# Patient Record
Sex: MALE | Race: WHITE | Employment: OTHER | URBAN - NONMETROPOLITAN AREA
[De-identification: names, ages, dates, MRNs, and addresses within clinical notes are randomized per-mention and may not be internally consistent; named-entity substitution may affect disease eponyms.]

---

## 2017-01-08 DIAGNOSIS — M25.512 LEFT SHOULDER PAIN: ICD-10-CM

## 2017-01-10 ENCOUNTER — TELEPHONE (OUTPATIENT)
Dept: OTHER | Age: 47
End: 2017-01-10

## 2017-01-24 ENCOUNTER — OFFICE VISIT (OUTPATIENT)
Dept: PSYCHIATRY | Age: 47
End: 2017-01-24

## 2017-01-24 DIAGNOSIS — F31.32 BIPOLAR AFFECTIVE DISORDER, CURRENTLY DEPRESSED, MODERATE (HCC): Primary | ICD-10-CM

## 2017-01-24 DIAGNOSIS — F90.8 ADHD, ADULT RESIDUAL TYPE: ICD-10-CM

## 2017-01-24 PROCEDURE — 99213 OFFICE O/P EST LOW 20 MIN: CPT | Performed by: PSYCHIATRY & NEUROLOGY

## 2017-01-24 RX ORDER — METHYLPHENIDATE HYDROCHLORIDE 54 MG/1
54 TABLET ORAL DAILY
Qty: 30 TABLET | Refills: 0 | Status: SHIPPED | OUTPATIENT
Start: 2017-01-24 | End: 2017-03-06 | Stop reason: SDUPTHER

## 2017-01-24 RX ORDER — LAMOTRIGINE 200 MG/1
200 TABLET ORAL 2 TIMES DAILY
Qty: 60 TABLET | Refills: 3 | Status: SHIPPED | OUTPATIENT
Start: 2017-01-24 | End: 2017-05-17 | Stop reason: SDUPTHER

## 2017-01-25 ENCOUNTER — OFFICE VISIT (OUTPATIENT)
Dept: FAMILY MEDICINE CLINIC | Age: 47
End: 2017-01-25

## 2017-01-25 VITALS
BODY MASS INDEX: 42.69 KG/M2 | SYSTOLIC BLOOD PRESSURE: 122 MMHG | HEIGHT: 67 IN | RESPIRATION RATE: 24 BRPM | HEART RATE: 76 BPM | DIASTOLIC BLOOD PRESSURE: 70 MMHG | WEIGHT: 272 LBS | TEMPERATURE: 98.6 F

## 2017-01-25 DIAGNOSIS — R74.8 ELEVATED ALKALINE PHOSPHATASE LEVEL: ICD-10-CM

## 2017-01-25 DIAGNOSIS — F17.210 CIGARETTE NICOTINE DEPENDENCE WITHOUT COMPLICATION: Chronic | ICD-10-CM

## 2017-01-25 DIAGNOSIS — E78.2 MIXED HYPERLIPIDEMIA: ICD-10-CM

## 2017-01-25 DIAGNOSIS — E11.29 TYPE 2 DIABETES MELLITUS WITH MICROALBUMINURIA, WITHOUT LONG-TERM CURRENT USE OF INSULIN (HCC): Primary | Chronic | ICD-10-CM

## 2017-01-25 DIAGNOSIS — R80.9 TYPE 2 DIABETES MELLITUS WITH MICROALBUMINURIA, WITHOUT LONG-TERM CURRENT USE OF INSULIN (HCC): Primary | Chronic | ICD-10-CM

## 2017-01-25 LAB
CHOLESTEROL, TOTAL: 189 MG/DL
CHOLESTEROL/HDL RATIO: ABNORMAL
HDLC SERPL-MCNC: 33 MG/DL (ref 35–70)
LDL CHOLESTEROL CALCULATED: 124 MG/DL (ref 0–160)
TRIGL SERPL-MCNC: 212 MG/DL
VLDLC SERPL CALC-MCNC: 42 MG/DL

## 2017-01-25 PROCEDURE — 99214 OFFICE O/P EST MOD 30 MIN: CPT | Performed by: FAMILY MEDICINE

## 2017-01-26 ENCOUNTER — TELEPHONE (OUTPATIENT)
Dept: FAMILY MEDICINE CLINIC | Age: 47
End: 2017-01-26

## 2017-01-26 LAB
ALBUMIN SERPL-MCNC: 4.2 G/DL
ALP BLD-CCNC: 86 U/L
ALT SERPL-CCNC: 46 U/L
AST SERPL-CCNC: 39 U/L
BILIRUB SERPL-MCNC: 0.2 MG/DL (ref 0.1–1.4)
BUN BLDV-MCNC: 12 MG/DL
CALCIUM SERPL-MCNC: 9.4 MG/DL
CHLORIDE BLD-SCNC: 104 MMOL/L
CO2: 21 MMOL/L
CREAT SERPL-MCNC: 0.97 MG/DL
GFR CALCULATED: 92
GLUCOSE BLD-MCNC: 52 MG/DL
POTASSIUM SERPL-SCNC: 4.6 MMOL/L
SODIUM BLD-SCNC: 142 MMOL/L
TOTAL PROTEIN: 6.5

## 2017-01-30 ENCOUNTER — OFFICE VISIT (OUTPATIENT)
Dept: CARDIOLOGY | Age: 47
End: 2017-01-30

## 2017-01-30 VITALS
HEIGHT: 67 IN | SYSTOLIC BLOOD PRESSURE: 124 MMHG | BODY MASS INDEX: 43.22 KG/M2 | DIASTOLIC BLOOD PRESSURE: 70 MMHG | WEIGHT: 275.4 LBS | HEART RATE: 72 BPM

## 2017-01-30 DIAGNOSIS — I10 ESSENTIAL HYPERTENSION: Primary | Chronic | ICD-10-CM

## 2017-01-30 DIAGNOSIS — R06.02 SOB (SHORTNESS OF BREATH) ON EXERTION: ICD-10-CM

## 2017-01-30 DIAGNOSIS — E78.2 MIXED HYPERLIPIDEMIA: Chronic | ICD-10-CM

## 2017-01-30 DIAGNOSIS — Z98.890 S/P CARDIAC CATHETERIZATION: Chronic | ICD-10-CM

## 2017-01-30 PROCEDURE — 99213 OFFICE O/P EST LOW 20 MIN: CPT | Performed by: INTERNAL MEDICINE

## 2017-02-01 ENCOUNTER — TELEPHONE (OUTPATIENT)
Dept: FAMILY MEDICINE CLINIC | Age: 47
End: 2017-02-01

## 2017-02-01 ENCOUNTER — OFFICE VISIT (OUTPATIENT)
Dept: FAMILY MEDICINE CLINIC | Age: 47
End: 2017-02-01

## 2017-02-01 VITALS
HEART RATE: 92 BPM | HEIGHT: 67 IN | TEMPERATURE: 98.3 F | BODY MASS INDEX: 43.38 KG/M2 | SYSTOLIC BLOOD PRESSURE: 136 MMHG | WEIGHT: 276.4 LBS | DIASTOLIC BLOOD PRESSURE: 72 MMHG | RESPIRATION RATE: 20 BRPM

## 2017-02-01 DIAGNOSIS — K64.0 FIRST DEGREE HEMORRHOIDS: ICD-10-CM

## 2017-02-01 DIAGNOSIS — L02.31 ABSCESS OF BUTTOCK: Primary | ICD-10-CM

## 2017-02-01 DIAGNOSIS — F17.210 CIGARETTE NICOTINE DEPENDENCE WITHOUT COMPLICATION: Chronic | ICD-10-CM

## 2017-02-01 PROCEDURE — 99214 OFFICE O/P EST MOD 30 MIN: CPT | Performed by: FAMILY MEDICINE

## 2017-02-01 RX ORDER — BUTALBITAL, ACETAMINOPHEN AND CAFFEINE 50; 325; 40 MG/1; MG/1; MG/1
1 TABLET ORAL PRN
Qty: 60 TABLET | Refills: 0 | Status: SHIPPED | OUTPATIENT
Start: 2017-02-01 | End: 2017-02-01 | Stop reason: SDUPTHER

## 2017-02-01 RX ORDER — ROPINIROLE 2 MG/1
2 TABLET, FILM COATED ORAL EVERY EVENING
Qty: 90 TABLET | Refills: 0 | Status: SHIPPED | OUTPATIENT
Start: 2017-02-01 | End: 2018-04-18

## 2017-02-01 RX ORDER — MUPIROCIN CALCIUM 20 MG/G
CREAM TOPICAL
Qty: 1 TUBE | Refills: 0 | Status: SHIPPED | OUTPATIENT
Start: 2017-02-01 | End: 2017-03-03

## 2017-02-01 RX ORDER — BUTALBITAL, ACETAMINOPHEN AND CAFFEINE 50; 325; 40 MG/1; MG/1; MG/1
1 TABLET ORAL EVERY 6 HOURS PRN
Qty: 60 TABLET | Refills: 0 | Status: SHIPPED | OUTPATIENT
Start: 2017-02-01 | End: 2021-05-17

## 2017-02-01 RX ORDER — SULFAMETHOXAZOLE AND TRIMETHOPRIM 800; 160 MG/1; MG/1
1 TABLET ORAL 2 TIMES DAILY
Qty: 20 TABLET | Refills: 0 | Status: SHIPPED | OUTPATIENT
Start: 2017-02-01 | End: 2017-02-11

## 2017-03-06 ENCOUNTER — OFFICE VISIT (OUTPATIENT)
Dept: PSYCHIATRY | Age: 47
End: 2017-03-06

## 2017-03-06 DIAGNOSIS — F31.32 BIPOLAR AFFECTIVE DISORDER, CURRENTLY DEPRESSED, MODERATE (HCC): Primary | ICD-10-CM

## 2017-03-06 DIAGNOSIS — F90.8 ADHD, ADULT RESIDUAL TYPE: ICD-10-CM

## 2017-03-06 PROCEDURE — 99213 OFFICE O/P EST LOW 20 MIN: CPT | Performed by: PSYCHIATRY & NEUROLOGY

## 2017-03-06 RX ORDER — METHYLPHENIDATE HYDROCHLORIDE 54 MG/1
54 TABLET ORAL DAILY
Qty: 30 TABLET | Refills: 0 | Status: SHIPPED | OUTPATIENT
Start: 2017-03-06 | End: 2017-04-06

## 2017-03-08 ENCOUNTER — OFFICE VISIT (OUTPATIENT)
Dept: FAMILY MEDICINE CLINIC | Age: 47
End: 2017-03-08

## 2017-03-08 VITALS
SYSTOLIC BLOOD PRESSURE: 126 MMHG | RESPIRATION RATE: 20 BRPM | HEART RATE: 80 BPM | BODY MASS INDEX: 43.78 KG/M2 | TEMPERATURE: 97.7 F | HEIGHT: 66 IN | WEIGHT: 272.4 LBS | DIASTOLIC BLOOD PRESSURE: 66 MMHG

## 2017-03-08 DIAGNOSIS — Z00.00 VISIT FOR PREVENTIVE HEALTH EXAMINATION: Primary | ICD-10-CM

## 2017-03-08 PROCEDURE — 99396 PREV VISIT EST AGE 40-64: CPT | Performed by: FAMILY MEDICINE

## 2017-03-30 ENCOUNTER — OFFICE VISIT (OUTPATIENT)
Dept: FAMILY MEDICINE CLINIC | Age: 47
End: 2017-03-30

## 2017-03-30 VITALS
HEART RATE: 80 BPM | DIASTOLIC BLOOD PRESSURE: 78 MMHG | TEMPERATURE: 98.2 F | RESPIRATION RATE: 18 BRPM | BODY MASS INDEX: 42.82 KG/M2 | HEIGHT: 67 IN | WEIGHT: 272.8 LBS | SYSTOLIC BLOOD PRESSURE: 116 MMHG

## 2017-03-30 DIAGNOSIS — F17.210 CIGARETTE NICOTINE DEPENDENCE WITHOUT COMPLICATION: Chronic | ICD-10-CM

## 2017-03-30 DIAGNOSIS — I10 ESSENTIAL HYPERTENSION: Chronic | ICD-10-CM

## 2017-03-30 DIAGNOSIS — E11.29 TYPE 2 DIABETES MELLITUS WITH MICROALBUMINURIA, WITHOUT LONG-TERM CURRENT USE OF INSULIN (HCC): Primary | Chronic | ICD-10-CM

## 2017-03-30 DIAGNOSIS — R80.9 TYPE 2 DIABETES MELLITUS WITH MICROALBUMINURIA, WITHOUT LONG-TERM CURRENT USE OF INSULIN (HCC): Primary | Chronic | ICD-10-CM

## 2017-03-30 LAB — HBA1C MFR BLD: 6.5 %

## 2017-03-30 PROCEDURE — 99214 OFFICE O/P EST MOD 30 MIN: CPT | Performed by: FAMILY MEDICINE

## 2017-03-30 PROCEDURE — 83036 HEMOGLOBIN GLYCOSYLATED A1C: CPT | Performed by: FAMILY MEDICINE

## 2017-04-04 ENCOUNTER — TELEPHONE (OUTPATIENT)
Dept: FAMILY MEDICINE CLINIC | Age: 47
End: 2017-04-04

## 2017-04-04 DIAGNOSIS — R80.9 TYPE 2 DIABETES MELLITUS WITH MICROALBUMINURIA, WITHOUT LONG-TERM CURRENT USE OF INSULIN (HCC): Primary | Chronic | ICD-10-CM

## 2017-04-04 DIAGNOSIS — G89.29 CHRONIC LEFT SHOULDER PAIN: ICD-10-CM

## 2017-04-04 DIAGNOSIS — M25.512 CHRONIC LEFT SHOULDER PAIN: ICD-10-CM

## 2017-04-04 DIAGNOSIS — E11.29 TYPE 2 DIABETES MELLITUS WITH MICROALBUMINURIA, WITHOUT LONG-TERM CURRENT USE OF INSULIN (HCC): Primary | Chronic | ICD-10-CM

## 2017-04-04 RX ORDER — GLYBURIDE 5 MG/1
5 TABLET ORAL
Qty: 30 TABLET | Refills: 11 | Status: SHIPPED | OUTPATIENT
Start: 2017-04-04 | End: 2017-07-07

## 2017-04-06 ENCOUNTER — OFFICE VISIT (OUTPATIENT)
Dept: PSYCHIATRY | Age: 47
End: 2017-04-06

## 2017-04-06 DIAGNOSIS — F31.32 BIPOLAR AFFECTIVE DISORDER, CURRENTLY DEPRESSED, MODERATE (HCC): Primary | ICD-10-CM

## 2017-04-06 DIAGNOSIS — F90.8 ADHD, ADULT RESIDUAL TYPE: ICD-10-CM

## 2017-04-06 PROCEDURE — 99213 OFFICE O/P EST LOW 20 MIN: CPT | Performed by: PSYCHIATRY & NEUROLOGY

## 2017-04-06 RX ORDER — TRAZODONE HYDROCHLORIDE 50 MG/1
50 TABLET ORAL NIGHTLY PRN
Qty: 30 TABLET | Refills: 3 | Status: SHIPPED | OUTPATIENT
Start: 2017-04-06 | End: 2017-07-17 | Stop reason: SDUPTHER

## 2017-04-06 RX ORDER — DEXTROAMPHETAMINE SACCHARATE, AMPHETAMINE ASPARTATE, DEXTROAMPHETAMINE SULFATE AND AMPHETAMINE SULFATE 5; 5; 5; 5 MG/1; MG/1; MG/1; MG/1
20 TABLET ORAL 2 TIMES DAILY
Qty: 60 TABLET | Refills: 0 | Status: SHIPPED | OUTPATIENT
Start: 2017-04-06 | End: 2017-05-09 | Stop reason: SDUPTHER

## 2017-05-09 RX ORDER — DEXTROAMPHETAMINE SACCHARATE, AMPHETAMINE ASPARTATE, DEXTROAMPHETAMINE SULFATE AND AMPHETAMINE SULFATE 5; 5; 5; 5 MG/1; MG/1; MG/1; MG/1
20 TABLET ORAL 2 TIMES DAILY
Qty: 60 TABLET | Refills: 0 | Status: SHIPPED | OUTPATIENT
Start: 2017-05-09 | End: 2017-06-16

## 2017-05-17 ENCOUNTER — OFFICE VISIT (OUTPATIENT)
Dept: PSYCHIATRY | Age: 47
End: 2017-05-17

## 2017-05-17 DIAGNOSIS — F31.32 BIPOLAR AFFECTIVE DISORDER, CURRENTLY DEPRESSED, MODERATE (HCC): Primary | ICD-10-CM

## 2017-05-17 DIAGNOSIS — F90.8 ADHD, ADULT RESIDUAL TYPE: ICD-10-CM

## 2017-05-17 PROCEDURE — 99213 OFFICE O/P EST LOW 20 MIN: CPT | Performed by: PSYCHIATRY & NEUROLOGY

## 2017-05-17 RX ORDER — LAMOTRIGINE 200 MG/1
200 TABLET ORAL 2 TIMES DAILY
Qty: 60 TABLET | Refills: 3 | Status: SHIPPED | OUTPATIENT
Start: 2017-05-17 | End: 2017-09-18

## 2017-05-28 DIAGNOSIS — R80.9 TYPE 2 DIABETES MELLITUS WITH MICROALBUMINURIA, WITHOUT LONG-TERM CURRENT USE OF INSULIN (HCC): ICD-10-CM

## 2017-05-28 DIAGNOSIS — E11.29 TYPE 2 DIABETES MELLITUS WITH MICROALBUMINURIA, WITHOUT LONG-TERM CURRENT USE OF INSULIN (HCC): ICD-10-CM

## 2017-05-29 RX ORDER — LISINOPRIL 2.5 MG/1
TABLET ORAL
Qty: 30 TABLET | Refills: 11 | Status: SHIPPED | OUTPATIENT
Start: 2017-05-29 | End: 2018-05-22 | Stop reason: SDUPTHER

## 2017-06-16 ENCOUNTER — OFFICE VISIT (OUTPATIENT)
Dept: PSYCHIATRY | Age: 47
End: 2017-06-16

## 2017-06-16 DIAGNOSIS — F31.32 BIPOLAR AFFECTIVE DISORDER, CURRENTLY DEPRESSED, MODERATE (HCC): Primary | ICD-10-CM

## 2017-06-16 DIAGNOSIS — F90.8 ADHD, ADULT RESIDUAL TYPE: ICD-10-CM

## 2017-06-16 PROCEDURE — 99213 OFFICE O/P EST LOW 20 MIN: CPT | Performed by: PSYCHIATRY & NEUROLOGY

## 2017-06-16 RX ORDER — DEXTROAMPHETAMINE SACCHARATE, AMPHETAMINE ASPARTATE, DEXTROAMPHETAMINE SULFATE AND AMPHETAMINE SULFATE 7.5; 7.5; 7.5; 7.5 MG/1; MG/1; MG/1; MG/1
30 TABLET ORAL 2 TIMES DAILY
Qty: 60 TABLET | Refills: 0 | Status: SHIPPED | OUTPATIENT
Start: 2017-06-16 | End: 2017-07-17 | Stop reason: SDUPTHER

## 2017-06-16 RX ORDER — VILAZODONE HYDROCHLORIDE 40 MG/1
TABLET ORAL
Qty: 30 TABLET | Refills: 3 | Status: SHIPPED | OUTPATIENT
Start: 2017-06-16 | End: 2017-10-16 | Stop reason: SDUPTHER

## 2017-07-01 DIAGNOSIS — G89.29 CHRONIC LEFT SHOULDER PAIN: ICD-10-CM

## 2017-07-01 DIAGNOSIS — M25.512 CHRONIC LEFT SHOULDER PAIN: ICD-10-CM

## 2017-07-07 ENCOUNTER — OFFICE VISIT (OUTPATIENT)
Dept: FAMILY MEDICINE CLINIC | Age: 47
End: 2017-07-07

## 2017-07-07 VITALS
RESPIRATION RATE: 14 BRPM | TEMPERATURE: 98 F | HEIGHT: 66 IN | HEART RATE: 89 BPM | DIASTOLIC BLOOD PRESSURE: 70 MMHG | BODY MASS INDEX: 43.55 KG/M2 | WEIGHT: 271 LBS | SYSTOLIC BLOOD PRESSURE: 134 MMHG

## 2017-07-07 DIAGNOSIS — Z13.31 POSITIVE DEPRESSION SCREENING: ICD-10-CM

## 2017-07-07 DIAGNOSIS — F17.210 CIGARETTE NICOTINE DEPENDENCE WITHOUT COMPLICATION: Chronic | ICD-10-CM

## 2017-07-07 DIAGNOSIS — E11.29 TYPE 2 DIABETES MELLITUS WITH MICROALBUMINURIA, WITHOUT LONG-TERM CURRENT USE OF INSULIN (HCC): Primary | Chronic | ICD-10-CM

## 2017-07-07 DIAGNOSIS — R80.9 TYPE 2 DIABETES MELLITUS WITH MICROALBUMINURIA, WITHOUT LONG-TERM CURRENT USE OF INSULIN (HCC): Primary | Chronic | ICD-10-CM

## 2017-07-07 DIAGNOSIS — E66.01 MORBID OBESITY WITH BMI OF 40.0-44.9, ADULT (HCC): Chronic | ICD-10-CM

## 2017-07-07 DIAGNOSIS — F31.9 BIPOLAR 1 DISORDER (HCC): Chronic | ICD-10-CM

## 2017-07-07 LAB — HBA1C MFR BLD: 5.6 %

## 2017-07-07 PROCEDURE — 99214 OFFICE O/P EST MOD 30 MIN: CPT | Performed by: FAMILY MEDICINE

## 2017-07-07 PROCEDURE — 83036 HEMOGLOBIN GLYCOSYLATED A1C: CPT | Performed by: FAMILY MEDICINE

## 2017-07-07 RX ORDER — ATORVASTATIN CALCIUM 80 MG/1
TABLET, FILM COATED ORAL
Qty: 90 TABLET | Refills: 3 | Status: SHIPPED | OUTPATIENT
Start: 2017-07-07 | End: 2018-07-21 | Stop reason: SDUPTHER

## 2017-07-07 RX ORDER — LANCETS 30 GAUGE
EACH MISCELLANEOUS
Qty: 100 EACH | Refills: 3 | Status: SHIPPED | OUTPATIENT
Start: 2017-07-07 | End: 2019-02-05 | Stop reason: SDUPTHER

## 2017-07-07 ASSESSMENT — PATIENT HEALTH QUESTIONNAIRE - PHQ9
3. TROUBLE FALLING OR STAYING ASLEEP: 2
6. FEELING BAD ABOUT YOURSELF - OR THAT YOU ARE A FAILURE OR HAVE LET YOURSELF OR YOUR FAMILY DOWN: 2
5. POOR APPETITE OR OVEREATING: 1
2. FEELING DOWN, DEPRESSED OR HOPELESS: 2
10. IF YOU CHECKED OFF ANY PROBLEMS, HOW DIFFICULT HAVE THESE PROBLEMS MADE IT FOR YOU TO DO YOUR WORK, TAKE CARE OF THINGS AT HOME, OR GET ALONG WITH OTHER PEOPLE: 2
8. MOVING OR SPEAKING SO SLOWLY THAT OTHER PEOPLE COULD HAVE NOTICED. OR THE OPPOSITE, BEING SO FIGETY OR RESTLESS THAT YOU HAVE BEEN MOVING AROUND A LOT MORE THAN USUAL: 2
SUM OF ALL RESPONSES TO PHQ QUESTIONS 1-9: 15
7. TROUBLE CONCENTRATING ON THINGS, SUCH AS READING THE NEWSPAPER OR WATCHING TELEVISION: 3
9. THOUGHTS THAT YOU WOULD BE BETTER OFF DEAD, OR OF HURTING YOURSELF: 1
4. FEELING TIRED OR HAVING LITTLE ENERGY: 2
SUM OF ALL RESPONSES TO PHQ9 QUESTIONS 1 & 2: 2

## 2017-07-17 ENCOUNTER — OFFICE VISIT (OUTPATIENT)
Dept: PSYCHIATRY | Age: 47
End: 2017-07-17
Payer: COMMERCIAL

## 2017-07-17 DIAGNOSIS — F31.32 BIPOLAR AFFECTIVE DISORDER, CURRENTLY DEPRESSED, MODERATE (HCC): Primary | ICD-10-CM

## 2017-07-17 DIAGNOSIS — F90.8 ADHD, ADULT RESIDUAL TYPE: ICD-10-CM

## 2017-07-17 PROCEDURE — 99213 OFFICE O/P EST LOW 20 MIN: CPT | Performed by: PSYCHIATRY & NEUROLOGY

## 2017-07-17 RX ORDER — DEXTROAMPHETAMINE SACCHARATE, AMPHETAMINE ASPARTATE, DEXTROAMPHETAMINE SULFATE AND AMPHETAMINE SULFATE 7.5; 7.5; 7.5; 7.5 MG/1; MG/1; MG/1; MG/1
30 TABLET ORAL 2 TIMES DAILY
Qty: 60 TABLET | Refills: 0 | Status: SHIPPED | OUTPATIENT
Start: 2017-07-17 | End: 2017-09-18 | Stop reason: SDUPTHER

## 2017-07-17 RX ORDER — TRAZODONE HYDROCHLORIDE 50 MG/1
50 TABLET ORAL NIGHTLY PRN
Qty: 30 TABLET | Refills: 3 | Status: SHIPPED | OUTPATIENT
Start: 2017-07-17 | End: 2017-08-17 | Stop reason: SDUPTHER

## 2017-07-27 DIAGNOSIS — E78.1 HYPERTRIGLYCERIDEMIA: Chronic | ICD-10-CM

## 2017-07-27 RX ORDER — FENOFIBRATE 160 MG/1
TABLET ORAL
Qty: 30 TABLET | Refills: 11 | Status: SHIPPED | OUTPATIENT
Start: 2017-07-27 | End: 2018-07-21 | Stop reason: SDUPTHER

## 2017-08-17 ENCOUNTER — OFFICE VISIT (OUTPATIENT)
Dept: PSYCHIATRY | Age: 47
End: 2017-08-17
Payer: COMMERCIAL

## 2017-08-17 ENCOUNTER — TELEPHONE (OUTPATIENT)
Dept: FAMILY MEDICINE CLINIC | Age: 47
End: 2017-08-17

## 2017-08-17 DIAGNOSIS — M79.7 FIBROMYALGIA: Primary | ICD-10-CM

## 2017-08-17 DIAGNOSIS — F31.32 BIPOLAR AFFECTIVE DISORDER, CURRENTLY DEPRESSED, MODERATE (HCC): Primary | ICD-10-CM

## 2017-08-17 DIAGNOSIS — F90.8 ADHD, ADULT RESIDUAL TYPE: ICD-10-CM

## 2017-08-17 PROCEDURE — 99213 OFFICE O/P EST LOW 20 MIN: CPT | Performed by: PSYCHIATRY & NEUROLOGY

## 2017-08-17 RX ORDER — GUANFACINE 1 MG/1
1 TABLET ORAL NIGHTLY
Qty: 30 TABLET | Refills: 3 | Status: SHIPPED | OUTPATIENT
Start: 2017-08-17 | End: 2018-01-17

## 2017-08-17 RX ORDER — TRAZODONE HYDROCHLORIDE 50 MG/1
50 TABLET ORAL NIGHTLY PRN
Qty: 30 TABLET | Refills: 3 | Status: SHIPPED | OUTPATIENT
Start: 2017-08-17 | End: 2018-03-21 | Stop reason: SDUPTHER

## 2017-09-05 ENCOUNTER — TELEPHONE (OUTPATIENT)
Dept: PSYCHIATRY | Age: 47
End: 2017-09-05

## 2017-09-18 ENCOUNTER — OFFICE VISIT (OUTPATIENT)
Dept: PSYCHIATRY | Age: 47
End: 2017-09-18
Payer: COMMERCIAL

## 2017-09-18 DIAGNOSIS — F31.32 BIPOLAR AFFECTIVE DISORDER, CURRENTLY DEPRESSED, MODERATE (HCC): Primary | ICD-10-CM

## 2017-09-18 DIAGNOSIS — F90.8 ADHD, ADULT RESIDUAL TYPE: ICD-10-CM

## 2017-09-18 PROCEDURE — 99213 OFFICE O/P EST LOW 20 MIN: CPT | Performed by: PSYCHIATRY & NEUROLOGY

## 2017-09-18 RX ORDER — LAMOTRIGINE 25 MG/1
TABLET ORAL
Qty: 42 TABLET | Refills: 0 | Status: SHIPPED | OUTPATIENT
Start: 2017-09-18 | End: 2017-10-16

## 2017-09-18 RX ORDER — DEXTROAMPHETAMINE SACCHARATE, AMPHETAMINE ASPARTATE, DEXTROAMPHETAMINE SULFATE AND AMPHETAMINE SULFATE 7.5; 7.5; 7.5; 7.5 MG/1; MG/1; MG/1; MG/1
30 TABLET ORAL 2 TIMES DAILY
Qty: 60 TABLET | Refills: 0 | Status: SHIPPED | OUTPATIENT
Start: 2017-09-18 | End: 2017-10-16 | Stop reason: SDUPTHER

## 2017-09-25 ENCOUNTER — TELEPHONE (OUTPATIENT)
Dept: FAMILY MEDICINE CLINIC | Age: 47
End: 2017-09-25

## 2017-09-25 DIAGNOSIS — E11.29 TYPE 2 DIABETES MELLITUS WITH MICROALBUMINURIA, WITHOUT LONG-TERM CURRENT USE OF INSULIN (HCC): Primary | Chronic | ICD-10-CM

## 2017-09-25 DIAGNOSIS — R80.9 TYPE 2 DIABETES MELLITUS WITH MICROALBUMINURIA, WITHOUT LONG-TERM CURRENT USE OF INSULIN (HCC): Primary | Chronic | ICD-10-CM

## 2017-10-04 ENCOUNTER — OFFICE VISIT (OUTPATIENT)
Dept: RHEUMATOLOGY | Age: 47
End: 2017-10-04
Payer: COMMERCIAL

## 2017-10-04 ENCOUNTER — TELEPHONE (OUTPATIENT)
Dept: FAMILY MEDICINE CLINIC | Age: 47
End: 2017-10-04

## 2017-10-04 VITALS
WEIGHT: 266 LBS | DIASTOLIC BLOOD PRESSURE: 85 MMHG | SYSTOLIC BLOOD PRESSURE: 141 MMHG | RESPIRATION RATE: 16 BRPM | HEIGHT: 66 IN | BODY MASS INDEX: 42.75 KG/M2 | HEART RATE: 98 BPM

## 2017-10-04 DIAGNOSIS — M54.41 CHRONIC MIDLINE LOW BACK PAIN WITH BILATERAL SCIATICA: ICD-10-CM

## 2017-10-04 DIAGNOSIS — G89.29 CHRONIC MIDLINE LOW BACK PAIN WITH BILATERAL SCIATICA: ICD-10-CM

## 2017-10-04 DIAGNOSIS — M54.42 CHRONIC MIDLINE LOW BACK PAIN WITH BILATERAL SCIATICA: ICD-10-CM

## 2017-10-04 DIAGNOSIS — M25.50 POLYARTHRALGIA: Primary | ICD-10-CM

## 2017-10-04 LAB
AVERAGE GLUCOSE: NORMAL
HBA1C MFR BLD: 6.2 %

## 2017-10-04 PROCEDURE — 99244 OFF/OP CNSLTJ NEW/EST MOD 40: CPT | Performed by: INTERNAL MEDICINE

## 2017-10-04 RX ORDER — RANITIDINE 150 MG/1
150 TABLET ORAL NIGHTLY
COMMUNITY
End: 2020-10-13

## 2017-10-04 ASSESSMENT — ENCOUNTER SYMPTOMS
DOUBLE VISION: 0
CONSTIPATION: 0
ORTHOPNEA: 0
BACK PAIN: 1
ABDOMINAL PAIN: 1
NAUSEA: 1
SHORTNESS OF BREATH: 1
EYE DISCHARGE: 0
PHOTOPHOBIA: 0
BLURRED VISION: 1
COUGH: 1
DIARRHEA: 0
WHEEZING: 1
BLOOD IN STOOL: 0
EYE PAIN: 0
HEARTBURN: 1

## 2017-10-04 NOTE — PROGRESS NOTES
knees. Ankle swellign more dependent. - AM stiffness lasting about 4 hours, improved w/ movement. + gelling  - Persistent fatigue/tiredness, + insomnia (getting to sleep and staying asleep), non-restorative sleep.   - weakness along the left side since his TIA's per pt, Rt knee and ankle from Tarsal tunnel per pt.   - myalgia's shoulder, arms, wrist, lower back, posterior thighs, calfs and ankles. - tingling of the bilateral hands and toes. Hands worse with lifting, writing, prolonged driving or holding of cellphone. Hand numbness/tingling waking  - not currently exercising. Reported increased pain after exercising.     - Denies Photosenstivity, Rash, dry mouth/dry eyes, oral/nasal sores, Raynaud's, digital ulcerations, skin tightening, renal disease, foamy urination, hematuria, sz's, blood clots, AIHA, leukpenia/lymphopenia, thrombocytopenia, hair loss, serositis, arthritis. enthesitis, dactylitis, nail changes, hx of STD,  personal or family history of Psoriatic arthritis, psoriasis, ank spond,       Cancer screening:up to date   Travel: denies   Exposure(s): denies   FmHX: Rheumatoid arthritis: mother and father   ROS:  Review of Systems   Constitutional: Positive for weight loss (intentional). Negative for malaise/fatigue. HENT: Positive for congestion and tinnitus. Eyes: Positive for blurred vision. Negative for double vision, photophobia, pain and discharge. Respiratory: Positive for cough (mainly at night), shortness of breath (mainly on exertion) and wheezing. Cardiovascular: Positive for leg swelling. Negative for chest pain, palpitations, orthopnea and PND. Gastrointestinal: Positive for abdominal pain, heartburn and nausea. Negative for blood in stool, constipation, diarrhea and melena. Genitourinary: Positive for frequency. Musculoskeletal: Positive for back pain, myalgias and neck pain. Skin: Negative. Neurological: Positive for tingling and weakness.    Endo/Heme/Allergies: Positive for polydipsia. Psychiatric/Behavioral: Positive for depression and suicidal ideas (denies plans currently). Negative for substance abuse. The patient is nervous/anxious and has insomnia. PAST MEDICAL HISTORY  Past Medical History:   Diagnosis Date    ADHD (attention deficit hyperactivity disorder)     Allergic rhinitis     Benign esophageal stricture     s/p dilation may 2014    Bipolar 1 disorder (HCC)     BPH (benign prostatic hypertrophy)     CAD (coronary artery disease)     Unclear history, cath 2013 with patent arteries. Records pending.  Chronic back pain     Depression     DM2 (diabetes mellitus, type 2) ([facility] [address]     Dysfunctional voiding of urine 6/20/2013    Medications he changes to follow,  Adding ditropan  to the present schedule     ED (erectile dysfunction) of organic origin 5/1/2013    Fibromyalgia     GERD (gastroesophageal reflux disease)     History of colon polyps     Pt unsure when last colo.  History of TIA (transient ischemic attack)     x2     Hyperlipidemia     Hypertension     Hypertriglyceridemia 10/27/2015    Migraine headache     Morbid obesity with BMI of 40.0-44.9, adult ([facility])     Nicotine dependence     Obstructive sleep apnea on CPAP 11/3/2015    Peripheral neuropathy ([facility])     Pulmonary nodule     stable >2 years, no further w/u required.  Rotator cuff injury     Left side    S/P cardiac catheterization: 9/30/2013: Normal coronaries 9/30/2013 9/30/2013: Normal coronaries. Radial approach.  Dr. Ralston Jeans Short-segment Vásquez's esophagus 6/17/2015    Tarsal tunnel syndrome     Bilateral    Vertigo        SOCIAL HISTORY  Social History     Social History    Marital status:      Spouse name: N/A    Number of children: N/A    Years of education: N/A     Social History Main Topics    Smoking status: Current Some Day Smoker     Packs/day: 0.25     Years: 30.00     Types: Cigarettes     Start date: 2/1/1984    Smokeless lurasidone (LATUDA) 80 MG TABS tablet Take 1 tablet by mouth 2 times daily 60 tablet 3    Lancets MISC Check sugars once daily 100 each 3    glucose blood VI test strips (ACURA BLOOD GLUCOSE TEST) strip Check sugars daily 100 each 3    atorvastatin (LIPITOR) 80 MG tablet TAKE 1 TABLET BY MOUTH EVERY DAY 90 tablet 3    piroxicam (FELDENE) 20 MG capsule TAKE 1 CAPSULE BY MOUTH ONE TIME A DAY AS NEEDED FOR PAIN 30 capsule 5    vilazodone HCl (VILAZODONE HCL) 40 MG TABS Take 1/2 tablet daily by mouth, with food, for 1 week, then increase to the whole tablet daily 30 tablet 3    lisinopril (PRINIVIL;ZESTRIL) 2.5 MG tablet TAKE 1 TABLET BY MOUTH ONE TIME A DAY  30 tablet 11    metFORMIN (GLUCOPHAGE) 1000 MG tablet Take 1 tablet by mouth 2 times daily (with meals) 60 tablet 11    rOPINIRole (REQUIP) 2 MG tablet Take 1 tablet by mouth every evening Patient is to take 3 hours before bedtime 90 tablet 0    butalbital-acetaminophen-caffeine (FIORICET) -40 MG per tablet Take 1 tablet by mouth every 6 hours as needed for Migraine 60 tablet 0    omeprazole (PRILOSEC) 20 MG delayed release capsule Take 1 capsule by mouth 2 times daily (before meals) (Patient taking differently: Take 40 mg by mouth Daily ) 180 capsule 3    cetirizine (ZYRTEC) 10 MG tablet       EYE ITCH RELIEF 0.025 % ophthalmic solution       propranolol (INDERAL LA) 160 MG ER capsule Take 160 mg by mouth daily       fluticasone (FLONASE) 50 MCG/ACT nasal spray 1 spray by Nasal route daily as needed.  gabapentin (NEURONTIN) 800 MG tablet   Take 800 mg by mouth 4 times daily        No current facility-administered medications for this visit. Objective:  BP (!) 141/85  Pulse 98  Resp 16  Ht 5' 6.14\" (1.68 m)  Wt 266 lb (120.7 kg)  BMI 42.75 kg/m2    General: No distress. Alert. Eyes: P ERRL. No sclera icterus. No conjunctival injection. ENT: No discharge. Pharynx clear. Neck: Trachea midline. Normal thyroid.   Resp: No 01/26/2017    CREATININE 0.97 01/26/2017    ALT 46 01/26/2017    AST 39 01/26/2017       HgBA1c: No components found for: HGBA1C    Lab Results   Component Value Date    TSH 3.660 12/03/2016     No results found for: VITD25      No results found for: KIYA  No components found for: SSAABIGGREF  No components found for: SSBABIGGREF  No components found for: SMITHABIGGAR  No results found for: DSDNAAB   No results found for: C3, C4  No components found for: CYCCITPEPIGG  No results found for: RF    No components found for: CANCASCRN, APANCASCRN  No results found for: SEDRATE  No results found for: CRP    RADIOLOGY:   XR left shoulder: 4/15/16: IMPRESSION: Mild hypertrophic spurring greater tuberosity. Otherwise normal shoulder. XR left wrist: no abnormalities    XR lumbar spine: 4/7/14: no abnormalities noted. Assessment/ Plan:  There are no diagnoses linked to this encounter. polyarthralgia  Pt reports having injury of the lower back in 1998 w/  after lifting at work. Since this time he has developed generalized joint pain, and myalgia that have progressively worsened. rheumatologist evaluation 1999 dx'ed with Fibromyalgia and referred to physical therapy. Sx's have continued to progressively worsened. Pain now affecting the finger (MCPs, PIPs), wrist, elbows, shoulder, hips, knee, ankles, toe, neck and back. Most severe pain in the lower back. Pain on a scale 0-10: 8/10, pain ranges 3-9/10. Type of pain: constant, localized, aching pain with occasional sharp pain in the finger, knee,s ankles, and back. L > Rt legs shooting pains into the feet reported. Timing: mornings. AM stiffness lasting about 4 hours, improved w/ movement. + gelling.  myalgia's shoulder, arms, wrist, lower back, posterior thighs, calfs and ankles. FMHX of RA in mother and father.    - Exam withOUT synovitis, genearlized joint tenderness hands, arms, shoulder, hips, knees, ankles, toes. Left arm weakness noted.  (+) tinnel and phalen phalen and tinnel testing.    - Prior tx:  CTS release around 2002.    - evaluation for underlying inflammatory conditions such as RA, SLE, Etc    - discussed repeating the EMG/NCV   - report EMG/NCV at [facility] ~ 3 years ago       Chronic back pain:   - reports having injury of the lower back in 1998 w/  after lifting at work. constant, localized, aching pain with occasional sharp pain . L > Rt legs shooting pains into the feet reported. Exam with tenderness of the lumbosacral spine, (+) LBP with Left SLR. Intact DTR's, muscle strength in bilateral legs. Prior evaluation by [facility])  - CT lumbar spine 2014 w/o abnormalities. - Previous therapy: physical therapy. Lumbar and cervical epidural injections. Elavil (no relief). Tizanidine (? Relief)   - Current therapy: Adderall, lamictal, trazadone, latuda, Lipitor, asa, vilazodone, requip 2 mg, fioricet, Prilosec, Zyrtec, gabapentin 800mg qid., TENS unit. - could attempt addition of SNRI such as cymbalta but would defer to PCP and psychiatrist.    - other tx options that my be helpful include wt loss, pain medication but will defer this to his PCP        Return in about 3 months (around 1/4/2018). Electronically signed by Yared Hernandez DO on 10/4/2017 at 8:58 AM      Thank you for allowing me to participate in the care of this patient. Please call if there are any questions.

## 2017-10-04 NOTE — MR AVS SNAPSHOT
After Visit Summary             Meena Venegas   10/4/2017 8:45 AM   Office Visit    Description:  Male : 1970   Provider:  Raul Glass DO   Department:  [facility]) Rheumatology              Your Follow-Up and Future Appointments         Below is a list of your follow-up and future appointments. This may not be a complete list as you may have made appointments directly with providers that we are not aware of or your providers may have made some for you. Please call your providers to confirm appointments. It is important to keep your appointments. Please bring your current insurance card, photo ID, co-pay, and all medication bottles to your appointment. If self-pay, payment is expected at the time of service. Your To-Do List     Future Appointments Provider Department Dept Phone    10/12/2017 7:45 AM MD SHREYA Fernandez AM, II.[facility] Urology [phone]    Please arrive 15 minutes prior to appointment, bring photo ID and insurance card. Please arrive 15 minutes prior to appointment, bring photo ID and insurance card. 2018 7:40 AM Vincent Christie DO [address] [place] [phone]    Please arrive 15 minutes prior to appointment, bring photo ID and insurance card. 2018 7:45 AM [facility] [address] Rheumatology [phone]    Please arrive 15 minutes prior to appointment, bring photo ID and insurance card.     Future Orders Complete By Expires    Anti SSA [IBD9947 Custom]  10/4/2017 10/4/2018    Anti SSB [BXO6217 Custom]  10/4/2017     Basic Metabolic Panel [CNW67 Custom]  10/4/2017 10/4/2018    C-Reactive Protein [LZM899 Custom]  10/4/2017 10/4/2018    Uric Acid [XMO647 Custom]  10/4/2017 10/4/2018    CBC Auto Differential [WOP3904 Custom]  10/11/2017 10/4/2018    KIYA [NUF899 Custom]  10/18/2017 90/3/8850    Cyclic Citrul Peptide Antibody, IgG [JHB940 Custom]  10/18/2017 10/4/2018    Rheumatoid Factor [IDH748 Custom]  10/18/2017 10/4/2018 Sedimentation Rate [DVW2423 Custom]  10/18/2017 10/4/2018    Follow-Up    Return in about 3 months (around 1/4/2018). Information from Your Visit        Department     Name Address Phone Fax    [address] [facility] [address] [phone]      You Were Seen for:         Comments    Polyarthralgia   [138759]         Vital Signs     Blood Pressure Pulse Respirations Height Weight Body Mass Index    141/85 98 16 5' 6.14\" (1.68 m) 266 lb (120.7 kg) 42.75 kg/m2    Smoking Status                   Current Some Day Smoker           Additional Information about your Body Mass Index (BMI)           Your BMI as listed above is considered obese (30 or more). BMI is an estimate of body fat, calculated from your height and weight. The higher your BMI, the greater your risk of heart disease, high blood pressure, type 2 diabetes, stroke, gallstones, arthritis, sleep apnea, and certain cancers. BMI is not perfect. It may overestimate body fat in athletes and people who are more muscular. Even a small weight loss (between 5 and 10 percent of your current weight) by decreasing your calorie intake and becoming more physically active will help lower your risk of developing or worsening diseases associated with obesity. Learn more at: [employer]             Today's Medication Changes          These changes are accurate as of: 10/4/17 11:02 AM.  If you have any questions, ask your nurse or doctor.                STOP taking these medications           aspirin 81 MG tablet   Stopped by:  Gisell Ocasio DO       bisacodyl 5 MG EC tablet   Commonly known as:  DULCOLAX   Stopped by:  Gisell Ocasio DO       fluocinonide 0.05 % cream   Commonly known as:  LIDEX   Stopped by:  Gisell Ocasio DO       polyethylene glycol powder   Commonly known as:  GLYCOLAX   Stopped by:  Gisell Ocasio DO       tamsulosin 0.4 MG capsule gabapentin (NEURONTIN) 800 MG tablet   Take 800 mg by mouth 4 times daily       Allergies           No Known Allergies         Additional Information        Basic Information     Date Of Birth Sex Race Ethnicity Preferred Language    1970 Male White Non-/Non  English      Problem List as of 10/4/2017  Date Reviewed: 9/18/2017                Obstructive sleep apnea on CPAP (Chronic)    DM2 (diabetes mellitus, type 2) (HCC) (Chronic)    Left wrist sprain    BPH (benign prostatic hypertrophy) (Chronic)    Morbid obesity with BMI of 40.0-44.9, adult (HCC) (Chronic)    SOB (shortness of breath) on exertion    Tinnitus    Left shoulder pain    Hypertriglyceridemia (Chronic)    BPPV (benign paroxysmal positional vertigo)    Short-segment Vásquez's esophagus (Chronic)    Pulmonary nodule (Chronic)    Bipolar 1 disorder (HCC) (Chronic)    CAD (coronary artery disease) (Chronic)    History of TIA (transient ischemic attack) (Chronic)    Hypertension (Chronic)    Fibromyalgia (Chronic)    Depression (Chronic)    History of colon polyps (Chronic)    Chronic back pain (Chronic)    ADHD (attention deficit hyperactivity disorder) (Chronic)    Nicotine dependence (Chronic)    Peripheral neuropathy (HCC) (Chronic)    Tarsal tunnel syndrome (Chronic)    Benign esophageal stricture (Chronic)    Hyperlipidemia (Chronic)    Allergic rhinitis (Chronic)    Migraine headache (Chronic)    Dysphagia s/p dilation.  Follows with [facility] (Chronic)    GERD (gastroesophageal reflux disease) (Chronic)    S/P cardiac catheterization: 9/30/2013: Normal coronaries (Chronic)    Dysfunctional voiding of urine (Chronic)    ED (erectile dysfunction) of organic origin (Chronic)      Immunizations as of 10/4/2017     Name Date    Influenza Vaccine, unspecified formulation 9/12/2016    Influenza Virus Vaccine 9/29/2015, 10/27/2014    Nishant, Devan Campbell, 3 Years and older, IM 9/30/2016    Pneumococcal 13-valent Conjugate (Reginald Handsome) 9/3/2015

## 2017-10-04 NOTE — TELEPHONE ENCOUNTER
----- Message from Latosha Barron DO sent at 10/4/2017 10:29 AM EDT -----  Please let pt know that a1c great, con't current diabetes treatment, thanks!

## 2017-10-16 ENCOUNTER — OFFICE VISIT (OUTPATIENT)
Dept: PSYCHIATRY | Age: 47
End: 2017-10-16
Payer: COMMERCIAL

## 2017-10-16 DIAGNOSIS — F90.8 ADHD, ADULT RESIDUAL TYPE: ICD-10-CM

## 2017-10-16 DIAGNOSIS — F31.32 BIPOLAR AFFECTIVE DISORDER, CURRENTLY DEPRESSED, MODERATE (HCC): Primary | ICD-10-CM

## 2017-10-16 PROCEDURE — 99213 OFFICE O/P EST LOW 20 MIN: CPT | Performed by: PSYCHIATRY & NEUROLOGY

## 2017-10-16 RX ORDER — VILAZODONE HYDROCHLORIDE 40 MG/1
TABLET ORAL
Qty: 30 TABLET | Refills: 5 | Status: SHIPPED | OUTPATIENT
Start: 2017-10-16 | End: 2018-02-22

## 2017-10-16 RX ORDER — DEXTROAMPHETAMINE SACCHARATE, AMPHETAMINE ASPARTATE, DEXTROAMPHETAMINE SULFATE AND AMPHETAMINE SULFATE 7.5; 7.5; 7.5; 7.5 MG/1; MG/1; MG/1; MG/1
30 TABLET ORAL 2 TIMES DAILY
Qty: 60 TABLET | Refills: 0 | Status: SHIPPED | OUTPATIENT
Start: 2017-10-16 | End: 2017-11-17 | Stop reason: SDUPTHER

## 2017-10-16 RX ORDER — LAMOTRIGINE 100 MG/1
TABLET ORAL
Qty: 60 TABLET | Refills: 0 | Status: SHIPPED | OUTPATIENT
Start: 2017-10-16 | End: 2017-11-17 | Stop reason: SDUPTHER

## 2017-10-16 NOTE — PROGRESS NOTES
tablet Take 1 tablet by mouth 2 times daily .  60 tablet 0    lamoTRIgine (LAMICTAL) 100 MG tablet After completing the smaller size, take 1 by mouth daily for seven days, then increase to 2 daily 60 tablet 0    vilazodone HCl (VILAZODONE HCL) 40 MG TABS Take 1 by mouth daily 30 tablet 5    ranitidine (ZANTAC) 150 MG tablet Take 150 mg by mouth nightly Take 1 tab at bedtime      guanFACINE (TENEX) 1 MG tablet Take 1 tablet by mouth nightly 30 tablet 3    traZODone (DESYREL) 50 MG tablet Take 1 tablet by mouth nightly as needed for Sleep 30 tablet 3    fenofibrate 160 MG tablet TAKE 1 TABLET BY MOUTH ONE TIME A DAY  30 tablet 11    lurasidone (LATUDA) 80 MG TABS tablet Take 1 tablet by mouth 2 times daily 60 tablet 3    Lancets MISC Check sugars once daily 100 each 3    glucose blood VI test strips (ACURA BLOOD GLUCOSE TEST) strip Check sugars daily 100 each 3    atorvastatin (LIPITOR) 80 MG tablet TAKE 1 TABLET BY MOUTH EVERY DAY 90 tablet 3    piroxicam (FELDENE) 20 MG capsule TAKE 1 CAPSULE BY MOUTH ONE TIME A DAY AS NEEDED FOR PAIN 30 capsule 5    lisinopril (PRINIVIL;ZESTRIL) 2.5 MG tablet TAKE 1 TABLET BY MOUTH ONE TIME A DAY  30 tablet 11    metFORMIN (GLUCOPHAGE) 1000 MG tablet Take 1 tablet by mouth 2 times daily (with meals) 60 tablet 11    rOPINIRole (REQUIP) 2 MG tablet Take 1 tablet by mouth every evening Patient is to take 3 hours before bedtime 90 tablet 0    butalbital-acetaminophen-caffeine (FIORICET) -40 MG per tablet Take 1 tablet by mouth every 6 hours as needed for Migraine 60 tablet 0    omeprazole (PRILOSEC) 20 MG delayed release capsule Take 1 capsule by mouth 2 times daily (before meals) (Patient taking differently: Take 40 mg by mouth Daily ) 180 capsule 3    cetirizine (ZYRTEC) 10 MG tablet       EYE ITCH RELIEF 0.025 % ophthalmic solution       propranolol (INDERAL LA) 160 MG ER capsule Take 160 mg by mouth daily       fluticasone (FLONASE) 50 MCG/ACT nasal spray 1

## 2017-11-15 ENCOUNTER — OFFICE VISIT (OUTPATIENT)
Dept: FAMILY MEDICINE CLINIC | Age: 47
End: 2017-11-15
Payer: COMMERCIAL

## 2017-11-15 VITALS
SYSTOLIC BLOOD PRESSURE: 132 MMHG | WEIGHT: 262 LBS | RESPIRATION RATE: 18 BRPM | DIASTOLIC BLOOD PRESSURE: 74 MMHG | HEIGHT: 67 IN | HEART RATE: 76 BPM | TEMPERATURE: 97.2 F | BODY MASS INDEX: 41.12 KG/M2

## 2017-11-15 DIAGNOSIS — F17.210 CIGARETTE NICOTINE DEPENDENCE WITHOUT COMPLICATION: Chronic | ICD-10-CM

## 2017-11-15 DIAGNOSIS — M79.672 BILATERAL FOOT PAIN: ICD-10-CM

## 2017-11-15 DIAGNOSIS — Z23 NEED FOR INFLUENZA VACCINATION: ICD-10-CM

## 2017-11-15 DIAGNOSIS — M79.671 BILATERAL FOOT PAIN: ICD-10-CM

## 2017-11-15 DIAGNOSIS — I10 ESSENTIAL HYPERTENSION: Chronic | ICD-10-CM

## 2017-11-15 DIAGNOSIS — R20.0 NUMBNESS OF FOOT: Primary | ICD-10-CM

## 2017-11-15 PROCEDURE — 90686 IIV4 VACC NO PRSV 0.5 ML IM: CPT | Performed by: FAMILY MEDICINE

## 2017-11-15 PROCEDURE — 90471 IMMUNIZATION ADMIN: CPT | Performed by: FAMILY MEDICINE

## 2017-11-15 PROCEDURE — G8417 CALC BMI ABV UP PARAM F/U: HCPCS | Performed by: FAMILY MEDICINE

## 2017-11-15 PROCEDURE — 99214 OFFICE O/P EST MOD 30 MIN: CPT | Performed by: FAMILY MEDICINE

## 2017-11-15 PROCEDURE — G8598 ASA/ANTIPLAT THER USED: HCPCS | Performed by: FAMILY MEDICINE

## 2017-11-15 PROCEDURE — G8484 FLU IMMUNIZE NO ADMIN: HCPCS | Performed by: FAMILY MEDICINE

## 2017-11-15 PROCEDURE — 4004F PT TOBACCO SCREEN RCVD TLK: CPT | Performed by: FAMILY MEDICINE

## 2017-11-15 PROCEDURE — G8427 DOCREV CUR MEDS BY ELIG CLIN: HCPCS | Performed by: FAMILY MEDICINE

## 2017-11-15 NOTE — PROGRESS NOTES
tablet 11    metFORMIN (GLUCOPHAGE) 1000 MG tablet Take 1 tablet by mouth 2 times daily (with meals) 60 tablet 11    rOPINIRole (REQUIP) 2 MG tablet Take 1 tablet by mouth every evening Patient is to take 3 hours before bedtime 90 tablet 0    butalbital-acetaminophen-caffeine (FIORICET) -40 MG per tablet Take 1 tablet by mouth every 6 hours as needed for Migraine 60 tablet 0    cetirizine (ZYRTEC) 10 MG tablet       EYE ITCH RELIEF 0.025 % ophthalmic solution       propranolol (INDERAL LA) 160 MG ER capsule Take 160 mg by mouth daily       fluticasone (FLONASE) 50 MCG/ACT nasal spray 1 spray by Nasal route daily as needed.  gabapentin (NEURONTIN) 800 MG tablet   Take 800 mg by mouth 4 times daily       omeprazole (PRILOSEC) 20 MG delayed release capsule Take 1 capsule by mouth 2 times daily (before meals) (Patient taking differently: Take 40 mg by mouth Daily ) 180 capsule 3     No current facility-administered medications for this visit. No orders of the defined types were placed in this encounter. All medications reviewed and reconciled, including OTC and herbal medications. Updated list given to patient. Patient Active Problem List    Diagnosis Date Noted    Obstructive sleep apnea on CPAP 11/03/2015     Priority: High    Numbness of foot 11/15/2017    Bilateral foot pain 11/15/2017    DM2 (diabetes mellitus, type 2) ([address]     Left wrist sprain 09/30/2016    Benign prostatic hyperplasia     SOB (shortness of breath) on exertion 06/30/2016    Morbid obesity with BMI of 40.0-44.9, adult (HCC)     Tinnitus 03/29/2016    Left shoulder pain 03/29/2016    Hypertriglyceridemia 10/27/2015    BPPV (benign paroxysmal positional vertigo) 09/29/2015    Short-segment Vásquez's esophagus 06/17/2015     Following with [facility] for this.  Pulmonary nodule      stable >2 years, no further w/u required.        Bipolar 1 disorder ([address]     CAD (coronary artery disease) Unclear history, cath 2013 with patent arteries. Records pending.  History of TIA (transient ischemic attack)      x2      Hypertension     Fibromyalgia     Depression     History of colon polyps     Chronic back pain     ADHD (attention deficit hyperactivity disorder)     Nicotine dependence     Peripheral neuropathy (HCC)     Tarsal tunnel syndrome     Benign esophageal stricture     Hyperlipidemia     Allergic rhinitis     Migraine headache     Dysphagia s/p dilation. Follows with Yanni Samaniego 04/01/2014    GERD (gastroesophageal reflux disease) 10/15/2013    S/P cardiac catheterization: 9/30/2013: Normal coronaries 09/30/2013 9/30/2013: Normal coronaries. Radial approach. Dr. Rubens Duncan Dysfunctional voiding of urine 06/20/2013     Medications he changes to follow,  Adding ditropan  to the present schedule      ED (erectile dysfunction) of organic origin 05/01/2013         Past Medical History:   Diagnosis Date    ADHD (attention deficit hyperactivity disorder)     Allergic rhinitis     Benign esophageal stricture     s/p dilation may 2014    Benign prostatic hyperplasia     Bipolar 1 disorder ([address]     CAD (coronary artery disease)     Unclear history, cath 2013 with patent arteries. Records pending.  Chronic back pain     Depression     DM2 (diabetes mellitus, type 2) ([address]     Dysfunctional voiding of urine 6/20/2013    Medications he changes to follow,  Adding ditropan  to the present schedule     ED (erectile dysfunction) of organic origin 5/1/2013    Fibromyalgia     GERD (gastroesophageal reflux disease)     History of colon polyps     Pt unsure when last colo.      History of TIA (transient ischemic attack)     x2     Hyperlipidemia     Hypertension     Hypertriglyceridemia 10/27/2015    Migraine headache     Morbid obesity with BMI of 40.0-44.9, adult (HCC)     Nicotine dependence     Obstructive sleep apnea on CPAP 11/3/2015    Peripheral neuropathy ([address]  Pulmonary nodule     stable >2 years, no further w/u required.  Rotator cuff injury     Left side    S/P cardiac catheterization: 9/30/2013: Normal coronaries 9/30/2013 9/30/2013: Normal coronaries. Radial approach. Dr. Graciela Leggett Short-segment Vásquez's esophagus 6/17/2015    Tarsal tunnel syndrome     Bilateral    Vertigo          Past Surgical History:   Procedure Laterality Date    CARDIAC CATHETERIZATION  10/2013    9/30/2013: Normal coronaries. Radial approach. Dr. Graciela Leggett COLONOSCOPY  2008, 2012   [facility] KNEE SURGERY Left 2012    ROTATOR CUFF REPAIR Left 2013   [facility] SHOULDER SURGERY Bilateral 2011   [facility] SINUS SURGERY  2004    [place] occ.  TONSILLECTOMY  2002    TYMPANOSTOMY TUBE PLACEMENT      UPPER GASTROINTESTINAL ENDOSCOPY  2008, 2008 with dilation, 2012    UPPER GASTROINTESTINAL ENDOSCOPY  2013    UPPER GASTROINTESTINAL ENDOSCOPY  MAY 2014, 2017    Dilation       No Known Allergies      Social History     Social History    Marital status:      Spouse name: N/A    Number of children: N/A    Years of education: N/A     Occupational History    Not on file.      Social History Main Topics    Smoking status: Current Some Day Smoker     Packs/day: 0.75     Years: 30.00     Types: Cigarettes     Start date: 2/1/1984    Smokeless tobacco: Never Used      Comment: 5cigs a week    Alcohol use No    Drug use: No    Sexual activity: No     Other Topics Concern    Not on file     Social History Narrative    No narrative on file         Family History   Problem Relation Age of Onset    Cancer Father 77     lung     Rheum Arthritis Father     Colon Cancer Maternal Aunt 61    Cancer Maternal Aunt 76     \"throat\"    Colon Cancer Maternal Uncle 48    Cancer Maternal Uncle 60     pancreatic    Cancer Paternal Aunt 52     brain    Colon Cancer Paternal Uncle 72    Cancer Paternal Uncle 61     hepatic     Prostate Cancer Paternal Uncle 61    Cancer Maternal Uncle 79 pancreatic     Ovarian Cancer Paternal Aunt 48    Rheum Arthritis Mother          I have reviewed the patient's past medical history, past surgical history, allergies, medications, social and family history and I have made updates where appropriate. Review of Systems  Positive responses are highlighted in bold    Constitutional:  Fever, Chills, Night Sweats, Fatigue, Unexpected changes in weight  HENT:  Ear pain, Tinnitus, Nosebleeds, Trouble swallowing, Hearing loss, Sore throat  Cardiovascular:  Chest Pain, Palpitations, Orthopnea, Paroxysmal Nocturnal Dyspnea  Respiratory:  Cough, Wheezing, Shortness of breath, Chest tightness, Apnea  Gastrointestinal:  Nausea, Vomiting, Diarrhea, Constipation, Heartburn, Blood in stool  Genitourinary:  Difficulty or painful urination, Flank pain, Change in frequency, Urgency  Skin:  Color change, Rash, Itching, Wound  Musculoskeletal:  Joint pain, Back pain, Gait problems, Joint swelling, Myalgias  Neurological:  Dizziness, Headaches, Presyncope, Numbness, Seizures, Tremors  Endocrine:  Heat Intolerance, Cold Intolerance, Polydipsia, Polyphagia, Polyuria      PHYSICAL EXAM:  Vitals:    11/15/17 1107   BP: 132/74   Pulse: 76   Resp: 18   Temp: 97.2 °F (36.2 °C)   TempSrc: Oral   Weight: 262 lb (118.8 kg)   Height: 5' 6.5\" (1.689 m)     Body mass index is 41.65 kg/m².   Pain Score:   4 (feet)    Wt Readings from Last 3 Encounters:   11/15/17 262 lb (118.8 kg)   10/04/17 266 lb (120.7 kg)   07/07/17 271 lb (122.9 kg)     VS Reviewed  General Appearance: A&O x 3, No acute distress,well developed and well- nourished  Eyes: pupils equal, round, and reactive to light, extraocular eye movements intact, conjunctivae and eye lids without erythema  ENT: external ear and ear canal clear bilaterally, TMs intact and regular, nose without deformity, nasal mucosa and turbinates normal without polyps, oropharynx normal, dentition is normal for age  Neck: supple and non-tender without mass,

## 2017-11-15 NOTE — PROGRESS NOTES
After obtaining consent, and per orders of  , injection of  Flu Vaccine . 5 ml given in the right deltoid by Capo [facility]. Patient instructed to report any adverse reaction to me immediately. Most recent Vaccine Information sheet dated 8/7/15 given to patient.

## 2017-11-15 NOTE — PATIENT INSTRUCTIONS
LAB INSTRUCTIONS:    Please complete labs within 2 week(s). Please fast for 8 hours prior to lab collection. The clinic will call you within 1 week of collection. If you have not heard from us within that amount of time, please call us at [phone]. Patient Education        High Blood Pressure: Care Instructions  Your Care Instructions  If your blood pressure is usually above 140/90, you have high blood pressure, or hypertension. That means the top number is 140 or higher or the bottom number is 90 or higher, or both. Despite what a lot of people think, high blood pressure usually doesn't cause headaches or make you feel dizzy or lightheaded. It usually has no symptoms. But it does increase your risk for heart attack, stroke, and kidney or eye damage. The higher your blood pressure, the more your risk increases. Your doctor will give you a goal for your blood pressure. Your goal will be based on your health and your age. An example of a goal is to keep your blood pressure below 140/90. Lifestyle changes, such as eating healthy and being active, are always important to help lower blood pressure. You might also take medicine to reach your blood pressure goal.  Follow-up care is a key part of your treatment and safety. Be sure to make and go to all appointments, and call your doctor if you are having problems. It's also a good idea to know your test results and keep a list of the medicines you take. How can you care for yourself at home? Medical treatment  · If you stop taking your medicine, your blood pressure will go back up. You may take one or more types of medicine to lower your blood pressure. Be safe with medicines. Take your medicine exactly as prescribed. Call your doctor if you think you are having a problem with your medicine. · Talk to your doctor before you start taking aspirin every day. Aspirin can help certain people lower their risk of a heart attack or stroke.  But taking aspirin isn't right for everyone, because it can cause serious bleeding. · See your doctor regularly. You may need to see the doctor more often at first or until your blood pressure comes down. · If you are taking blood pressure medicine, talk to your doctor before you take decongestants or anti-inflammatory medicine, such as ibuprofen. Some of these medicines can raise blood pressure. · Learn how to check your blood pressure at home. Lifestyle changes  · Stay at a healthy weight. This is especially important if you put on weight around the waist. Losing even 10 pounds can help you lower your blood pressure. · If your doctor recommends it, get more exercise. Walking is a good choice. Bit by bit, increase the amount you walk every day. Try for at least 30 minutes on most days of the week. You also may want to swim, bike, or do other activities. · Avoid or limit alcohol. Talk to your doctor about whether you can drink any alcohol. · Try to limit how much sodium you eat to less than 2,300 milligrams (mg) a day. Your doctor may ask you to try to eat less than 1,500 mg a day. · Eat plenty of fruits (such as bananas and oranges), vegetables, legumes, whole grains, and low-fat dairy products. · Lower the amount of saturated fat in your diet. Saturated fat is found in animal products such as milk, cheese, and meat. Limiting these foods may help you lose weight and also lower your risk for heart disease. · Do not smoke. Smoking increases your risk for heart attack and stroke. If you need help quitting, talk to your doctor about stop-smoking programs and medicines. These can increase your chances of quitting for good. When should you call for help? Call 911 anytime you think you may need emergency care. This may mean having symptoms that suggest that your blood pressure is causing a serious heart or blood vessel problem. Your blood pressure may be over 180/110.   For example, call 911 if:  · You have symptoms of a heart take.  How can you care for yourself at home? · Ask your family, friends, and coworkers for support. You have a better chance of quitting if you have help and support. · Join a support group, such as Nicotine Anonymous, for people who are trying to quit smoking. · Consider signing up for a smoking cessation program, such as the American Lung Association's Freedom from Smoking program.  · Set a quit date. Pick your date carefully so that it is not right in the middle of a big deadline or stressful time. Once you quit, do not even take a puff. Get rid of all ashtrays and lighters after your last cigarette. Clean your house and your clothes so that they do not smell of smoke. · Learn how to be a nonsmoker. Think about ways you can avoid those things that make you reach for a cigarette. ¨ Avoid situations that put you at greatest risk for smoking. For some people, it is hard to have a drink with friends without smoking. For others, they might skip a coffee break with coworkers who smoke. ¨ Change your daily routine. Take a different route to work or eat a meal in a different place. · Cut down on stress. Calm yourself or release tension by doing an activity you enjoy, such as reading a book, taking a hot bath, or gardening. · Talk to your doctor or pharmacist about nicotine replacement therapy, which replaces the nicotine in your body. You still get nicotine but you do not use tobacco. Nicotine replacement products help you slowly reduce the amount of nicotine you need. These products come in several forms, many of them available over-the-counter:  ¨ Nicotine patches  ¨ Nicotine gum and lozenges  ¨ Nicotine inhaler  · Ask your doctor about bupropion (Wellbutrin) or varenicline (Chantix), which are prescription medicines. They do not contain nicotine. They help you by reducing withdrawal symptoms, such as stress and anxiety.   · Some people find hypnosis, acupuncture, and massage helpful for ending the smoking habit.  · Eat a healthy diet and get regular exercise. Having healthy habits will help your body move past its craving for nicotine. · Be prepared to keep trying. Most people are not successful the first few times they try to quit. Do not get mad at yourself if you smoke again. Make a list of things you learned and think about when you want to try again, such as next week, next month, or next year. Where can you learn more? Go to https://[employer]. org and sign in to your [employer] account. Enter I644 in the [employer] box to learn more about \"Stopping Smoking: Care Instructions. \"     If you do not have an account, please click on the \"Sign Up Now\" link. Current as of: March 20, 2017  Content Version: 11.3  © 3576-1445 [employer]. Care instructions adapted under license by [facility] ([facility]). If you have questions about a medical condition or this instruction, always ask your healthcare professional. [place] [address] any warranty or liability for your use of this information.

## 2017-11-15 NOTE — PROGRESS NOTES
Visit Information    Have you changed or started any medications since your last visit including any over-the-counter medicines, vitamins, or herbal medicines? no   Are you having any side effects from any of your medications? -  no  Have you stopped taking any of your medications? Is so, why? -  no    Have you seen any other physician or provider since your last visit? Yes - Records Obtained  Have you had any other diagnostic tests since your last visit? No  Have you been seen in the emergency room and/or had an admission to a hospital since we last saw you? No  Have you had your routine dental cleaning in the past 6 months? no    Have you activated your [employer] account? If not, what are your barriers?  No     Patient Care Team:  Tess England DO as PCP - [address] MD Jes as Consulting Physician (Pulmonology)      Health Maintenance   Topic Date Due    HIV screen  07/30/1983    Flu vaccine (1) 09/01/2017    Diabetic retinal exam  11/29/2017    Diabetic microalbuminuria test  12/03/2017    Diabetic foot exam  12/28/2017    Lipid screen  01/25/2018    Diabetic hemoglobin A1C test  04/04/2018    DTaP/Tdap/Td vaccine (2 - Td) 06/16/2024    Colon cancer screen colonoscopy  02/05/2025    Pneumococcal med risk  Completed

## 2017-11-17 RX ORDER — DEXTROAMPHETAMINE SACCHARATE, AMPHETAMINE ASPARTATE, DEXTROAMPHETAMINE SULFATE AND AMPHETAMINE SULFATE 7.5; 7.5; 7.5; 7.5 MG/1; MG/1; MG/1; MG/1
30 TABLET ORAL 2 TIMES DAILY
Qty: 60 TABLET | Refills: 0 | Status: SHIPPED | OUTPATIENT
Start: 2017-11-17 | End: 2017-12-21 | Stop reason: SDUPTHER

## 2017-11-17 RX ORDER — LAMOTRIGINE 200 MG/1
TABLET ORAL
Qty: 30 TABLET | Refills: 0 | Status: SHIPPED | OUTPATIENT
Start: 2017-11-17 | End: 2017-12-21 | Stop reason: SDUPTHER

## 2017-12-15 LAB
ANA TITER: NEGATIVE
BASOPHILS ABSOLUTE: 0 /ΜL
BASOPHILS RELATIVE PERCENT: 0 %
BUN BLDV-MCNC: 13 MG/DL
C-REACTIVE PROTEIN: 3.8
CALCIUM SERPL-MCNC: 9.7 MG/DL
CHLORIDE BLD-SCNC: 100 MMOL/L
CO2: 21 MMOL/L
CREAT SERPL-MCNC: 1.01 MG/DL
EOSINOPHILS ABSOLUTE: 0.3 /ΜL
EOSINOPHILS RELATIVE PERCENT: 4 %
GFR CALCULATED: 87
GLUCOSE BLD-MCNC: 235 MG/DL
HCT VFR BLD CALC: 39.9 % (ref 41–53)
HEMOGLOBIN: 13.8 G/DL (ref 13.5–17.5)
LYMPHOCYTES ABSOLUTE: 2 /ΜL
LYMPHOCYTES RELATIVE PERCENT: 25 %
MCH RBC QN AUTO: 31.5 PG
MCHC RBC AUTO-ENTMCNC: 34.6 G/DL
MCV RBC AUTO: 91 FL
MONOCYTES ABSOLUTE: 0.8 /ΜL
MONOCYTES RELATIVE PERCENT: 10 %
NEUTROPHILS ABSOLUTE: 4.9 /ΜL
NEUTROPHILS RELATIVE PERCENT: 61 %
PDW BLD-RTO: 13.7 %
PLATELET # BLD: 222 K/ΜL
PMV BLD AUTO: ABNORMAL FL
POTASSIUM SERPL-SCNC: 4.7 MMOL/L
RBC # BLD: 4.38 10^6/ΜL
RHEUMATOID FACTOR: <10
SODIUM BLD-SCNC: 139 MMOL/L
URIC ACID: 4.9
WBC # BLD: 8.2 10^3/ML

## 2017-12-18 DIAGNOSIS — E53.8 B12 DEFICIENCY: Primary | ICD-10-CM

## 2017-12-18 RX ORDER — LANOLIN ALCOHOL/MO/W.PET/CERES
1000 CREAM (GRAM) TOPICAL DAILY
Qty: 30 TABLET | Refills: 5 | Status: SHIPPED | OUTPATIENT
Start: 2017-12-18 | End: 2018-06-21 | Stop reason: SDUPTHER

## 2017-12-19 ENCOUNTER — TELEPHONE (OUTPATIENT)
Dept: FAMILY MEDICINE CLINIC | Age: 47
End: 2017-12-19

## 2017-12-19 NOTE — TELEPHONE ENCOUNTER
----- Message from Leonardo Lugo DO sent at 12/18/2017  9:46 PM EST -----  Please let pt know that b12 borderline low, recommend start b12 supplementation. Repeat labs in 3 months, fasting. Let me know if questions, thanks!

## 2017-12-21 RX ORDER — LAMOTRIGINE 200 MG/1
TABLET ORAL
Qty: 30 TABLET | Refills: 0 | Status: SHIPPED | OUTPATIENT
Start: 2017-12-21 | End: 2018-01-20 | Stop reason: SDUPTHER

## 2017-12-21 RX ORDER — DEXTROAMPHETAMINE SACCHARATE, AMPHETAMINE ASPARTATE, DEXTROAMPHETAMINE SULFATE AND AMPHETAMINE SULFATE 7.5; 7.5; 7.5; 7.5 MG/1; MG/1; MG/1; MG/1
30 TABLET ORAL 2 TIMES DAILY
Qty: 60 TABLET | Refills: 0 | Status: SHIPPED | OUTPATIENT
Start: 2017-12-21 | End: 2018-02-07 | Stop reason: SDUPTHER

## 2017-12-21 NOTE — TELEPHONE ENCOUNTER
[place] is a patient of Dr. Romana Rosella.   He is requesting refills for Adderall and Lamictal.  He attended an appointment in the office 10/16 and is scheduled to return 1/11

## 2017-12-28 RX ORDER — LURASIDONE HYDROCHLORIDE 80 MG/1
TABLET, FILM COATED ORAL
Qty: 60 TABLET | Refills: 0 | Status: SHIPPED | OUTPATIENT
Start: 2017-12-28 | End: 2018-01-11 | Stop reason: SDUPTHER

## 2017-12-28 RX ORDER — GUANFACINE 1 MG/1
TABLET ORAL
Qty: 13 TABLET | Refills: 2 | OUTPATIENT
Start: 2017-12-28

## 2017-12-28 NOTE — TELEPHONE ENCOUNTER
Mert Dubin is a patient of Dr. Talon [place]. YolandaBryan Noahharjeet Avila is requesting a refill for [place] on his behalf. He attended an appointment in the office 10/16 and is scheduled to return 1/11.

## 2018-01-03 ENCOUNTER — TELEPHONE (OUTPATIENT)
Dept: RHEUMATOLOGY | Age: 48
End: 2018-01-03

## 2018-01-03 NOTE — TELEPHONE ENCOUNTER
----- Message from Enrique Mcneil DO sent at 1/1/2018 10:49 AM EST -----  Please call and inform the pt that the blood testing for an underlying inflammatory arthritis was negative. I would like for him to follow up with his PCP for the treatment of his fibromyalgia. Treatment recommendation were provided to the PCP. Thank you,     Dr. Sivakumar Bui

## 2018-01-11 ENCOUNTER — OFFICE VISIT (OUTPATIENT)
Dept: PSYCHIATRY | Age: 48
End: 2018-01-11
Payer: COMMERCIAL

## 2018-01-11 DIAGNOSIS — F90.8 ADHD, ADULT RESIDUAL TYPE: ICD-10-CM

## 2018-01-11 DIAGNOSIS — F31.32 BIPOLAR AFFECTIVE DISORDER, CURRENTLY DEPRESSED, MODERATE (HCC): Primary | ICD-10-CM

## 2018-01-11 PROCEDURE — G8484 FLU IMMUNIZE NO ADMIN: HCPCS | Performed by: PSYCHIATRY & NEUROLOGY

## 2018-01-11 PROCEDURE — 99213 OFFICE O/P EST LOW 20 MIN: CPT | Performed by: PSYCHIATRY & NEUROLOGY

## 2018-01-11 PROCEDURE — 4004F PT TOBACCO SCREEN RCVD TLK: CPT | Performed by: PSYCHIATRY & NEUROLOGY

## 2018-01-11 PROCEDURE — G8428 CUR MEDS NOT DOCUMENT: HCPCS | Performed by: PSYCHIATRY & NEUROLOGY

## 2018-01-11 PROCEDURE — G8417 CALC BMI ABV UP PARAM F/U: HCPCS | Performed by: PSYCHIATRY & NEUROLOGY

## 2018-01-11 PROCEDURE — G8599 NO ASA/ANTIPLAT THER USE RNG: HCPCS | Performed by: PSYCHIATRY & NEUROLOGY

## 2018-01-11 RX ORDER — LAMOTRIGINE 25 MG/1
TABLET ORAL
Qty: 42 TABLET | Refills: 0 | Status: SHIPPED | OUTPATIENT
Start: 2018-01-11 | End: 2018-01-17 | Stop reason: DRUGHIGH

## 2018-01-11 NOTE — PROGRESS NOTES
Chief Complaint   Patient presents with    3 Month Follow-Up     Bipolar ADHD     Sobeida Johnson returned after three months to follow-up on bipolar disorder and ADHD. He told me that he had run out of Lamictal.  He got ill, missed an appointment, and never phoned in for it. I told him that in the future, if he runs short on anything to call and we will do what we can by telephone. In any event he's been off lamotrigine for several weeks now so I will have to re-titrate him. I gave him 42 of the 25 mg tablets, and he will start with one tablet daily for two weeks, then two tablets daily until those are gone. There is an order in already or the 200 mg tablets and I told him that when he switches to those he will need to take one half tablet daily for the first week and then go to a whole tablet daily. He thinks it was working well for him and did not cause rashes. He has ample supplies of his other medicine. He only occasionally uses trazodone. Everything else seems to be working well for him, and he desired no changes and is not having any side effect issues.     Mental Status Examination    Level of consciousness:  within normal limits  Appearance:  well-appearing, street clothes, in chair, good grooming and good hygiene  Behavior/Motor:  no abnormalities noted  Attitude toward examiner:  cooperative, attentive and good eye contact  Speech:  spontaneous, normal rate, normal volume and well articulated  Mood:  mild depression  Affect:  mood congruent  Thought processes:  linear, goal directed and coherent  Thought content:  Homocidal ideation: none  Suicidal Ideation:  denies suicidal ideation  Delusions:  paranoid at times  Perceptual Disturbance:  denies any perceptual disturbance  Cognition:  oriented to person, place, and time  Concentration succeeded  Memory intact  Fund of knowledge:  good  Abstract thinking:  fair  Insight:  good  Judgment:  good    DIAGNOSTIC IMPRESSION  Bipolar

## 2018-01-17 ENCOUNTER — OFFICE VISIT (OUTPATIENT)
Dept: FAMILY MEDICINE CLINIC | Age: 48
End: 2018-01-17
Payer: COMMERCIAL

## 2018-01-17 ENCOUNTER — TELEPHONE (OUTPATIENT)
Dept: FAMILY MEDICINE CLINIC | Age: 48
End: 2018-01-17

## 2018-01-17 VITALS
WEIGHT: 274 LBS | TEMPERATURE: 97.5 F | HEIGHT: 66 IN | BODY MASS INDEX: 44.03 KG/M2 | DIASTOLIC BLOOD PRESSURE: 68 MMHG | HEART RATE: 68 BPM | SYSTOLIC BLOOD PRESSURE: 125 MMHG | RESPIRATION RATE: 20 BRPM

## 2018-01-17 DIAGNOSIS — M79.672 BILATERAL FOOT PAIN: ICD-10-CM

## 2018-01-17 DIAGNOSIS — F17.210 CIGARETTE NICOTINE DEPENDENCE WITHOUT COMPLICATION: Chronic | ICD-10-CM

## 2018-01-17 DIAGNOSIS — E78.2 MIXED HYPERLIPIDEMIA: Chronic | ICD-10-CM

## 2018-01-17 DIAGNOSIS — R20.0 NUMBNESS OF FOOT: ICD-10-CM

## 2018-01-17 DIAGNOSIS — M79.671 BILATERAL FOOT PAIN: ICD-10-CM

## 2018-01-17 DIAGNOSIS — E11.29 TYPE 2 DIABETES MELLITUS WITH MICROALBUMINURIA, WITHOUT LONG-TERM CURRENT USE OF INSULIN (HCC): Primary | Chronic | ICD-10-CM

## 2018-01-17 DIAGNOSIS — E53.8 B12 DEFICIENCY: ICD-10-CM

## 2018-01-17 DIAGNOSIS — R80.9 TYPE 2 DIABETES MELLITUS WITH MICROALBUMINURIA, WITHOUT LONG-TERM CURRENT USE OF INSULIN (HCC): Primary | Chronic | ICD-10-CM

## 2018-01-17 LAB
CREATININE, URINE: 221.1 MG/DL
HBA1C MFR BLD: 7.1 %
MICROALBUMIN UR-MCNC: 2.43 MG/DL
MICROALBUMIN/CREAT UR-RTO: 11 MG/G (ref 0–30)

## 2018-01-17 PROCEDURE — 83036 HEMOGLOBIN GLYCOSYLATED A1C: CPT | Performed by: FAMILY MEDICINE

## 2018-01-17 PROCEDURE — 3045F PR MOST RECENT HEMOGLOBIN A1C LEVEL 7.0-9.0%: CPT | Performed by: FAMILY MEDICINE

## 2018-01-17 PROCEDURE — G8598 ASA/ANTIPLAT THER USED: HCPCS | Performed by: FAMILY MEDICINE

## 2018-01-17 PROCEDURE — 4004F PT TOBACCO SCREEN RCVD TLK: CPT | Performed by: FAMILY MEDICINE

## 2018-01-17 PROCEDURE — G8417 CALC BMI ABV UP PARAM F/U: HCPCS | Performed by: FAMILY MEDICINE

## 2018-01-17 PROCEDURE — G8427 DOCREV CUR MEDS BY ELIG CLIN: HCPCS | Performed by: FAMILY MEDICINE

## 2018-01-17 PROCEDURE — 99214 OFFICE O/P EST MOD 30 MIN: CPT | Performed by: FAMILY MEDICINE

## 2018-01-17 PROCEDURE — G8484 FLU IMMUNIZE NO ADMIN: HCPCS | Performed by: FAMILY MEDICINE

## 2018-01-17 RX ORDER — NICOTINE 21 MG/24HR
1 PATCH, TRANSDERMAL 24 HOURS TRANSDERMAL EVERY 24 HOURS
Qty: 42 PATCH | Refills: 0 | Status: SHIPPED | OUTPATIENT
Start: 2018-01-17 | End: 2018-04-18

## 2018-01-17 RX ORDER — ASPIRIN 81 MG/1
81 TABLET ORAL DAILY
Qty: 90 TABLET | Refills: 3 | Status: SHIPPED | OUTPATIENT
Start: 2018-01-17 | End: 2019-01-17 | Stop reason: SDUPTHER

## 2018-01-17 NOTE — PROGRESS NOTES
Chronic Disease Visit Information    BP Readings from Last 3 Encounters:   11/15/17 132/74   10/04/17 (!) 141/85   07/07/17 134/70          Hemoglobin A1C (%)   Date Value   10/04/2017 6.2   07/07/2017 5.6   03/30/2017 6.5     LDL Calculated (mg/dL)   Date Value   01/25/2017 124     HDL (mg/dL)   Date Value   01/25/2017 33 (A)     BUN (mg/dL)   Date Value   12/15/2017 13     CREATININE (no units)   Date Value   12/15/2017 1.01     Glucose (mg/dL)   Date Value   12/15/2017 235            Have you changed or started any medications since your last visit including any over-the-counter medicines, vitamins, or herbal medicines? yes - see med list    Are you having any side effects from any of your medications? -  no  Have you stopped taking any of your medications? Is so, why? -  yes - see med list     Have you seen any other physician or provider since your last visit? Yes - Records Obtained  Have you had any other diagnostic tests since your last visit? Yes - Records Obtained  Have you been seen in the emergency room and/or had an admission to a hospital since we last saw you? No  Have you had your annual diabetic retinal (eye) exam? No  Have you had your routine dental cleaning in the past 6 months? yes - routine     Have you activated your [employer] account? If not, what are your barriers?  No: pt declined      Patient Care Team:  Rickey Zunigaite, DO as PCP - [address]26 Damien Andre MD as Consulting Physician (Pulmonology)         Medical History Review  Past Medical, Family, and Social History reviewed and does contribute to the patient presenting condition    Health Maintenance   Topic Date Due    HIV screen  07/30/1983    Diabetic retinal exam  11/29/2017    Diabetic microalbuminuria test  12/03/2017    Diabetic foot exam  12/28/2017    Lipid screen  01/25/2018    A1C test (Diabetic or Prediabetic)  04/04/2018    Potassium monitoring  12/15/2018    Creatinine monitoring  12/15/2018    DTaP/Tdap/Td

## 2018-01-17 NOTE — PATIENT INSTRUCTIONS
your blood sugar. ? · You have symptoms of long-term diabetes problems, such as:  ¨ New vision changes. ¨ New pain, numbness, or tingling in your hands or feet. ¨ Skin problems. Where can you learn more? Go to https://hannah.FashionGuide. org and sign in to your In[employer] account. Enter C553 in the ClickandBuy box to learn more about \"Type 2 Diabetes: Care Instructions. \"     If you do not have an account, please click on the \"Sign Up Now\" link. Current as of: March 13, 2017  Content Version: 11.5  © 1986-1616 [employer]. Care instructions adapted under license by [facility] ([facility]). If you have questions about a medical condition or this instruction, always ask your healthcare professional. [address] any warranty or liability for your use of this information. Patient Education        Stopping Smoking: Care Instructions  Your Care Instructions    Cigarette smokers crave the nicotine in cigarettes. Giving it up is much harder than simply changing a habit. Your body has to stop craving the nicotine. It is hard to quit, but you can do it. There are many tools that people use to quit smoking. You may find that combining tools works best for you. There are several steps to quitting. First you get ready to quit. Then you get support to help you. After that, you learn new skills and behaviors to become a nonsmoker. For many people, a necessary step is getting and using medicine. Your doctor will help you set up the plan that best meets your needs. You may want to attend a smoking cessation program to help you quit smoking. When you choose a program, look for one that has proven success. Ask your doctor for ideas. You will greatly increase your chances of success if you take medicine as well as get counseling or join a cessation program.  Some of the changes you feel when you first quit tobacco are uncomfortable.  Your body will miss the nicotine at first, your body. You still get nicotine but you do not use tobacco. Nicotine replacement products help you slowly reduce the amount of nicotine you need. These products come in several forms, many of them available over-the-counter:  ¨ Nicotine patches  ¨ Nicotine gum and lozenges  ¨ Nicotine inhaler  · Ask your doctor about bupropion (Wellbutrin) or varenicline (Chantix), which are prescription medicines. They do not contain nicotine. They help you by reducing withdrawal symptoms, such as stress and anxiety. · Some people find hypnosis, acupuncture, and massage helpful for ending the smoking habit. · Eat a healthy diet and get regular exercise. Having healthy habits will help your body move past its craving for nicotine. · Be prepared to keep trying. Most people are not successful the first few times they try to quit. Do not get mad at yourself if you smoke again. Make a list of things you learned and think about when you want to try again, such as next week, next month, or next year. Where can you learn more? Go to https://[employer].GroupTie. org and sign in to your SCRM account. Enter O302 in the [employer] box to learn more about \"Stopping Smoking: Care Instructions. \"     If you do not have an account, please click on the \"Sign Up Now\" link. Current as of: March 20, 2017  Content Version: 11.5  © 0439-2110 Healthwise, Incorporated. Care instructions adapted under license by [facility] ([facility]). If you have questions about a medical condition or this instruction, always ask your healthcare professional. [place] [address] any warranty or liability for your use of this information.

## 2018-01-17 NOTE — PROGRESS NOTES
Chief Complaint   Patient presents with    Follow-up     Chronic issues below       History obtained from the patient. SUBJECTIVE:  Renate Bermeo is a 50 y.o. male that presents today for:       01. DM2: PRIOR VISIT: Pt admitted to [facility] from 12/3 to 12/5 for new onset DM. I was called last SAT morning by lab with critical values. Contacted pt and sent to ER. Sadly no discharge summary done, nor is the latest progress note, 4 days after discharge. It appears, anyway, that he was started on metformin and glyburide and d/c home. Labs were improving. Was not started on insulin.      Since discharge has done well. Keeping glucose log at home. Sugars 200-400 depending. Tolerating metformin and glyburide. No lows. Would like to see DM ed. Denies polyuria at this point.      UPDATE PRIOR VISIT: doing well on 1000mg bid of metformin. Sugars trending down to the mid 100s the last wk. No lows. Has DM ed consult. Denies polyuria, dypsa or phagia.      UPDATE PRIOR VISIT: doing well with glyburide and metformin 1000mg bid. Sugars  most days in the AM. No lows. Doing well with diet. Exercise not so much.      UPDATE PRIOR VISIT: doing well with metfromin and glyburide. a1c down to 6.5. No lows.      UPDATE LAST VISIT: a1c down to 5.5. Not checking sugars. Feels occ hypoglycemia. On glyburide and metformin. UPDATE TODAY: on metformin monotherapy now, glyuride stopped in July d/t low sugars. Admits to poor diet choices last 3 months. a1c up to 7.1.        -02. Bilateral foot pain LAST VISIT: 4 wks ago started with pain in his feet. When walks, feels like electric shocks and burns and radiates up. No known injury. Pain equal in both. Does have chronic back pain. Does have DM. But controlled. Starts in soles of his feet. No rashes on feet. Sugars have been appropriate and last a1c was 6.2 in OCT    UPDATE TODAY: modest improvement with B12. Hasn't done EMG yet, scheduled for 30 JAN at [facility]       -03.  B12 def: started on b12 last visit when shown to have borderline low b12. Tolerating well, due for labs in [facility] 2018. No side effects, foot pain doing a bit better      -04. HLD:    HPI: on statin, due for labs. Working on General Velomedix ?: no      -05. Smoking: would like to quit smoking, interested in patches. Age/Gender Health Maintenance    Lipid -     No components found for: CHLPL  Lab Results   Component Value Date    TRIG 212 01/25/2017    TRIG 3,052 (H) 12/04/2016    TRIG 493 (H) 12/12/2015     Lab Results   Component Value Date    HDL 33 (A) 01/25/2017    HDL 20 12/04/2016    HDL 35 (L) 12/12/2015     Lab Results   Component Value Date    LDLCALC 124 01/25/2017    LDLCALC SEE BELOW 12/04/2016    LDLCALC 161 (A) 06/16/2014     No results found for: LABVLDL  Lab Results   Component Value Date    LDLCALC 124 01/25/2017    LDLDIRECT 119 (H) 12/12/2015       TSH -   Lab Results   Component Value Date    TSH 3.660 12/03/2016       Colon Cancer Screening - NEG CANCER FEB 2015    Tetanus - UTD June 2014  Influenza Vaccine - UTD FALL 2017  Pneumonia Vaccine - UTD June 2014 PPV 23  Zostavax - Age 59     PSA testing discussion - Age 52  AAA Screening - Age 72    Falls screening - N/A      Diabetes Health Maintenance    A1C -   Lab Results   Component Value Date    LABA1C 7.1 01/17/2018    LABA1C 6.2 10/04/2017    LABA1C 5.6 07/07/2017    LABA1C 6.5 03/30/2017    LABA1C 11.4 12/03/2016       ACE/ARB - yes, lisinopril 2.5mg daily  Eye - UTD NOV 2016   Foot - UTD JAN 2018  ASA - yes, 81mg  Microal/Cr - + DEC 2016  eGFR -   Lab Results   Component Value Date    LABGLOM 87 12/15/2017    LABGLOM >90 12/05/2016       Statin - yes, lipitor 80mg qhs      Specialist Lists:    Dr Jaclyn Estrada Dr [facility] Cardiology  Dr Yessenia Aragon Urology  Dr Anna Steele Pulmonology/Sleep  Dr Rehan Estrada surgeon  Dr Guillermo Stanton ENT  Dr Maria Isabel Flores Psychiatry, was seeing, no longer seeing. Will be seen at [facility] in [place] in 2 wks.     mouth every 6 hours as needed for Migraine 60 tablet 0    omeprazole (PRILOSEC) 20 MG delayed release capsule Take 1 capsule by mouth 2 times daily (before meals) (Patient taking differently: Take 40 mg by mouth Daily ) 180 capsule 3    cetirizine (ZYRTEC) 10 MG tablet       EYE ITCH RELIEF 0.025 % ophthalmic solution       propranolol (INDERAL LA) 160 MG ER capsule Take 160 mg by mouth daily       fluticasone (FLONASE) 50 MCG/ACT nasal spray 1 spray by Nasal route daily as needed.  gabapentin (NEURONTIN) 800 MG tablet   Take 800 mg by mouth 4 times daily        No current facility-administered medications for this visit. Orders Placed This Encounter   Medications    aspirin EC 81 MG EC tablet     Sig: Take 1 tablet by mouth daily     Dispense:  90 tablet     Refill:  3    nicotine (NICODERM CQ) 14 MG/24HR     Sig: Place 1 patch onto the skin every 24 hours X 6 weeks. Remove patch at bedtime. Reapply new patch next morning     Dispense:  42 patch     Refill:  0    nicotine (NICODERM CQ) 7 MG/24HR     Sig: Place 1 patch onto the skin every 24 hours     Dispense:  30 patch     Refill:  3         All medications reviewed and reconciled, including OTC and herbal medications. Updated list given to patient. Patient Active Problem List    Diagnosis Date Noted    Obstructive sleep apnea on CPAP 11/03/2015     Priority: High    B12 deficiency     Numbness of foot 11/15/2017    Bilateral foot pain 11/15/2017    DM2 (diabetes mellitus, type 2) ([facility] [address]     Left wrist sprain 09/30/2016    Benign prostatic hyperplasia     SOB (shortness of breath) on exertion 06/30/2016    Morbid obesity with BMI of 40.0-44.9, adult (HCC)     Tinnitus 03/29/2016    Left shoulder pain 03/29/2016    Hypertriglyceridemia 10/27/2015    BPPV (benign paroxysmal positional vertigo) 09/29/2015    Short-segment Vásquez's esophagus 06/17/2015     Following with Sharyle Pay for this.        Pulmonary nodule      stable >2 years, hyperlipidemia    con't lipitor  Check labs    - Lipid Panel; Future  - TSH with Reflex; Future    6. Cigarette nicotine dependence without complication    Start patches  F/u if unable to quit    - nicotine (NICODERM CQ) 14 MG/24HR; Place 1 patch onto the skin every 24 hours X 6 weeks. Remove patch at bedtime. Reapply new patch next morning  Dispense: 42 patch; Refill: 0  - nicotine (NICODERM CQ) 7 MG/24HR; Place 1 patch onto the skin every 24 hours  Dispense: 30 patch; Refill: 3      DISPOSITION    Return in about 3 months (around 4/17/2018) for Follow-up Diabetes, sooner as needed. Az released without restrictions. Future Appointments  Date Time Provider Mikey Painting   2/22/2018 10:00 AM Lucy Dsouza MD [facility]   4/17/2018 8:00 AM Monique Curiel DO [facility] [address] received counseling on the following healthy behaviors: nutrition, exercise and medication adherence    Patient given educational materials on: See Attached    I have instructed Tristan Oconnor to complete a self tracking handout on blood sugars, smoking and instructed them to bring it with them to his next appointment. Barriers to learning and self management: none    Discussed use, benefit, and side effects of prescribed medications. Barriers to medication compliance addressed. All patient questions answered. Pt voiced understanding.        Electronically signed by Monique Curiel DO on 1/17/2018 at 9:12 AM

## 2018-01-17 NOTE — TELEPHONE ENCOUNTER
----- Message from Toan Rothman DO sent at 1/17/2018  4:11 PM EST -----  Please let pt know that urine neg for protein. Thanks!

## 2018-01-20 DIAGNOSIS — M25.512 CHRONIC LEFT SHOULDER PAIN: ICD-10-CM

## 2018-01-20 DIAGNOSIS — G89.29 CHRONIC LEFT SHOULDER PAIN: ICD-10-CM

## 2018-01-22 RX ORDER — LAMOTRIGINE 200 MG/1
TABLET ORAL
Qty: 30 TABLET | Refills: 0 | Status: SHIPPED | OUTPATIENT
Start: 2018-01-22 | End: 2018-02-19 | Stop reason: SDUPTHER

## 2018-02-05 ENCOUNTER — TELEPHONE (OUTPATIENT)
Dept: FAMILY MEDICINE CLINIC | Age: 48
End: 2018-02-05

## 2018-02-07 RX ORDER — DEXTROAMPHETAMINE SACCHARATE, AMPHETAMINE ASPARTATE, DEXTROAMPHETAMINE SULFATE AND AMPHETAMINE SULFATE 7.5; 7.5; 7.5; 7.5 MG/1; MG/1; MG/1; MG/1
30 TABLET ORAL 2 TIMES DAILY
Qty: 60 TABLET | Refills: 0 | Status: SHIPPED | OUTPATIENT
Start: 2018-02-07 | End: 2018-04-05 | Stop reason: SDUPTHER

## 2018-02-19 RX ORDER — LAMOTRIGINE 200 MG/1
TABLET ORAL
Qty: 30 TABLET | Refills: 0 | Status: SHIPPED | OUTPATIENT
Start: 2018-02-19 | End: 2018-02-22 | Stop reason: SDUPTHER

## 2018-02-19 NOTE — TELEPHONE ENCOUNTER
Pharmacy faxed in request for refill of Lamotrigine 200 MG tablets      Last Appointment Date: 1/11/2018  Next Appointment Date: 2/22/2018

## 2018-02-22 ENCOUNTER — OFFICE VISIT (OUTPATIENT)
Dept: PSYCHIATRY | Age: 48
End: 2018-02-22
Payer: COMMERCIAL

## 2018-02-22 DIAGNOSIS — F90.8 ADHD, ADULT RESIDUAL TYPE: ICD-10-CM

## 2018-02-22 DIAGNOSIS — F31.32 BIPOLAR AFFECTIVE DISORDER, CURRENTLY DEPRESSED, MODERATE (HCC): Primary | ICD-10-CM

## 2018-02-22 PROCEDURE — 99212 OFFICE O/P EST SF 10 MIN: CPT | Performed by: PSYCHIATRY & NEUROLOGY

## 2018-02-22 RX ORDER — BUPROPION HYDROCHLORIDE 300 MG/1
300 TABLET ORAL EVERY MORNING
Qty: 30 TABLET | Refills: 5 | Status: SHIPPED | OUTPATIENT
Start: 2018-02-22 | End: 2018-04-05

## 2018-02-22 RX ORDER — LAMOTRIGINE 200 MG/1
TABLET ORAL
Qty: 30 TABLET | Refills: 5 | Status: SHIPPED | OUTPATIENT
Start: 2018-02-22 | End: 2018-08-20 | Stop reason: SDUPTHER

## 2018-02-22 RX ORDER — BUPROPION HYDROCHLORIDE 150 MG/1
150 TABLET ORAL EVERY MORNING
Qty: 7 TABLET | Refills: 0 | Status: SHIPPED | OUTPATIENT
Start: 2018-02-22 | End: 2018-04-05

## 2018-02-22 NOTE — PROGRESS NOTES
Chief Complaint   Patient presents with    1 Month Follow-Up     Bipolar ADHD     Omkar Miller returned after 30 days to follow-up on bipolar disorder and ADHD. He does report that he's feeling tired all of the time. He takes a lurasidone in the morning and does not find that causes the tiredness, so he really doesn't think that that is the source of it. He does have sleep apnea and wears a BiPAP, so that likely isn't the cause of either although that may be a contributor He may have gained weight. He is not convinced he is getting much benefit from the [place]. I'm going to discontinue that and let him try some Wellbutrin. We will titrate that to 300 mg in the morning daily. Beyond that we didn't make any changes. I did give him some refills for lamotrigine. He should have adequate supplies of everything else although he will be needing Adderall in another week.     Mental Status Examination    Level of consciousness:  within normal limits  Appearance:  well-appearing, street clothes, in chair, good grooming and good hygiene  Behavior/Motor:  no abnormalities noted  Attitude toward examiner:  cooperative, attentive and good eye contact  Speech:  spontaneous, normal rate, normal volume and well articulated  Mood:  depressed  Affect:  mood congruent  Thought processes:  linear, goal directed and coherent  Thought content:  Homocidal ideation: none  Suicidal Ideation:  denies suicidal ideation  Delusions:  no evidence of delusions  Perceptual Disturbance:  denies any perceptual disturbance  Cognition:  oriented to person, place, and time  Concentration succeeded  Memory intact  Fund of knowledge:  fair  Abstract thinking:  fair  Insight:  good  Judgment:  good    DIAGNOSTIC IMPRESSION  Bipolar depression  ADHD    Plan  Stop vilazadone  Trial bupropron  Current Outpatient Prescriptions   Medication Sig Dispense Refill    lamoTRIgine (LAMICTAL) 200 MG tablet TAKE 1 TABLET BY MOUTH ONE TIME A DAY 30 tablet 5

## 2018-03-21 DIAGNOSIS — E11.29 TYPE 2 DIABETES MELLITUS WITH MICROALBUMINURIA, WITHOUT LONG-TERM CURRENT USE OF INSULIN (HCC): Primary | Chronic | ICD-10-CM

## 2018-03-21 DIAGNOSIS — R80.9 TYPE 2 DIABETES MELLITUS WITH MICROALBUMINURIA, WITHOUT LONG-TERM CURRENT USE OF INSULIN (HCC): Primary | Chronic | ICD-10-CM

## 2018-03-21 RX ORDER — GLYBURIDE 5 MG/1
TABLET ORAL
Qty: 30 TABLET | Refills: 10 | OUTPATIENT
Start: 2018-03-21

## 2018-03-22 RX ORDER — TRAZODONE HYDROCHLORIDE 50 MG/1
TABLET ORAL
Qty: 30 TABLET | Refills: 2 | Status: SHIPPED | OUTPATIENT
Start: 2018-03-22 | End: 2018-06-21 | Stop reason: SDUPTHER

## 2018-04-05 ENCOUNTER — OFFICE VISIT (OUTPATIENT)
Dept: PSYCHIATRY | Age: 48
End: 2018-04-05
Payer: COMMERCIAL

## 2018-04-05 DIAGNOSIS — F90.8 ADHD, ADULT RESIDUAL TYPE: ICD-10-CM

## 2018-04-05 DIAGNOSIS — F31.32 BIPOLAR AFFECTIVE DISORDER, CURRENTLY DEPRESSED, MODERATE (HCC): Primary | ICD-10-CM

## 2018-04-05 PROCEDURE — 99213 OFFICE O/P EST LOW 20 MIN: CPT | Performed by: PSYCHIATRY & NEUROLOGY

## 2018-04-05 RX ORDER — DEXTROAMPHETAMINE SACCHARATE, AMPHETAMINE ASPARTATE, DEXTROAMPHETAMINE SULFATE AND AMPHETAMINE SULFATE 7.5; 7.5; 7.5; 7.5 MG/1; MG/1; MG/1; MG/1
30 TABLET ORAL 2 TIMES DAILY
Qty: 60 TABLET | Refills: 0 | Status: SHIPPED | OUTPATIENT
Start: 2018-04-05 | End: 2018-05-18 | Stop reason: SDUPTHER

## 2018-04-05 RX ORDER — BUPROPION HYDROCHLORIDE 450 MG/1
450 TABLET, FILM COATED, EXTENDED RELEASE ORAL DAILY
Qty: 30 TABLET | Refills: 5 | Status: SHIPPED | OUTPATIENT
Start: 2018-04-05 | End: 2018-09-19 | Stop reason: SDUPTHER

## 2018-04-10 ENCOUNTER — TELEPHONE (OUTPATIENT)
Dept: FAMILY MEDICINE CLINIC | Age: 48
End: 2018-04-10

## 2018-04-18 ENCOUNTER — OFFICE VISIT (OUTPATIENT)
Dept: FAMILY MEDICINE CLINIC | Age: 48
End: 2018-04-18
Payer: COMMERCIAL

## 2018-04-18 VITALS
RESPIRATION RATE: 14 BRPM | TEMPERATURE: 97.6 F | DIASTOLIC BLOOD PRESSURE: 88 MMHG | HEART RATE: 80 BPM | WEIGHT: 280 LBS | HEIGHT: 66 IN | SYSTOLIC BLOOD PRESSURE: 132 MMHG | BODY MASS INDEX: 45 KG/M2

## 2018-04-18 DIAGNOSIS — M79.671 BILATERAL FOOT PAIN: ICD-10-CM

## 2018-04-18 DIAGNOSIS — R10.13 EPIGASTRIC PAIN: ICD-10-CM

## 2018-04-18 DIAGNOSIS — E78.2 MIXED HYPERLIPIDEMIA: Chronic | ICD-10-CM

## 2018-04-18 DIAGNOSIS — M79.672 BILATERAL FOOT PAIN: ICD-10-CM

## 2018-04-18 DIAGNOSIS — F17.210 CIGARETTE NICOTINE DEPENDENCE WITHOUT COMPLICATION: Chronic | ICD-10-CM

## 2018-04-18 DIAGNOSIS — E11.65 UNCONTROLLED TYPE 2 DIABETES MELLITUS WITH HYPERGLYCEMIA, WITHOUT LONG-TERM CURRENT USE OF INSULIN (HCC): Primary | ICD-10-CM

## 2018-04-18 DIAGNOSIS — R20.0 NUMBNESS OF FOOT: ICD-10-CM

## 2018-04-18 DIAGNOSIS — R13.14 PHARYNGOESOPHAGEAL DYSPHAGIA: ICD-10-CM

## 2018-04-18 DIAGNOSIS — R10.13 DYSPEPSIA: ICD-10-CM

## 2018-04-18 DIAGNOSIS — E53.8 B12 DEFICIENCY: ICD-10-CM

## 2018-04-18 DIAGNOSIS — K21.9 GASTROESOPHAGEAL REFLUX DISEASE, ESOPHAGITIS PRESENCE NOT SPECIFIED: Primary | ICD-10-CM

## 2018-04-18 LAB — HBA1C MFR BLD: 9.5 %

## 2018-04-18 PROCEDURE — 4004F PT TOBACCO SCREEN RCVD TLK: CPT | Performed by: FAMILY MEDICINE

## 2018-04-18 PROCEDURE — G8427 DOCREV CUR MEDS BY ELIG CLIN: HCPCS | Performed by: FAMILY MEDICINE

## 2018-04-18 PROCEDURE — 2022F DILAT RTA XM EVC RTNOPTHY: CPT | Performed by: FAMILY MEDICINE

## 2018-04-18 PROCEDURE — 83036 HEMOGLOBIN GLYCOSYLATED A1C: CPT | Performed by: FAMILY MEDICINE

## 2018-04-18 PROCEDURE — G8598 ASA/ANTIPLAT THER USED: HCPCS | Performed by: FAMILY MEDICINE

## 2018-04-18 PROCEDURE — 99214 OFFICE O/P EST MOD 30 MIN: CPT | Performed by: FAMILY MEDICINE

## 2018-04-18 PROCEDURE — G8417 CALC BMI ABV UP PARAM F/U: HCPCS | Performed by: FAMILY MEDICINE

## 2018-04-18 PROCEDURE — 3046F HEMOGLOBIN A1C LEVEL >9.0%: CPT | Performed by: FAMILY MEDICINE

## 2018-04-18 RX ORDER — OMEPRAZOLE 20 MG/1
20 CAPSULE, DELAYED RELEASE ORAL
Qty: 180 CAPSULE | Refills: 3 | Status: CANCELLED | OUTPATIENT
Start: 2018-04-18 | End: 2018-07-17

## 2018-04-18 RX ORDER — OMEPRAZOLE 20 MG/1
40 CAPSULE, DELAYED RELEASE ORAL DAILY
Qty: 180 CAPSULE | Refills: 3 | Status: SHIPPED | OUTPATIENT
Start: 2018-04-18 | End: 2019-04-21 | Stop reason: SDUPTHER

## 2018-04-18 RX ORDER — ROPINIROLE 3 MG/1
3 TABLET, FILM COATED ORAL NIGHTLY
COMMUNITY

## 2018-04-18 RX ORDER — GLYBURIDE 5 MG/1
5 TABLET ORAL
Qty: 30 TABLET | Refills: 11 | Status: CANCELLED | OUTPATIENT
Start: 2018-04-18

## 2018-04-19 ENCOUNTER — TELEPHONE (OUTPATIENT)
Dept: FAMILY MEDICINE CLINIC | Age: 48
End: 2018-04-19

## 2018-04-24 RX ORDER — VILAZODONE HYDROCHLORIDE 40 MG/1
TABLET ORAL
Qty: 30 TABLET | Refills: 5 | Status: SHIPPED | OUTPATIENT
Start: 2018-04-24 | End: 2018-05-18

## 2018-05-18 ENCOUNTER — OFFICE VISIT (OUTPATIENT)
Dept: PSYCHIATRY | Age: 48
End: 2018-05-18
Payer: COMMERCIAL

## 2018-05-18 DIAGNOSIS — F31.32 BIPOLAR AFFECTIVE DISORDER, CURRENTLY DEPRESSED, MODERATE (HCC): Primary | ICD-10-CM

## 2018-05-18 DIAGNOSIS — F90.8 ADHD, ADULT RESIDUAL TYPE: ICD-10-CM

## 2018-05-18 PROCEDURE — 99213 OFFICE O/P EST LOW 20 MIN: CPT | Performed by: PSYCHIATRY & NEUROLOGY

## 2018-05-18 RX ORDER — QUETIAPINE FUMARATE 50 MG/1
100 TABLET, EXTENDED RELEASE ORAL NIGHTLY
Qty: 60 TABLET | Refills: 3 | Status: SHIPPED | OUTPATIENT
Start: 2018-05-18 | End: 2018-06-18

## 2018-05-21 RX ORDER — DEXTROAMPHETAMINE SACCHARATE, AMPHETAMINE ASPARTATE, DEXTROAMPHETAMINE SULFATE AND AMPHETAMINE SULFATE 7.5; 7.5; 7.5; 7.5 MG/1; MG/1; MG/1; MG/1
30 TABLET ORAL 2 TIMES DAILY
Qty: 60 TABLET | Refills: 0 | Status: SHIPPED | OUTPATIENT
Start: 2018-05-21 | End: 2018-06-18 | Stop reason: SDUPTHER

## 2018-05-22 DIAGNOSIS — E11.29 TYPE 2 DIABETES MELLITUS WITH MICROALBUMINURIA, WITHOUT LONG-TERM CURRENT USE OF INSULIN (HCC): ICD-10-CM

## 2018-05-22 DIAGNOSIS — R80.9 TYPE 2 DIABETES MELLITUS WITH MICROALBUMINURIA, WITHOUT LONG-TERM CURRENT USE OF INSULIN (HCC): ICD-10-CM

## 2018-05-22 RX ORDER — LISINOPRIL 2.5 MG/1
TABLET ORAL
Qty: 90 TABLET | Refills: 3 | Status: SHIPPED | OUTPATIENT
Start: 2018-05-22 | End: 2019-02-16 | Stop reason: SDUPTHER

## 2018-06-01 LAB
CHOLESTEROL, TOTAL: 170 MG/DL
CHOLESTEROL/HDL RATIO: ABNORMAL
HDLC SERPL-MCNC: 27 MG/DL (ref 35–70)
LDL CHOLESTEROL CALCULATED: 96 MG/DL (ref 0–160)
TRIGL SERPL-MCNC: 237 MG/DL
VLDLC SERPL CALC-MCNC: 47 MG/DL

## 2018-06-04 ENCOUNTER — TELEPHONE (OUTPATIENT)
Dept: FAMILY MEDICINE CLINIC | Age: 48
End: 2018-06-04

## 2018-06-04 DIAGNOSIS — R79.89 ELEVATED TSH: Primary | ICD-10-CM

## 2018-06-18 ENCOUNTER — OFFICE VISIT (OUTPATIENT)
Dept: PSYCHIATRY | Age: 48
End: 2018-06-18
Payer: COMMERCIAL

## 2018-06-18 DIAGNOSIS — F31.32 BIPOLAR AFFECTIVE DISORDER, CURRENTLY DEPRESSED, MODERATE (HCC): ICD-10-CM

## 2018-06-18 DIAGNOSIS — F90.9 ATTENTION DEFICIT HYPERACTIVITY DISORDER (ADHD), UNSPECIFIED ADHD TYPE: Primary | ICD-10-CM

## 2018-06-18 PROCEDURE — 99213 OFFICE O/P EST LOW 20 MIN: CPT | Performed by: PSYCHIATRY & NEUROLOGY

## 2018-06-18 RX ORDER — QUETIAPINE 150 MG/1
150 TABLET, FILM COATED, EXTENDED RELEASE ORAL DAILY
Qty: 60 TABLET | Refills: 3 | Status: SHIPPED | OUTPATIENT
Start: 2018-06-18 | End: 2018-07-18 | Stop reason: DRUGHIGH

## 2018-06-18 RX ORDER — DEXTROAMPHETAMINE SACCHARATE, AMPHETAMINE ASPARTATE, DEXTROAMPHETAMINE SULFATE AND AMPHETAMINE SULFATE 7.5; 7.5; 7.5; 7.5 MG/1; MG/1; MG/1; MG/1
30 TABLET ORAL 2 TIMES DAILY
Qty: 60 TABLET | Refills: 0 | Status: SHIPPED | OUTPATIENT
Start: 2018-06-18 | End: 2018-07-18 | Stop reason: SDUPTHER

## 2018-06-22 RX ORDER — TRAZODONE HYDROCHLORIDE 50 MG/1
TABLET ORAL
Qty: 30 TABLET | Refills: 1 | Status: SHIPPED | OUTPATIENT
Start: 2018-06-22 | End: 2018-08-20 | Stop reason: SDUPTHER

## 2018-07-11 ENCOUNTER — TELEPHONE (OUTPATIENT)
Dept: FAMILY MEDICINE CLINIC | Age: 48
End: 2018-07-11

## 2018-07-11 NOTE — TELEPHONE ENCOUNTER
Pt missed appt with Dr Khadar Alexander 7/11/18 at 8:00 am.  This is an appt that was rescheduled from 7/18/18 per Dr Khadar Alexander, and a letter was mailed to pt. Dr Khadar Alexander feels pt may not have received the letter, and was unaware of the appt change. Dr Khadar Alexander would like to see pt today and will work him into the schedule. If pt is unable to come to appt today, will need to be scheduled next week per Dr Khadar Alexander. Left detailed msg on pt's mach requesting that he call our office at earliest convenience.

## 2018-07-12 NOTE — TELEPHONE ENCOUNTER
Left detailed msg informing pt of Dr. Meryle Remedies message. (ok per signed HIPAA)    It appears Tomás Levine is going to CB to R/S according to last message.

## 2018-07-18 ENCOUNTER — OFFICE VISIT (OUTPATIENT)
Dept: PSYCHIATRY | Age: 48
End: 2018-07-18
Payer: COMMERCIAL

## 2018-07-18 DIAGNOSIS — F31.9 BIPOLAR 1 DISORDER (HCC): Primary | ICD-10-CM

## 2018-07-18 DIAGNOSIS — F90.9 ATTENTION DEFICIT HYPERACTIVITY DISORDER (ADHD), UNSPECIFIED ADHD TYPE: ICD-10-CM

## 2018-07-18 PROCEDURE — 99213 OFFICE O/P EST LOW 20 MIN: CPT | Performed by: PSYCHIATRY & NEUROLOGY

## 2018-07-18 RX ORDER — QUETIAPINE 400 MG/1
400 TABLET, FILM COATED, EXTENDED RELEASE ORAL NIGHTLY
Qty: 30 TABLET | Refills: 5 | Status: SHIPPED | OUTPATIENT
Start: 2018-07-18 | End: 2018-08-22

## 2018-07-18 RX ORDER — DEXTROAMPHETAMINE SACCHARATE, AMPHETAMINE ASPARTATE, DEXTROAMPHETAMINE SULFATE AND AMPHETAMINE SULFATE 7.5; 7.5; 7.5; 7.5 MG/1; MG/1; MG/1; MG/1
30 TABLET ORAL 2 TIMES DAILY
Qty: 60 TABLET | Refills: 0 | Status: SHIPPED | OUTPATIENT
Start: 2018-07-18 | End: 2018-08-22 | Stop reason: SDUPTHER

## 2018-07-21 DIAGNOSIS — E11.29 TYPE 2 DIABETES MELLITUS WITH MICROALBUMINURIA, WITHOUT LONG-TERM CURRENT USE OF INSULIN (HCC): Chronic | ICD-10-CM

## 2018-07-21 DIAGNOSIS — R80.9 TYPE 2 DIABETES MELLITUS WITH MICROALBUMINURIA, WITHOUT LONG-TERM CURRENT USE OF INSULIN (HCC): Chronic | ICD-10-CM

## 2018-07-21 DIAGNOSIS — E78.1 HYPERTRIGLYCERIDEMIA: Chronic | ICD-10-CM

## 2018-07-23 RX ORDER — ATORVASTATIN CALCIUM 80 MG/1
TABLET, FILM COATED ORAL
Qty: 90 TABLET | Refills: 3 | Status: SHIPPED | OUTPATIENT
Start: 2018-07-23 | End: 2019-07-16 | Stop reason: SDUPTHER

## 2018-07-23 RX ORDER — FENOFIBRATE 160 MG/1
TABLET ORAL
Qty: 90 TABLET | Refills: 3 | Status: SHIPPED | OUTPATIENT
Start: 2018-07-23 | End: 2019-07-16 | Stop reason: SDUPTHER

## 2018-08-07 ENCOUNTER — TELEPHONE (OUTPATIENT)
Dept: FAMILY MEDICINE CLINIC | Age: 48
End: 2018-08-07

## 2018-08-07 NOTE — TELEPHONE ENCOUNTER
----- Message from Kayden Arizmendi DO sent at 8/7/2018  2:41 PM EDT -----  Please let pt know that f/u thyroid labs normal. No underactive thyroid. Will discuss further tomorrow at f/u visit. Let me know if questions, thanks!

## 2018-08-08 ENCOUNTER — OFFICE VISIT (OUTPATIENT)
Dept: PULMONOLOGY | Age: 48
End: 2018-08-08
Payer: COMMERCIAL

## 2018-08-08 VITALS
DIASTOLIC BLOOD PRESSURE: 78 MMHG | BODY MASS INDEX: 43.97 KG/M2 | HEIGHT: 66 IN | WEIGHT: 273.6 LBS | OXYGEN SATURATION: 98 % | SYSTOLIC BLOOD PRESSURE: 130 MMHG | HEART RATE: 78 BPM

## 2018-08-08 DIAGNOSIS — G47.33 OSA TREATED WITH BIPAP: Primary | ICD-10-CM

## 2018-08-08 PROCEDURE — 3017F COLORECTAL CA SCREEN DOC REV: CPT | Performed by: NURSE PRACTITIONER

## 2018-08-08 PROCEDURE — 1036F TOBACCO NON-USER: CPT | Performed by: NURSE PRACTITIONER

## 2018-08-08 PROCEDURE — G8417 CALC BMI ABV UP PARAM F/U: HCPCS | Performed by: NURSE PRACTITIONER

## 2018-08-08 PROCEDURE — G8427 DOCREV CUR MEDS BY ELIG CLIN: HCPCS | Performed by: NURSE PRACTITIONER

## 2018-08-08 PROCEDURE — 99213 OFFICE O/P EST LOW 20 MIN: CPT | Performed by: NURSE PRACTITIONER

## 2018-08-08 PROCEDURE — G8598 ASA/ANTIPLAT THER USED: HCPCS | Performed by: NURSE PRACTITIONER

## 2018-08-08 NOTE — PROGRESS NOTES
[facility]      Juliet Dear                                         [id number]  8/8/2018   Chief Complaint   Patient presents with    Sleep Apnea     IRISH Last [facility]. Sammy Harrell [facility]        Pt of Dr. Sammy Harrell    PAP Download:   Romulo Rodrigez or initial  AHI: 100.8     Date of initial study: Re-titration 2/12/16 (diagnosed 2003)   [] Compliant  17%   [x]  Noncompliant 83 %     PAP Type BiPAP   Level  19/14   Avg Hrs/Day 4:21  AHI: 10   Recorded compliance dates , July 10, 2018  to August 8, 2018   Machine/Mfg: Genelux Interface: FFM    Provider:  [x][facility]  [][facility]  [][facility]  [][facility]         [][facility] []Other:   Neck Size: 17.5  Mallampati Mallampati 3  ESS:  15    Here is a scan of the most recent download:          Presentation:   Linda Martines presents for sleep medicine follow up for obstructive sleep apnea  Since the last visit, Linda Martines is not doing reasonably well with their sleep machine. Other comments: He has been in need of new supplies for the last few months and has not been using his machine with increased AHI and EDS. He reports when using however, he does have benefit. He has been non-compliant with follow up since his re-titration study in 2016. Equipment issues: The pressure is  acceptable, the mask is acceptable and he  is  using the humidity. Sleep issues:  Do you feel better? Yes  More rested? Yes   Better concentration? yes    Progress History:   Since last visit any new medical issues? No  New ER or hospitlal visits? No  Any new or changes in medicines? No  Any new sleep medicines? No        Past Medical History:   Diagnosis Date    ADHD (attention deficit hyperactivity disorder)     Allergic rhinitis     Benign esophageal stricture     s/p dilation may 2014    Benign prostatic hyperplasia     Bipolar 1 disorder ([facility] [address]     CAD (coronary artery disease)     Unclear history, cath 2013 with patent arteries. Records pending.      Chronic back pain (LIPITOR) 80 MG tablet TAKE 1 TABLET BY MOUTH ONE TIME A DAY  90 tablet 3    fenofibrate 160 MG tablet TAKE 1 TABLET BY MOUTH ONE TIME A DAY  90 tablet 3    QUEtiapine (SEROQUEL XR) 400 MG extended release tablet Take 1 tablet by mouth nightly 30 tablet 5    amphetamine-dextroamphetamine (ADDERALL, 30MG,) 30 MG tablet Take 1 tablet by mouth 2 times daily for 30 days. . 60 tablet 0    traZODone (DESYREL) 50 MG tablet TAKE 1 TABLET BY MOUTH ONE TIME A DAY AT NIGHT AS NEEDED FOR SLEEP 30 tablet 1    vitamin B-12 (CYANOCOBALAMIN) 1000 MCG tablet TAKE 1 TABLET BY MOUTH ONE TIME A DAY  30 tablet 5    piroxicam (FELDENE) 20 MG capsule TAKE 1 CAPSULE BY MOUTH ONE TIME A DAY AS NEEDED FOR PAIN 30 capsule 5    lisinopril (PRINIVIL;ZESTRIL) 2.5 MG tablet TAKE 1 TABLET BY MOUTH ONE TIME A DAY  90 tablet 3    omeprazole (PRILOSEC) 20 MG delayed release capsule Take 2 capsules by mouth Daily 180 capsule 3    rOPINIRole (REQUIP) 3 MG tablet Take 3 mg by mouth nightly      BuPROPion HCl ER, XL, 450 MG TB24 Take 450 mg by mouth daily 30 tablet 5    metFORMIN (GLUCOPHAGE) 1000 MG tablet TAKE 1 TABLET BY MOUTH TWO TIMES A DAY WITH MEALS 180 tablet 3    lamoTRIgine (LAMICTAL) 200 MG tablet TAKE 1 TABLET BY MOUTH ONE TIME A DAY 30 tablet 5    aspirin EC 81 MG EC tablet Take 1 tablet by mouth daily 90 tablet 3    ranitidine (ZANTAC) 150 MG tablet Take 150 mg by mouth nightly Take 1 tab at bedtime      Lancets MISC Check sugars once daily 100 each 3    glucose blood VI test strips (ACURA BLOOD GLUCOSE TEST) strip Check sugars daily 100 each 3    butalbital-acetaminophen-caffeine (FIORICET) -40 MG per tablet Take 1 tablet by mouth every 6 hours as needed for Migraine 60 tablet 0    cetirizine (ZYRTEC) 10 MG tablet       EYE ITCH RELIEF 0.025 % ophthalmic solution       propranolol (INDERAL LA) 160 MG ER capsule Take 160 mg by mouth daily       fluticasone (FLONASE) 50 MCG/ACT nasal spray 1 spray by Nasal route daily as needed.  gabapentin (NEURONTIN) 800 MG tablet   Take 800 mg by mouth 4 times daily       SITagliptin (JANUVIA) 100 MG tablet Take 1 tablet by mouth daily 30 tablet 11     No current facility-administered medications for this visit. Family History   Problem Relation Age of Onset    Cancer Father 77        lung     Rheum Arthritis Father     Colon Cancer Maternal Aunt 61    Cancer Maternal Aunt 76        \"throat\"    Colon Cancer Maternal Uncle 48    Cancer Maternal Uncle 61        pancreatic    Cancer Paternal Aunt 52        brain    Colon Cancer Paternal Uncle 72    Cancer Paternal Uncle 61        hepatic     Prostate Cancer Paternal Uncle 61    Cancer Maternal Uncle 79        pancreatic     Ovarian Cancer Paternal Aunt 48   Michail Schwab Rheum Arthritis Mother       ROS   General ROS: stable / unchanged  ENT ROS: negative for - nasal congestion, oral lesions or sore throat  Hematological and Lymphatic ROS: negative  Endocrine ROS: negative  Respiratory ROS: no cough, shortness of breath, or wheezing  Cardiovascular ROS: no chest pain or dyspnea on exertion  Gastrointestinal ROS: no abdominal pain, change in bowel habits, or black or bloody stools  Musculoskeletal ROS: negative  Neurological ROS: negative     Physical Exam:    BMI:  Body mass index is 44.16 kg/m². Wt Readings from Last 3 Encounters:   04/18/18 280 lb (127 kg)   01/17/18 274 lb (124.3 kg)   11/15/17 262 lb (118.8 kg)     Vitals: Ht 5' 6\" (1.676 m)       Physical Exam  General appearance: alert and oriented to person, place and time, well-developed and well-nourished, in no acute distress  Nose: Nares normal. Septum midline. Mucosa normal. No drainage or sinus tenderness.   Oropharynx:  negative  Lungs: clear to auscultation bilaterally  Heart: regular rate and rhythm, S1, S2 normal, no murmur, click, rub or gallop  Extremities: extremities normal, atraumatic, no cyanosis or edema  Neurologic: Mental status: Alert, oriented, thought content appropriate       ASSESSMENT/DIAGNOSIS     Diagnosis Orders   1. IRISH treated with BiPAP  DME Order for BiPAP as OP            Plan   Do you need any equipment today? Yes I sent Rx to [facility] for new supplies. I want to see him back in 8 weeks with download. He never followed up since his re-titration study thus compliance and appropriate settings do need to be followed. I explained in detail the severity of his IRISH and his medical risks involved. His medical compliance is of extreme importance. His weight is up over 10 lbs since his study. Follow closely. - He  was advised to continue current positive airway pressure therapy with above described pressure. - He  advised to keep good compliance with current recommended pressure to get optimal results and clinical improvement  - Recommend 7-9 hours of sleep with PAP  - He was advised to call DME company regarding supplies if needed. - He call my office for earlier appointment if needed for worsening of sleep symptoms.   - He was instructed on weight loss  - Leticia Castillo was educated about my impression and plan. Patient verbalizes understanding.     We will see Red Mcmahankev back in: 8 weeks with download    Electronically signed by MIGNON Montague CNP on 8/8/2018 at 3:28 PM

## 2018-08-08 NOTE — PATIENT INSTRUCTIONS
trying to quit smoking. · Consider signing up for a smoking cessation program, such as the American Lung Association's Freedom from Smoking program.  · Get text messaging support. Go to the website at www.smokefree. gov to sign up for the [facility] program.  · Set a quit date. Pick your date carefully so that it is not right in the middle of a big deadline or stressful time. Once you quit, do not even take a puff. Get rid of all ashtrays and lighters after your last cigarette. Clean your house and your clothes so that they do not smell of smoke. · Learn how to be a nonsmoker. Think about ways you can avoid those things that make you reach for a cigarette. ¨ Avoid situations that put you at greatest risk for smoking. For some people, it is hard to have a drink with friends without smoking. For others, they might skip a coffee break with coworkers who smoke. ¨ Change your daily routine. Take a different route to work or eat a meal in a different place. · Cut down on stress. Calm yourself or release tension by doing an activity you enjoy, such as reading a book, taking a hot bath, or gardening. · Talk to your doctor or pharmacist about nicotine replacement therapy, which replaces the nicotine in your body. You still get nicotine but you do not use tobacco. Nicotine replacement products help you slowly reduce the amount of nicotine you need. These products come in several forms, many of them available over-the-counter:  ¨ Nicotine patches  ¨ Nicotine gum and lozenges  ¨ Nicotine inhaler  · Ask your doctor about bupropion (Wellbutrin) or varenicline (Chantix), which are prescription medicines. They do not contain nicotine. They help you by reducing withdrawal symptoms, such as stress and anxiety. · Some people find hypnosis, acupuncture, and massage helpful for ending the smoking habit. · Eat a healthy diet and get regular exercise.  Having healthy habits will help your body move past its craving for nicotine. · Be prepared to keep trying. Most people are not successful the first few times they try to quit. Do not get mad at yourself if you smoke again. Make a list of things you learned and think about when you want to try again, such as next week, next month, or next year. Where can you learn more? Go to https://Nukotoyspecastilloewtaye.[email or web address]CIL. org and sign in to your [employer] account. Enter X143 in the [employer] box to learn more about \"Stopping Smoking: Care Instructions. \"     If you do not have an account, please click on the \"Sign Up Now\" link. Current as of: November 29, 2017  Content Version: 11.7  © 6256-1224 [employer]. Care instructions adapted under license by [facility] ([facility]). If you have questions about a medical condition or this instruction, always ask your healthcare professional. [address] any warranty or liability for your use of this information.

## 2018-08-16 ENCOUNTER — OFFICE VISIT (OUTPATIENT)
Dept: FAMILY MEDICINE CLINIC | Age: 48
End: 2018-08-16
Payer: COMMERCIAL

## 2018-08-16 VITALS
HEART RATE: 80 BPM | DIASTOLIC BLOOD PRESSURE: 88 MMHG | SYSTOLIC BLOOD PRESSURE: 128 MMHG | WEIGHT: 270.5 LBS | HEIGHT: 68 IN | BODY MASS INDEX: 41 KG/M2 | RESPIRATION RATE: 24 BRPM | TEMPERATURE: 98.9 F

## 2018-08-16 DIAGNOSIS — F17.211 CIGARETTE NICOTINE DEPENDENCE IN REMISSION: ICD-10-CM

## 2018-08-16 DIAGNOSIS — E78.2 MIXED HYPERLIPIDEMIA: ICD-10-CM

## 2018-08-16 DIAGNOSIS — I10 ESSENTIAL HYPERTENSION: ICD-10-CM

## 2018-08-16 DIAGNOSIS — E53.8 B12 DEFICIENCY: ICD-10-CM

## 2018-08-16 DIAGNOSIS — E11.8 TYPE 2 DIABETES MELLITUS WITH COMPLICATION, WITHOUT LONG-TERM CURRENT USE OF INSULIN (HCC): Primary | Chronic | ICD-10-CM

## 2018-08-16 LAB — HBA1C MFR BLD: 7.1 %

## 2018-08-16 PROCEDURE — 3017F COLORECTAL CA SCREEN DOC REV: CPT | Performed by: FAMILY MEDICINE

## 2018-08-16 PROCEDURE — 3045F PR MOST RECENT HEMOGLOBIN A1C LEVEL 7.0-9.0%: CPT | Performed by: FAMILY MEDICINE

## 2018-08-16 PROCEDURE — G8417 CALC BMI ABV UP PARAM F/U: HCPCS | Performed by: FAMILY MEDICINE

## 2018-08-16 PROCEDURE — G8598 ASA/ANTIPLAT THER USED: HCPCS | Performed by: FAMILY MEDICINE

## 2018-08-16 PROCEDURE — 2022F DILAT RTA XM EVC RTNOPTHY: CPT | Performed by: FAMILY MEDICINE

## 2018-08-16 PROCEDURE — 83036 HEMOGLOBIN GLYCOSYLATED A1C: CPT | Performed by: FAMILY MEDICINE

## 2018-08-16 PROCEDURE — G8427 DOCREV CUR MEDS BY ELIG CLIN: HCPCS | Performed by: FAMILY MEDICINE

## 2018-08-16 PROCEDURE — 1036F TOBACCO NON-USER: CPT | Performed by: FAMILY MEDICINE

## 2018-08-16 PROCEDURE — 99214 OFFICE O/P EST MOD 30 MIN: CPT | Performed by: FAMILY MEDICINE

## 2018-08-16 NOTE — PROGRESS NOTES
Visit Information    Have you changed or started any medications since your last visit including any over-the-counter medicines, vitamins, or herbal medicines? no   Are you having any side effects from any of your medications? -  no  Have you stopped taking any of your medications? Is so, why? -  no    Have you seen any other physician or provider since your last visit? No  Have you had any other diagnostic tests since your last visit? Yes - Records Obtained  Have you been seen in the emergency room and/or had an admission to a hospital since we last saw you? No  Have you had your routine dental cleaning in the past 6 months? no    Have you activated your [employer] account? If not, what are your barriers? No     Patient Care Team:  Kayden Arizmendi, DO as PCP - [address] [place] [address], DO as PCP - [facility] Attributed Provider  Etienne Ibarra MD as Consulting Physician (Pulmonology)    Medical History Review  Past Medical, Family, and Social History     Defer to provider.

## 2018-08-16 NOTE — PROGRESS NOTES
once daily 100 each 3    glucose blood VI test strips (ACURA BLOOD GLUCOSE TEST) strip Check sugars daily 100 each 3    butalbital-acetaminophen-caffeine (FIORICET) -40 MG per tablet Take 1 tablet by mouth every 6 hours as needed for Migraine 60 tablet 0    cetirizine (ZYRTEC) 10 MG tablet       EYE ITCH RELIEF 0.025 % ophthalmic solution       propranolol (INDERAL LA) 160 MG ER capsule Take 160 mg by mouth daily       fluticasone (FLONASE) 50 MCG/ACT nasal spray 1 spray by Nasal route daily as needed.  gabapentin (NEURONTIN) 800 MG tablet   Take 800 mg by mouth 4 times daily        No current facility-administered medications for this visit. No orders of the defined types were placed in this encounter. All medications reviewed and reconciled, including OTC and herbal medications. Updated list given to patient. Patient Active Problem List    Diagnosis Date Noted    Obstructive sleep apnea on CPAP 11/03/2015     Priority: High    B12 deficiency     DM2 (diabetes mellitus, type 2) [facility]     Benign prostatic hyperplasia     Morbid obesity with BMI of 40.0-44.9, adult ([facility]     Hypertriglyceridemia 10/27/2015    BPPV (benign paroxysmal positional vertigo) 09/29/2015    Short-segment Vásquez's esophagus 06/17/2015     Following with Nelida Hilton for this.  Pulmonary nodule      stable >2 years, no further w/u required.  Bipolar 1 disorder ([facility]     CAD (coronary artery disease)      Unclear history, cath 2013 with patent arteries. Records pending.  History of TIA (transient ischemic attack)      x2      Hypertension     Fibromyalgia     Depression     History of colon polyps     Chronic back pain     ADHD (attention deficit hyperactivity disorder)     Nicotine dependence     Peripheral neuropathy     Tarsal tunnel syndrome     Benign esophageal stricture     Hyperlipidemia     Allergic rhinitis     Migraine headache     Dysphagia s/p dilation.  Follows KNEE SURGERY Left 2012    ROTATOR CUFF REPAIR Left 2013   Orlene Gala SHOULDER SURGERY Bilateral 2011   Orlene Gala SINUS SURGERY  2004    [place] occ.  TONSILLECTOMY  2002    TYMPANOSTOMY TUBE PLACEMENT      UPPER GASTROINTESTINAL ENDOSCOPY  2008, 2008 with dilation, 2012    UPPER GASTROINTESTINAL ENDOSCOPY  2013    UPPER GASTROINTESTINAL ENDOSCOPY  MAY 2014, 2017    Dilation       No Known Allergies      Social History     Social History    Marital status:      Spouse name: N/A    Number of children: N/A    Years of education: N/A     Occupational History    Not on file. Social History Main Topics    Smoking status: Former Smoker     Packs/day: 0.75     Years: 30.00     Types: Cigarettes     Start date: 2/1/1984     Quit date: 5/8/2018    Smokeless tobacco: Never Used      Comment: 5cigs a week    Alcohol use No    Drug use: No    Sexual activity: No     Other Topics Concern    Not on file     Social History Narrative    No narrative on file         Family History   Problem Relation Age of Onset    Cancer Father 77        lung     Rheum Arthritis Father     Colon Cancer Maternal Aunt 61    Cancer Maternal Aunt 76        \"throat\"    Colon Cancer Maternal Uncle 48    Cancer Maternal Uncle 60        pancreatic    Cancer Paternal Aunt 52        brain    Colon Cancer Paternal Uncle 72    Cancer Paternal Uncle 61        hepatic     Prostate Cancer Paternal Uncle 61    Cancer Maternal Uncle 79        pancreatic     Ovarian Cancer Paternal Aunt 48    Rheum Arthritis Mother          I have reviewed the patient's past medical history, past surgical history, allergies, medications, social and family history and I have made updates where appropriate.       Review of Systems  Positive responses are highlighted in bold    Constitutional:  Fever, Chills, Night Sweats, Fatigue, Unexpected changes in weight  HENT:  Ear pain, Tinnitus, Nosebleeds, Trouble swallowing, Hearing loss, Sore throat  Cardiovascular:  Chest Pain, Palpitations, Orthopnea, Paroxysmal Nocturnal Dyspnea  Respiratory:  Cough, Wheezing, Shortness of breath, Chest tightness, Apnea  Gastrointestinal:  Nausea, Vomiting, Diarrhea, Constipation, Heartburn, Blood in stool  Genitourinary:  Difficulty or painful urination, Flank pain, Change in frequency, Urgency  Skin:  Color change, Rash, Itching, Wound  Musculoskeletal:  Joint pain, Back pain, Gait problems, Joint swelling, Myalgias  Neurological:  Dizziness, Headaches, Presyncope, Numbness, Seizures, Tremors  Endocrine:  Heat Intolerance, Cold Intolerance, Polydipsia, Polyphagia, Polyuria      PHYSICAL EXAM:  Vitals:    08/16/18 1548   BP: 128/88   Pulse: 80   Resp: 24   Temp: 98.9 °F (37.2 °C)   TempSrc: Oral   Weight: 270 lb 8 oz (122.7 kg)   Height: 5' 8\" (1.727 m)     Body mass index is 41.13 kg/m². Pain Score:   3 (Joints)    Wt Readings from Last 3 Encounters:   08/16/18 270 lb 8 oz (122.7 kg)   08/08/18 273 lb 9.6 oz (124.1 kg)   04/18/18 280 lb (127 kg)     VS Reviewed  General Appearance: A&O x 3, No acute distress,well developed and well- nourished  Eyes: pupils equal, round, and reactive to light, extraocular eye movements intact, conjunctivae and eye lids without erythema  ENT: external ear and ear canal clear bilaterally, TMs intact and regular, nose without deformity, nasal mucosa and turbinates normal without polyps, oropharynx normal, dentition is normal for age  Neck: supple and non-tender without mass, no thyromegaly or thyroid nodules, no cervical lymphadenopathy  Pulmonary/Chest: clear to auscultation bilaterally- no wheezes, rales or rhonchi, normal air movement, no respiratory distress or retractions  Cardiovascular: S1 and S2 auscultated w/ RRR. No murmurs, rubs, clicks, or gallops, distal pulses intact. Abdomen: soft, non-tender, non-distended, bowl sounds physiologic,  no rebound or guarding, no masses or hernias noted.  Liver and spleen without enlargement. Extremities: no cyanosis, clubbing or edema of the lower extremities. +2 PT/DP bilaterally. Skin: warm and dry, no rash or erythema      Results for POC orders placed in visit on 08/16/18   POCT glycosylated hemoglobin (Hb A1C)   Result Value Ref Range    Hemoglobin A1C 7.1 %                 ASSESSMENT & PLAN  1. Type 2 diabetes mellitus with complication, without long-term current use of insulin (HCC)    Very close to goal  con't meds adjusted meds  con't diet changes and activity mod  Reminded about eye exam  F/u 6 months    - POCT glycosylated hemoglobin (Hb A1C)    2. B12 deficiency    D/c vit d  Repeat labs 6 months, fasting    - Vitamin B12; Future    3. Essential hypertension    At goal  con't meds  Labs UTD    4. Mixed hyperlipidemia    con't lipitor, labs stable and appropriate. 5. Cigarette nicotine dependence in remission    Smoke free for 6 wks, congratulated. DISPOSITION    Return in about 3 months (around 11/16/2018) for Follow-up Diabetes, sooner as needed. Az released without restrictions. Future Appointments  Date Time Provider Mikey Painting   8/22/2018 8:40 AM MD Maite Hylton   10/15/2018 10:00 AM Francesca Ventura, [address] [facility]   11/16/2018 7:40 AM Nadia Ramirez DO [facility]     PATIENT COUNSELING    Az received counseling on the following healthy behaviors: nutrition, exercise and medication adherence    Patient given educational materials on: See Attached    I have instructed Helen Munguia to complete a self tracking handout on blood sugars, smoking and instructed them to bring it with them to his next appointment. Barriers to learning and self management: none    Discussed use, benefit, and side effects of prescribed medications. Barriers to medication compliance addressed. All patient questions answered. Pt voiced understanding.        Electronically signed by Nadia Ramirez DO on 8/16/2018 at

## 2018-08-20 RX ORDER — LAMOTRIGINE 200 MG/1
TABLET ORAL
Qty: 30 TABLET | Refills: 0 | Status: SHIPPED | OUTPATIENT
Start: 2018-08-20 | End: 2018-08-22 | Stop reason: SDUPTHER

## 2018-08-20 RX ORDER — TRAZODONE HYDROCHLORIDE 50 MG/1
TABLET ORAL
Qty: 30 TABLET | Refills: 0 | Status: SHIPPED | OUTPATIENT
Start: 2018-08-20 | End: 2018-09-19 | Stop reason: SDUPTHER

## 2018-08-22 ENCOUNTER — OFFICE VISIT (OUTPATIENT)
Dept: PSYCHIATRY | Age: 48
End: 2018-08-22
Payer: COMMERCIAL

## 2018-08-22 DIAGNOSIS — F90.9 ATTENTION DEFICIT HYPERACTIVITY DISORDER (ADHD), UNSPECIFIED ADHD TYPE: ICD-10-CM

## 2018-08-22 DIAGNOSIS — F31.9 BIPOLAR 1 DISORDER (HCC): Primary | ICD-10-CM

## 2018-08-22 PROCEDURE — 99213 OFFICE O/P EST LOW 20 MIN: CPT | Performed by: PSYCHIATRY & NEUROLOGY

## 2018-08-22 PROCEDURE — G8598 ASA/ANTIPLAT THER USED: HCPCS | Performed by: PSYCHIATRY & NEUROLOGY

## 2018-08-22 PROCEDURE — G8428 CUR MEDS NOT DOCUMENT: HCPCS | Performed by: PSYCHIATRY & NEUROLOGY

## 2018-08-22 PROCEDURE — G8417 CALC BMI ABV UP PARAM F/U: HCPCS | Performed by: PSYCHIATRY & NEUROLOGY

## 2018-08-22 PROCEDURE — 1036F TOBACCO NON-USER: CPT | Performed by: PSYCHIATRY & NEUROLOGY

## 2018-08-22 PROCEDURE — 3017F COLORECTAL CA SCREEN DOC REV: CPT | Performed by: PSYCHIATRY & NEUROLOGY

## 2018-08-22 RX ORDER — LAMOTRIGINE 200 MG/1
TABLET ORAL
Qty: 30 TABLET | Refills: 5 | Status: SHIPPED | OUTPATIENT
Start: 2018-08-22 | End: 2019-02-07

## 2018-08-22 RX ORDER — OLANZAPINE 10 MG/1
10 TABLET ORAL NIGHTLY
Qty: 30 TABLET | Refills: 3 | Status: SHIPPED | OUTPATIENT
Start: 2018-08-22 | End: 2018-09-24

## 2018-08-22 RX ORDER — DEXTROAMPHETAMINE SACCHARATE, AMPHETAMINE ASPARTATE, DEXTROAMPHETAMINE SULFATE AND AMPHETAMINE SULFATE 7.5; 7.5; 7.5; 7.5 MG/1; MG/1; MG/1; MG/1
30 TABLET ORAL 2 TIMES DAILY
Qty: 60 TABLET | Refills: 0 | Status: SHIPPED | OUTPATIENT
Start: 2018-08-22 | End: 2018-09-24 | Stop reason: SDUPTHER

## 2018-08-22 NOTE — PROGRESS NOTES
Chief Complaint   Patient presents with    1 Month Follow-Up     Bipolar I dep ADHD     Ericka Wright returned after one month to follow-up on bipolar disorder and ADHD. He told me for the first time that he's been having visual and auditory hallucinations. Last night, he apparently had a conversation with his daughter. She was not actually in the room, and had gone to bed several hours earlier. At one point, he glanced away and she was gone when he looked back. However his wife came into the room and asked him who he had been talking to. She thought that he needed to report this to me. He is on Seroquel 400 mg XR. This evidently is not working well. Since the diagnosis is bipolar depression I will change him to [id number] milligrams. I warned him about the potential for weight gain and to watch his blood sugars. He understood these warnings. He is willing to try it nonetheless. If this doesn't help we may want to add Prozac. He is currently on Wellbutrin. I've asked him to return in 30 days.     Mental Status Examination    Level of consciousness:  within normal limits  Appearance:  well-appearing, street clothes, in chair, good grooming and good hygiene  Behavior/Motor:  no abnormalities noted  Attitude toward examiner:  cooperative, attentive and good eye contact  Speech:  spontaneous, normal rate, normal volume and well articulated  Mood:  euthymic  Affect:  mood congruent  Thought processes:  linear, goal directed and coherent  Thought content:  Homocidal ideation: none  Suicidal Ideation:  denies suicidal ideation  Delusions:  no evidence of delusions  Perceptual Disturbance: visual, auditory  Cognition:  oriented to person, place, and time  Concentration succeeded  Memory intact  Fund of knowledge:  good  Abstract thinking:  fair  Insight:  good  Judgment:  good    DIAGNOSTIC IMPRESSION  Bipolar I dep  ADHD    Plan  Stop quetiapine  Trial olanzapine  Current Outpatient Prescriptions Medication Sig Dispense Refill    amphetamine-dextroamphetamine (ADDERALL, 30MG,) 30 MG tablet Take 1 tablet by mouth 2 times daily for 30 days. . 60 tablet 0    OLANZapine (ZYPREXA) 10 MG tablet Take 1 tablet by mouth nightly 30 tablet 3    lamoTRIgine (LAMICTAL) 200 MG tablet TAKE 1 TABLET BY MOUTH ONE TIME A DAY 30 tablet 5    traZODone (DESYREL) 50 MG tablet TAKE 1 TABLET BY MOUTH ONE TIME A DAY AT NIGHT AS NEEDED FOR SLEEP 30 tablet 0    atorvastatin (LIPITOR) 80 MG tablet TAKE 1 TABLET BY MOUTH ONE TIME A DAY  90 tablet 3    fenofibrate 160 MG tablet TAKE 1 TABLET BY MOUTH ONE TIME A DAY  90 tablet 3    piroxicam (FELDENE) 20 MG capsule TAKE 1 CAPSULE BY MOUTH ONE TIME A DAY AS NEEDED FOR PAIN 30 capsule 5    lisinopril (PRINIVIL;ZESTRIL) 2.5 MG tablet TAKE 1 TABLET BY MOUTH ONE TIME A DAY  90 tablet 3    omeprazole (PRILOSEC) 20 MG delayed release capsule Take 2 capsules by mouth Daily 180 capsule 3    SITagliptin (JANUVIA) 100 MG tablet Take 1 tablet by mouth daily 30 tablet 11    rOPINIRole (REQUIP) 3 MG tablet Take 3 mg by mouth nightly      BuPROPion HCl ER, XL, 450 MG TB24 Take 450 mg by mouth daily 30 tablet 5    metFORMIN (GLUCOPHAGE) 1000 MG tablet TAKE 1 TABLET BY MOUTH TWO TIMES A DAY WITH MEALS 180 tablet 3    aspirin EC 81 MG EC tablet Take 1 tablet by mouth daily 90 tablet 3    ranitidine (ZANTAC) 150 MG tablet Take 150 mg by mouth nightly       Lancets MISC Check sugars once daily 100 each 3    glucose blood VI test strips (ACURA BLOOD GLUCOSE TEST) strip Check sugars daily 100 each 3    butalbital-acetaminophen-caffeine (FIORICET) -40 MG per tablet Take 1 tablet by mouth every 6 hours as needed for Migraine 60 tablet 0    cetirizine (ZYRTEC) 10 MG tablet       EYE ITCH RELIEF 0.025 % ophthalmic solution       propranolol (INDERAL LA) 160 MG ER capsule Take 160 mg by mouth daily       fluticasone (FLONASE) 50 MCG/ACT nasal spray 1 spray by Nasal route daily as needed.  gabapentin (NEURONTIN) 800 MG tablet   Take 800 mg by mouth 4 times daily        No current facility-administered medications for this visit.

## 2018-09-19 RX ORDER — TRAZODONE HYDROCHLORIDE 50 MG/1
50 TABLET ORAL NIGHTLY
Qty: 30 TABLET | Refills: 0 | Status: SHIPPED | OUTPATIENT
Start: 2018-09-19 | End: 2018-10-23 | Stop reason: SDUPTHER

## 2018-09-19 RX ORDER — QUETIAPINE FUMARATE 50 MG/1
TABLET, EXTENDED RELEASE ORAL
Qty: 60 TABLET | Refills: 2 | OUTPATIENT
Start: 2018-09-19

## 2018-09-19 RX ORDER — BUPROPION HYDROCHLORIDE 450 MG/1
TABLET, FILM COATED, EXTENDED RELEASE ORAL
Qty: 30 TABLET | Refills: 4 | Status: SHIPPED | OUTPATIENT
Start: 2018-09-19 | End: 2018-09-24

## 2018-09-24 ENCOUNTER — OFFICE VISIT (OUTPATIENT)
Dept: PSYCHIATRY | Age: 48
End: 2018-09-24
Payer: COMMERCIAL

## 2018-09-24 DIAGNOSIS — F90.9 ATTENTION DEFICIT HYPERACTIVITY DISORDER (ADHD), UNSPECIFIED ADHD TYPE: ICD-10-CM

## 2018-09-24 DIAGNOSIS — F31.9 BIPOLAR 1 DISORDER (HCC): Primary | ICD-10-CM

## 2018-09-24 PROCEDURE — G8428 CUR MEDS NOT DOCUMENT: HCPCS | Performed by: PSYCHIATRY & NEUROLOGY

## 2018-09-24 PROCEDURE — G8417 CALC BMI ABV UP PARAM F/U: HCPCS | Performed by: PSYCHIATRY & NEUROLOGY

## 2018-09-24 PROCEDURE — G8598 ASA/ANTIPLAT THER USED: HCPCS | Performed by: PSYCHIATRY & NEUROLOGY

## 2018-09-24 PROCEDURE — 1036F TOBACCO NON-USER: CPT | Performed by: PSYCHIATRY & NEUROLOGY

## 2018-09-24 PROCEDURE — 3017F COLORECTAL CA SCREEN DOC REV: CPT | Performed by: PSYCHIATRY & NEUROLOGY

## 2018-09-24 PROCEDURE — 99214 OFFICE O/P EST MOD 30 MIN: CPT | Performed by: PSYCHIATRY & NEUROLOGY

## 2018-09-24 RX ORDER — OLANZAPINE 7.5 MG/1
15 TABLET ORAL NIGHTLY
Qty: 60 TABLET | Refills: 3 | Status: SHIPPED | OUTPATIENT
Start: 2018-09-24 | End: 2018-10-24

## 2018-09-24 RX ORDER — BUPROPION HYDROCHLORIDE 450 MG/1
450 TABLET, FILM COATED, EXTENDED RELEASE ORAL DAILY
Qty: 30 TABLET | Refills: 5 | Status: SHIPPED | OUTPATIENT
Start: 2018-09-24 | End: 2019-03-06 | Stop reason: SDUPTHER

## 2018-09-24 RX ORDER — DEXTROAMPHETAMINE SACCHARATE, AMPHETAMINE ASPARTATE, DEXTROAMPHETAMINE SULFATE AND AMPHETAMINE SULFATE 7.5; 7.5; 7.5; 7.5 MG/1; MG/1; MG/1; MG/1
30 TABLET ORAL 2 TIMES DAILY
Qty: 60 TABLET | Refills: 0 | Status: SHIPPED | OUTPATIENT
Start: 2018-09-24 | End: 2018-10-24 | Stop reason: SDUPTHER

## 2018-09-24 NOTE — PROGRESS NOTES
Chief Complaint   Patient presents with    1 Month Follow-Up     Bipolar I dep ADHD     Tiffany Manzanares returned after one month to follow-up on bipolar disorder and depression. He was accompanied by young woman but I'm not sure what his relationship with her was. He is reporting some symptoms. He feels the Zyprexa is better in the Seroquel in that it doesn't make him as tired. He is describing ongoing paranoia, ongoing visual hallucinations, and a new symptom, a tingling through his body and feeling that he can't distinguish between reality and non-reality. He evidently has spoken to his regular physician about this and is being sent to a neurologist.    I will increase his Zyprexa to 15 mg to see if that's any better. If not, I told him we could touch base in a week and I can increase it again to 20. I'll have him return in 30 days. Mental Status Examination    Level of consciousness:  within normal limits  Appearance:  well-appearing, street clothes, in chair, fair grooming and fair hygiene  Behavior/Motor:  agitated-mild  Attitude toward examiner:  cooperative, attentive and good eye contact  Speech:  spontaneous, normal rate, normal volume and well articulated  Mood:  mild depression  Affect:  mood congruent  Thought processes:  linear, goal directed and coherent  Thought content:  Homocidal ideation: none  Suicidal Ideation:  denies suicidal ideation  Delusions:  no evidence of delusions  Perceptual Disturbance:  denies any perceptual disturbance  Cognition:  oriented to person, place, and time  Concentration succeeded  Memory intact  Fund of knowledge:  good  Abstract thinking:  good  Insight:  good  Judgment:  good    DIAGNOSTIC IMPRESSION  Bipolar 1 dep  ADHD    Plan  Increase olanzapine  Current Outpatient Prescriptions   Medication Sig Dispense Refill    amphetamine-dextroamphetamine (ADDERALL, 30MG,) 30 MG tablet Take 1 tablet by mouth 2 times daily for 30 days. . 60 tablet 0    BuPROPion HCl ER,

## 2018-10-23 RX ORDER — QUETIAPINE FUMARATE 50 MG/1
TABLET, EXTENDED RELEASE ORAL
Qty: 60 TABLET | Refills: 2 | OUTPATIENT
Start: 2018-10-23

## 2018-10-23 RX ORDER — TRAZODONE HYDROCHLORIDE 50 MG/1
TABLET ORAL
Qty: 30 TABLET | Refills: 0 | Status: SHIPPED | OUTPATIENT
Start: 2018-10-23 | End: 2018-11-19 | Stop reason: SDUPTHER

## 2018-10-24 ENCOUNTER — OFFICE VISIT (OUTPATIENT)
Dept: PSYCHIATRY | Age: 48
End: 2018-10-24
Payer: COMMERCIAL

## 2018-10-24 DIAGNOSIS — F90.9 ATTENTION DEFICIT HYPERACTIVITY DISORDER (ADHD), UNSPECIFIED ADHD TYPE: ICD-10-CM

## 2018-10-24 DIAGNOSIS — F31.9 BIPOLAR 1 DISORDER (HCC): Primary | ICD-10-CM

## 2018-10-24 PROCEDURE — 1036F TOBACCO NON-USER: CPT | Performed by: PSYCHIATRY & NEUROLOGY

## 2018-10-24 PROCEDURE — G8484 FLU IMMUNIZE NO ADMIN: HCPCS | Performed by: PSYCHIATRY & NEUROLOGY

## 2018-10-24 PROCEDURE — G8428 CUR MEDS NOT DOCUMENT: HCPCS | Performed by: PSYCHIATRY & NEUROLOGY

## 2018-10-24 PROCEDURE — G8417 CALC BMI ABV UP PARAM F/U: HCPCS | Performed by: PSYCHIATRY & NEUROLOGY

## 2018-10-24 PROCEDURE — G8598 ASA/ANTIPLAT THER USED: HCPCS | Performed by: PSYCHIATRY & NEUROLOGY

## 2018-10-24 PROCEDURE — 99213 OFFICE O/P EST LOW 20 MIN: CPT | Performed by: PSYCHIATRY & NEUROLOGY

## 2018-10-24 PROCEDURE — 3017F COLORECTAL CA SCREEN DOC REV: CPT | Performed by: PSYCHIATRY & NEUROLOGY

## 2018-10-24 RX ORDER — DEXTROAMPHETAMINE SACCHARATE, AMPHETAMINE ASPARTATE, DEXTROAMPHETAMINE SULFATE AND AMPHETAMINE SULFATE 7.5; 7.5; 7.5; 7.5 MG/1; MG/1; MG/1; MG/1
30 TABLET ORAL 2 TIMES DAILY
Qty: 60 TABLET | Refills: 0 | Status: SHIPPED | OUTPATIENT
Start: 2018-10-24 | End: 2018-11-26 | Stop reason: SDUPTHER

## 2018-10-24 RX ORDER — OLANZAPINE 20 MG/1
20 TABLET ORAL NIGHTLY
Qty: 30 TABLET | Refills: 3 | Status: SHIPPED | OUTPATIENT
Start: 2018-10-24 | End: 2018-11-16 | Stop reason: SINTOL

## 2018-11-01 ENCOUNTER — NURSE TRIAGE (OUTPATIENT)
Dept: ADMINISTRATIVE | Age: 48
End: 2018-11-01

## 2018-11-01 NOTE — TELEPHONE ENCOUNTER
Reason for Disposition   Patient sounds very sick or weak to the triager    Answer Assessment - Initial Assessment Questions  1. BLOOD GLUCOSE: \"What is your blood glucose level? \"       311  2. ONSET: \"When did you check the blood glucose? \"      Today   3. USUAL RANGE: \"What is your glucose level usually? \" (e.g., usual fasting morning value, usual evening value)      Been up to 500's  4. KETONES: \"Do you check for ketones (urine or blood test strips)? \" If yes, ask: \"What does the test show now? \"       no  5. TYPE 1 or 2:  \"Do you know what type of diabetes you have? \"  (e.g., Type 1, Type 2, Gestational; doesn't know)       Type 2  6. INSULIN: \"Do you take insulin? \" If yes, ask: \"Have you missed any shots recently? \"      no  7. DIABETES PILLS: \"Do you take any pills for your diabetes? \" If yes, ask: \"Have you missed taking any pills recently? \"      metformin   8. OTHER SYMPTOMS: \"Do you have any symptoms? \" (e.g., fever, frequent urination, difficulty breathing, dizziness, weakness, vomiting)      A little dizzy  A little out  Of it   9. PREGNANCY: \"Is there any chance you are pregnant? \" \"When was your last menstrual period? \"     n/a    Protocols used: DIABETES - HIGH BLOOD SUGAR-ADULT-

## 2018-11-05 ENCOUNTER — CARE COORDINATION (OUTPATIENT)
Dept: CARE COORDINATION | Age: 48
End: 2018-11-05

## 2018-11-05 DIAGNOSIS — E11.65 UNCONTROLLED TYPE 2 DIABETES MELLITUS WITH HYPERGLYCEMIA, WITHOUT LONG-TERM CURRENT USE OF INSULIN (HCC): Primary | ICD-10-CM

## 2018-11-05 NOTE — TELEPHONE ENCOUNTER
Ok  levemir sent with pen needles  Start 10 units QHS    Levemir titration:    Goal Fasting (Morning) Blood sugar 90 to 120    Start at 10 units. If not at goal after 3 days, increase Levemir dose by 3 units every 3 days until you reach your goal consistently, without going low on your blood sugar. If persistently having low blood sugar, decrease by 3 units every 2 days until hypoglycemia resolves. Januvia sent as well  Needs f/u next wk if possible.

## 2018-11-06 ENCOUNTER — TELEPHONE (OUTPATIENT)
Dept: FAMILY MEDICINE CLINIC | Age: 48
End: 2018-11-06

## 2018-11-06 DIAGNOSIS — E11.65 UNCONTROLLED TYPE 2 DIABETES MELLITUS WITH HYPERGLYCEMIA, WITHOUT LONG-TERM CURRENT USE OF INSULIN (HCC): Primary | ICD-10-CM

## 2018-11-06 NOTE — TELEPHONE ENCOUNTER
Will close this encounter as new encounter dated 11-6-18 contains new orders. Message has been left by office staff as well.

## 2018-11-06 NOTE — CARE COORDINATION
Left message to return phone call. Encouraged them to call back as soon as possible and that other messages have been left.

## 2018-11-09 ENCOUNTER — TELEPHONE (OUTPATIENT)
Dept: PSYCHIATRY | Age: 48
End: 2018-11-09

## 2018-11-13 ENCOUNTER — CARE COORDINATION (OUTPATIENT)
Dept: CARE COORDINATION | Age: 48
End: 2018-11-13

## 2018-11-16 ENCOUNTER — NURSE ONLY (OUTPATIENT)
Dept: LAB | Age: 48
End: 2018-11-16

## 2018-11-16 ENCOUNTER — OFFICE VISIT (OUTPATIENT)
Dept: FAMILY MEDICINE CLINIC | Age: 48
End: 2018-11-16
Payer: COMMERCIAL

## 2018-11-16 VITALS
HEIGHT: 67 IN | SYSTOLIC BLOOD PRESSURE: 124 MMHG | RESPIRATION RATE: 18 BRPM | BODY MASS INDEX: 42.06 KG/M2 | DIASTOLIC BLOOD PRESSURE: 76 MMHG | TEMPERATURE: 98.2 F | HEART RATE: 88 BPM | WEIGHT: 268 LBS

## 2018-11-16 DIAGNOSIS — E11.65 TYPE 2 DIABETES MELLITUS WITH HYPERGLYCEMIA, WITH LONG-TERM CURRENT USE OF INSULIN (HCC): Primary | Chronic | ICD-10-CM

## 2018-11-16 DIAGNOSIS — E66.01 MORBID OBESITY WITH BMI OF 40.0-44.9, ADULT (HCC): Chronic | ICD-10-CM

## 2018-11-16 DIAGNOSIS — Z79.4 TYPE 2 DIABETES MELLITUS WITH HYPERGLYCEMIA, WITH LONG-TERM CURRENT USE OF INSULIN (HCC): Primary | Chronic | ICD-10-CM

## 2018-11-16 DIAGNOSIS — E11.65 TYPE 2 DIABETES MELLITUS WITH HYPERGLYCEMIA, WITH LONG-TERM CURRENT USE OF INSULIN (HCC): Chronic | ICD-10-CM

## 2018-11-16 DIAGNOSIS — K21.9 GASTROESOPHAGEAL REFLUX DISEASE, ESOPHAGITIS PRESENCE NOT SPECIFIED: Chronic | ICD-10-CM

## 2018-11-16 DIAGNOSIS — Z23 NEED FOR INFLUENZA VACCINATION: ICD-10-CM

## 2018-11-16 DIAGNOSIS — Z79.4 TYPE 2 DIABETES MELLITUS WITH HYPERGLYCEMIA, WITH LONG-TERM CURRENT USE OF INSULIN (HCC): Chronic | ICD-10-CM

## 2018-11-16 LAB
ANION GAP SERPL CALCULATED.3IONS-SCNC: 13 MEQ/L (ref 8–16)
BASOPHILS # BLD: 0.9 %
BASOPHILS ABSOLUTE: 0 THOU/MM3 (ref 0–0.1)
BUN BLDV-MCNC: 11 MG/DL (ref 7–22)
CALCIUM SERPL-MCNC: 9.1 MG/DL (ref 8.5–10.5)
CHLORIDE BLD-SCNC: 98 MEQ/L (ref 98–111)
CO2: 26 MEQ/L (ref 23–33)
CREAT SERPL-MCNC: 0.8 MG/DL (ref 0.4–1.2)
EOSINOPHIL # BLD: 3.1 %
EOSINOPHILS ABSOLUTE: 0.1 THOU/MM3 (ref 0–0.4)
ERYTHROCYTE [DISTWIDTH] IN BLOOD BY AUTOMATED COUNT: 13 % (ref 11.5–14.5)
ERYTHROCYTE [DISTWIDTH] IN BLOOD BY AUTOMATED COUNT: 42.5 FL (ref 35–45)
GFR SERPL CREATININE-BSD FRML MDRD: > 90 ML/MIN/1.73M2
GLUCOSE BLD-MCNC: 357 MG/DL (ref 70–108)
HBA1C MFR BLD: 12.2 %
HCT VFR BLD CALC: 39.6 % (ref 42–52)
HEMOGLOBIN: 13.3 GM/DL (ref 14–18)
IMMATURE GRANS (ABS): 0.07 THOU/MM3 (ref 0–0.07)
IMMATURE GRANULOCYTES: 1.6 %
LYMPHOCYTES # BLD: 34.7 %
LYMPHOCYTES ABSOLUTE: 1.6 THOU/MM3 (ref 1–4.8)
MCH RBC QN AUTO: 30.6 PG (ref 26–33)
MCHC RBC AUTO-ENTMCNC: 33.6 GM/DL (ref 32.2–35.5)
MCV RBC AUTO: 91 FL (ref 80–94)
MONOCYTES # BLD: 8 %
MONOCYTES ABSOLUTE: 0.4 THOU/MM3 (ref 0.4–1.3)
NUCLEATED RED BLOOD CELLS: 0 /100 WBC
PLATELET # BLD: 165 THOU/MM3 (ref 130–400)
PMV BLD AUTO: 11.9 FL (ref 9.4–12.4)
POTASSIUM SERPL-SCNC: 4.5 MEQ/L (ref 3.5–5.2)
RBC # BLD: 4.35 MILL/MM3 (ref 4.7–6.1)
SEG NEUTROPHILS: 51.7 %
SEGMENTED NEUTROPHILS ABSOLUTE COUNT: 2.3 THOU/MM3 (ref 1.8–7.7)
SODIUM BLD-SCNC: 137 MEQ/L (ref 135–145)
WBC # BLD: 4.5 THOU/MM3 (ref 4.8–10.8)

## 2018-11-16 PROCEDURE — 2022F DILAT RTA XM EVC RTNOPTHY: CPT | Performed by: FAMILY MEDICINE

## 2018-11-16 PROCEDURE — 90471 IMMUNIZATION ADMIN: CPT | Performed by: FAMILY MEDICINE

## 2018-11-16 PROCEDURE — G8482 FLU IMMUNIZE ORDER/ADMIN: HCPCS | Performed by: FAMILY MEDICINE

## 2018-11-16 PROCEDURE — 1036F TOBACCO NON-USER: CPT | Performed by: FAMILY MEDICINE

## 2018-11-16 PROCEDURE — 3017F COLORECTAL CA SCREEN DOC REV: CPT | Performed by: FAMILY MEDICINE

## 2018-11-16 PROCEDURE — 99214 OFFICE O/P EST MOD 30 MIN: CPT | Performed by: FAMILY MEDICINE

## 2018-11-16 PROCEDURE — G8598 ASA/ANTIPLAT THER USED: HCPCS | Performed by: FAMILY MEDICINE

## 2018-11-16 PROCEDURE — 3046F HEMOGLOBIN A1C LEVEL >9.0%: CPT | Performed by: FAMILY MEDICINE

## 2018-11-16 PROCEDURE — G8427 DOCREV CUR MEDS BY ELIG CLIN: HCPCS | Performed by: FAMILY MEDICINE

## 2018-11-16 PROCEDURE — 90686 IIV4 VACC NO PRSV 0.5 ML IM: CPT | Performed by: FAMILY MEDICINE

## 2018-11-16 PROCEDURE — G8417 CALC BMI ABV UP PARAM F/U: HCPCS | Performed by: FAMILY MEDICINE

## 2018-11-16 PROCEDURE — 83036 HEMOGLOBIN GLYCOSYLATED A1C: CPT | Performed by: FAMILY MEDICINE

## 2018-11-16 NOTE — PATIENT INSTRUCTIONS
minutes of exercise on most, preferably all, days of the week. Walking is a good choice. You also may want to do other activities, such as running, swimming, cycling, or playing tennis or team sports. If your doctor says it's okay, do muscle-strengthening exercises at least 2 times a week. ? Take your medicines exactly as prescribed. Call your doctor if you think you are having a problem with your medicine. You will get more details on the specific medicines your doctor prescribes. · Check your blood sugar as often as your doctor recommends. It is important to keep track of any symptoms you have, such as low blood sugar. Also tell your doctor if you have any changes in your activities, diet, or insulin use. · Talk to your doctor before you start taking aspirin every day. Aspirin can help certain people lower their risk of a heart attack or stroke. But taking aspirin isn't right for everyone, because it can cause serious bleeding. · Do not smoke. If you need help quitting, talk to your doctor about stop-smoking programs and medicines. These can increase your chances of quitting for good. · Keep your cholesterol and blood pressure at normal levels. You may need to take one or more medicines to reach your goals. Take them exactly as directed. Do not stop or change a medicine without talking to your doctor first.  When should you call for help? Call 911 anytime you think you may need emergency care. For example, call if:    · You passed out (lost consciousness), or you suddenly become very sleepy or confused. (You may have very low blood sugar.)    Call your doctor now or seek immediate medical care if:    · Your blood sugar is 300 mg/dL or is higher than the level your doctor has set for you.     · You have symptoms of low blood sugar, such as:  ? Sweating. ? Feeling nervous, shaky, and weak. ? Extreme hunger and slight nausea. ? Dizziness and headache.  ? Blurred vision. ?  Confusion.    Watch closely for

## 2018-11-16 NOTE — PROGRESS NOTES
Visit Information    Have you changed or started any medications since your last visit including any over-the-counter medicines, vitamins, or herbal medicines? no   Are you having any side effects from any of your medications? -  no  Have you stopped taking any of your medications? Is so, why? -  no    Have you seen any other physician or provider since your last visit? Yes - Records Obtained  Have you had any other diagnostic tests since your last visit? No  Have you been seen in the emergency room and/or had an admission to a hospital since we last saw you? No  Have you had your routine dental cleaning in the past 6 months? no    Have you activated your [employer] account? If not, what are your barriers?  No: pt declined      Patient Care Team:  Shyanne Isbell, DO as PCP - [address] [place] [address], DO as PCP - [facility] Attributed Provider  Jaguar Shannon MD as Consulting Physician (Pulmonology)  Clarice Jung RN as Care Coordinator    Medical History Review  Past Medical, Family, and Social History reviewed and does contribute to the patient presenting condition    Health Maintenance   Topic Date Due    HIV screen  07/30/1983    Diabetic retinal exam  11/29/2017    Shingles Vaccine (1 of 2 - 2 Dose Series) 07/30/2018    Flu vaccine (1) 09/01/2018    A1C test (Diabetic or Prediabetic)  11/16/2018    Potassium monitoring  12/15/2018    Creatinine monitoring  12/15/2018    Diabetic foot exam  01/17/2019    Diabetic microalbuminuria test  01/17/2019    Lipid screen  06/01/2019    DTaP/Tdap/Td vaccine (2 - Td) 06/16/2024    Colon cancer screen colonoscopy  02/05/2025    Pneumococcal med risk  Completed
management: none    Discussed use, benefit, and side effects of prescribed medications. Barriers to medication compliance addressed. All patient questions answered. Pt voiced understanding.        Electronically signed by Shyanne Isbell DO on 11/16/2018 at 8:11 AM

## 2018-11-19 ENCOUNTER — TELEPHONE (OUTPATIENT)
Dept: FAMILY MEDICINE CLINIC | Age: 48
End: 2018-11-19

## 2018-11-19 RX ORDER — TRAZODONE HYDROCHLORIDE 50 MG/1
TABLET ORAL
Qty: 30 TABLET | Refills: 0 | Status: SHIPPED | OUTPATIENT
Start: 2018-11-19 | End: 2018-11-26 | Stop reason: SDUPTHER

## 2018-11-26 ENCOUNTER — OFFICE VISIT (OUTPATIENT)
Dept: PSYCHIATRY | Age: 48
End: 2018-11-26
Payer: COMMERCIAL

## 2018-11-26 DIAGNOSIS — F90.9 ATTENTION DEFICIT HYPERACTIVITY DISORDER (ADHD), UNSPECIFIED ADHD TYPE: ICD-10-CM

## 2018-11-26 DIAGNOSIS — F31.9 BIPOLAR 1 DISORDER (HCC): Primary | ICD-10-CM

## 2018-11-26 PROCEDURE — G8428 CUR MEDS NOT DOCUMENT: HCPCS | Performed by: PSYCHIATRY & NEUROLOGY

## 2018-11-26 PROCEDURE — 1036F TOBACCO NON-USER: CPT | Performed by: PSYCHIATRY & NEUROLOGY

## 2018-11-26 PROCEDURE — G8417 CALC BMI ABV UP PARAM F/U: HCPCS | Performed by: PSYCHIATRY & NEUROLOGY

## 2018-11-26 PROCEDURE — G8482 FLU IMMUNIZE ORDER/ADMIN: HCPCS | Performed by: PSYCHIATRY & NEUROLOGY

## 2018-11-26 PROCEDURE — 99213 OFFICE O/P EST LOW 20 MIN: CPT | Performed by: PSYCHIATRY & NEUROLOGY

## 2018-11-26 PROCEDURE — 3017F COLORECTAL CA SCREEN DOC REV: CPT | Performed by: PSYCHIATRY & NEUROLOGY

## 2018-11-26 PROCEDURE — G8598 ASA/ANTIPLAT THER USED: HCPCS | Performed by: PSYCHIATRY & NEUROLOGY

## 2018-11-26 RX ORDER — DEXTROAMPHETAMINE SACCHARATE, AMPHETAMINE ASPARTATE, DEXTROAMPHETAMINE SULFATE AND AMPHETAMINE SULFATE 7.5; 7.5; 7.5; 7.5 MG/1; MG/1; MG/1; MG/1
30 TABLET ORAL 2 TIMES DAILY
Qty: 60 TABLET | Refills: 0 | Status: SHIPPED | OUTPATIENT
Start: 2018-11-26 | End: 2019-01-07 | Stop reason: SDUPTHER

## 2018-11-26 RX ORDER — TRAZODONE HYDROCHLORIDE 50 MG/1
TABLET ORAL
Qty: 30 TABLET | Refills: 3 | Status: SHIPPED | OUTPATIENT
Start: 2018-11-26 | End: 2019-03-06

## 2018-12-18 DIAGNOSIS — G89.29 CHRONIC LEFT SHOULDER PAIN: ICD-10-CM

## 2018-12-18 DIAGNOSIS — M25.512 CHRONIC LEFT SHOULDER PAIN: ICD-10-CM

## 2018-12-19 ENCOUNTER — CARE COORDINATION (OUTPATIENT)
Dept: CARE COORDINATION | Age: 48
End: 2018-12-19

## 2018-12-20 DIAGNOSIS — E53.8 B12 DEFICIENCY: ICD-10-CM

## 2018-12-20 RX ORDER — LANOLIN ALCOHOL/MO/W.PET/CERES
CREAM (GRAM) TOPICAL
Qty: 90 TABLET | Refills: 3 | Status: SHIPPED | OUTPATIENT
Start: 2018-12-20 | End: 2019-12-20

## 2019-01-07 ENCOUNTER — OFFICE VISIT (OUTPATIENT)
Dept: PSYCHIATRY | Age: 49
End: 2019-01-07
Payer: COMMERCIAL

## 2019-01-07 DIAGNOSIS — F90.9 ATTENTION DEFICIT HYPERACTIVITY DISORDER (ADHD), UNSPECIFIED ADHD TYPE: ICD-10-CM

## 2019-01-07 DIAGNOSIS — F31.9 BIPOLAR 1 DISORDER (HCC): Primary | ICD-10-CM

## 2019-01-07 PROCEDURE — G8598 ASA/ANTIPLAT THER USED: HCPCS | Performed by: PSYCHIATRY & NEUROLOGY

## 2019-01-07 PROCEDURE — G8417 CALC BMI ABV UP PARAM F/U: HCPCS | Performed by: PSYCHIATRY & NEUROLOGY

## 2019-01-07 PROCEDURE — 3017F COLORECTAL CA SCREEN DOC REV: CPT | Performed by: PSYCHIATRY & NEUROLOGY

## 2019-01-07 PROCEDURE — 99213 OFFICE O/P EST LOW 20 MIN: CPT | Performed by: PSYCHIATRY & NEUROLOGY

## 2019-01-07 PROCEDURE — 1036F TOBACCO NON-USER: CPT | Performed by: PSYCHIATRY & NEUROLOGY

## 2019-01-07 PROCEDURE — G8482 FLU IMMUNIZE ORDER/ADMIN: HCPCS | Performed by: PSYCHIATRY & NEUROLOGY

## 2019-01-07 PROCEDURE — G8428 CUR MEDS NOT DOCUMENT: HCPCS | Performed by: PSYCHIATRY & NEUROLOGY

## 2019-01-07 RX ORDER — DEXTROAMPHETAMINE SACCHARATE, AMPHETAMINE ASPARTATE, DEXTROAMPHETAMINE SULFATE AND AMPHETAMINE SULFATE 7.5; 7.5; 7.5; 7.5 MG/1; MG/1; MG/1; MG/1
30 TABLET ORAL 2 TIMES DAILY
Qty: 60 TABLET | Refills: 0 | Status: SHIPPED | OUTPATIENT
Start: 2019-01-07 | End: 2019-05-10

## 2019-01-17 DIAGNOSIS — E11.29 TYPE 2 DIABETES MELLITUS WITH MICROALBUMINURIA, WITHOUT LONG-TERM CURRENT USE OF INSULIN (HCC): Chronic | ICD-10-CM

## 2019-01-17 DIAGNOSIS — R80.9 TYPE 2 DIABETES MELLITUS WITH MICROALBUMINURIA, WITHOUT LONG-TERM CURRENT USE OF INSULIN (HCC): Chronic | ICD-10-CM

## 2019-01-17 DIAGNOSIS — I25.10 ASCVD (ARTERIOSCLEROTIC CARDIOVASCULAR DISEASE): Primary | ICD-10-CM

## 2019-01-17 RX ORDER — ASPIRIN 81 MG/1
TABLET, DELAYED RELEASE ORAL
Qty: 90 TABLET | Refills: 3 | Status: SHIPPED | OUTPATIENT
Start: 2019-01-17 | End: 2020-01-22 | Stop reason: SDUPTHER

## 2019-01-28 ENCOUNTER — CARE COORDINATION (OUTPATIENT)
Dept: CARE COORDINATION | Age: 49
End: 2019-01-28

## 2019-02-02 ENCOUNTER — APPOINTMENT (OUTPATIENT)
Dept: GENERAL RADIOLOGY | Age: 49
End: 2019-02-02
Payer: COMMERCIAL

## 2019-02-02 ENCOUNTER — HOSPITAL ENCOUNTER (EMERGENCY)
Age: 49
Discharge: HOME | End: 2019-02-02
Attending: FAMILY MEDICINE
Payer: COMMERCIAL

## 2019-02-02 VITALS
BODY MASS INDEX: 42.06 KG/M2 | DIASTOLIC BLOOD PRESSURE: 78 MMHG | RESPIRATION RATE: 16 BRPM | WEIGHT: 268 LBS | SYSTOLIC BLOOD PRESSURE: 139 MMHG | HEART RATE: 79 BPM | TEMPERATURE: 97.9 F | HEIGHT: 67 IN | OXYGEN SATURATION: 97 %
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
## 2019-02-02 DIAGNOSIS — S80.01XA CONTUSION OF RIGHT KNEE, INITIAL ENCOUNTER: ICD-10-CM

## 2019-02-02 PROCEDURE — 73564 X-RAY EXAM KNEE 4 OR MORE: CPT

## 2019-02-02 PROCEDURE — 99283 EMERGENCY DEPT VISIT LOW MDM: CPT

## 2019-02-02 PROCEDURE — L1830 KO IMMOB CANVAS LONG PRE OTS: HCPCS

## 2019-02-02 PROCEDURE — 2709999900 HC NON-CHARGEABLE SUPPLY

## 2019-02-02 ASSESSMENT — PAIN DESCRIPTION - PAIN TYPE: TYPE: ACUTE PAIN

## 2019-02-02 ASSESSMENT — ENCOUNTER SYMPTOMS
ROS SKIN COMMENTS: ABRASION RIGHT KNEE
VOMITING: 0
SHORTNESS OF BREATH: 0
NAUSEA: 0
BACK PAIN: 0
FACIAL SWELLING: 0

## 2019-02-02 ASSESSMENT — PAIN SCALES - GENERAL: PAINLEVEL_OUTOF10: 7

## 2019-02-02 ASSESSMENT — PAIN DESCRIPTION - LOCATION: LOCATION: KNEE

## 2019-02-02 ASSESSMENT — PAIN DESCRIPTION - ORIENTATION: ORIENTATION: RIGHT

## 2019-02-04 ENCOUNTER — CARE COORDINATION (OUTPATIENT)
Dept: CARE COORDINATION | Age: 49
End: 2019-02-04

## 2019-02-04 DIAGNOSIS — E11.29 TYPE 2 DIABETES MELLITUS WITH MICROALBUMINURIA, WITHOUT LONG-TERM CURRENT USE OF INSULIN (HCC): Chronic | ICD-10-CM

## 2019-02-04 DIAGNOSIS — R80.9 TYPE 2 DIABETES MELLITUS WITH MICROALBUMINURIA, WITHOUT LONG-TERM CURRENT USE OF INSULIN (HCC): Chronic | ICD-10-CM

## 2019-02-04 RX ORDER — CALCIUM CITRATE/VITAMIN D3 200MG-6.25
TABLET ORAL
Qty: 100 EACH | Refills: 2 | Status: SHIPPED | OUTPATIENT
Start: 2019-02-04 | End: 2020-11-14 | Stop reason: SDUPTHER

## 2019-02-05 DIAGNOSIS — E11.29 TYPE 2 DIABETES MELLITUS WITH MICROALBUMINURIA, WITHOUT LONG-TERM CURRENT USE OF INSULIN (HCC): Chronic | ICD-10-CM

## 2019-02-05 DIAGNOSIS — R80.9 TYPE 2 DIABETES MELLITUS WITH MICROALBUMINURIA, WITHOUT LONG-TERM CURRENT USE OF INSULIN (HCC): Chronic | ICD-10-CM

## 2019-02-05 RX ORDER — TITANIUM DIOXIDE, OCTINOXATE 1.94; 1.8 MG/G; MG/G
POWDER TOPICAL
Qty: 100 EACH | Refills: 3 | Status: SHIPPED | OUTPATIENT
Start: 2019-02-05 | End: 2019-12-13 | Stop reason: SDUPTHER

## 2019-02-07 ENCOUNTER — OFFICE VISIT (OUTPATIENT)
Dept: PSYCHIATRY | Age: 49
End: 2019-02-07
Payer: COMMERCIAL

## 2019-02-07 DIAGNOSIS — F90.8 ADHD, ADULT RESIDUAL TYPE: ICD-10-CM

## 2019-02-07 DIAGNOSIS — F31.9 BIPOLAR 1 DISORDER (HCC): Primary | ICD-10-CM

## 2019-02-07 PROCEDURE — 99212 OFFICE O/P EST SF 10 MIN: CPT | Performed by: PSYCHIATRY & NEUROLOGY

## 2019-02-07 PROCEDURE — G8482 FLU IMMUNIZE ORDER/ADMIN: HCPCS | Performed by: PSYCHIATRY & NEUROLOGY

## 2019-02-07 PROCEDURE — G8598 ASA/ANTIPLAT THER USED: HCPCS | Performed by: PSYCHIATRY & NEUROLOGY

## 2019-02-07 PROCEDURE — G8417 CALC BMI ABV UP PARAM F/U: HCPCS | Performed by: PSYCHIATRY & NEUROLOGY

## 2019-02-07 PROCEDURE — 3017F COLORECTAL CA SCREEN DOC REV: CPT | Performed by: PSYCHIATRY & NEUROLOGY

## 2019-02-07 PROCEDURE — G8428 CUR MEDS NOT DOCUMENT: HCPCS | Performed by: PSYCHIATRY & NEUROLOGY

## 2019-02-07 PROCEDURE — 1036F TOBACCO NON-USER: CPT | Performed by: PSYCHIATRY & NEUROLOGY

## 2019-02-07 RX ORDER — DEXTROAMPHETAMINE SACCHARATE, AMPHETAMINE ASPARTATE, DEXTROAMPHETAMINE SULFATE AND AMPHETAMINE SULFATE 7.5; 7.5; 7.5; 7.5 MG/1; MG/1; MG/1; MG/1
30 TABLET ORAL 2 TIMES DAILY
Qty: 30 TABLET | Refills: 0 | Status: SHIPPED | OUTPATIENT
Start: 2019-02-07 | End: 2019-02-07 | Stop reason: SDUPTHER

## 2019-02-07 RX ORDER — DEXTROAMPHETAMINE SACCHARATE, AMPHETAMINE ASPARTATE, DEXTROAMPHETAMINE SULFATE AND AMPHETAMINE SULFATE 7.5; 7.5; 7.5; 7.5 MG/1; MG/1; MG/1; MG/1
30 TABLET ORAL 2 TIMES DAILY
Qty: 60 TABLET | Refills: 0 | Status: SHIPPED | OUTPATIENT
Start: 2019-02-07 | End: 2019-03-06 | Stop reason: SDUPTHER

## 2019-02-07 RX ORDER — LAMOTRIGINE 200 MG/1
200 TABLET ORAL DAILY
Qty: 30 TABLET | Refills: 3 | Status: SHIPPED | OUTPATIENT
Start: 2019-02-07 | End: 2019-07-08 | Stop reason: SDUPTHER

## 2019-02-11 ENCOUNTER — TELEPHONE (OUTPATIENT)
Dept: FAMILY MEDICINE CLINIC | Age: 49
End: 2019-02-11

## 2019-02-16 DIAGNOSIS — R80.9 TYPE 2 DIABETES MELLITUS WITH MICROALBUMINURIA, WITHOUT LONG-TERM CURRENT USE OF INSULIN (HCC): Primary | ICD-10-CM

## 2019-02-16 DIAGNOSIS — I10 ESSENTIAL HYPERTENSION: Chronic | ICD-10-CM

## 2019-02-16 DIAGNOSIS — E11.29 TYPE 2 DIABETES MELLITUS WITH MICROALBUMINURIA, WITHOUT LONG-TERM CURRENT USE OF INSULIN (HCC): Primary | ICD-10-CM

## 2019-02-17 RX ORDER — LISINOPRIL 5 MG/1
2.5 TABLET ORAL DAILY
Qty: 45 TABLET | Refills: 3 | Status: SHIPPED | OUTPATIENT
Start: 2019-02-17 | End: 2019-03-22 | Stop reason: SDUPTHER

## 2019-02-18 ENCOUNTER — NURSE ONLY (OUTPATIENT)
Dept: LAB | Age: 49
End: 2019-02-18

## 2019-02-18 ENCOUNTER — OFFICE VISIT (OUTPATIENT)
Dept: FAMILY MEDICINE CLINIC | Age: 49
End: 2019-02-18
Payer: COMMERCIAL

## 2019-02-18 VITALS
RESPIRATION RATE: 18 BRPM | DIASTOLIC BLOOD PRESSURE: 76 MMHG | HEART RATE: 88 BPM | WEIGHT: 272 LBS | BODY MASS INDEX: 43.71 KG/M2 | SYSTOLIC BLOOD PRESSURE: 128 MMHG | HEIGHT: 66 IN | TEMPERATURE: 97.7 F

## 2019-02-18 DIAGNOSIS — E11.65 TYPE 2 DIABETES MELLITUS WITH HYPERGLYCEMIA, WITH LONG-TERM CURRENT USE OF INSULIN (HCC): Primary | Chronic | ICD-10-CM

## 2019-02-18 DIAGNOSIS — E78.2 MIXED HYPERLIPIDEMIA: ICD-10-CM

## 2019-02-18 DIAGNOSIS — E11.65 TYPE 2 DIABETES MELLITUS WITH HYPERGLYCEMIA, WITH LONG-TERM CURRENT USE OF INSULIN (HCC): Chronic | ICD-10-CM

## 2019-02-18 DIAGNOSIS — R29.898 POSITIVE ANTERIOR DRAWER TEST OF KNEE JOINT: ICD-10-CM
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
## 2019-02-18 DIAGNOSIS — E66.01 MORBID OBESITY WITH BMI OF 40.0-44.9, ADULT (HCC): ICD-10-CM

## 2019-02-18 DIAGNOSIS — M25.361 KNEE INSTABILITY, RIGHT: ICD-10-CM
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
## 2019-02-18 DIAGNOSIS — I10 ESSENTIAL HYPERTENSION: ICD-10-CM

## 2019-02-18 DIAGNOSIS — E53.8 B12 DEFICIENCY: ICD-10-CM

## 2019-02-18 DIAGNOSIS — Z79.4 TYPE 2 DIABETES MELLITUS WITH HYPERGLYCEMIA, WITH LONG-TERM CURRENT USE OF INSULIN (HCC): Chronic | ICD-10-CM

## 2019-02-18 DIAGNOSIS — M25.561 ACUTE PAIN OF RIGHT KNEE: ICD-10-CM

## 2019-02-18 DIAGNOSIS — Z79.4 TYPE 2 DIABETES MELLITUS WITH HYPERGLYCEMIA, WITH LONG-TERM CURRENT USE OF INSULIN (HCC): Primary | Chronic | ICD-10-CM

## 2019-02-18 LAB
BASOPHILS # BLD: 0.9 %
BASOPHILS ABSOLUTE: 0 THOU/MM3 (ref 0–0.1)
CREATININE, URINE: 36.7 MG/DL
EOSINOPHIL # BLD: 3.9 %
EOSINOPHILS ABSOLUTE: 0.2 THOU/MM3 (ref 0–0.4)
ERYTHROCYTE [DISTWIDTH] IN BLOOD BY AUTOMATED COUNT: 13.2 % (ref 11.5–14.5)
ERYTHROCYTE [DISTWIDTH] IN BLOOD BY AUTOMATED COUNT: 43.3 FL (ref 35–45)
HBA1C MFR BLD: 11.2 %
HCT VFR BLD CALC: 39.5 % (ref 42–52)
HEMOGLOBIN: 13.1 GM/DL (ref 14–18)
IMMATURE GRANS (ABS): 0.09 THOU/MM3 (ref 0–0.07)
IMMATURE GRANULOCYTES: 1.9 %
LYMPHOCYTES # BLD: 28.3 %
LYMPHOCYTES ABSOLUTE: 1.3 THOU/MM3 (ref 1–4.8)
MCH RBC QN AUTO: 29.7 PG (ref 26–33)
MCHC RBC AUTO-ENTMCNC: 33.2 GM/DL (ref 32.2–35.5)
MCV RBC AUTO: 89.6 FL (ref 80–94)
MICROALBUMIN UR-MCNC: < 1.2 MG/DL
MICROALBUMIN/CREAT UR-RTO: 33 MG/G (ref 0–30)
MONOCYTES # BLD: 8.2 %
MONOCYTES ABSOLUTE: 0.4 THOU/MM3 (ref 0.4–1.3)
NUCLEATED RED BLOOD CELLS: 0 /100 WBC
PLATELET # BLD: 172 THOU/MM3 (ref 130–400)
PMV BLD AUTO: 11.8 FL (ref 9.4–12.4)
RBC # BLD: 4.41 MILL/MM3 (ref 4.7–6.1)
SEG NEUTROPHILS: 56.8 %
SEGMENTED NEUTROPHILS ABSOLUTE COUNT: 2.7 THOU/MM3 (ref 1.8–7.7)
VITAMIN B-12: 1352 PG/ML (ref 211–911)
WBC # BLD: 4.7 THOU/MM3 (ref 4.8–10.8)

## 2019-02-18 PROCEDURE — G8482 FLU IMMUNIZE ORDER/ADMIN: HCPCS | Performed by: FAMILY MEDICINE

## 2019-02-18 PROCEDURE — G8598 ASA/ANTIPLAT THER USED: HCPCS | Performed by: FAMILY MEDICINE

## 2019-02-18 PROCEDURE — 2022F DILAT RTA XM EVC RTNOPTHY: CPT | Performed by: FAMILY MEDICINE

## 2019-02-18 PROCEDURE — 1036F TOBACCO NON-USER: CPT | Performed by: FAMILY MEDICINE

## 2019-02-18 PROCEDURE — 99214 OFFICE O/P EST MOD 30 MIN: CPT | Performed by: FAMILY MEDICINE

## 2019-02-18 PROCEDURE — G8427 DOCREV CUR MEDS BY ELIG CLIN: HCPCS | Performed by: FAMILY MEDICINE

## 2019-02-18 PROCEDURE — G8417 CALC BMI ABV UP PARAM F/U: HCPCS | Performed by: FAMILY MEDICINE

## 2019-02-18 PROCEDURE — 83036 HEMOGLOBIN GLYCOSYLATED A1C: CPT | Performed by: FAMILY MEDICINE

## 2019-02-18 PROCEDURE — 3017F COLORECTAL CA SCREEN DOC REV: CPT | Performed by: FAMILY MEDICINE

## 2019-02-18 PROCEDURE — 3046F HEMOGLOBIN A1C LEVEL >9.0%: CPT | Performed by: FAMILY MEDICINE

## 2019-02-19 ENCOUNTER — TELEPHONE (OUTPATIENT)
Dept: FAMILY MEDICINE CLINIC | Age: 49
End: 2019-02-19

## 2019-02-19 DIAGNOSIS — D64.9 NORMOCYTIC ANEMIA: Primary | ICD-10-CM

## 2019-02-20 ENCOUNTER — CARE COORDINATION (OUTPATIENT)
Dept: CARE COORDINATION | Age: 49
End: 2019-02-20

## 2019-02-20 RX ORDER — QUETIAPINE FUMARATE 50 MG/1
TABLET, EXTENDED RELEASE ORAL
Qty: 60 TABLET | Refills: 2 | OUTPATIENT
Start: 2019-02-20

## 2019-02-20 RX ORDER — BUPROPION HYDROCHLORIDE 450 MG/1
TABLET, FILM COATED, EXTENDED RELEASE ORAL
Qty: 30 TABLET | Refills: 3 | Status: SHIPPED | OUTPATIENT
Start: 2019-02-20 | End: 2020-03-24

## 2019-02-25 ENCOUNTER — NURSE ONLY (OUTPATIENT)
Dept: LAB | Age: 49
End: 2019-02-25

## 2019-02-25 DIAGNOSIS — D64.9 NORMOCYTIC ANEMIA: ICD-10-CM

## 2019-02-25 LAB
BASOPHILS # BLD: 0.6 %
BASOPHILS ABSOLUTE: 0 THOU/MM3 (ref 0–0.1)
EOSINOPHIL # BLD: 2.4 %
EOSINOPHILS ABSOLUTE: 0.1 THOU/MM3 (ref 0–0.4)
ERYTHROCYTE [DISTWIDTH] IN BLOOD BY AUTOMATED COUNT: 13.1 % (ref 11.5–14.5)
ERYTHROCYTE [DISTWIDTH] IN BLOOD BY AUTOMATED COUNT: 42.5 FL (ref 35–45)
FERRITIN: 45 NG/ML (ref 22–322)
HCT VFR BLD CALC: 39.8 % (ref 42–52)
HEMOGLOBIN: 13.2 GM/DL (ref 14–18)
IMMATURE GRANS (ABS): 0.04 THOU/MM3 (ref 0–0.07)
IMMATURE GRANULOCYTES: 0.8 %
IRON: 65 UG/DL (ref 65–195)
LYMPHOCYTES # BLD: 22.6 %
LYMPHOCYTES ABSOLUTE: 1.1 THOU/MM3 (ref 1–4.8)
MCH RBC QN AUTO: 29.7 PG (ref 26–33)
MCHC RBC AUTO-ENTMCNC: 33.2 GM/DL (ref 32.2–35.5)
MCV RBC AUTO: 89.4 FL (ref 80–94)
MONOCYTES # BLD: 8.2 %
MONOCYTES ABSOLUTE: 0.4 THOU/MM3 (ref 0.4–1.3)
NUCLEATED RED BLOOD CELLS: 0 /100 WBC
PLATELET # BLD: 167 THOU/MM3 (ref 130–400)
PMV BLD AUTO: 11.7 FL (ref 9.4–12.4)
RBC # BLD: 4.45 MILL/MM3 (ref 4.7–6.1)
SEG NEUTROPHILS: 65.4 %
SEGMENTED NEUTROPHILS ABSOLUTE COUNT: 3.3 THOU/MM3 (ref 1.8–7.7)
TOTAL IRON BINDING CAPACITY: 439 UG/DL (ref 171–450)
WBC # BLD: 5 THOU/MM3 (ref 4.8–10.8)

## 2019-02-26 ENCOUNTER — TELEPHONE (OUTPATIENT)
Dept: FAMILY MEDICINE CLINIC | Age: 49
End: 2019-02-26

## 2019-03-05 ENCOUNTER — TELEPHONE (OUTPATIENT)
Dept: FAMILY MEDICINE CLINIC | Age: 49
End: 2019-03-05

## 2019-03-05 DIAGNOSIS — N40.1 BENIGN PROSTATIC HYPERPLASIA WITH URINARY FREQUENCY: Primary | ICD-10-CM

## 2019-03-05 DIAGNOSIS — R35.0 BENIGN PROSTATIC HYPERPLASIA WITH URINARY FREQUENCY: Primary | ICD-10-CM

## 2019-03-06 ENCOUNTER — OFFICE VISIT (OUTPATIENT)
Dept: PSYCHIATRY | Age: 49
End: 2019-03-06
Payer: COMMERCIAL

## 2019-03-06 ENCOUNTER — HOSPITAL ENCOUNTER (OUTPATIENT)
Dept: MRI IMAGING | Age: 49
Discharge: HOME | End: 2019-03-06
Payer: COMMERCIAL
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
## 2019-03-06 DIAGNOSIS — F90.8 ADHD, ADULT RESIDUAL TYPE: ICD-10-CM

## 2019-03-06 DIAGNOSIS — M25.361 KNEE INSTABILITY, RIGHT: ICD-10-CM
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
## 2019-03-06 DIAGNOSIS — M25.561 ACUTE PAIN OF RIGHT KNEE: ICD-10-CM

## 2019-03-06 DIAGNOSIS — F31.9 BIPOLAR 1 DISORDER (HCC): Primary | ICD-10-CM

## 2019-03-06 DIAGNOSIS — R29.898 POSITIVE ANTERIOR DRAWER TEST OF KNEE JOINT: ICD-10-CM

## 2019-03-06 PROCEDURE — G8428 CUR MEDS NOT DOCUMENT: HCPCS | Performed by: PSYCHIATRY & NEUROLOGY

## 2019-03-06 PROCEDURE — 99212 OFFICE O/P EST SF 10 MIN: CPT | Performed by: PSYCHIATRY & NEUROLOGY

## 2019-03-06 PROCEDURE — G8482 FLU IMMUNIZE ORDER/ADMIN: HCPCS | Performed by: PSYCHIATRY & NEUROLOGY

## 2019-03-06 PROCEDURE — G8598 ASA/ANTIPLAT THER USED: HCPCS | Performed by: PSYCHIATRY & NEUROLOGY

## 2019-03-06 PROCEDURE — 73721 MRI JNT OF LWR EXTRE W/O DYE: CPT

## 2019-03-06 PROCEDURE — 3017F COLORECTAL CA SCREEN DOC REV: CPT | Performed by: PSYCHIATRY & NEUROLOGY

## 2019-03-06 PROCEDURE — G8417 CALC BMI ABV UP PARAM F/U: HCPCS | Performed by: PSYCHIATRY & NEUROLOGY

## 2019-03-06 PROCEDURE — 1036F TOBACCO NON-USER: CPT | Performed by: PSYCHIATRY & NEUROLOGY

## 2019-03-06 RX ORDER — DEXTROAMPHETAMINE SACCHARATE, AMPHETAMINE ASPARTATE, DEXTROAMPHETAMINE SULFATE AND AMPHETAMINE SULFATE 7.5; 7.5; 7.5; 7.5 MG/1; MG/1; MG/1; MG/1
30 TABLET ORAL 2 TIMES DAILY
Qty: 60 TABLET | Refills: 0 | Status: SHIPPED | OUTPATIENT
Start: 2019-03-06 | End: 2019-04-09 | Stop reason: SDUPTHER

## 2019-03-06 RX ORDER — BUPROPION HYDROCHLORIDE 450 MG/1
450 TABLET, FILM COATED, EXTENDED RELEASE ORAL DAILY
Qty: 30 TABLET | Refills: 5 | Status: SHIPPED | OUTPATIENT
Start: 2019-03-06 | End: 2019-09-09 | Stop reason: SDUPTHER

## 2019-03-06 RX ORDER — TRAZODONE HYDROCHLORIDE 100 MG/1
100 TABLET ORAL NIGHTLY PRN
Qty: 30 TABLET | Refills: 5 | Status: SHIPPED | OUTPATIENT
Start: 2019-03-06 | End: 2019-04-09

## 2019-03-07 ENCOUNTER — TELEPHONE (OUTPATIENT)
Dept: FAMILY MEDICINE CLINIC | Age: 49
End: 2019-03-07

## 2019-03-07 RX ORDER — ALFUZOSIN HYDROCHLORIDE 10 MG/1
10 TABLET, EXTENDED RELEASE ORAL DAILY
Qty: 30 TABLET | Refills: 3 | Status: SHIPPED | OUTPATIENT
Start: 2019-03-07 | End: 2019-03-10

## 2019-03-08 ENCOUNTER — NURSE ONLY (OUTPATIENT)
Dept: LAB | Age: 49
End: 2019-03-08

## 2019-03-08 DIAGNOSIS — R35.0 BENIGN PROSTATIC HYPERPLASIA WITH URINARY FREQUENCY: ICD-10-CM

## 2019-03-08 DIAGNOSIS — N40.1 BENIGN PROSTATIC HYPERPLASIA WITH URINARY FREQUENCY: ICD-10-CM

## 2019-03-08 LAB
AMORPHOUS: ABNORMAL
BACTERIA: ABNORMAL
BILIRUBIN URINE: NEGATIVE
BLOOD, URINE: NEGATIVE
CASTS: ABNORMAL /LPF
CHARACTER, URINE: ABNORMAL
COLOR: YELLOW
EPITHELIAL CELLS, UA: ABNORMAL /HPF
GLUCOSE, URINE: >= 1000 MG/DL
KETONES, URINE: NEGATIVE
LEUKOCYTE ESTERASE, URINE: ABNORMAL
MUCUS: ABNORMAL
NITRITE, URINE: NEGATIVE
PH UA: 6 (ref 5–9)
PROTEIN UA: NEGATIVE MG/DL
RBC URINE: ABNORMAL /HPF
SPECIFIC GRAVITY UA: > 1.03 (ref 1–1.03)
UROBILINOGEN, URINE: 0.2 EU/DL (ref 0–1)
WBC UA: > 100 /HPF

## 2019-03-09 ENCOUNTER — TELEPHONE (OUTPATIENT)
Dept: FAMILY MEDICINE CLINIC | Age: 49
End: 2019-03-09

## 2019-03-09 DIAGNOSIS — N40.1 BENIGN PROSTATIC HYPERPLASIA WITH URINARY FREQUENCY: ICD-10-CM

## 2019-03-09 DIAGNOSIS — R35.0 BENIGN PROSTATIC HYPERPLASIA WITH URINARY FREQUENCY: ICD-10-CM

## 2019-03-09 LAB
ORGANISM: ABNORMAL
URINE CULTURE, ROUTINE: ABNORMAL

## 2019-03-10 RX ORDER — TAMSULOSIN HYDROCHLORIDE 0.4 MG/1
0.4 CAPSULE ORAL DAILY
Qty: 90 CAPSULE | Refills: 1 | Status: SHIPPED | OUTPATIENT
Start: 2019-03-10 | End: 2019-11-13 | Stop reason: SDUPTHER

## 2019-03-11 ENCOUNTER — TELEPHONE (OUTPATIENT)
Dept: FAMILY MEDICINE CLINIC | Age: 49
End: 2019-03-11

## 2019-03-11 DIAGNOSIS — N41.0 ACUTE PROSTATITIS: Primary | ICD-10-CM

## 2019-03-11 RX ORDER — CIPROFLOXACIN 500 MG/1
500 TABLET, FILM COATED ORAL 2 TIMES DAILY
Qty: 28 TABLET | Refills: 0 | Status: SHIPPED | OUTPATIENT
Start: 2019-03-11 | End: 2019-03-25

## 2019-03-12 ENCOUNTER — TELEPHONE (OUTPATIENT)
Dept: FAMILY MEDICINE CLINIC | Age: 49
End: 2019-03-12

## 2019-03-12 ENCOUNTER — CARE COORDINATION (OUTPATIENT)
Dept: CARE COORDINATION | Age: 49
End: 2019-03-12

## 2019-03-12 PROBLEM — S82.141A CLOSED FRACTURE OF RIGHT TIBIAL PLATEAU: Status: ACTIVE | Noted: 2019-03-12

## 2019-03-12 PROBLEM — R29.898: Status: RESOLVED | Noted: 2019-02-18 | Resolved: 2019-03-12

## 2019-03-12 PROBLEM — M25.561 ACUTE PAIN OF RIGHT KNEE: Status: RESOLVED | Noted: 2019-02-18 | Resolved: 2019-03-12

## 2019-03-12 PROBLEM — M25.361 KNEE INSTABILITY, RIGHT: Status: RESOLVED | Noted: 2019-02-18 | Resolved: 2019-03-12

## 2019-03-15 ENCOUNTER — OFFICE VISIT (OUTPATIENT)
Dept: FAMILY MEDICINE CLINIC | Age: 49
End: 2019-03-15
Payer: COMMERCIAL

## 2019-03-15 ENCOUNTER — NURSE ONLY (OUTPATIENT)
Dept: LAB | Age: 49
End: 2019-03-15

## 2019-03-15 VITALS
DIASTOLIC BLOOD PRESSURE: 88 MMHG | RESPIRATION RATE: 24 BRPM | SYSTOLIC BLOOD PRESSURE: 134 MMHG | HEIGHT: 66 IN | BODY MASS INDEX: 43.81 KG/M2 | TEMPERATURE: 97.7 F | HEART RATE: 86 BPM | WEIGHT: 272.6 LBS

## 2019-03-15 DIAGNOSIS — S82.141A CLOSED FRACTURE OF RIGHT TIBIAL PLATEAU, INITIAL ENCOUNTER: ICD-10-CM

## 2019-03-15 DIAGNOSIS — R81 GLUCOSURIA: ICD-10-CM

## 2019-03-15 DIAGNOSIS — Z79.4 TYPE 2 DIABETES MELLITUS WITH HYPERGLYCEMIA, WITH LONG-TERM CURRENT USE OF INSULIN (HCC): Primary | ICD-10-CM

## 2019-03-15 DIAGNOSIS — E11.65 TYPE 2 DIABETES MELLITUS WITH HYPERGLYCEMIA, WITH LONG-TERM CURRENT USE OF INSULIN (HCC): Primary | ICD-10-CM

## 2019-03-15 DIAGNOSIS — N41.0 ACUTE PROSTATITIS: ICD-10-CM

## 2019-03-15 LAB — PROSTATE SPECIFIC ANTIGEN: 0.27 NG/ML (ref 0–1)

## 2019-03-15 PROCEDURE — 2022F DILAT RTA XM EVC RTNOPTHY: CPT | Performed by: FAMILY MEDICINE

## 2019-03-15 PROCEDURE — G8427 DOCREV CUR MEDS BY ELIG CLIN: HCPCS | Performed by: FAMILY MEDICINE

## 2019-03-15 PROCEDURE — G8482 FLU IMMUNIZE ORDER/ADMIN: HCPCS | Performed by: FAMILY MEDICINE

## 2019-03-15 PROCEDURE — G8598 ASA/ANTIPLAT THER USED: HCPCS | Performed by: FAMILY MEDICINE

## 2019-03-15 PROCEDURE — 3046F HEMOGLOBIN A1C LEVEL >9.0%: CPT | Performed by: FAMILY MEDICINE

## 2019-03-15 PROCEDURE — G8417 CALC BMI ABV UP PARAM F/U: HCPCS | Performed by: FAMILY MEDICINE

## 2019-03-15 PROCEDURE — 1036F TOBACCO NON-USER: CPT | Performed by: FAMILY MEDICINE

## 2019-03-15 PROCEDURE — 99214 OFFICE O/P EST MOD 30 MIN: CPT | Performed by: FAMILY MEDICINE

## 2019-03-15 PROCEDURE — 3017F COLORECTAL CA SCREEN DOC REV: CPT | Performed by: FAMILY MEDICINE

## 2019-03-18 ENCOUNTER — TELEPHONE (OUTPATIENT)
Dept: FAMILY MEDICINE CLINIC | Age: 49
End: 2019-03-18

## 2019-03-22 DIAGNOSIS — R80.9 TYPE 2 DIABETES MELLITUS WITH MICROALBUMINURIA, WITHOUT LONG-TERM CURRENT USE OF INSULIN (HCC): Chronic | ICD-10-CM

## 2019-03-22 DIAGNOSIS — E11.29 TYPE 2 DIABETES MELLITUS WITH MICROALBUMINURIA, WITHOUT LONG-TERM CURRENT USE OF INSULIN (HCC): Chronic | ICD-10-CM

## 2019-03-22 RX ORDER — LISINOPRIL 5 MG/1
2.5 TABLET ORAL DAILY
Qty: 45 TABLET | Refills: 3 | Status: SHIPPED | OUTPATIENT
Start: 2019-03-22 | End: 2019-04-22 | Stop reason: SDUPTHER

## 2019-03-26 ENCOUNTER — OFFICE VISIT (OUTPATIENT)
Dept: UROLOGY | Age: 49
End: 2019-03-26
Payer: COMMERCIAL

## 2019-03-26 VITALS
DIASTOLIC BLOOD PRESSURE: 88 MMHG | SYSTOLIC BLOOD PRESSURE: 138 MMHG | HEIGHT: 67 IN | WEIGHT: 266 LBS | BODY MASS INDEX: 41.75 KG/M2

## 2019-03-26 DIAGNOSIS — N40.1 BENIGN PROSTATIC HYPERPLASIA WITH LOWER URINARY TRACT SYMPTOMS, SYMPTOM DETAILS UNSPECIFIED: Primary | ICD-10-CM

## 2019-03-26 DIAGNOSIS — N52.1 ERECTILE DYSFUNCTION DUE TO DISEASES CLASSIFIED ELSEWHERE: ICD-10-CM

## 2019-03-26 DIAGNOSIS — N47.1 PHIMOSIS: ICD-10-CM

## 2019-03-26 LAB
BILIRUBIN URINE: ABNORMAL
BLOOD URINE, POC: ABNORMAL
CHARACTER, URINE: CLEAR
COLOR, URINE: YELLOW
GLUCOSE URINE: NEGATIVE MG/DL
KETONES, URINE: 15
LEUKOCYTE CLUMPS, URINE: NEGATIVE
NITRITE, URINE: NEGATIVE
PH, URINE: 6 (ref 5–9)
POST VOID RESIDUAL (PVR): 0 ML
PROTEIN, URINE: 30 MG/DL
SPECIFIC GRAVITY, URINE: >= 1.03 (ref 1–1.03)
UROBILINOGEN, URINE: 0.2 EU/DL (ref 0–1)

## 2019-03-26 PROCEDURE — 1036F TOBACCO NON-USER: CPT | Performed by: UROLOGY

## 2019-03-26 PROCEDURE — G8417 CALC BMI ABV UP PARAM F/U: HCPCS | Performed by: UROLOGY

## 2019-03-26 PROCEDURE — 51798 US URINE CAPACITY MEASURE: CPT | Performed by: UROLOGY

## 2019-03-26 PROCEDURE — 81003 URINALYSIS AUTO W/O SCOPE: CPT | Performed by: UROLOGY

## 2019-03-26 PROCEDURE — G8427 DOCREV CUR MEDS BY ELIG CLIN: HCPCS | Performed by: UROLOGY

## 2019-03-26 PROCEDURE — 99203 OFFICE O/P NEW LOW 30 MIN: CPT | Performed by: UROLOGY

## 2019-03-26 PROCEDURE — G8598 ASA/ANTIPLAT THER USED: HCPCS | Performed by: UROLOGY

## 2019-03-26 PROCEDURE — G8482 FLU IMMUNIZE ORDER/ADMIN: HCPCS | Performed by: UROLOGY

## 2019-03-26 PROCEDURE — 3017F COLORECTAL CA SCREEN DOC REV: CPT | Performed by: UROLOGY

## 2019-03-26 RX ORDER — FESOTERODINE FUMARATE 4 MG/1
4 TABLET, EXTENDED RELEASE ORAL DAILY
Qty: 30 TABLET | Refills: 5 | Status: SHIPPED | OUTPATIENT
Start: 2019-03-26 | End: 2019-05-10 | Stop reason: DRUGHIGH

## 2019-03-26 RX ORDER — CLOTRIMAZOLE AND BETAMETHASONE DIPROPIONATE 10; .64 MG/G; MG/G
CREAM TOPICAL
Qty: 1 TUBE | Refills: 1 | Status: SHIPPED | OUTPATIENT
Start: 2019-03-26

## 2019-03-26 RX ORDER — SILDENAFIL 50 MG/1
50 TABLET, FILM COATED ORAL DAILY PRN
Qty: 6 TABLET | Refills: 5 | Status: SHIPPED | OUTPATIENT
Start: 2019-03-26 | End: 2020-05-08 | Stop reason: SDUPTHER

## 2019-03-26 RX ORDER — FESOTERODINE FUMARATE 4 MG/1
4 TABLET, EXTENDED RELEASE ORAL DAILY
Qty: 14 TABLET | Refills: 0 | COMMUNITY
Start: 2019-03-26 | End: 2019-05-10 | Stop reason: ALTCHOICE

## 2019-03-26 ASSESSMENT — ENCOUNTER SYMPTOMS
COLOR CHANGE: 0
ABDOMINAL PAIN: 0
NAUSEA: 0
EYE PAIN: 0
EYE REDNESS: 0
FACIAL SWELLING: 0
SHORTNESS OF BREATH: 0
BACK PAIN: 0
CHEST TIGHTNESS: 0

## 2019-03-28 ENCOUNTER — TELEPHONE (OUTPATIENT)
Dept: UROLOGY | Age: 49
End: 2019-03-28

## 2019-04-09 ENCOUNTER — OFFICE VISIT (OUTPATIENT)
Dept: PSYCHIATRY | Age: 49
End: 2019-04-09
Payer: COMMERCIAL

## 2019-04-09 DIAGNOSIS — F90.8 ADHD, ADULT RESIDUAL TYPE: ICD-10-CM

## 2019-04-09 DIAGNOSIS — F31.9 BIPOLAR 1 DISORDER (HCC): Primary | ICD-10-CM

## 2019-04-09 DIAGNOSIS — F90.2 ATTENTION DEFICIT HYPERACTIVITY DISORDER (ADHD), COMBINED TYPE: ICD-10-CM

## 2019-04-09 PROCEDURE — G8598 ASA/ANTIPLAT THER USED: HCPCS | Performed by: PSYCHIATRY & NEUROLOGY

## 2019-04-09 PROCEDURE — 3017F COLORECTAL CA SCREEN DOC REV: CPT | Performed by: PSYCHIATRY & NEUROLOGY

## 2019-04-09 PROCEDURE — 1036F TOBACCO NON-USER: CPT | Performed by: PSYCHIATRY & NEUROLOGY

## 2019-04-09 PROCEDURE — G8428 CUR MEDS NOT DOCUMENT: HCPCS | Performed by: PSYCHIATRY & NEUROLOGY

## 2019-04-09 PROCEDURE — G8417 CALC BMI ABV UP PARAM F/U: HCPCS | Performed by: PSYCHIATRY & NEUROLOGY

## 2019-04-09 PROCEDURE — 99212 OFFICE O/P EST SF 10 MIN: CPT | Performed by: PSYCHIATRY & NEUROLOGY

## 2019-04-09 RX ORDER — TRAZODONE HYDROCHLORIDE 100 MG/1
200 TABLET ORAL NIGHTLY
Qty: 60 TABLET | Refills: 5 | Status: SHIPPED | OUTPATIENT
Start: 2019-04-09 | End: 2019-09-09

## 2019-04-09 RX ORDER — DEXTROAMPHETAMINE SACCHARATE, AMPHETAMINE ASPARTATE, DEXTROAMPHETAMINE SULFATE AND AMPHETAMINE SULFATE 7.5; 7.5; 7.5; 7.5 MG/1; MG/1; MG/1; MG/1
30 TABLET ORAL 2 TIMES DAILY
Qty: 60 TABLET | Refills: 0 | Status: SHIPPED | OUTPATIENT
Start: 2019-04-09 | End: 2019-05-07 | Stop reason: SDUPTHER
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
## 2019-04-21 DIAGNOSIS — R10.13 EPIGASTRIC PAIN: ICD-10-CM

## 2019-04-21 DIAGNOSIS — K21.9 GASTROESOPHAGEAL REFLUX DISEASE, ESOPHAGITIS PRESENCE NOT SPECIFIED: ICD-10-CM

## 2019-04-21 DIAGNOSIS — E11.29 TYPE 2 DIABETES MELLITUS WITH MICROALBUMINURIA, WITHOUT LONG-TERM CURRENT USE OF INSULIN (HCC): ICD-10-CM

## 2019-04-21 DIAGNOSIS — R13.14 PHARYNGOESOPHAGEAL DYSPHAGIA: ICD-10-CM

## 2019-04-21 DIAGNOSIS — R10.13 DYSPEPSIA: ICD-10-CM

## 2019-04-21 DIAGNOSIS — R80.9 TYPE 2 DIABETES MELLITUS WITH MICROALBUMINURIA, WITHOUT LONG-TERM CURRENT USE OF INSULIN (HCC): ICD-10-CM

## 2019-04-22 RX ORDER — LISINOPRIL 5 MG/1
2.5 TABLET ORAL DAILY
Qty: 45 TABLET | Refills: 3 | Status: SHIPPED | OUTPATIENT
Start: 2019-04-22 | End: 2019-06-20 | Stop reason: SDUPTHER

## 2019-04-22 RX ORDER — OMEPRAZOLE 20 MG/1
CAPSULE, DELAYED RELEASE ORAL
Qty: 180 CAPSULE | Refills: 3 | Status: SHIPPED | OUTPATIENT
Start: 2019-04-22 | End: 2020-04-11

## 2019-05-06 DIAGNOSIS — E11.65 UNCONTROLLED TYPE 2 DIABETES MELLITUS WITH HYPERGLYCEMIA, WITHOUT LONG-TERM CURRENT USE OF INSULIN (HCC): ICD-10-CM

## 2019-05-07 ENCOUNTER — OFFICE VISIT (OUTPATIENT)
Dept: PSYCHIATRY | Age: 49
End: 2019-05-07
Payer: COMMERCIAL

## 2019-05-07 DIAGNOSIS — F31.9 BIPOLAR 1 DISORDER (HCC): Primary | ICD-10-CM

## 2019-05-07 DIAGNOSIS — F90.8 ADHD, ADULT RESIDUAL TYPE: ICD-10-CM

## 2019-05-07 PROCEDURE — 3017F COLORECTAL CA SCREEN DOC REV: CPT | Performed by: PSYCHIATRY & NEUROLOGY

## 2019-05-07 PROCEDURE — G8598 ASA/ANTIPLAT THER USED: HCPCS | Performed by: PSYCHIATRY & NEUROLOGY

## 2019-05-07 PROCEDURE — 99212 OFFICE O/P EST SF 10 MIN: CPT | Performed by: PSYCHIATRY & NEUROLOGY

## 2019-05-07 PROCEDURE — G8417 CALC BMI ABV UP PARAM F/U: HCPCS | Performed by: PSYCHIATRY & NEUROLOGY

## 2019-05-07 PROCEDURE — G8428 CUR MEDS NOT DOCUMENT: HCPCS | Performed by: PSYCHIATRY & NEUROLOGY

## 2019-05-07 PROCEDURE — 1036F TOBACCO NON-USER: CPT | Performed by: PSYCHIATRY & NEUROLOGY

## 2019-05-07 RX ORDER — DEXTROAMPHETAMINE SACCHARATE, AMPHETAMINE ASPARTATE, DEXTROAMPHETAMINE SULFATE AND AMPHETAMINE SULFATE 7.5; 7.5; 7.5; 7.5 MG/1; MG/1; MG/1; MG/1
30 TABLET ORAL 2 TIMES DAILY
Qty: 60 TABLET | Refills: 0 | Status: SHIPPED | OUTPATIENT
Start: 2019-05-07 | End: 2019-06-06 | Stop reason: SDUPTHER

## 2019-05-07 NOTE — PROGRESS NOTES
Chief Complaint   Patient presents with    1 Month Follow-Up     Bipolar ADHD     Amanda Pina returned for follow-up of bipolar disorder and ADHD after one month. He is reporting some \"mood\" symptoms. He is a bit depressed, irritable, he says. We agreed to increase his lurasidone to 80 mg to see how much help that is. It can also go higher than that. He is also reporting some visual hallucinations, often seeing people in the room with him when he is actually alone or another vehicle on the road with him when there isn't one. He denies any side effects. He did need some refills. He was not very forthcoming or talkative today. Mental Status Examination    Level of consciousness:  within normal limits  Appearance:  well-appearing, street clothes, in chair, fair grooming and fair hygiene  Behavior/Motor:  no abnormalities noted  Attitude toward examiner:  cooperative, attentive and poor eye contact  Speech:  spontaneous, normal rate, normal volume, well articulated and poverty of speech  Mood:  depressed  Affect:  mood congruent  Thought processes:  linear, goal directed and coherent  Thought content:  Homocidal ideation: none  Suicidal Ideation:  denies suicidal ideation  Delusions:  no evidence of delusions  Perceptual Disturbance:  visual  Cognition:  oriented to person, place, and time  Concentration succeeded  Memory intact  Fund of knowledge:  good  Abstract thinking:  fair  Insight:  good  Judgment:  good    DIAGNOSTIC IMPRESSION  Bipolar I depressed  ADHD residual    Plan  Increase lurasidone  Current Outpatient Medications   Medication Sig Dispense Refill    lurasidone (LATUDA) 80 MG TABS tablet Take 1 tablet by mouth daily 30 tablet 5    amphetamine-dextroamphetamine (ADDERALL, 30MG,) 30 MG tablet Take 1 tablet by mouth 2 times daily for 30 days.  60 tablet 0    insulin glargine (BASAGLAR KWIKPEN) 100 UNIT/ML injection pen Inject 56 Units into the skin nightly Increase by 3 units every 3 days until fasting sugar  15 pen 5    omeprazole (PRILOSEC) 20 MG delayed release capsule TAKE 2 CAPSULES BY MOUTH ONE TIME A DAY  180 capsule 3    lisinopril (PRINIVIL;ZESTRIL) 5 MG tablet Take 0.5 tablets by mouth daily 45 tablet 3    traZODone (DESYREL) 100 MG tablet Take 2 tablets by mouth nightly 60 tablet 5    fesoterodine (TOVIAZ) 4 MG TB24 ER tablet Take 1 tablet by mouth daily 14 tablet 0    fesoterodine (TOVIAZ) 4 MG TB24 ER tablet Take 1 tablet by mouth daily 30 tablet 5    sildenafil (VIAGRA) 50 MG tablet Take 1 tablet by mouth daily as needed for Erectile Dysfunction Take one tablet one hour before sexual activity. 6 tablet 5    clotrimazole-betamethasone (LOTRISONE) 1-0.05 % cream Apply topically 2 times daily. Not to exceed 2 weeks consecutively. 1 Tube 1    metFORMIN (GLUCOPHAGE) 1000 MG tablet TAKE 1 TABLET BY MOUTH TWO TIMES A DAY WITH MEALS 180 tablet 3    tamsulosin (FLOMAX) 0.4 MG capsule Take 1 capsule by mouth daily 90 capsule 1    buPROPion HCl ER, XL, 450 MG TB24 Take 450 mg by mouth daily 30 tablet 5    FORFIVO  MG TB24 TAKE 1 TABLET BY MOUTH ONE TIME A DAY  30 tablet 3    lamoTRIgine (LAMICTAL) 200 MG tablet Take 1 tablet by mouth daily 30 tablet 3    MEIJER LANCETS UNIVERSAL 33G MISC check blood sugar one time daily 100 each 3    TRUE METRIX BLOOD GLUCOSE TEST strip check blood sugar one time daily 100 each 2    SM ASPIRIN ADULT LOW STRENGTH 81 MG EC tablet TAKE 1 TABLET BY MOUTH ONE TIME A DAY  90 tablet 3    amphetamine-dextroamphetamine (ADDERALL, 30MG,) 30 MG tablet Take 1 tablet by mouth 2 times daily for 30 days. . 60 tablet 0    vitamin B-12 (CYANOCOBALAMIN) 1000 MCG tablet TAKE 1 TABLET BY MOUTH ONE TIME A DAY 90 tablet 3    piroxicam (FELDENE) 20 MG capsule TAKE 1 CAPSULE BY MOUTH ONE TIME A DAY AS NEEDED FOR PAIN 30 capsule 5    SITagliptin (JANUVIA) 100 MG tablet Take 1 tablet by mouth daily 30 tablet 11    Insulin Pen Needle 31G X 8 MM MISC Use As Directed. Do NOT re-use needles. 100 each 3    atorvastatin (LIPITOR) 80 MG tablet TAKE 1 TABLET BY MOUTH ONE TIME A DAY  90 tablet 3    fenofibrate 160 MG tablet TAKE 1 TABLET BY MOUTH ONE TIME A DAY  90 tablet 3    rOPINIRole (REQUIP) 3 MG tablet Take 3 mg by mouth nightly      ranitidine (ZANTAC) 150 MG tablet Take 150 mg by mouth nightly       butalbital-acetaminophen-caffeine (FIORICET) -40 MG per tablet Take 1 tablet by mouth every 6 hours as needed for Migraine 60 tablet 0    cetirizine (ZYRTEC) 10 MG tablet daily       EYE ITCH RELIEF 0.025 % ophthalmic solution Place 2 drops into both eyes as needed       propranolol (INDERAL LA) 160 MG ER capsule Take 160 mg by mouth daily       fluticasone (FLONASE) 50 MCG/ACT nasal spray 1 spray by Nasal route daily as needed.  gabapentin (NEURONTIN) 800 MG tablet   Take 800 mg by mouth 4 times daily        No current facility-administered medications for this visit.

## 2019-05-10 ENCOUNTER — OFFICE VISIT (OUTPATIENT)
Dept: UROLOGY | Age: 49
End: 2019-05-10
Payer: COMMERCIAL

## 2019-05-10 VITALS
BODY MASS INDEX: 43.16 KG/M2 | HEIGHT: 67 IN | DIASTOLIC BLOOD PRESSURE: 84 MMHG | SYSTOLIC BLOOD PRESSURE: 128 MMHG | WEIGHT: 275 LBS

## 2019-05-10 DIAGNOSIS — N40.1 BENIGN PROSTATIC HYPERPLASIA WITH LOWER URINARY TRACT SYMPTOMS, SYMPTOM DETAILS UNSPECIFIED: Primary | ICD-10-CM

## 2019-05-10 PROCEDURE — 1036F TOBACCO NON-USER: CPT | Performed by: UROLOGY

## 2019-05-10 PROCEDURE — G8427 DOCREV CUR MEDS BY ELIG CLIN: HCPCS | Performed by: UROLOGY

## 2019-05-10 PROCEDURE — 99212 OFFICE O/P EST SF 10 MIN: CPT | Performed by: UROLOGY

## 2019-05-10 PROCEDURE — 3017F COLORECTAL CA SCREEN DOC REV: CPT | Performed by: UROLOGY

## 2019-05-10 PROCEDURE — G8417 CALC BMI ABV UP PARAM F/U: HCPCS | Performed by: UROLOGY

## 2019-05-10 PROCEDURE — G8598 ASA/ANTIPLAT THER USED: HCPCS | Performed by: UROLOGY

## 2019-05-10 RX ORDER — FESOTERODINE FUMARATE 4 MG/1
4 TABLET, EXTENDED RELEASE ORAL DAILY
Qty: 90 TABLET | Refills: 3 | Status: SHIPPED | OUTPATIENT
Start: 2019-05-10 | End: 2019-12-12

## 2019-05-10 RX ORDER — POLYVINYL ALCOHOL 14 MG/ML
1 SOLUTION/ DROPS OPHTHALMIC 4 TIMES DAILY
Refills: 0 | COMMUNITY
Start: 2019-03-29

## 2019-05-10 ASSESSMENT — ENCOUNTER SYMPTOMS
EYE REDNESS: 0
SHORTNESS OF BREATH: 0
COLOR CHANGE: 0
NAUSEA: 0
ABDOMINAL PAIN: 0
CHEST TIGHTNESS: 0
FACIAL SWELLING: 0
EYE PAIN: 0
BACK PAIN: 1

## 2019-05-10 NOTE — PROGRESS NOTES
[address]  [facility]  [place] [address]  Dept: [phone]  Loc: [phone]  Visit Date: 5/10/2019    Subjective:      Patient ID: Haley Oliver 46 y.o. male 1970    Chief Complaint   Patient presents with    Benign Prostatic Hypertrophy    1 Month Follow-Up       HPI this is a follow-up for Mr. Tad Schirmer, a 59-year-old male with lower urinary tract symptoms and uncontrolled diabetes. He states his diabetes is now better control with a fasting glucose of 125-150 mg/dl. He has tried 2 weeks of Toviaz 4 mg by mouth daily at bedtime with a significant improvement in his voiding symptoms. He is experiencing some mild dry mouth but is tolerable. He denies constipation or blurred vision. He was specifically asked if qualitatively life has improved and he said yes. He is also on Flomax 0.4 mg by mouth daily at bedtime. Past Medical History:   Diagnosis Date    ADHD (attention deficit hyperactivity disorder)     Allergic rhinitis     Benign esophageal stricture     s/p dilation may 2014    Benign prostatic hyperplasia     Bipolar 1 disorder ([facility] [address]     CAD (coronary artery disease)     Unclear history, cath 2013 with patent arteries. Records pending.  Chronic back pain     Depression     DM2 (diabetes mellitus, type 2) ([facility] [address]     Dysfunctional voiding of urine 6/20/2013    Medications he changes to follow,  Adding ditropan  to the present schedule     ED (erectile dysfunction) of organic origin 5/1/2013    Fibromyalgia     Former smoker     GERD (gastroesophageal reflux disease)     History of colon polyps     Pt unsure when last colo.      History of TIA (transient ischemic attack)     x2     Hyperlipidemia     Hypertension     Hypertriglyceridemia 10/27/2015    Migraine headache     Morbid obesity with BMI of 40.0-44.9, adult ([facility] [address]     Obstructive sleep apnea on CPAP 11/3/2015    Peripheral neuropathy     Pulmonary nodule     stable >2 years, no further w/u required.  Rotator cuff injury     Left side    S/P cardiac catheterization: 2013: Normal coronaries 2013: Normal coronaries. Radial approach.  Dr. Welsh Party Short-segment Vásquez's esophagus 2015    Tarsal tunnel syndrome     Bilateral       Social History     Socioeconomic History    Marital status:      Spouse name: Not on file    Number of children: Not on file    Years of education: Not on file    Highest education level: Not on file   Occupational History    Not on file   Social Needs    Financial resource strain: Not on file    Food insecurity:     Worry: Not on file     Inability: Not on file    Transportation needs:     Medical: Not on file     Non-medical: Not on file   Tobacco Use    Smoking status: Former Smoker     Packs/day: 0.75     Years: 30.00     Pack years: 22.50     Types: Cigarettes     Start date: 1984     Last attempt to quit: 2018     Years since quittin.0    Smokeless tobacco: Never Used    Tobacco comment: 5cigs a week   Substance and Sexual Activity    Alcohol use: No     Alcohol/week: 0.0 oz    Drug use: No    Sexual activity: Never   Lifestyle    Physical activity:     Days per week: Not on file     Minutes per session: Not on file    Stress: Not on file   Relationships    Social connections:     Talks on phone: Not on file     Gets together: Not on file     Attends Holiness service: Not on file     Active member of club or organization: Not on file     Attends meetings of clubs or organizations: Not on file     Relationship status: Not on file    Intimate partner violence:     Fear of current or ex partner: Not on file     Emotionally abused: Not on file     Physically abused: Not on file     Forced sexual activity: Not on file   Other Topics Concern    Not on file   Social History Narrative    Not on file       Family History   Problem Relation Age of Onset    Cancer Father 77        lung     Rheum Arthritis Father     Colon Cancer Maternal Aunt 61    Cancer Maternal Aunt 76        \"throat\"    Colon Cancer Maternal Uncle 48    Cancer Maternal Uncle 61        pancreatic    Cancer Paternal Aunt 52        brain    Colon Cancer Paternal Uncle 72    Cancer Paternal Uncle 61        hepatic     Prostate Cancer Paternal Uncle 61    Cancer Maternal Uncle 79        pancreatic     Ovarian Cancer Paternal Aunt 48    Rheum Arthritis Mother        Past Surgical History:   Procedure Laterality Date    CARDIAC CATHETERIZATION  10/2013    9/30/2013: Normal coronaries. Radial approach. Dr. Caryle Beams COLONOSCOPY  2008, 2012   Eppie Colin KNEE SURGERY Left 2012    ROTATOR CUFF REPAIR Left 2013   Eppie Colin SHOULDER SURGERY Bilateral 2011   Eppie Colin SINUS SURGERY  2004    [place] occ.  TONSILLECTOMY  2002    TYMPANOSTOMY TUBE PLACEMENT      UPPER GASTROINTESTINAL ENDOSCOPY  2008, 2008 with dilation, 2012    UPPER GASTROINTESTINAL ENDOSCOPY  2013    UPPER GASTROINTESTINAL ENDOSCOPY  MAY 2014, 2017    Dilation       No Known Allergies      Current Outpatient Medications:     ARTIFICIAL TEARS 1.4 % ophthalmic solution, Place 1 drop into both eyes 4 times daily, Disp: , Rfl: 0    insulin glargine (BASAGLAR KWIKPEN) 100 UNIT/ML injection pen, Inject 56 Units into the skin nightly Increase by 3 units every 3 days until fasting sugar , Disp: 15 pen, Rfl: 5    lurasidone (LATUDA) 80 MG TABS tablet, Take 1 tablet by mouth daily, Disp: 30 tablet, Rfl: 5    amphetamine-dextroamphetamine (ADDERALL, 30MG,) 30 MG tablet, Take 1 tablet by mouth 2 times daily for 30 days. , Disp: 60 tablet, Rfl: 0    omeprazole (PRILOSEC) 20 MG delayed release capsule, TAKE 2 CAPSULES BY MOUTH ONE TIME A DAY , Disp: 180 capsule, Rfl: 3    lisinopril (PRINIVIL;ZESTRIL) 5 MG tablet, Take 0.5 tablets by mouth daily, Disp: 45 tablet, Rfl: 3    traZODone (DESYREL) 100 MG tablet, Take 2 tablets by mouth nightly, Disp: 60 tablet, Rfl: 5    fesoterodine (TOVIAZ) 4 MG TB24 ER tablet, Take 1 tablet by mouth daily, Disp: 30 tablet, Rfl: 5    sildenafil (VIAGRA) 50 MG tablet, Take 1 tablet by mouth daily as needed for Erectile Dysfunction Take one tablet one hour before sexual activity. , Disp: 6 tablet, Rfl: 5    clotrimazole-betamethasone (LOTRISONE) 1-0.05 % cream, Apply topically 2 times daily. Not to exceed 2 weeks consecutively. , Disp: 1 Tube, Rfl: 1    metFORMIN (GLUCOPHAGE) 1000 MG tablet, TAKE 1 TABLET BY MOUTH TWO TIMES A DAY WITH MEALS, Disp: 180 tablet, Rfl: 3    tamsulosin (FLOMAX) 0.4 MG capsule, Take 1 capsule by mouth daily, Disp: 90 capsule, Rfl: 1    buPROPion HCl ER, XL, 450 MG TB24, Take 450 mg by mouth daily, Disp: 30 tablet, Rfl: 5    FORFIVO  MG TB24, TAKE 1 TABLET BY MOUTH ONE TIME A DAY , Disp: 30 tablet, Rfl: 3    lamoTRIgine (LAMICTAL) 200 MG tablet, Take 1 tablet by mouth daily, Disp: 30 tablet, Rfl: 3    MEIJER LANCETS UNIVERSAL 33G MISC, check blood sugar one time daily, Disp: 100 each, Rfl: 3    TRUE METRIX BLOOD GLUCOSE TEST strip, check blood sugar one time daily, Disp: 100 each, Rfl: 2    SM ASPIRIN ADULT LOW STRENGTH 81 MG EC tablet, TAKE 1 TABLET BY MOUTH ONE TIME A DAY , Disp: 90 tablet, Rfl: 3    vitamin B-12 (CYANOCOBALAMIN) 1000 MCG tablet, TAKE 1 TABLET BY MOUTH ONE TIME A DAY, Disp: 90 tablet, Rfl: 3    piroxicam (FELDENE) 20 MG capsule, TAKE 1 CAPSULE BY MOUTH ONE TIME A DAY AS NEEDED FOR PAIN, Disp: 30 capsule, Rfl: 5    SITagliptin (JANUVIA) 100 MG tablet, Take 1 tablet by mouth daily, Disp: 30 tablet, Rfl: 11    Insulin Pen Needle 31G X 8 MM MISC, Use As Directed. Do NOT re-use needles. , Disp: 100 each, Rfl: 3    atorvastatin (LIPITOR) 80 MG tablet, TAKE 1 TABLET BY MOUTH ONE TIME A DAY , Disp: 90 tablet, Rfl: 3    fenofibrate 160 MG tablet, TAKE 1 TABLET BY MOUTH ONE TIME A DAY , Disp: 90 tablet, Rfl: 3    rOPINIRole (REQUIP) 3 MG tablet, Take 3 mg by mouth nightly, Disp: , Rfl:     ranitidine (ZANTAC) 150 MG tablet, Take 150 mg by mouth nightly , Disp: , Rfl:     butalbital-acetaminophen-caffeine (FIORICET) -40 MG per tablet, Take 1 tablet by mouth every 6 hours as needed for Migraine, Disp: 60 tablet, Rfl: 0    cetirizine (ZYRTEC) 10 MG tablet, daily , Disp: , Rfl:     EYE ITCH RELIEF 0.025 % ophthalmic solution, Place 2 drops into both eyes as needed , Disp: , Rfl:     propranolol (INDERAL LA) 160 MG ER capsule, Take 160 mg by mouth daily , Disp: , Rfl:     fluticasone (FLONASE) 50 MCG/ACT nasal spray, 1 spray by Nasal route daily as needed. , Disp: , Rfl:     gabapentin (NEURONTIN) 800 MG tablet,  Take 800 mg by mouth 4 times daily , Disp: , Rfl:     Review of Systems   Constitutional: Negative for chills and fever. HENT: Negative for ear pain and facial swelling. Eyes: Negative for pain and redness. Respiratory: Negative for chest tightness and shortness of breath. Cardiovascular: Positive for leg swelling (B/L). Negative for chest pain. Gastrointestinal: Negative for abdominal pain and nausea. Endocrine: Positive for heat intolerance. Negative for cold intolerance. Genitourinary: Negative for difficulty urinating and dysuria. Musculoskeletal: Positive for back pain and joint swelling. Skin: Negative for color change and rash. Allergic/Immunologic: Positive for environmental allergies. Negative for food allergies. Neurological: Positive for dizziness and headaches. Hematological: Does not bruise/bleed easily. /84   Ht 5' 7\" (1.702 m)   Wt 275 lb (124.7 kg)   BMI 43.07 kg/m²     Objective:   Physical Exam   Constitutional: He is oriented to person, place, and time. He appears well-developed. Obese male   HENT:   Head: Normocephalic and atraumatic. Eyes: Pupils are equal, round, and reactive to light. EOM are normal.   Neurological: He is alert and oriented to person, place, and time. Skin: Skin is warm and dry. Psychiatric: He has a normal mood and affect. Assessment:       Diagnosis Orders   1. Benign prostatic hyperplasia with lower urinary tract symptoms, symptom details unspecified  POCT Urinalysis No Micro (Auto)       Mr. Steff Chaves today in follow-up for Benign prostatic hyperplasia with lower urinary tract symptoms, symptom details unspecified [N40.1]. The patient has improved very nicely on a combination of an alpha blocker and anticholinergic medication. Plan:        I have sent him to a 90 day supply of [facility] with 3 refills to his pharmacy. Continue Flomax. Return in 6 months.

## 2019-05-19 NOTE — PROGRESS NOTES
Chief Complaint   Patient presents with    Follow-up     DM/chronic conditions       History obtained from the patient. SUBJECTIVE:  Paul Pollard is a 46 y.o. male that presents today for:       -01. DM2: PRIOR VISIT: Pt admitted to [facility] from 12/3 to 12/5 for new onset DM. I was called last SAT morning by lab with critical values. Contacted pt and sent to ER. Sadly no discharge summary done, nor is the latest progress note, 4 days after discharge. It appears, anyway, that he was started on metformin and glyburide and d/c home. Labs were improving. Was not started on insulin.      Since discharge has done well. Keeping glucose log at home. Sugars 200-400 depending. Tolerating metformin and glyburide. No lows. Would like to see DM ed. Denies polyuria at this point.      UPDATE PRIOR VISIT: doing well on 1000mg bid of metformin. Sugars trending down to the mid 100s the last wk. No lows. Has DM ed consult. Denies polyuria, dypsa or phagia.      UPDATE PRIOR VISIT: doing well with glyburide and metformin 1000mg bid. Sugars  most days in the AM. No lows. Doing well with diet. Exercise not so much.      UPDATE PRIOR VISIT: doing well with metfromin and glyburide. a1c down to 6.5. No lows.      UPDATE PRIOR VISIT: a1c down to 5.5. Not checking sugars. Feels occ hypoglycemia. On glyburide and metformin. UPDATE PRIOR VISIT: on metformin monotherapy now, glyuride stopped in July d/t low sugars. Admits to poor diet choices last 3 months. a1c up to 7.1. UPDATE PRIOR VISIT: a1c higher. Pt not sure why. Diet poor. Not exercising and wts up. Taking metformin as rx'd. UPDATE PRIOR VISIT: a1c much improved. Diet better. januvia added to metformin, doing great. FBS . No lows. UPDATE PRIOR VISIT: pt called early NOV with sig elevated sugars. Was told to go to ER. Refused. Has to take care of his wife. See care coordination note 11/5. So insulin started via phone.  Since then, sugars starting to come down, 200-300 mostly, up to 16 units of basalgar. On metfomrin and [place] and [place] as well. Only recently change is was started on zyprexa. However that was recently stopped d/t concern for his sugars. No fevers. No s/s infection. Feels fine    UPDATE PRIOR VISIT: sugars had improved, but high again. Up to 56 units of basaglar. FBS were running 300-400  The last month. We inc his insulin 10 units a few wks ago with instructions go up 3 units every 3 days until FBS . No lows. UPDATE LAST VISIT: sugars running 200-300 the last 4 wks. Down to jsut about 200 the last few days. Up to 77 units of basiglar. No lows. UPDATE TODAY: -190. Higher if snacks during the night (snacks d/t hunger not low sugars). Currently on up to 74 units qhs pf glargine, plans to inc to 77 units tonight. Still taking metformin and januvia. Appears didn't inc like he should. wts down a few lbs. Diet improving some. Willing to see DM clinic     Lab Results   Component Value Date    LABA1C 10.7 05/20/2019    LABA1C 11.2 02/18/2019    LABA1C 12.2 11/16/2018    LABA1C 7.1 08/16/2018    LABA1C 9.5 04/18/2018    LABA1C 7.1 01/17/2018       Wt Readings from Last 3 Encounters:   05/20/19 269 lb 3.2 oz (122.1 kg)   05/10/19 275 lb (124.7 kg)   03/26/19 266 lb (120.7 kg)       -02. HLD:    HPI:    Taking meds as prescribed ?: yes  Tolerating well ?: yes  Side Effects ?: denies  Muscle Pain?: denies  Working on TLCS ?: yes      -03. HTN:    HPI:    Taking meds as prescribed ?: yes  Tolerating well ?: yes  Side Effects ?: denies  BP at home ?: denies  Working on TLCS ?: denies  Chest Pain/SOB/Palpitations?  yes    BP Readings from Last 3 Encounters:   05/20/19 122/70   05/10/19 128/84   03/26/19 138/88       Age/Gender Health Maintenance    Lipid -     No components found for: CHLPL  Lab Results   Component Value Date    TRIG 237 06/01/2018    TRIG 212 01/25/2017    TRIG 3,052 (H) 12/04/2016     Lab Results   Component Value Date    HDL 27 (A) 06/01/2018    HDL 33 (A) 01/25/2017    HDL 20 12/04/2016     Lab Results   Component Value Date    LDLCALC 96 06/01/2018    LDLCALC 124 01/25/2017    LDLCALC SEE BELOW 12/04/2016     Lab Results   Component Value Date    LDLCALC 96 06/01/2018    LDLDIRECT 119 (H) 12/12/2015       TSH - 3.260 (AUG 2018)  Lab Results   Component Value Date    TSH 3.660 12/03/2016       Colon Cancer Screening - NEG CANCER FEB 2015    Tetanus - UTD June 2014  Influenza Vaccine - UTD FALL 2018  Pneumonia Vaccine - UTD June 2014 PPV 23  Zostavax - Age 59     PSA testing discussion - Age 52  AAA Screening - Age 70      Diabetes Health Maintenance    A1C -   Lab Results   Component Value Date    LABA1C 10.7 05/20/2019    LABA1C 11.2 02/18/2019    LABA1C 12.2 11/16/2018    LABA1C 7.1 08/16/2018    LABA1C 9.5 04/18/2018    LABA1C 7.1 01/17/2018       ACE/ARB - yes, lisinopril 2.5mg daily  Eye - UTD NOV 2016; due pt reminded AUG 2018/FEB 2019  Foot - UTD FEB 2019  ASA - yes, 81mg  Microal/Cr -   Lab Results   Component Value Date    LABMICR < 1.20 02/18/2019    LABCREA 36.7 02/18/2019    MACRR 33 (H) 02/18/2019     eGFR -   Lab Results   Component Value Date    LABGLOM >90 11/16/2018       Statin - yes, lipitor 80mg qhs      Specialist Lists:    Dr Ze Schwartz Dr [facility] Cardiology  Dr Louis Genera:  Urology  Dr White Handler Pulmonology/Sleep  Dr Isreal Bell surgeon  [facility] ENT  Dr Zoran Zuñiga: Neurology      Current Outpatient Medications   Medication Sig Dispense Refill    ARTIFICIAL TEARS 1.4 % ophthalmic solution Place 1 drop into both eyes 4 times daily  0    fesoterodine (TOVIAZ) 4 MG TB24 ER tablet Take 1 tablet by mouth daily 90 tablet 3    insulin glargine (BASAGLAR KWIKPEN) 100 UNIT/ML injection pen Inject 56 Units into the skin nightly Increase by 3 units every 3 days until fasting sugar  (Patient taking differently: Inject 74 Units into the skin nightly Increase by 3 units every 3 days until fasting sugar ) 15 pen 5    lurasidone (LATUDA) 80 MG TABS tablet Take 1 tablet by mouth daily 30 tablet 5    amphetamine-dextroamphetamine (ADDERALL, 30MG,) 30 MG tablet Take 1 tablet by mouth 2 times daily for 30 days. 60 tablet 0    omeprazole (PRILOSEC) 20 MG delayed release capsule TAKE 2 CAPSULES BY MOUTH ONE TIME A DAY  180 capsule 3    lisinopril (PRINIVIL;ZESTRIL) 5 MG tablet Take 0.5 tablets by mouth daily 45 tablet 3    traZODone (DESYREL) 100 MG tablet Take 2 tablets by mouth nightly (Patient taking differently: Take 100 mg by mouth nightly ) 60 tablet 5    sildenafil (VIAGRA) 50 MG tablet Take 1 tablet by mouth daily as needed for Erectile Dysfunction Take one tablet one hour before sexual activity. 6 tablet 5    clotrimazole-betamethasone (LOTRISONE) 1-0.05 % cream Apply topically 2 times daily. Not to exceed 2 weeks consecutively. 1 Tube 1    metFORMIN (GLUCOPHAGE) 1000 MG tablet TAKE 1 TABLET BY MOUTH TWO TIMES A DAY WITH MEALS 180 tablet 3    tamsulosin (FLOMAX) 0.4 MG capsule Take 1 capsule by mouth daily 90 capsule 1    buPROPion HCl ER, XL, 450 MG TB24 Take 450 mg by mouth daily 30 tablet 5    FORFIVO  MG TB24 TAKE 1 TABLET BY MOUTH ONE TIME A DAY  30 tablet 3    lamoTRIgine (LAMICTAL) 200 MG tablet Take 1 tablet by mouth daily 30 tablet 3    MEIJER LANCETS UNIVERSAL 33G MISC check blood sugar one time daily 100 each 3    TRUE METRIX BLOOD GLUCOSE TEST strip check blood sugar one time daily 100 each 2    SM ASPIRIN ADULT LOW STRENGTH 81 MG EC tablet TAKE 1 TABLET BY MOUTH ONE TIME A DAY  90 tablet 3    vitamin B-12 (CYANOCOBALAMIN) 1000 MCG tablet TAKE 1 TABLET BY MOUTH ONE TIME A DAY 90 tablet 3    piroxicam (FELDENE) 20 MG capsule TAKE 1 CAPSULE BY MOUTH ONE TIME A DAY AS NEEDED FOR PAIN 30 capsule 5    SITagliptin (JANUVIA) 100 MG tablet Take 1 tablet by mouth daily 30 tablet 11    Insulin Pen Needle 31G X 8 MM MISC Use As Directed.  Do Fibromyalgia     Depression     History of colon polyps     Chronic back pain     ADHD (attention deficit hyperactivity disorder)     Former smoker     Peripheral neuropathy     Tarsal tunnel syndrome     Benign esophageal stricture     Hyperlipidemia     Allergic rhinitis     Migraine headache     Dysphagia s/p dilation. Follows with Storm Shirland 04/01/2014    GERD (gastroesophageal reflux disease) 10/15/2013    S/P cardiac catheterization: 9/30/2013: Normal coronaries 09/30/2013 9/30/2013: Normal coronaries. Radial approach. Dr. Maria Isabel Rojo Dysfunctional voiding of urine 06/20/2013     Medications he changes to follow,  Adding ditropan  to the present schedule      ED (erectile dysfunction) of organic origin 05/01/2013       Past Medical History:   Diagnosis Date    ADHD (attention deficit hyperactivity disorder)     Allergic rhinitis     Benign esophageal stricture     s/p dilation may 2014    Benign prostatic hyperplasia     B[facility]     CAD (coronary artery disease)     Unclear history, cath 2013 with patent arteries. Records pending.  Chronic back pain     Depression     DM2 (diabetes mellitus, type 2) ([facility]     Dysfunctional voiding of urine 6/20/2013    Medications he changes to follow,  Adding ditropan  to the present schedule     ED (erectile dysfunction) of organic origin 5/1/2013    Fibromyalgia     Former smoker     GERD (gastroesophageal reflux disease)     History of colon polyps     Pt unsure when last colo.  History of TIA (transient ischemic attack)     x2     Hyperlipidemia     Hypertension     Hypertriglyceridemia 10/27/2015    Migraine headache     Morbid obesity with BMI of 40.0-44.9, adult ([facility]     Obstructive sleep apnea on CPAP 11/3/2015    Peripheral neuropathy     Pulmonary nodule     stable >2 years, no further w/u required.      Rotator cuff injury     Left side    S/P cardiac catheterization: 9/30/2013: Normal coronaries 2013: Normal coronaries. Radial approach. Dr. Andrade Emerson Short-segment Vásquez's esophagus 2015    Tarsal tunnel syndrome     Bilateral       Past Surgical History:   Procedure Laterality Date    CARDIAC CATHETERIZATION  10/2013    2013: Normal coronaries. Radial approach. Dr. Andrade Emerson COLONOSCOPY  ,    [facility] KNEE SURGERY Left 2012    ROTATOR CUFF REPAIR Left 2013   [facility] SHOULDER SURGERY Bilateral    [facility] SINUS SURGERY  2004    [place] occ.  TONSILLECTOMY  2002    TYMPANOSTOMY TUBE PLACEMENT      UPPER GASTROINTESTINAL ENDOSCOPY  ,  with dilation,     UPPER GASTROINTESTINAL ENDOSCOPY      UPPER GASTROINTESTINAL ENDOSCOPY  MAY 2014, 2017    Dilation       No Known Allergies      Social History     Tobacco Use    Smoking status: Former Smoker     Packs/day: 0.75     Years: 30.00     Pack years: 22.50     Types: Cigarettes     Start date: 1984     Last attempt to quit: 2018     Years since quittin.0    Smokeless tobacco: Never Used   Substance Use Topics    Alcohol use: No     Alcohol/week: 0.0 oz       Family History   Problem Relation Age of Onset    Cancer Father 77        lung     Rheum Arthritis Father     Colon Cancer Maternal Aunt 61    Cancer Maternal Aunt 76        \"throat\"    Colon Cancer Maternal Uncle 48    Cancer Maternal Uncle 60        pancreatic    Cancer Paternal Aunt 52        brain    Colon Cancer Paternal Uncle 72    Cancer Paternal Uncle 61        hepatic     Prostate Cancer Paternal Uncle 61    Cancer Maternal Uncle 79        pancreatic     Ovarian Cancer Paternal Aunt 48    Rheum Arthritis Mother          I have reviewed the patient's past medical history, past surgical history, allergies, medications, social and family history and I have made updates where appropriate.       Review of Systems  Positive responses are highlighted in bold    Constitutional:  Fever, Chills, Night Sweats, Fatigue, Unexpected changes in weight  HENT:  Ear pain, Tinnitus, Nosebleeds, Trouble swallowing, Hearing loss, Sore throat  Cardiovascular:  Chest Pain, Palpitations, Orthopnea, Paroxysmal Nocturnal Dyspnea  Respiratory:  Cough, Wheezing, Shortness of breath, Chest tightness, Apnea  Gastrointestinal:  Nausea, Vomiting, Diarrhea, Constipation, Heartburn, Blood in stool  Genitourinary:  Difficulty or painful urination, Flank pain, Change in frequency, Urgency  Skin:  Color change, Rash, Itching, Wound  Musculoskeletal:  Joint pain, Back pain, Gait problems, Joint swelling, Myalgias  Neurological:  Dizziness, Headaches, Presyncope, Numbness, Seizures, Tremors  Endocrine:  Heat Intolerance, Cold Intolerance, Polydipsia, Polyphagia, Polyuria      PHYSICAL EXAM:  Vitals:    05/20/19 1052   BP: 122/70   Pulse: 86   Resp: 18   Temp: 98 °F (36.7 °C)   TempSrc: Oral   Weight: 269 lb 3.2 oz (122.1 kg)   Height: 5' 5.75\" (1.67 m)     Body mass index is 43.78 kg/m². Pain Score:   0 - No pain    Wt Readings from Last 3 Encounters:   05/20/19 269 lb 3.2 oz (122.1 kg)   05/10/19 275 lb (124.7 kg)   03/26/19 266 lb (120.7 kg)     VS Reviewed  General Appearance: A&O x 3, No acute distress,well developed and well- nourished  Eyes: pupils equal, round, and reactive to light, extraocular eye movements intact, conjunctivae and eye lids without erythema  ENT: external ear and ear canal clear bilaterally, TMs intact and regular, nose without deformity, nasal mucosa and turbinates normal without polyps, oropharynx normal, dentition is normal for age  Neck: supple and non-tender without mass, no thyromegaly or thyroid nodules, no cervical lymphadenopathy  Pulmonary/Chest: clear to auscultation bilaterally- no wheezes, rales or rhonchi, normal air movement, no respiratory distress or retractions  Cardiovascular: S1 and S2 auscultated w/ RRR. No murmurs, rubs, clicks, or gallops, distal pulses intact.   Abdomen: soft, non-tender, non-distended, bowl sounds physiologic,  no rebound or guarding, no masses or hernias noted. Liver and spleen without enlargement. Extremities: no cyanosis, clubbing or edema of the lower extremities. +2 PT/DP bilaterally. Skin: warm and dry, no rash or erythema      Results for POC orders placed in visit on 05/20/19   POCT glycosylated hemoglobin (Hb A1C)   Result Value Ref Range    Hemoglobin A1C 10.7 (H) %       ASSESSMENT & PLAN  1. Type 2 diabetes mellitus with hyperglycemia, with long-term current use of insulin ([facility]    Uncontrolled yet  Hasn't been inc glargine like he should    Plan:  Inc basaglar to 87 units qhs  Inc by 3 units every 3 days for FBS   If gets to 100 units w/o sugars at goal, he is to [facility]  DM clinic referral  con't metformin/januvia  F/u 3 months    - POCT glycosylated hemoglobin (Hb A1C)  - [address] [facility]    2. Type 2 diabetes mellitus with microalbuminuria, without long-term current use of insulin ([facility])    - POCT glycosylated hemoglobin (Hb A1C)  - [address] [facility]    3. Morbid obesity with BMI of 40.0-44.9, adult ([facility]    Discussed wt loss strategies    4. Mixed hyperlipidemia    con't lipitor  Due for labs    - Lipid Panel; Future    5. Essential hypertension    At goal  con't meds, labs UTD      DISPOSITION    Return in about 3 months (around 8/20/2019) for Follow-up Diabetes, follow-up on chronic medical conditions, sooner as needed. Az released without restrictions.     Future Appointments   Date Time Provider Mikey Painting   6/6/2019  9:20 AM [facility] [address] [address]   8/21/2019  8:40 AM [facility]ales [facility] [address]   11/12/2019  9:45 AM MD SHREYA Marie AM [address] [address] [facility]  [address]     PATIENT COUNSELING    Fabiene Gottron received counseling on the following healthy behaviors: nutrition, exercise and medication adherence    Patient given educational materials on: See Attached    I have instructed Fabiene Gottron to complete a self tracking handout on blood sugars, smoking and instructed them to bring it with them to his next appointment. Barriers to learning and self management: none    Discussed use, benefit, and side effects of prescribed medications. Barriers to medication compliance addressed. All patient questions answered. Pt voiced understanding.        Electronically signed by Destiny Stanley DO on 5/20/2019 at 11:30 AM

## 2019-05-20 ENCOUNTER — OFFICE VISIT (OUTPATIENT)
Dept: FAMILY MEDICINE CLINIC | Age: 49
End: 2019-05-20
Payer: COMMERCIAL

## 2019-05-20 ENCOUNTER — TELEPHONE (OUTPATIENT)
Dept: FAMILY MEDICINE CLINIC | Age: 49
End: 2019-05-20

## 2019-05-20 ENCOUNTER — NURSE ONLY (OUTPATIENT)
Dept: LAB | Age: 49
End: 2019-05-20

## 2019-05-20 VITALS
HEIGHT: 66 IN | BODY MASS INDEX: 43.26 KG/M2 | RESPIRATION RATE: 18 BRPM | TEMPERATURE: 98 F | SYSTOLIC BLOOD PRESSURE: 122 MMHG | HEART RATE: 86 BPM | DIASTOLIC BLOOD PRESSURE: 70 MMHG | WEIGHT: 269.2 LBS

## 2019-05-20 DIAGNOSIS — E11.29 TYPE 2 DIABETES MELLITUS WITH MICROALBUMINURIA, WITHOUT LONG-TERM CURRENT USE OF INSULIN (HCC): ICD-10-CM

## 2019-05-20 DIAGNOSIS — I10 ESSENTIAL HYPERTENSION: ICD-10-CM

## 2019-05-20 DIAGNOSIS — Z79.4 TYPE 2 DIABETES MELLITUS WITH HYPERGLYCEMIA, WITH LONG-TERM CURRENT USE OF INSULIN (HCC): Primary | ICD-10-CM

## 2019-05-20 DIAGNOSIS — E66.01 MORBID OBESITY WITH BMI OF 40.0-44.9, ADULT (HCC): ICD-10-CM

## 2019-05-20 DIAGNOSIS — R80.9 TYPE 2 DIABETES MELLITUS WITH MICROALBUMINURIA, WITHOUT LONG-TERM CURRENT USE OF INSULIN (HCC): ICD-10-CM

## 2019-05-20 DIAGNOSIS — E78.2 MIXED HYPERLIPIDEMIA: ICD-10-CM

## 2019-05-20 DIAGNOSIS — E11.65 TYPE 2 DIABETES MELLITUS WITH HYPERGLYCEMIA, WITH LONG-TERM CURRENT USE OF INSULIN (HCC): Primary | ICD-10-CM

## 2019-05-20 LAB
CHOLESTEROL, TOTAL: 218 MG/DL (ref 100–199)
HBA1C MFR BLD: 10.7 %
HDLC SERPL-MCNC: 22 MG/DL
LDL CHOLESTEROL CALCULATED: ABNORMAL MG/DL
TRIGL SERPL-MCNC: 716 MG/DL (ref 0–199)

## 2019-05-20 PROCEDURE — 99214 OFFICE O/P EST MOD 30 MIN: CPT | Performed by: FAMILY MEDICINE

## 2019-05-20 PROCEDURE — 83036 HEMOGLOBIN GLYCOSYLATED A1C: CPT | Performed by: FAMILY MEDICINE

## 2019-05-20 PROCEDURE — G8598 ASA/ANTIPLAT THER USED: HCPCS | Performed by: FAMILY MEDICINE

## 2019-05-20 PROCEDURE — 3017F COLORECTAL CA SCREEN DOC REV: CPT | Performed by: FAMILY MEDICINE

## 2019-05-20 PROCEDURE — G8417 CALC BMI ABV UP PARAM F/U: HCPCS | Performed by: FAMILY MEDICINE

## 2019-05-20 PROCEDURE — 3046F HEMOGLOBIN A1C LEVEL >9.0%: CPT | Performed by: FAMILY MEDICINE

## 2019-05-20 PROCEDURE — 2022F DILAT RTA XM EVC RTNOPTHY: CPT | Performed by: FAMILY MEDICINE

## 2019-05-20 PROCEDURE — 1036F TOBACCO NON-USER: CPT | Performed by: FAMILY MEDICINE

## 2019-05-20 PROCEDURE — G8427 DOCREV CUR MEDS BY ELIG CLIN: HCPCS | Performed by: FAMILY MEDICINE

## 2019-05-20 NOTE — PROGRESS NOTES
Visit Information    Have you changed or started any medications since your last visit including any over-the-counter medicines, vitamins, or herbal medicines? no  Are you having any side effects from any of your medications? -  no  Have you stopped taking any of your medications? Is so, why? -  no    Have you seen any other physician or provider since your last visit? Yes - Records Obtained Dr Anupam Esparza  Have you had any other diagnostic tests since your last visit? No  Have you been seen in the emergency room and/or had an admission to a hospital since we last saw you? No  Have you had your routine dental cleaning in the past 6 months? no    Have you activated your [employer] account? If not, what are your barriers?  No     Patient Care Team:  Dov Ellington,  as PCP - [address] [place] [address], DO as PCP - [facility] Attributed Provider  Livier Allen MD as Consulting Physician (Pulmonology)    Medical History Review  Deferred to PCP    Health Maintenance   Topic Date Due    HIV screen  07/30/1983    Hepatitis B Vaccine (1 of 3 - Risk 3-dose series) 07/30/1987    Shingles Vaccine (1 of 2) 07/30/2018    A1C test (Diabetic or Prediabetic)  05/18/2019    Lipid screen  06/01/2019    Potassium monitoring  11/16/2019    Creatinine monitoring  11/16/2019    Diabetic foot exam  02/18/2020    Diabetic microalbuminuria test  02/18/2020    Diabetic retinal exam  03/29/2020    DTaP/Tdap/Td vaccine (2 - Td) 06/16/2024    Colon cancer screen colonoscopy  02/05/2025    Flu vaccine  Completed    Pneumococcal 0-64 years Vaccine  Completed

## 2019-06-05 NOTE — PATIENT INSTRUCTIONS
LAB INSTRUCTIONS:    Please complete labs within 6 week(s). Please fast for 8 hours prior to lab collection. The clinic will call you within 1 week of collection. If you have not heard from us within that amount of time, please call us at [phone]. Glargine titration:    Goal Fasting (Morning) Blood sugar 90 to 130    Start at 77 units. If not at goal after 3 days, increase Glargine dose by 3 units every 3 days until you reach your goal consistently, without going low on your blood sugar. If persistently having low blood sugar, decrease by 3 units every 2 days until hypoglycemia resolves. Patient Education        Type 2 Diabetes: Care Instructions  Your Care Instructions    Type 2 diabetes is a disease that develops when the body's tissues cannot use insulin properly. Over time, the pancreas cannot make enough insulin. Insulin is a hormone that helps the body's cells use sugar (glucose) for energy. It also helps the body store extra sugar in muscle, fat, and liver cells. Without insulin, the sugar cannot get into the cells to do its work. It stays in the blood instead. This can cause high blood sugar levels. A person has diabetes when the blood sugar stays too high too much of the time. Over time, diabetes can lead to diseases of the heart, blood vessels, nerves, kidneys, and eyes. You may be able to control your blood sugar by losing weight, eating a healthy diet, and getting daily exercise. You may also have to take insulin or other diabetes medicine. Follow-up care is a key part of your treatment and safety. Be sure to make and go to all appointments. Call your doctor if you are having problems. It's also a good idea to know your test results and keep a list of the medicines you take. How can you care for yourself at home? · Keep your blood sugar at a target level (which you set with your doctor). ? Eat a good diet that spreads carbohydrate throughout the day.  Carbohydrate--the body's main source of fuel--affects blood sugar more than any other nutrient. Carbohydrate is in fruits, vegetables, milk, and yogurt. It also is in breads, cereals, vegetables such as potatoes and corn, and sugary foods such as candy and cakes. ? Aim for 30 minutes of exercise on most, preferably all, days of the week. Walking is a good choice. You also may want to do other activities, such as running, swimming, cycling, or playing tennis or team sports. If your doctor says it's okay, do muscle-strengthening exercises at least 2 times a week. ? Take your medicines exactly as prescribed. Call your doctor if you think you are having a problem with your medicine. You will get more details on the specific medicines your doctor prescribes. · Check your blood sugar as often as your doctor recommends. It is important to keep track of any symptoms you have, such as low blood sugar. Also tell your doctor if you have any changes in your activities, diet, or insulin use. · Talk to your doctor before you start taking aspirin every day. Aspirin can help certain people lower their risk of a heart attack or stroke. But taking aspirin isn't right for everyone, because it can cause serious bleeding. · Do not smoke. If you need help quitting, talk to your doctor about stop-smoking programs and medicines. These can increase your chances of quitting for good. · Keep your cholesterol and blood pressure at normal levels. You may need to take one or more medicines to reach your goals. Take them exactly as directed. Do not stop or change a medicine without talking to your doctor first.  When should you call for help? Call 911 anytime you think you may need emergency care. For example, call if:    · You passed out (lost consciousness), or you suddenly become very sleepy or confused.  (You may have very low blood sugar.)    Call your doctor now or seek immediate medical care if:    · Your blood sugar is 300 mg/dL or is higher than the level your doctor has set for you.     · You have symptoms of low blood sugar, such as:  ? Sweating. ? Feeling nervous, shaky, and weak. ? Extreme hunger and slight nausea. ? Dizziness and headache.  ? Blurred vision. ? Confusion.    Watch closely for changes in your health, and be sure to contact your doctor if:    · You often have problems controlling your blood sugar.     · You have symptoms of long-term diabetes problems, such as:  ? New vision changes. ? New pain, numbness, or tingling in your hands or feet. ? Skin problems. Where can you learn more? Go to [email or web address] and sign in to your [employer] account. Enter C553 in the [employer] box to learn more about \"Type 2 Diabetes: Care Instructions. \"     If you do not have an account, please click on the \"Sign Up Now\" link. Current as of: July 25, 2018  Content Version: 12.0  © 1366-7091 Healthwise, Incorporated. Care instructions adapted under license by [facility] ([facility]). If you have questions about a medical condition or this instruction, always ask your healthcare professional. [place] [address] any warranty or liability for your use of this information. How Severe Is Your Rash?: moderate Is This A New Presentation, Or A Follow-Up?: Rash

## 2019-06-06 ENCOUNTER — OFFICE VISIT (OUTPATIENT)
Dept: PSYCHIATRY | Age: 49
End: 2019-06-06
Payer: COMMERCIAL

## 2019-06-06 DIAGNOSIS — F90.8 ADHD, ADULT RESIDUAL TYPE: ICD-10-CM

## 2019-06-06 DIAGNOSIS — F31.9 BIPOLAR 1 DISORDER (HCC): Primary | ICD-10-CM

## 2019-06-06 PROCEDURE — 1036F TOBACCO NON-USER: CPT | Performed by: PSYCHIATRY & NEUROLOGY

## 2019-06-06 PROCEDURE — 3017F COLORECTAL CA SCREEN DOC REV: CPT | Performed by: PSYCHIATRY & NEUROLOGY

## 2019-06-06 PROCEDURE — 99212 OFFICE O/P EST SF 10 MIN: CPT | Performed by: PSYCHIATRY & NEUROLOGY

## 2019-06-06 PROCEDURE — G8417 CALC BMI ABV UP PARAM F/U: HCPCS | Performed by: PSYCHIATRY & NEUROLOGY

## 2019-06-06 PROCEDURE — G8428 CUR MEDS NOT DOCUMENT: HCPCS | Performed by: PSYCHIATRY & NEUROLOGY

## 2019-06-06 PROCEDURE — G8598 ASA/ANTIPLAT THER USED: HCPCS | Performed by: PSYCHIATRY & NEUROLOGY

## 2019-06-06 RX ORDER — DEXTROAMPHETAMINE SACCHARATE, AMPHETAMINE ASPARTATE, DEXTROAMPHETAMINE SULFATE AND AMPHETAMINE SULFATE 7.5; 7.5; 7.5; 7.5 MG/1; MG/1; MG/1; MG/1
30 TABLET ORAL 2 TIMES DAILY
Qty: 60 TABLET | Refills: 0 | Status: SHIPPED | OUTPATIENT
Start: 2019-06-06 | End: 2019-08-07

## 2019-06-06 NOTE — PROGRESS NOTES
Chief Complaint   Patient presents with    1 Month Follow-Up     Bipolar ADHD       Pan Bolivar returned after 30 days to follow-up on ADHD and bipolar disorders. When he came in he told me that things could be better. I asked him what he was referring to, indicated that he just found out that his stroke, suffered about 10 years ago, damage to his vision. He has visual loss on the left temporal and superior visual fields. This is expected to worsen over time. Additionally, he told me that his wife was just diagnosed with leukemia and he didn't know what type. She has been started on chemotherapy, and her bone marrow is to be biopsied or has been biopsied. He says they're wanting to line up he the children as potential donors for her. This has got him much more down than usual.  He was rather subdued in the session today. I did not make any changes. He did need a refill of Adderall, which was provided.     Mental Status Examination    Level of consciousness:  within normal limits  Appearance:  well-appearing, street clothes, in chair, fair grooming and fair hygiene  Behavior/Motor:  no abnormalities noted  Attitude toward examiner:  cooperative, attentive and good eye contact  Speech:  spontaneous, normal rate, normal volume and well articulated  Mood:  down, depressed  Affect:  mood congruent  Thought processes:  linear, goal directed and coherent  Thought content:  Homocidal ideation: none  Suicidal Ideation:  denies suicidal ideation  Delusions:  no evidence of delusions  Perceptual Disturbance:  denies any perceptual disturbance  Cognition:  oriented to person, place, and time  Concentration succeeded  Memory intact  Fund of knowledge:  fair  Abstract thinking:  fair  Insight:  good  Judgment:  good    DIAGNOSTIC IMPRESSION  Bipolar disorder  ADHD    Plan  No change\  Current Outpatient Medications   Medication Sig Dispense Refill    amphetamine-dextroamphetamine (ADDERALL, 30MG,) 30 MG tablet Take 3    vitamin B-12 (CYANOCOBALAMIN) 1000 MCG tablet TAKE 1 TABLET BY MOUTH ONE TIME A DAY 90 tablet 3    piroxicam (FELDENE) 20 MG capsule TAKE 1 CAPSULE BY MOUTH ONE TIME A DAY AS NEEDED FOR PAIN 30 capsule 5    SITagliptin (JANUVIA) 100 MG tablet Take 1 tablet by mouth daily 30 tablet 11    Insulin Pen Needle 31G X 8 MM MISC Use As Directed. Do NOT re-use needles. 100 each 3    atorvastatin (LIPITOR) 80 MG tablet TAKE 1 TABLET BY MOUTH ONE TIME A DAY  90 tablet 3    fenofibrate 160 MG tablet TAKE 1 TABLET BY MOUTH ONE TIME A DAY  90 tablet 3    rOPINIRole (REQUIP) 3 MG tablet Take 3 mg by mouth nightly      ranitidine (ZANTAC) 150 MG tablet Take 150 mg by mouth nightly       butalbital-acetaminophen-caffeine (FIORICET) -40 MG per tablet Take 1 tablet by mouth every 6 hours as needed for Migraine 60 tablet 0    cetirizine (ZYRTEC) 10 MG tablet daily       EYE ITCH RELIEF 0.025 % ophthalmic solution Place 2 drops into both eyes as needed       propranolol (INDERAL LA) 160 MG ER capsule Take 160 mg by mouth daily       fluticasone (FLONASE) 50 MCG/ACT nasal spray 1 spray by Nasal route daily as needed.  gabapentin (NEURONTIN) 800 MG tablet   Take 800 mg by mouth 4 times daily        No current facility-administered medications for this visit.     \

## 2019-06-16 DIAGNOSIS — G89.29 CHRONIC LEFT SHOULDER PAIN: ICD-10-CM

## 2019-06-16 DIAGNOSIS — M25.512 CHRONIC LEFT SHOULDER PAIN: ICD-10-CM

## 2019-06-20 DIAGNOSIS — E11.29 TYPE 2 DIABETES MELLITUS WITH MICROALBUMINURIA, WITHOUT LONG-TERM CURRENT USE OF INSULIN (HCC): ICD-10-CM

## 2019-06-20 DIAGNOSIS — R80.9 TYPE 2 DIABETES MELLITUS WITH MICROALBUMINURIA, WITHOUT LONG-TERM CURRENT USE OF INSULIN (HCC): ICD-10-CM

## 2019-06-20 RX ORDER — LISINOPRIL 5 MG/1
2.5 TABLET ORAL DAILY
Qty: 45 TABLET | Refills: 3 | Status: SHIPPED | OUTPATIENT
Start: 2019-06-20 | End: 2020-04-11

## 2019-07-08 ENCOUNTER — OFFICE VISIT (OUTPATIENT)
Dept: PSYCHIATRY | Age: 49
End: 2019-07-08
Payer: COMMERCIAL

## 2019-07-08 DIAGNOSIS — F90.9 ATTENTION DEFICIT HYPERACTIVITY DISORDER (ADHD), UNSPECIFIED ADHD TYPE: ICD-10-CM

## 2019-07-08 DIAGNOSIS — F31.9 BIPOLAR 1 DISORDER (HCC): Primary | ICD-10-CM

## 2019-07-08 PROCEDURE — 3017F COLORECTAL CA SCREEN DOC REV: CPT | Performed by: PSYCHIATRY & NEUROLOGY

## 2019-07-08 PROCEDURE — G8428 CUR MEDS NOT DOCUMENT: HCPCS | Performed by: PSYCHIATRY & NEUROLOGY

## 2019-07-08 PROCEDURE — G8598 ASA/ANTIPLAT THER USED: HCPCS | Performed by: PSYCHIATRY & NEUROLOGY

## 2019-07-08 PROCEDURE — 1036F TOBACCO NON-USER: CPT | Performed by: PSYCHIATRY & NEUROLOGY

## 2019-07-08 PROCEDURE — 99213 OFFICE O/P EST LOW 20 MIN: CPT | Performed by: PSYCHIATRY & NEUROLOGY

## 2019-07-08 PROCEDURE — G8417 CALC BMI ABV UP PARAM F/U: HCPCS | Performed by: PSYCHIATRY & NEUROLOGY

## 2019-07-08 RX ORDER — DEXTROAMPHETAMINE SACCHARATE, AMPHETAMINE ASPARTATE, DEXTROAMPHETAMINE SULFATE AND AMPHETAMINE SULFATE 7.5; 7.5; 7.5; 7.5 MG/1; MG/1; MG/1; MG/1
30 TABLET ORAL 2 TIMES DAILY
Qty: 60 TABLET | Refills: 0 | Status: SHIPPED | OUTPATIENT
Start: 2019-07-08 | End: 2019-08-07 | Stop reason: SDUPTHER

## 2019-07-08 RX ORDER — LAMOTRIGINE 200 MG/1
200 TABLET ORAL DAILY
Qty: 30 TABLET | Refills: 5 | Status: SHIPPED | OUTPATIENT
Start: 2019-07-08 | End: 2020-01-27 | Stop reason: SDUPTHER

## 2019-07-16 DIAGNOSIS — E11.29 TYPE 2 DIABETES MELLITUS WITH MICROALBUMINURIA, WITHOUT LONG-TERM CURRENT USE OF INSULIN (HCC): Chronic | ICD-10-CM

## 2019-07-16 DIAGNOSIS — R80.9 TYPE 2 DIABETES MELLITUS WITH MICROALBUMINURIA, WITHOUT LONG-TERM CURRENT USE OF INSULIN (HCC): Chronic | ICD-10-CM

## 2019-07-16 DIAGNOSIS — E78.1 HYPERTRIGLYCERIDEMIA: Chronic | ICD-10-CM

## 2019-07-16 RX ORDER — FENOFIBRATE 160 MG/1
TABLET ORAL
Qty: 30 TABLET | Refills: 2 | Status: SHIPPED | OUTPATIENT
Start: 2019-07-16 | End: 2019-10-14 | Stop reason: SDUPTHER

## 2019-07-16 RX ORDER — ATORVASTATIN CALCIUM 80 MG/1
TABLET, FILM COATED ORAL
Qty: 30 TABLET | Refills: 2 | Status: SHIPPED | OUTPATIENT
Start: 2019-07-16 | End: 2019-10-29 | Stop reason: SDUPTHER

## 2019-08-07 ENCOUNTER — OFFICE VISIT (OUTPATIENT)
Dept: PSYCHIATRY | Age: 49
End: 2019-08-07
Payer: COMMERCIAL

## 2019-08-07 DIAGNOSIS — F31.9 BIPOLAR 1 DISORDER (HCC): Primary | ICD-10-CM

## 2019-08-07 DIAGNOSIS — F90.9 ATTENTION DEFICIT HYPERACTIVITY DISORDER (ADHD), UNSPECIFIED ADHD TYPE: ICD-10-CM

## 2019-08-07 PROCEDURE — 99213 OFFICE O/P EST LOW 20 MIN: CPT | Performed by: PSYCHIATRY & NEUROLOGY

## 2019-08-07 PROCEDURE — G8428 CUR MEDS NOT DOCUMENT: HCPCS | Performed by: PSYCHIATRY & NEUROLOGY

## 2019-08-07 PROCEDURE — G8598 ASA/ANTIPLAT THER USED: HCPCS | Performed by: PSYCHIATRY & NEUROLOGY

## 2019-08-07 PROCEDURE — 3017F COLORECTAL CA SCREEN DOC REV: CPT | Performed by: PSYCHIATRY & NEUROLOGY

## 2019-08-07 PROCEDURE — 1036F TOBACCO NON-USER: CPT | Performed by: PSYCHIATRY & NEUROLOGY

## 2019-08-07 PROCEDURE — G8417 CALC BMI ABV UP PARAM F/U: HCPCS | Performed by: PSYCHIATRY & NEUROLOGY

## 2019-08-07 RX ORDER — DEXTROAMPHETAMINE SACCHARATE, AMPHETAMINE ASPARTATE, DEXTROAMPHETAMINE SULFATE AND AMPHETAMINE SULFATE 7.5; 7.5; 7.5; 7.5 MG/1; MG/1; MG/1; MG/1
30 TABLET ORAL 2 TIMES DAILY
Qty: 60 TABLET | Refills: 0 | Status: SHIPPED | OUTPATIENT
Start: 2019-08-07 | End: 2019-09-09 | Stop reason: SDUPTHER

## 2019-08-07 NOTE — PROGRESS NOTES
Chief Complaint   Patient presents with    1 Month Follow-Up     Bipolar ADHD     Lukas Patel returns after 1 month to follow-up on bipolar disorder and ADHD. He is stung me by telling me that his wife of 24 years  over the weekend. She had stage IV renal disease but he does not know specifically what to attribute her death to. He said she had been falling because she had before equilibrium he thinks she may have hit her head. He wonders if that caused her death, but apparently the autopsy did not report any subdural hematomas. He is doing okay. He has 2 daughters and a son living at home with him so he is not alone. All of them are depressed he says that he acknowledges that he has had some fleeting suicidal thoughts but he does not think that it is serious. This is extremely acute. I could do little more than suppressed my condolences. We will have to see how it affects him over time. He wants to keep the frequency at once a month. He needed some Adderall today which I provided. Otherwise he has ample supplies of the remaining drugs. No changes were made to any of these.     Mental Status Examination    Level of consciousness:  within normal limits  Appearance:  well-appearing, street clothes, in chair, good grooming and good hygiene  Behavior/Motor:  no abnormalities noted  Attitude toward examiner:  cooperative, attentive and good eye contact  Speech:  spontaneous, normal rate, normal volume and well articulated  Mood:  depressed  Affect:  mood congruent  Thought processes:  linear, goal directed and coherent  Thought content:  Homocidal ideation: none  Suicidal Ideation:  active, without plan and without intent--fleeting thoughts he says  Delusions:  no evidence of delusions  Perceptual Disturbance:  denies any perceptual disturbance  Cognition:  oriented to person, place, and time  Concentration succeeded  Memory not tested  Fund of knowledge:  fair  Abstract thinking:  fair  Insight:

## 2019-08-19 NOTE — PROGRESS NOTES
Chief Complaint   Patient presents with    Follow-up     DM, etc       History obtained from the patient. SUBJECTIVE:  Sultana Guzman is a 44 y.o. male that presents today for:       -DM2: PRIOR VISIT: Pt admitted to [facility] from 12/3 to 12/5 for new onset DM. I was called last SAT morning by lab with critical values. Contacted pt and sent to ER. Sadly no discharge summary done, nor is the latest progress note, 4 days after discharge. It appears, anyway, that he was started on metformin and glyburide and d/c home. Labs were improving. Was not started on insulin.      Since discharge has done well. Keeping glucose log at home. Sugars 200-400 depending. Tolerating metformin and glyburide. No lows. Would like to see DM ed. Denies polyuria at this point.      UPDATE PRIOR VISIT: doing well on 1000mg bid of metformin. Sugars trending down to the mid 100s the last wk. No lows. Has DM ed consult. Denies polyuria, dypsa or phagia.      UPDATE PRIOR VISIT: doing well with glyburide and metformin 1000mg bid. Sugars  most days in the AM. No lows. Doing well with diet. Exercise not so much.      UPDATE PRIOR VISIT: doing well with metfromin and glyburide. a1c down to 6.5. No lows.      UPDATE PRIOR VISIT: a1c down to 5.5. Not checking sugars. Feels occ hypoglycemia. On glyburide and metformin. UPDATE PRIOR VISIT: on metformin monotherapy now, glyuride stopped in July d/t low sugars. Admits to poor diet choices last 3 months. a1c up to 7.1. UPDATE PRIOR VISIT: a1c higher. Pt not sure why. Diet poor. Not exercising and wts up. Taking metformin as rx'd. UPDATE PRIOR VISIT: a1c much improved. Diet better. januvia added to metformin, doing great. FBS . No lows. UPDATE PRIOR VISIT: pt called early NOV with sig elevated sugars. Was told to go to ER. Refused. Has to take care of his wife. See care coordination note 11/5. So insulin started via phone.  Since then, sugars starting to come down, 200-300 mostly, up to 16 units of basalgar. On metfomrin and [place] and [place] as well. Only recently change is was started on zyprexa. However that was recently stopped d/t concern for his sugars. No fevers. No s/s infection. Feels fine    UPDATE PRIOR VISIT: sugars had improved, but high again. Up to 56 units of basaglar. FBS were running 300-400  The last month. We inc his insulin 10 units a few wks ago with instructions go up 3 units every 3 days until FBS . No lows. UPDATE PRIOR VISIT: sugars running 200-300 the last 4 wks. Down to jsut about 200 the last few days. Up to 77 units of basiglar. No lows. UPDATE LAST VISIT: -190. Higher if snacks during the night (snacks d/t hunger not low sugars). Currently on up to 74 units qhs pf glargine, plans to inc to 77 units tonight. Still taking metformin and januvia. Appears didn't inc like he should. wts down a few lbs. Diet improving some. Willing to see DM clinic     UPDATE TODAY:   -130  Pt weaned himself off glargine 4 wks, was having low sugars  a1c's at goal w/o insulin  Still on metfmormin and januvia  Never did go to DM clinic since doing better   Diet better  Working on wt loss  No low sugars now    Lab Results   Component Value Date    LABA1C 7.8 08/20/2019    LABA1C 10.7 05/20/2019    LABA1C 11.2 02/18/2019    LABA1C 12.2 11/16/2018    LABA1C 7.1 08/16/2018    LABA1C 9.5 04/18/2018       Wt Readings from Last 3 Encounters:   08/20/19 269 lb (122 kg)   05/20/19 269 lb 3.2 oz (122.1 kg)   05/10/19 275 lb (124.7 kg)        -HLD:    HPI:  TG very high  Called pt in May to see if was still taking meds  He never called back  Letter sent  No response  States is taking lipitor and fenofibrate daily  Diet better now    Taking meds as prescribed ?: yes  Tolerating well ?: yes  Side Effects ?: denies  Muscle Pain?: denies  Working on TLCS ?: yes      -B12 def PRIOR VISIT: noted on labs, on supplementation. Overdue for f/u labs.  Will get today    UPDATE for 30 days. 60 tablet 0    fenofibrate 160 MG tablet TAKE 1 TABLET BY MOUTH ONE TIME A DAY  30 tablet 2    atorvastatin (LIPITOR) 80 MG tablet TAKE 1 TABLET BY MOUTH ONE TIME A DAY  30 tablet 2    lamoTRIgine (LAMICTAL) 200 MG tablet Take 1 tablet by mouth daily 30 tablet 5    lisinopril (PRINIVIL;ZESTRIL) 5 MG tablet Take 0.5 tablets by mouth daily 45 tablet 3    piroxicam (FELDENE) 20 MG capsule TAKE 1 CAPSULE BY MOUTH ONE TIME A DAY AS NEEDED FOR PAIN 30 capsule 4    ARTIFICIAL TEARS 1.4 % ophthalmic solution Place 1 drop into both eyes 4 times daily  0    fesoterodine (TOVIAZ) 4 MG TB24 ER tablet Take 1 tablet by mouth daily 90 tablet 3    lurasidone (LATUDA) 80 MG TABS tablet Take 1 tablet by mouth daily 30 tablet 5    omeprazole (PRILOSEC) 20 MG delayed release capsule TAKE 2 CAPSULES BY MOUTH ONE TIME A DAY  180 capsule 3    traZODone (DESYREL) 100 MG tablet Take 2 tablets by mouth nightly (Patient taking differently: Take 100 mg by mouth nightly ) 60 tablet 5    sildenafil (VIAGRA) 50 MG tablet Take 1 tablet by mouth daily as needed for Erectile Dysfunction Take one tablet one hour before sexual activity. 6 tablet 5    clotrimazole-betamethasone (LOTRISONE) 1-0.05 % cream Apply topically 2 times daily. Not to exceed 2 weeks consecutively.  1 Tube 1    metFORMIN (GLUCOPHAGE) 1000 MG tablet TAKE 1 TABLET BY MOUTH TWO TIMES A DAY WITH MEALS 180 tablet 3    tamsulosin (FLOMAX) 0.4 MG capsule Take 1 capsule by mouth daily 90 capsule 1    buPROPion HCl ER, XL, 450 MG TB24 Take 450 mg by mouth daily 30 tablet 5    FORFIVO  MG TB24 TAKE 1 TABLET BY MOUTH ONE TIME A DAY  30 tablet 3    MEIJER LANCETS UNIVERSAL 33G MISC check blood sugar one time daily 100 each 3    TRUE METRIX BLOOD GLUCOSE TEST strip check blood sugar one time daily 100 each 2    SM ASPIRIN ADULT LOW STRENGTH 81 MG EC tablet TAKE 1 TABLET BY MOUTH ONE TIME A DAY  90 tablet 3    vitamin B-12 (CYANOCOBALAMIN) 1000 MCG tablet TAKE 1 TABLET BY MOUTH ONE TIME A DAY 90 tablet 3    SITagliptin (JANUVIA) 100 MG tablet Take 1 tablet by mouth daily 30 tablet 11    rOPINIRole (REQUIP) 3 MG tablet Take 3 mg by mouth nightly      ranitidine (ZANTAC) 150 MG tablet Take 150 mg by mouth nightly       butalbital-acetaminophen-caffeine (FIORICET) -40 MG per tablet Take 1 tablet by mouth every 6 hours as needed for Migraine 60 tablet 0    cetirizine (ZYRTEC) 10 MG tablet daily       EYE ITCH RELIEF 0.025 % ophthalmic solution Place 2 drops into both eyes as needed       propranolol (INDERAL LA) 160 MG ER capsule Take 160 mg by mouth daily       fluticasone (FLONASE) 50 MCG/ACT nasal spray 1 spray by Nasal route daily as needed.  gabapentin (NEURONTIN) 800 MG tablet   Take 800 mg by mouth 4 times daily        No current facility-administered medications for this visit. No orders of the defined types were placed in this encounter. All medications reviewed and reconciled, including OTC and herbal medications. Updated list given to patient. Patient Active Problem List    Diagnosis Date Noted    Obstructive sleep apnea on CPAP 11/03/2015     Priority: High    Closed fracture of right tibial plateau 45/23/9615    Normocytic anemia 02/19/2019    B12 deficiency     DM2 [facility]diabetes mellitus, [facility] 2) [facility] [address]     Benign prostatic hyperplasia     Morbid obesity with BMI of 40.0-44.9, adult ([facility] [address]     Hypertriglyceridemia 10/27/2015    BPPV (benign paroxysmal positional vertigo) 09/29/2015    Short-segment Vásquez's esophagus 06/17/2015     Following with Christal Prherman for this.  Pulmonary nodule      stable >2 years, no further w/u required.  Bipolar 1 disorder ([facility] [address]     CAD (coronary artery disease)      Unclear history, cath 2013 with patent arteries. Records pending.        History of TIA (transient ischemic attack)      x2      Hypertension     Fibromyalgia     Depression     History of colon polyps     Chronic Short-segment Vásquez's esophagus 2015    Tarsal tunnel syndrome     Bilateral       Past Surgical History:   Procedure Laterality Date    CARDIAC CATHETERIZATION  10/2013    2013: Normal coronaries. Radial approach. Dr. Arturo Sanchez COLONOSCOPY  ,    Catha Sprout KNEE SURGERY Left 2012    ROTATOR CUFF REPAIR Left 2013   Catha Sprout SHOULDER SURGERY Bilateral 2011   Catha Sprout SINUS SURGERY  2004    [place] occ.  TONSILLECTOMY  2002    TYMPANOSTOMY TUBE PLACEMENT      UPPER GASTROINTESTINAL ENDOSCOPY  ,  with dilation,     UPPER GASTROINTESTINAL ENDOSCOPY      UPPER GASTROINTESTINAL ENDOSCOPY  MAY 2014, 2017    Dilation       No Known Allergies      Social History     Tobacco Use    Smoking status: Former Smoker     Packs/day: 0.75     Years: 30.00     Pack years: 22.50     Types: Cigarettes     Start date: 1984     Last attempt to quit: 2018     Years since quittin.2    Smokeless tobacco: Never Used   Substance Use Topics    Alcohol use: No     Alcohol/week: 0.0 standard drinks       Family History   Problem Relation Age of Onset    Cancer Father 77        lung     Rheum Arthritis Father     Colon Cancer Maternal Aunt 61    Cancer Maternal Aunt 76        \"throat\"    Colon Cancer Maternal Uncle 48    Cancer Maternal Uncle 60        pancreatic    Cancer Paternal Aunt 52        brain    Colon Cancer Paternal Uncle 72    Cancer Paternal Uncle 61        hepatic     Prostate Cancer Paternal Uncle 61    Cancer Maternal Uncle 79        pancreatic     Ovarian Cancer Paternal Aunt 48    Rheum Arthritis Mother          I have reviewed the patient's past medical history, past surgical history, allergies, medications, social and family history and I have made updates where appropriate.       Review of Systems  Positive responses are highlighted in bold    Constitutional:  Fever, Chills, Night Sweats, Fatigue, Unexpected changes in weight  HENT:  Ear pain, Tinnitus, Nosebleeds, Trouble use, benefit, and side effects of prescribed medications. Barriers to medication compliance addressed. All patient questions answered. Pt voiced understanding.        Electronically signed by Dahlia Ryan DO on 8/20/2019 at 8:13 AM

## 2019-08-20 ENCOUNTER — OFFICE VISIT (OUTPATIENT)
Dept: FAMILY MEDICINE CLINIC | Age: 49
End: 2019-08-20
Payer: COMMERCIAL

## 2019-08-20 VITALS
BODY MASS INDEX: 43.23 KG/M2 | HEART RATE: 80 BPM | WEIGHT: 269 LBS | TEMPERATURE: 98 F | SYSTOLIC BLOOD PRESSURE: 106 MMHG | DIASTOLIC BLOOD PRESSURE: 60 MMHG | HEIGHT: 66 IN | RESPIRATION RATE: 14 BRPM

## 2019-08-20 DIAGNOSIS — E53.8 B12 DEFICIENCY: ICD-10-CM

## 2019-08-20 DIAGNOSIS — E78.2 MIXED HYPERLIPIDEMIA: ICD-10-CM

## 2019-08-20 DIAGNOSIS — E66.01 MORBID OBESITY WITH BMI OF 40.0-44.9, ADULT (HCC): ICD-10-CM

## 2019-08-20 DIAGNOSIS — R80.9 TYPE 2 DIABETES MELLITUS WITH MICROALBUMINURIA, WITHOUT LONG-TERM CURRENT USE OF INSULIN (HCC): Primary | ICD-10-CM

## 2019-08-20 DIAGNOSIS — D64.9 NORMOCYTIC ANEMIA: ICD-10-CM

## 2019-08-20 DIAGNOSIS — E11.29 TYPE 2 DIABETES MELLITUS WITH MICROALBUMINURIA, WITHOUT LONG-TERM CURRENT USE OF INSULIN (HCC): Primary | ICD-10-CM

## 2019-08-20 LAB — HBA1C MFR BLD: 7.8 %

## 2019-08-20 PROCEDURE — 2022F DILAT RTA XM EVC RTNOPTHY: CPT | Performed by: FAMILY MEDICINE

## 2019-08-20 PROCEDURE — G8427 DOCREV CUR MEDS BY ELIG CLIN: HCPCS | Performed by: FAMILY MEDICINE

## 2019-08-20 PROCEDURE — 3017F COLORECTAL CA SCREEN DOC REV: CPT | Performed by: FAMILY MEDICINE

## 2019-08-20 PROCEDURE — 3045F PR MOST RECENT HEMOGLOBIN A1C LEVEL 7.0-9.0%: CPT | Performed by: FAMILY MEDICINE

## 2019-08-20 PROCEDURE — 99214 OFFICE O/P EST MOD 30 MIN: CPT | Performed by: FAMILY MEDICINE

## 2019-08-20 PROCEDURE — 1036F TOBACCO NON-USER: CPT | Performed by: FAMILY MEDICINE

## 2019-08-20 PROCEDURE — G8417 CALC BMI ABV UP PARAM F/U: HCPCS | Performed by: FAMILY MEDICINE

## 2019-08-20 PROCEDURE — 83036 HEMOGLOBIN GLYCOSYLATED A1C: CPT | Performed by: FAMILY MEDICINE

## 2019-08-20 PROCEDURE — G8598 ASA/ANTIPLAT THER USED: HCPCS | Performed by: FAMILY MEDICINE

## 2019-09-09 ENCOUNTER — OFFICE VISIT (OUTPATIENT)
Dept: PSYCHIATRY | Age: 49
End: 2019-09-09
Payer: COMMERCIAL

## 2019-09-09 DIAGNOSIS — F90.9 ATTENTION DEFICIT HYPERACTIVITY DISORDER (ADHD), UNSPECIFIED ADHD TYPE: ICD-10-CM

## 2019-09-09 DIAGNOSIS — F31.9 BIPOLAR 1 DISORDER (HCC): Primary | ICD-10-CM

## 2019-09-09 DIAGNOSIS — Z63.4 BEREAVEMENT: ICD-10-CM

## 2019-09-09 PROCEDURE — 3017F COLORECTAL CA SCREEN DOC REV: CPT | Performed by: PSYCHIATRY & NEUROLOGY

## 2019-09-09 PROCEDURE — G8428 CUR MEDS NOT DOCUMENT: HCPCS | Performed by: PSYCHIATRY & NEUROLOGY

## 2019-09-09 PROCEDURE — G8598 ASA/ANTIPLAT THER USED: HCPCS | Performed by: PSYCHIATRY & NEUROLOGY

## 2019-09-09 PROCEDURE — 99213 OFFICE O/P EST LOW 20 MIN: CPT | Performed by: PSYCHIATRY & NEUROLOGY

## 2019-09-09 PROCEDURE — 1036F TOBACCO NON-USER: CPT | Performed by: PSYCHIATRY & NEUROLOGY

## 2019-09-09 PROCEDURE — G8417 CALC BMI ABV UP PARAM F/U: HCPCS | Performed by: PSYCHIATRY & NEUROLOGY

## 2019-09-09 RX ORDER — TRAZODONE HYDROCHLORIDE 300 MG/1
300 TABLET ORAL NIGHTLY
Qty: 30 TABLET | Refills: 5 | Status: SHIPPED | OUTPATIENT
Start: 2019-09-09 | End: 2020-03-24 | Stop reason: SDUPTHER

## 2019-09-09 RX ORDER — DEXTROAMPHETAMINE SACCHARATE, AMPHETAMINE ASPARTATE, DEXTROAMPHETAMINE SULFATE AND AMPHETAMINE SULFATE 7.5; 7.5; 7.5; 7.5 MG/1; MG/1; MG/1; MG/1
30 TABLET ORAL 2 TIMES DAILY
Qty: 60 TABLET | Refills: 0 | Status: SHIPPED | OUTPATIENT
Start: 2019-09-09 | End: 2019-10-09 | Stop reason: SDUPTHER

## 2019-09-09 RX ORDER — BUPROPION HYDROCHLORIDE 450 MG/1
450 TABLET, FILM COATED, EXTENDED RELEASE ORAL DAILY
Qty: 30 TABLET | Refills: 5 | Status: SHIPPED | OUTPATIENT
Start: 2019-09-09 | End: 2020-03-24 | Stop reason: SDUPTHER

## 2019-09-09 SDOH — SOCIAL STABILITY - SOCIAL INSECURITY: DISSAPEARANCE AND DEATH OF FAMILY MEMBER: Z63.4

## 2019-09-09 NOTE — PROGRESS NOTES
Chief Complaint   Patient presents with    1 Month Follow-Up     Bipolar dep ADHD Bereavement     Ben Boswell returned after 1 month to follow-up on the above. He continues to mourn the loss of his wife of 24 years. She  of a probable heart attack, but had renal failure. Ben Boswell says he is not sleeping well at all. With the trazodone he gets about 2 hours of sleep, and then is awake the rest of the night. We will try an increase. He is finding everything that he has to do to settle the estate very difficult. Additionally he has to take on all of the duties that she had taken care of, such as paying the bills, cooking,   cleaning etc.  All of this is weighing pretty heavily on him. He was rather depressed. I am going to add some Trintellix if he is able to get it. There is some interaction with piroxicam that he takes for fibromyalgia. I warned him about GI bleeding and told him that it could present with nausea or black tarry stools etc.  He is aware of it so we will just monitor. Otherwise I did not change the regimen. He was given appropriate refills. I will ask him to return in 30 days.     Mental Status Examination    Level of consciousness:  within normal limits  Appearance:  well-appearing, street clothes, in chair, fair grooming and fair hygiene  Behavior/Motor:  no abnormalities noted  Attitude toward examiner:  cooperative, attentive and poor eye contact  Speech:  spontaneous, well articulated, slow and whispered  Mood:  depressed  Affect:  mood congruent  Thought processes:  linear, goal directed and coherent  Thought content:  Homocidal ideation: none  Suicidal Ideation:  denies suicidal ideation  Delusions:  no evidence of delusions  Perceptual Disturbance:  denies any perceptual disturbance  Cognition:  oriented to person, place, and time  Concentration Not tested no apparent problem on casual observation  Memory Not tested no apparent problem on casual observation  [facility] of knowledge:  good  Abstract thinking:  fair  Insight:  good  Judgment:  good  Anxiety:   Generalized: No  Excessive Worry: No  Panic Attacks: No  Frequency:   Housebound: No   Obsession: No   Compulsion: No    DIAGNOSTIC IMPRESSION  Bipolar 1 depressed  ADHD  Bereavement    Plan  Add trintellix  Increase trazodone  Current Outpatient Medications   Medication Sig Dispense Refill    amphetamine-dextroamphetamine (ADDERALL, 30MG,) 30 MG tablet Take 1 tablet by mouth 2 times daily for 30 days. 60 tablet 0    buPROPion HCl ER, XL, 450 MG TB24 Take 450 mg by mouth daily 30 tablet 5    traZODone (DESYREL) 300 MG tablet Take 1 tablet by mouth nightly 30 tablet 5    VORTIoxetine (TRINTELLIX) 10 MG TABS tablet Take 1 tablet by mouth daily 30 tablet 3    fenofibrate 160 MG tablet TAKE 1 TABLET BY MOUTH ONE TIME A DAY  30 tablet 2    atorvastatin (LIPITOR) 80 MG tablet TAKE 1 TABLET BY MOUTH ONE TIME A DAY  30 tablet 2    lamoTRIgine (LAMICTAL) 200 MG tablet Take 1 tablet by mouth daily 30 tablet 5    lisinopril (PRINIVIL;ZESTRIL) 5 MG tablet Take 0.5 tablets by mouth daily 45 tablet 3    piroxicam (FELDENE) 20 MG capsule TAKE 1 CAPSULE BY MOUTH ONE TIME A DAY AS NEEDED FOR PAIN 30 capsule 4    ARTIFICIAL TEARS 1.4 % ophthalmic solution Place 1 drop into both eyes 4 times daily  0    fesoterodine (TOVIAZ) 4 MG TB24 ER tablet Take 1 tablet by mouth daily 90 tablet 3    lurasidone (LATUDA) 80 MG TABS tablet Take 1 tablet by mouth daily 30 tablet 5    omeprazole (PRILOSEC) 20 MG delayed release capsule TAKE 2 CAPSULES BY MOUTH ONE TIME A DAY  180 capsule 3    sildenafil (VIAGRA) 50 MG tablet Take 1 tablet by mouth daily as needed for Erectile Dysfunction Take one tablet one hour before sexual activity. 6 tablet 5    clotrimazole-betamethasone (LOTRISONE) 1-0.05 % cream Apply topically 2 times daily. Not to exceed 2 weeks consecutively.  1 Tube 1    metFORMIN (GLUCOPHAGE) 1000 MG tablet TAKE 1 TABLET BY MOUTH TWO TIMES A DAY WITH MEALS 180 tablet 3    tamsulosin (FLOMAX) 0.4 MG capsule Take 1 capsule by mouth daily 90 capsule 1    FORFIVO  MG TB24 TAKE 1 TABLET BY MOUTH ONE TIME A DAY  30 tablet 3    MEIJER LANCETS UNIVERSAL 33G MISC check blood sugar one time daily 100 each 3    TRUE METRIX BLOOD GLUCOSE TEST strip check blood sugar one time daily 100 each 2    SM ASPIRIN ADULT LOW STRENGTH 81 MG EC tablet TAKE 1 TABLET BY MOUTH ONE TIME A DAY  90 tablet 3    vitamin B-12 (CYANOCOBALAMIN) 1000 MCG tablet TAKE 1 TABLET BY MOUTH ONE TIME A DAY 90 tablet 3    SITagliptin (JANUVIA) 100 MG tablet Take 1 tablet by mouth daily 30 tablet 11    rOPINIRole (REQUIP) 3 MG tablet Take 3 mg by mouth nightly      ranitidine (ZANTAC) 150 MG tablet Take 150 mg by mouth nightly       butalbital-acetaminophen-caffeine (FIORICET) -40 MG per tablet Take 1 tablet by mouth every 6 hours as needed for Migraine 60 tablet 0    cetirizine (ZYRTEC) 10 MG tablet daily       EYE ITCH RELIEF 0.025 % ophthalmic solution Place 2 drops into both eyes as needed       propranolol (INDERAL LA) 160 MG ER capsule Take 160 mg by mouth daily       fluticasone (FLONASE) 50 MCG/ACT nasal spray 1 spray by Nasal route daily as needed.  gabapentin (NEURONTIN) 800 MG tablet   Take 800 mg by mouth 4 times daily        No current facility-administered medications for this visit.

## 2019-09-20 ENCOUNTER — NURSE ONLY (OUTPATIENT)
Dept: LAB | Age: 49
End: 2019-09-20

## 2019-09-20 DIAGNOSIS — E78.2 MIXED HYPERLIPIDEMIA: ICD-10-CM

## 2019-09-20 DIAGNOSIS — D64.9 NORMOCYTIC ANEMIA: ICD-10-CM

## 2019-09-20 DIAGNOSIS — E53.8 B12 DEFICIENCY: ICD-10-CM

## 2019-09-20 LAB
BASOPHILS # BLD: 0.5 %
BASOPHILS ABSOLUTE: 0 THOU/MM3 (ref 0–0.1)
CHOLESTEROL, TOTAL: 207 MG/DL (ref 100–199)
EOSINOPHIL # BLD: 2.5 %
EOSINOPHILS ABSOLUTE: 0.1 THOU/MM3 (ref 0–0.4)
ERYTHROCYTE [DISTWIDTH] IN BLOOD BY AUTOMATED COUNT: 13.8 % (ref 11.5–14.5)
ERYTHROCYTE [DISTWIDTH] IN BLOOD BY AUTOMATED COUNT: 46.1 FL (ref 35–45)
HCT VFR BLD CALC: 39.5 % (ref 42–52)
HDLC SERPL-MCNC: 25 MG/DL
HEMOGLOBIN: 12.7 GM/DL (ref 14–18)
IMMATURE GRANS (ABS): 0.07 THOU/MM3 (ref 0–0.07)
IMMATURE GRANULOCYTES: 1 %
LDL CHOLESTEROL CALCULATED: ABNORMAL MG/DL
LDL CHOLESTEROL DIRECT: 135.78 MG/DL
LYMPHOCYTES # BLD: 26.3 %
LYMPHOCYTES ABSOLUTE: 1.5 THOU/MM3 (ref 1–4.8)
MCH RBC QN AUTO: 29.4 PG (ref 26–33)
MCHC RBC AUTO-ENTMCNC: 32.2 GM/DL (ref 32.2–35.5)
MCV RBC AUTO: 91.4 FL (ref 80–94)
MONOCYTES # BLD: 9 %
MONOCYTES ABSOLUTE: 0.5 THOU/MM3 (ref 0.4–1.3)
NUCLEATED RED BLOOD CELLS: 0 /100 WBC
PLATELET # BLD: 135 THOU/MM3 (ref 130–400)
PMV BLD AUTO: 11.4 FL (ref 9.4–12.4)
RBC # BLD: 4.32 MILL/MM3 (ref 4.7–6.1)
SEG NEUTROPHILS: 60.4 %
SEGMENTED NEUTROPHILS ABSOLUTE COUNT: 3.4 THOU/MM3 (ref 1.8–7.7)
TRIGL SERPL-MCNC: 443 MG/DL (ref 0–199)
VITAMIN B-12: 1892 PG/ML (ref 211–911)
WBC # BLD: 5.6 THOU/MM3 (ref 4.8–10.8)

## 2019-09-22 DIAGNOSIS — D64.9 NORMOCYTIC ANEMIA: Primary | ICD-10-CM

## 2019-09-22 DIAGNOSIS — E53.8 B12 DEFICIENCY: ICD-10-CM

## 2019-09-23 ENCOUNTER — TELEPHONE (OUTPATIENT)
Dept: FAMILY MEDICINE CLINIC | Age: 49
End: 2019-09-23

## 2019-10-01 ENCOUNTER — NURSE ONLY (OUTPATIENT)
Dept: LAB | Age: 49
End: 2019-10-01

## 2019-10-01 ENCOUNTER — TELEPHONE (OUTPATIENT)
Dept: FAMILY MEDICINE CLINIC | Age: 49
End: 2019-10-01

## 2019-10-01 DIAGNOSIS — D64.9 NORMOCYTIC ANEMIA: Primary | ICD-10-CM

## 2019-10-01 DIAGNOSIS — E53.8 B12 DEFICIENCY: ICD-10-CM

## 2019-10-01 DIAGNOSIS — D64.9 NORMOCYTIC ANEMIA: ICD-10-CM

## 2019-10-01 LAB
ABSOLUTE RETIC #: 97 THOU/MM3 (ref 20–115)
BASOPHILS # BLD: 0.8 %
BASOPHILS ABSOLUTE: 0 THOU/MM3 (ref 0–0.1)
EOSINOPHIL # BLD: 3 %
EOSINOPHILS ABSOLUTE: 0.2 THOU/MM3 (ref 0–0.4)
ERYTHROCYTE [DISTWIDTH] IN BLOOD BY AUTOMATED COUNT: 13.5 % (ref 11.5–14.5)
ERYTHROCYTE [DISTWIDTH] IN BLOOD BY AUTOMATED COUNT: 43.8 FL (ref 35–45)
FERRITIN: 28 NG/ML (ref 22–322)
FOLATE: 10.1 NG/ML (ref 4.8–24.2)
HCT VFR BLD CALC: 38.1 % (ref 42–52)
HEMOGLOBIN: 12.7 GM/DL (ref 14–18)
IMMATURE GRANS (ABS): 0.07 THOU/MM3 (ref 0–0.07)
IMMATURE GRANULOCYTES: 1.3 %
IMMATURE RETIC FRACT: 28.8 % (ref 2.3–13.4)
IRON: 107 UG/DL (ref 65–195)
LYMPHOCYTES # BLD: 23.9 %
LYMPHOCYTES ABSOLUTE: 1.3 THOU/MM3 (ref 1–4.8)
MCH RBC QN AUTO: 29.7 PG (ref 26–33)
MCHC RBC AUTO-ENTMCNC: 33.3 GM/DL (ref 32.2–35.5)
MCV RBC AUTO: 89.2 FL (ref 80–94)
MONOCYTES # BLD: 7.8 %
MONOCYTES ABSOLUTE: 0.4 THOU/MM3 (ref 0.4–1.3)
NUCLEATED RED BLOOD CELLS: 0 /100 WBC
PLATELET # BLD: 144 THOU/MM3 (ref 130–400)
PMV BLD AUTO: 11.6 FL (ref 9.4–12.4)
RBC # BLD: 4.27 MILL/MM3 (ref 4.7–6.1)
RETIC HEMOGLOBIN: 32.6 PG (ref 28.2–35.7)
RETICULOCYTE ABSOLUTE COUNT: 2.3 % (ref 0.5–2)
SEG NEUTROPHILS: 63.2 %
SEGMENTED NEUTROPHILS ABSOLUTE COUNT: 3.3 THOU/MM3 (ref 1.8–7.7)
TOTAL IRON BINDING CAPACITY: 444 UG/DL (ref 171–450)
WBC # BLD: 5.3 THOU/MM3 (ref 4.8–10.8)

## 2019-10-09 ENCOUNTER — OFFICE VISIT (OUTPATIENT)
Dept: PSYCHIATRY | Age: 49
End: 2019-10-09
Payer: COMMERCIAL

## 2019-10-09 DIAGNOSIS — Z63.4 BEREAVEMENT: ICD-10-CM

## 2019-10-09 DIAGNOSIS — F90.9 ATTENTION DEFICIT HYPERACTIVITY DISORDER (ADHD), UNSPECIFIED ADHD TYPE: ICD-10-CM

## 2019-10-09 DIAGNOSIS — F31.9 BIPOLAR 1 DISORDER (HCC): Primary | ICD-10-CM

## 2019-10-09 PROCEDURE — G8598 ASA/ANTIPLAT THER USED: HCPCS | Performed by: PSYCHIATRY & NEUROLOGY

## 2019-10-09 PROCEDURE — G8417 CALC BMI ABV UP PARAM F/U: HCPCS | Performed by: PSYCHIATRY & NEUROLOGY

## 2019-10-09 PROCEDURE — G8484 FLU IMMUNIZE NO ADMIN: HCPCS | Performed by: PSYCHIATRY & NEUROLOGY

## 2019-10-09 PROCEDURE — 1036F TOBACCO NON-USER: CPT | Performed by: PSYCHIATRY & NEUROLOGY

## 2019-10-09 PROCEDURE — G8428 CUR MEDS NOT DOCUMENT: HCPCS | Performed by: PSYCHIATRY & NEUROLOGY

## 2019-10-09 PROCEDURE — 99213 OFFICE O/P EST LOW 20 MIN: CPT | Performed by: PSYCHIATRY & NEUROLOGY

## 2019-10-09 PROCEDURE — 3017F COLORECTAL CA SCREEN DOC REV: CPT | Performed by: PSYCHIATRY & NEUROLOGY

## 2019-10-09 RX ORDER — DEXTROAMPHETAMINE SACCHARATE, AMPHETAMINE ASPARTATE, DEXTROAMPHETAMINE SULFATE AND AMPHETAMINE SULFATE 7.5; 7.5; 7.5; 7.5 MG/1; MG/1; MG/1; MG/1
30 TABLET ORAL 2 TIMES DAILY
Qty: 60 TABLET | Refills: 0 | Status: SHIPPED | OUTPATIENT
Start: 2019-10-09 | End: 2019-11-11 | Stop reason: SDUPTHER

## 2019-10-09 SDOH — SOCIAL STABILITY - SOCIAL INSECURITY: DISSAPEARANCE AND DEATH OF FAMILY MEMBER: Z63.4

## 2019-10-14 DIAGNOSIS — E78.1 HYPERTRIGLYCERIDEMIA: Chronic | ICD-10-CM

## 2019-10-14 DIAGNOSIS — E11.65 UNCONTROLLED TYPE 2 DIABETES MELLITUS WITH HYPERGLYCEMIA, WITHOUT LONG-TERM CURRENT USE OF INSULIN (HCC): ICD-10-CM

## 2019-10-14 RX ORDER — FENOFIBRATE 160 MG/1
TABLET ORAL
Qty: 90 TABLET | Refills: 3 | Status: SHIPPED | OUTPATIENT
Start: 2019-10-14 | End: 2020-11-11

## 2019-10-18 DIAGNOSIS — E11.65 UNCONTROLLED TYPE 2 DIABETES MELLITUS WITH HYPERGLYCEMIA, WITHOUT LONG-TERM CURRENT USE OF INSULIN (HCC): ICD-10-CM

## 2019-10-18 RX ORDER — SITAGLIPTIN 100 MG/1
TABLET, FILM COATED ORAL
Qty: 90 TABLET | Refills: 3 | Status: SHIPPED | OUTPATIENT
Start: 2019-10-18 | End: 2020-10-12

## 2019-10-21 ENCOUNTER — TELEPHONE (OUTPATIENT)
Dept: FAMILY MEDICINE CLINIC | Age: 49
End: 2019-10-21

## 2019-10-21 DIAGNOSIS — Z79.4 TYPE 2 DIABETES MELLITUS WITH OTHER SPECIFIED COMPLICATION, WITH LONG-TERM CURRENT USE OF INSULIN (HCC): ICD-10-CM

## 2019-10-21 DIAGNOSIS — E11.69 TYPE 2 DIABETES MELLITUS WITH OTHER SPECIFIED COMPLICATION, WITH LONG-TERM CURRENT USE OF INSULIN (HCC): ICD-10-CM

## 2019-10-29 ENCOUNTER — OFFICE VISIT (OUTPATIENT)
Dept: FAMILY MEDICINE CLINIC | Age: 49
End: 2019-10-29
Payer: COMMERCIAL

## 2019-10-29 VITALS
SYSTOLIC BLOOD PRESSURE: 124 MMHG | DIASTOLIC BLOOD PRESSURE: 76 MMHG | HEART RATE: 82 BPM | HEIGHT: 67 IN | TEMPERATURE: 97.2 F | RESPIRATION RATE: 16 BRPM | WEIGHT: 263 LBS | BODY MASS INDEX: 41.28 KG/M2

## 2019-10-29 DIAGNOSIS — Z23 NEED FOR INFLUENZA VACCINATION: ICD-10-CM

## 2019-10-29 DIAGNOSIS — Z00.00 VISIT FOR PREVENTIVE HEALTH EXAMINATION: Primary | ICD-10-CM

## 2019-10-29 DIAGNOSIS — R80.9 TYPE 2 DIABETES MELLITUS WITH MICROALBUMINURIA, WITHOUT LONG-TERM CURRENT USE OF INSULIN (HCC): Chronic | ICD-10-CM

## 2019-10-29 DIAGNOSIS — E11.29 TYPE 2 DIABETES MELLITUS WITH MICROALBUMINURIA, WITHOUT LONG-TERM CURRENT USE OF INSULIN (HCC): Chronic | ICD-10-CM

## 2019-10-29 DIAGNOSIS — E78.5 DYSLIPIDEMIA: Primary | ICD-10-CM

## 2019-10-29 PROCEDURE — 90686 IIV4 VACC NO PRSV 0.5 ML IM: CPT | Performed by: FAMILY MEDICINE

## 2019-10-29 PROCEDURE — 90471 IMMUNIZATION ADMIN: CPT | Performed by: FAMILY MEDICINE

## 2019-10-29 PROCEDURE — 99396 PREV VISIT EST AGE 40-64: CPT | Performed by: FAMILY MEDICINE

## 2019-10-29 PROCEDURE — G8482 FLU IMMUNIZE ORDER/ADMIN: HCPCS | Performed by: FAMILY MEDICINE

## 2019-10-29 RX ORDER — ATORVASTATIN CALCIUM 80 MG/1
TABLET, FILM COATED ORAL
Qty: 90 TABLET | Refills: 3 | Status: SHIPPED | OUTPATIENT
Start: 2019-10-29 | End: 2020-10-12

## 2019-11-11 ENCOUNTER — OFFICE VISIT (OUTPATIENT)
Dept: PSYCHIATRY | Age: 49
End: 2019-11-11
Payer: COMMERCIAL

## 2019-11-11 DIAGNOSIS — F90.9 ATTENTION DEFICIT HYPERACTIVITY DISORDER (ADHD), UNSPECIFIED ADHD TYPE: ICD-10-CM

## 2019-11-11 DIAGNOSIS — F31.9 BIPOLAR 1 DISORDER (HCC): Primary | ICD-10-CM

## 2019-11-11 PROCEDURE — G8482 FLU IMMUNIZE ORDER/ADMIN: HCPCS | Performed by: PSYCHIATRY & NEUROLOGY

## 2019-11-11 PROCEDURE — G8598 ASA/ANTIPLAT THER USED: HCPCS | Performed by: PSYCHIATRY & NEUROLOGY

## 2019-11-11 PROCEDURE — 99212 OFFICE O/P EST SF 10 MIN: CPT | Performed by: PSYCHIATRY & NEUROLOGY

## 2019-11-11 PROCEDURE — 1036F TOBACCO NON-USER: CPT | Performed by: PSYCHIATRY & NEUROLOGY

## 2019-11-11 PROCEDURE — 3017F COLORECTAL CA SCREEN DOC REV: CPT | Performed by: PSYCHIATRY & NEUROLOGY

## 2019-11-11 PROCEDURE — G8417 CALC BMI ABV UP PARAM F/U: HCPCS | Performed by: PSYCHIATRY & NEUROLOGY

## 2019-11-11 PROCEDURE — G8428 CUR MEDS NOT DOCUMENT: HCPCS | Performed by: PSYCHIATRY & NEUROLOGY

## 2019-11-11 RX ORDER — DEXTROAMPHETAMINE SACCHARATE, AMPHETAMINE ASPARTATE, DEXTROAMPHETAMINE SULFATE AND AMPHETAMINE SULFATE 7.5; 7.5; 7.5; 7.5 MG/1; MG/1; MG/1; MG/1
30 TABLET ORAL 2 TIMES DAILY
Qty: 60 TABLET | Refills: 0 | Status: SHIPPED | OUTPATIENT
Start: 2019-11-11 | End: 2019-12-11 | Stop reason: SDUPTHER

## 2019-11-13 DIAGNOSIS — E11.65 UNCONTROLLED TYPE 2 DIABETES MELLITUS WITH HYPERGLYCEMIA, WITHOUT LONG-TERM CURRENT USE OF INSULIN (HCC): ICD-10-CM

## 2019-11-13 DIAGNOSIS — G89.29 CHRONIC LEFT SHOULDER PAIN: ICD-10-CM

## 2019-11-13 DIAGNOSIS — N40.1 BENIGN PROSTATIC HYPERPLASIA WITH URINARY FREQUENCY: ICD-10-CM

## 2019-11-13 DIAGNOSIS — R35.0 BENIGN PROSTATIC HYPERPLASIA WITH URINARY FREQUENCY: ICD-10-CM

## 2019-11-13 DIAGNOSIS — M25.512 CHRONIC LEFT SHOULDER PAIN: ICD-10-CM

## 2019-11-13 RX ORDER — TAMSULOSIN HYDROCHLORIDE 0.4 MG/1
CAPSULE ORAL
Qty: 90 CAPSULE | Refills: 3 | Status: SHIPPED | OUTPATIENT
Start: 2019-11-13 | End: 2021-02-04 | Stop reason: SDUPTHER

## 2019-11-19 ENCOUNTER — NURSE ONLY (OUTPATIENT)
Dept: LAB | Age: 49
End: 2019-11-19

## 2019-11-19 DIAGNOSIS — E78.5 DYSLIPIDEMIA: ICD-10-CM

## 2019-11-19 LAB
ALBUMIN SERPL-MCNC: 4.5 G/DL (ref 3.5–5.1)
ALP BLD-CCNC: 144 U/L (ref 38–126)
ALT SERPL-CCNC: 94 U/L (ref 11–66)
ANION GAP SERPL CALCULATED.3IONS-SCNC: 18 MEQ/L (ref 8–16)
AST SERPL-CCNC: 64 U/L (ref 5–40)
BILIRUB SERPL-MCNC: 0.2 MG/DL (ref 0.3–1.2)
BUN BLDV-MCNC: 10 MG/DL (ref 7–22)
CALCIUM SERPL-MCNC: 9.5 MG/DL (ref 8.5–10.5)
CHLORIDE BLD-SCNC: 98 MEQ/L (ref 98–111)
CHOLESTEROL, TOTAL: 216 MG/DL (ref 100–199)
CO2: 21 MEQ/L (ref 23–33)
CREAT SERPL-MCNC: 0.9 MG/DL (ref 0.4–1.2)
GFR SERPL CREATININE-BSD FRML MDRD: 89 ML/MIN/1.73M2
GLUCOSE BLD-MCNC: 266 MG/DL (ref 70–108)
HDLC SERPL-MCNC: 25 MG/DL
LDL CHOLESTEROL CALCULATED: ABNORMAL MG/DL
LDL CHOLESTEROL DIRECT: 98.87 MG/DL
POTASSIUM SERPL-SCNC: 4 MEQ/L (ref 3.5–5.2)
SODIUM BLD-SCNC: 137 MEQ/L (ref 135–145)
TOTAL PROTEIN: 7.6 G/DL (ref 6.1–8)
TRIGL SERPL-MCNC: 656 MG/DL (ref 0–199)
TSH SERPL DL<=0.05 MIU/L-ACNC: 2.63 UIU/ML (ref 0.4–4.2)

## 2019-11-20 ENCOUNTER — TELEPHONE (OUTPATIENT)
Dept: FAMILY MEDICINE CLINIC | Age: 49
End: 2019-11-20

## 2019-11-20 DIAGNOSIS — E11.65 UNCONTROLLED TYPE 2 DIABETES MELLITUS WITH HYPERGLYCEMIA, WITHOUT LONG-TERM CURRENT USE OF INSULIN (HCC): ICD-10-CM

## 2019-11-21 ENCOUNTER — OFFICE VISIT (OUTPATIENT)
Dept: FAMILY MEDICINE CLINIC | Age: 49
End: 2019-11-21
Payer: COMMERCIAL

## 2019-11-21 VITALS
WEIGHT: 260.8 LBS | HEART RATE: 88 BPM | DIASTOLIC BLOOD PRESSURE: 70 MMHG | HEIGHT: 66 IN | RESPIRATION RATE: 14 BRPM | TEMPERATURE: 97.9 F | BODY MASS INDEX: 41.91 KG/M2 | SYSTOLIC BLOOD PRESSURE: 104 MMHG

## 2019-11-21 DIAGNOSIS — F43.21 GRIEF REACTION: ICD-10-CM

## 2019-11-21 DIAGNOSIS — E11.29 TYPE 2 DIABETES MELLITUS WITH MICROALBUMINURIA, WITHOUT LONG-TERM CURRENT USE OF INSULIN (HCC): Primary | ICD-10-CM

## 2019-11-21 DIAGNOSIS — E78.5 DYSLIPIDEMIA: ICD-10-CM

## 2019-11-21 DIAGNOSIS — E53.8 B12 DEFICIENCY: ICD-10-CM

## 2019-11-21 DIAGNOSIS — I10 ESSENTIAL HYPERTENSION: ICD-10-CM

## 2019-11-21 DIAGNOSIS — R74.8 ABNORMAL TRANSAMINASES: ICD-10-CM

## 2019-11-21 DIAGNOSIS — E11.65 UNCONTROLLED TYPE 2 DIABETES MELLITUS WITH HYPERGLYCEMIA, WITHOUT LONG-TERM CURRENT USE OF INSULIN (HCC): ICD-10-CM

## 2019-11-21 DIAGNOSIS — R80.9 TYPE 2 DIABETES MELLITUS WITH MICROALBUMINURIA, WITHOUT LONG-TERM CURRENT USE OF INSULIN (HCC): Primary | ICD-10-CM

## 2019-11-21 DIAGNOSIS — E66.01 MORBID OBESITY WITH BMI OF 40.0-44.9, ADULT (HCC): Chronic | ICD-10-CM

## 2019-11-21 DIAGNOSIS — D64.9 NORMOCYTIC ANEMIA: ICD-10-CM

## 2019-11-21 LAB — HBA1C MFR BLD: 11.2 %

## 2019-11-21 PROCEDURE — 99214 OFFICE O/P EST MOD 30 MIN: CPT | Performed by: FAMILY MEDICINE

## 2019-11-21 PROCEDURE — G8482 FLU IMMUNIZE ORDER/ADMIN: HCPCS | Performed by: FAMILY MEDICINE

## 2019-11-21 PROCEDURE — 2022F DILAT RTA XM EVC RTNOPTHY: CPT | Performed by: FAMILY MEDICINE

## 2019-11-21 PROCEDURE — 1036F TOBACCO NON-USER: CPT | Performed by: FAMILY MEDICINE

## 2019-11-21 PROCEDURE — G8598 ASA/ANTIPLAT THER USED: HCPCS | Performed by: FAMILY MEDICINE

## 2019-11-21 PROCEDURE — 83036 HEMOGLOBIN GLYCOSYLATED A1C: CPT | Performed by: FAMILY MEDICINE

## 2019-11-21 PROCEDURE — G8417 CALC BMI ABV UP PARAM F/U: HCPCS | Performed by: FAMILY MEDICINE

## 2019-11-21 PROCEDURE — 3046F HEMOGLOBIN A1C LEVEL >9.0%: CPT | Performed by: FAMILY MEDICINE

## 2019-11-21 PROCEDURE — 3017F COLORECTAL CA SCREEN DOC REV: CPT | Performed by: FAMILY MEDICINE

## 2019-11-21 PROCEDURE — G8427 DOCREV CUR MEDS BY ELIG CLIN: HCPCS | Performed by: FAMILY MEDICINE

## 2019-12-11 ENCOUNTER — OFFICE VISIT (OUTPATIENT)
Dept: PSYCHIATRY | Age: 49
End: 2019-12-11
Payer: COMMERCIAL

## 2019-12-11 DIAGNOSIS — F90.9 ATTENTION DEFICIT HYPERACTIVITY DISORDER (ADHD), UNSPECIFIED ADHD TYPE: ICD-10-CM

## 2019-12-11 DIAGNOSIS — F31.9 BIPOLAR 1 DISORDER (HCC): Primary | ICD-10-CM

## 2019-12-11 DIAGNOSIS — F90.8 ADHD, ADULT RESIDUAL TYPE: ICD-10-CM

## 2019-12-11 PROCEDURE — G8428 CUR MEDS NOT DOCUMENT: HCPCS | Performed by: PSYCHIATRY & NEUROLOGY

## 2019-12-11 PROCEDURE — G8482 FLU IMMUNIZE ORDER/ADMIN: HCPCS | Performed by: PSYCHIATRY & NEUROLOGY

## 2019-12-11 PROCEDURE — 3017F COLORECTAL CA SCREEN DOC REV: CPT | Performed by: PSYCHIATRY & NEUROLOGY

## 2019-12-11 PROCEDURE — G8417 CALC BMI ABV UP PARAM F/U: HCPCS | Performed by: PSYCHIATRY & NEUROLOGY

## 2019-12-11 PROCEDURE — G8598 ASA/ANTIPLAT THER USED: HCPCS | Performed by: PSYCHIATRY & NEUROLOGY

## 2019-12-11 PROCEDURE — 1036F TOBACCO NON-USER: CPT | Performed by: PSYCHIATRY & NEUROLOGY

## 2019-12-11 PROCEDURE — 99213 OFFICE O/P EST LOW 20 MIN: CPT | Performed by: PSYCHIATRY & NEUROLOGY

## 2019-12-11 RX ORDER — DEXTROAMPHETAMINE SACCHARATE, AMPHETAMINE ASPARTATE, DEXTROAMPHETAMINE SULFATE AND AMPHETAMINE SULFATE 7.5; 7.5; 7.5; 7.5 MG/1; MG/1; MG/1; MG/1
30 TABLET ORAL 2 TIMES DAILY
Qty: 60 TABLET | Refills: 0 | Status: SHIPPED | OUTPATIENT
Start: 2019-12-11 | End: 2020-01-17 | Stop reason: SDUPTHER

## 2019-12-12 ENCOUNTER — OFFICE VISIT (OUTPATIENT)
Dept: UROLOGY | Age: 49
End: 2019-12-12
Payer: COMMERCIAL

## 2019-12-12 VITALS
WEIGHT: 262 LBS | HEIGHT: 66 IN | SYSTOLIC BLOOD PRESSURE: 150 MMHG | DIASTOLIC BLOOD PRESSURE: 82 MMHG | BODY MASS INDEX: 42.11 KG/M2

## 2019-12-12 DIAGNOSIS — N40.1 BENIGN PROSTATIC HYPERPLASIA WITH LOWER URINARY TRACT SYMPTOMS, SYMPTOM DETAILS UNSPECIFIED: Primary | ICD-10-CM

## 2019-12-12 DIAGNOSIS — Z12.5 SCREENING FOR PROSTATE CANCER: ICD-10-CM

## 2019-12-12 LAB
BILIRUBIN URINE: NEGATIVE
BLOOD URINE, POC: ABNORMAL
CHARACTER, URINE: CLEAR
COLOR, URINE: YELLOW
GLUCOSE URINE: 500 MG/DL
KETONES, URINE: ABNORMAL
LEUKOCYTE CLUMPS, URINE: NEGATIVE
NITRITE, URINE: NEGATIVE
PH, URINE: 6.5 (ref 5–9)
POST VOID RESIDUAL (PVR): 74 ML
PROTEIN, URINE: NEGATIVE MG/DL
SPECIFIC GRAVITY, URINE: 1.02 (ref 1–1.03)
UROBILINOGEN, URINE: 1 EU/DL (ref 0–1)

## 2019-12-12 PROCEDURE — G8417 CALC BMI ABV UP PARAM F/U: HCPCS | Performed by: UROLOGY

## 2019-12-12 PROCEDURE — G8427 DOCREV CUR MEDS BY ELIG CLIN: HCPCS | Performed by: UROLOGY

## 2019-12-12 PROCEDURE — 99212 OFFICE O/P EST SF 10 MIN: CPT | Performed by: UROLOGY

## 2019-12-12 PROCEDURE — 1036F TOBACCO NON-USER: CPT | Performed by: UROLOGY

## 2019-12-12 PROCEDURE — 3017F COLORECTAL CA SCREEN DOC REV: CPT | Performed by: UROLOGY

## 2019-12-12 PROCEDURE — G8482 FLU IMMUNIZE ORDER/ADMIN: HCPCS | Performed by: UROLOGY

## 2019-12-12 PROCEDURE — G8598 ASA/ANTIPLAT THER USED: HCPCS | Performed by: UROLOGY

## 2019-12-12 PROCEDURE — 81003 URINALYSIS AUTO W/O SCOPE: CPT | Performed by: UROLOGY

## 2019-12-12 PROCEDURE — 51798 US URINE CAPACITY MEASURE: CPT | Performed by: UROLOGY

## 2019-12-12 RX ORDER — TROSPIUM CHLORIDE 20 MG/1
20 TABLET, FILM COATED ORAL 2 TIMES DAILY
Qty: 60 TABLET | Refills: 5 | Status: SHIPPED | OUTPATIENT
Start: 2019-12-12 | End: 2020-01-29

## 2019-12-13 DIAGNOSIS — R80.9 TYPE 2 DIABETES MELLITUS WITH MICROALBUMINURIA, WITHOUT LONG-TERM CURRENT USE OF INSULIN (HCC): Chronic | ICD-10-CM

## 2019-12-13 DIAGNOSIS — E11.29 TYPE 2 DIABETES MELLITUS WITH MICROALBUMINURIA, WITHOUT LONG-TERM CURRENT USE OF INSULIN (HCC): Chronic | ICD-10-CM

## 2019-12-16 RX ORDER — TITANIUM DIOXIDE, OCTINOXATE 1.94; 1.8 MG/G; MG/G
POWDER TOPICAL
Qty: 100 EACH | Refills: 3 | Status: SHIPPED | OUTPATIENT
Start: 2019-12-16 | End: 2019-12-17 | Stop reason: SDUPTHER

## 2019-12-17 DIAGNOSIS — E11.29 TYPE 2 DIABETES MELLITUS WITH MICROALBUMINURIA, WITHOUT LONG-TERM CURRENT USE OF INSULIN (HCC): Chronic | ICD-10-CM

## 2019-12-17 DIAGNOSIS — R80.9 TYPE 2 DIABETES MELLITUS WITH MICROALBUMINURIA, WITHOUT LONG-TERM CURRENT USE OF INSULIN (HCC): Chronic | ICD-10-CM

## 2019-12-17 DIAGNOSIS — E11.65 UNCONTROLLED TYPE 2 DIABETES MELLITUS WITH HYPERGLYCEMIA, WITHOUT LONG-TERM CURRENT USE OF INSULIN (HCC): ICD-10-CM

## 2019-12-17 RX ORDER — TITANIUM DIOXIDE, OCTINOXATE 1.94; 1.8 MG/G; MG/G
POWDER TOPICAL
Qty: 100 EACH | Refills: 3 | Status: SHIPPED | OUTPATIENT
Start: 2019-12-17 | End: 2022-02-23 | Stop reason: SDUPTHER

## 2019-12-19 DIAGNOSIS — E53.8 B12 DEFICIENCY: ICD-10-CM

## 2019-12-20 RX ORDER — LANOLIN ALCOHOL/MO/W.PET/CERES
CREAM (GRAM) TOPICAL
Qty: 90 TABLET | Refills: 3 | Status: SHIPPED | OUTPATIENT
Start: 2019-12-20 | End: 2020-01-22 | Stop reason: SDUPTHER

## 2019-12-31 ENCOUNTER — TELEPHONE (OUTPATIENT)
Dept: FAMILY MEDICINE CLINIC | Age: 49
End: 2019-12-31

## 2019-12-31 DIAGNOSIS — E11.29 TYPE 2 DIABETES MELLITUS WITH MICROALBUMINURIA, WITHOUT LONG-TERM CURRENT USE OF INSULIN (HCC): Primary | ICD-10-CM

## 2019-12-31 DIAGNOSIS — R80.9 TYPE 2 DIABETES MELLITUS WITH MICROALBUMINURIA, WITHOUT LONG-TERM CURRENT USE OF INSULIN (HCC): Primary | ICD-10-CM

## 2020-01-09 ENCOUNTER — TELEPHONE (OUTPATIENT)
Dept: FAMILY MEDICINE CLINIC | Age: 50
End: 2020-01-09

## 2020-01-09 NOTE — TELEPHONE ENCOUNTER
1st attempt to contact the pt re:overdue labs that Dr Pau Geronimo ordered on 10/1/9 & 11/21/19. No current HIPAA form on file so a VM asking the pt to please return our call was left.

## 2020-01-17 RX ORDER — DEXTROAMPHETAMINE SACCHARATE, AMPHETAMINE ASPARTATE, DEXTROAMPHETAMINE SULFATE AND AMPHETAMINE SULFATE 7.5; 7.5; 7.5; 7.5 MG/1; MG/1; MG/1; MG/1
30 TABLET ORAL 2 TIMES DAILY
Qty: 60 TABLET | Refills: 0 | Status: SHIPPED | OUTPATIENT
Start: 2020-01-17 | End: 2020-02-24 | Stop reason: SDUPTHER

## 2020-01-17 NOTE — TELEPHONE ENCOUNTER
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 Jess Bray called into the office stating that he needs a medication refill for Adderall 30mg;#60 with 0 refills; last with a start date of 12/11/19. Medication is pending your approval for a 30 day supply with 0 refills. Patient's last completed appt was on 12/11/19 to return on 02/25/2020.

## 2020-01-17 NOTE — TELEPHONE ENCOUNTER
Covering for Dr. Shira Calderon. Approved Adderall refill. [facility] was ok.    Electronically signed by Lolita Pink MD on 1/17/2020 at 12:28 PM

## 2020-01-21 ENCOUNTER — NURSE ONLY (OUTPATIENT)
Dept: LAB | Age: 50
End: 2020-01-21

## 2020-01-21 LAB
ABSOLUTE RETIC #: 72 THOU/MM3 (ref 20–115)
ALBUMIN SERPL-MCNC: 4.4 G/DL (ref 3.5–5.1)
ALP BLD-CCNC: 100 U/L (ref 38–126)
ALT SERPL-CCNC: 84 U/L (ref 11–66)
AST SERPL-CCNC: 52 U/L (ref 5–40)
BASOPHILS # BLD: 0.8 %
BASOPHILS ABSOLUTE: 0 THOU/MM3 (ref 0–0.1)
BILIRUB SERPL-MCNC: 0.3 MG/DL (ref 0.3–1.2)
BILIRUBIN DIRECT: < 0.2 MG/DL (ref 0–0.3)
CHOLESTEROL, TOTAL: 181 MG/DL (ref 100–199)
EOSINOPHIL # BLD: 3.8 %
EOSINOPHILS ABSOLUTE: 0.2 THOU/MM3 (ref 0–0.4)
ERYTHROCYTE [DISTWIDTH] IN BLOOD BY AUTOMATED COUNT: 13.9 % (ref 11.5–14.5)
ERYTHROCYTE [DISTWIDTH] IN BLOOD BY AUTOMATED COUNT: 45.1 FL (ref 35–45)
FERRITIN: 20 NG/ML (ref 22–322)
HCT VFR BLD CALC: 39.2 % (ref 42–52)
HDLC SERPL-MCNC: 34 MG/DL
HEMOGLOBIN: 12.5 GM/DL (ref 14–18)
IMMATURE GRANS (ABS): 0.06 THOU/MM3 (ref 0–0.07)
IMMATURE GRANULOCYTES: 1.3 %
IMMATURE RETIC FRACT: 18.8 % (ref 2.3–13.4)
IRON: 69 UG/DL (ref 65–195)
LDL CHOLESTEROL CALCULATED: 108 MG/DL
LDL CHOLESTEROL DIRECT: 116.17 MG/DL
LYMPHOCYTES # BLD: 25.3 %
LYMPHOCYTES ABSOLUTE: 1.2 THOU/MM3 (ref 1–4.8)
MCH RBC QN AUTO: 28.7 PG (ref 26–33)
MCHC RBC AUTO-ENTMCNC: 31.9 GM/DL (ref 32.2–35.5)
MCV RBC AUTO: 89.9 FL (ref 80–94)
MONOCYTES # BLD: 8.2 %
MONOCYTES ABSOLUTE: 0.4 THOU/MM3 (ref 0.4–1.3)
NUCLEATED RED BLOOD CELLS: 0 /100 WBC
PLATELET # BLD: 125 THOU/MM3 (ref 130–400)
PMV BLD AUTO: 11.9 FL (ref 9.4–12.4)
RBC # BLD: 4.36 MILL/MM3 (ref 4.7–6.1)
RETIC HEMOGLOBIN: 33.7 PG (ref 28.2–35.7)
RETICULOCYTE ABSOLUTE COUNT: 1.6 % (ref 0.5–2)
SEG NEUTROPHILS: 60.6 %
SEGMENTED NEUTROPHILS ABSOLUTE COUNT: 2.8 THOU/MM3 (ref 1.8–7.7)
TOTAL IRON BINDING CAPACITY: 441 UG/DL (ref 171–450)
TOTAL PROTEIN: 6.8 G/DL (ref 6.1–8)
TRIGL SERPL-MCNC: 195 MG/DL (ref 0–199)
WBC # BLD: 4.7 THOU/MM3 (ref 4.8–10.8)

## 2020-01-22 ENCOUNTER — TELEPHONE (OUTPATIENT)
Dept: FAMILY MEDICINE CLINIC | Age: 50
End: 2020-01-22

## 2020-01-22 ENCOUNTER — OFFICE VISIT (OUTPATIENT)
Dept: UROLOGY | Age: 50
End: 2020-01-22
Payer: COMMERCIAL

## 2020-01-22 VITALS
HEIGHT: 68 IN | SYSTOLIC BLOOD PRESSURE: 124 MMHG | WEIGHT: 277.3 LBS | DIASTOLIC BLOOD PRESSURE: 76 MMHG | BODY MASS INDEX: 42.03 KG/M2

## 2020-01-22 PROCEDURE — G8417 CALC BMI ABV UP PARAM F/U: HCPCS | Performed by: UROLOGY

## 2020-01-22 PROCEDURE — G8427 DOCREV CUR MEDS BY ELIG CLIN: HCPCS | Performed by: UROLOGY

## 2020-01-22 PROCEDURE — 3017F COLORECTAL CA SCREEN DOC REV: CPT | Performed by: UROLOGY

## 2020-01-22 PROCEDURE — G8482 FLU IMMUNIZE ORDER/ADMIN: HCPCS | Performed by: UROLOGY

## 2020-01-22 PROCEDURE — 1036F TOBACCO NON-USER: CPT | Performed by: UROLOGY

## 2020-01-22 PROCEDURE — 99212 OFFICE O/P EST SF 10 MIN: CPT | Performed by: UROLOGY

## 2020-01-22 RX ORDER — LANOLIN ALCOHOL/MO/W.PET/CERES
CREAM (GRAM) TOPICAL
Qty: 90 TABLET | Refills: 3 | Status: SHIPPED | OUTPATIENT
Start: 2020-01-22 | End: 2021-09-27

## 2020-01-22 RX ORDER — FERROUS SULFATE 325(65) MG
325 TABLET ORAL 2 TIMES DAILY WITH MEALS
Qty: 180 TABLET | Refills: 1 | Status: SHIPPED | OUTPATIENT
Start: 2020-01-22 | End: 2020-05-20

## 2020-01-22 RX ORDER — ASPIRIN 81 MG/1
TABLET ORAL
Qty: 90 TABLET | Refills: 3 | Status: SHIPPED | OUTPATIENT
Start: 2020-01-22 | End: 2021-01-08

## 2020-01-22 NOTE — PROGRESS NOTES
57-year-old white male returns today for follow-up regarding his chronic voiding symptoms. He is currently on Flomax and [place] 20 mg p.o. twice daily. This combination has improved his voiding pattern significantly. Urinary flow is much stronger, no hesitancy, and no urgency, frequency or nocturia. Has minimal minimal dryness of the mouth. No constipation. He has refills on both medications for several more months. His PSA in March, 2019 was 0.27 NG/mL. Impression :LUTS, markedly improved with medications.   Plan of care: Return in 12 months or as needed

## 2020-01-22 NOTE — TELEPHONE ENCOUNTER
Spoke with Jojo Duckworth at 's office asking to cancel consult with pt. She stated the referral was faxed back to our office stating the pt was discharged from their practice.

## 2020-01-27 RX ORDER — LAMOTRIGINE 200 MG/1
200 TABLET ORAL DAILY
Qty: 30 TABLET | Refills: 5 | Status: SHIPPED | OUTPATIENT
Start: 2020-01-27 | End: 2020-07-13

## 2020-01-27 NOTE — TELEPHONE ENCOUNTER
[facility] called requesting a refill of lamictal 200mg    Last Appointment Date: 12/11/2019  Next Appointment Date: 2/25/2020

## 2020-01-29 RX ORDER — FESOTERODINE FUMARATE 4 MG/1
TABLET, FILM COATED, EXTENDED RELEASE ORAL
COMMUNITY
Start: 2020-01-16 | End: 2021-02-04

## 2020-01-29 RX ORDER — AZELASTINE 1 MG/ML
SPRAY, METERED NASAL
COMMUNITY
Start: 2020-01-16

## 2020-01-29 NOTE — PROGRESS NOTES
Chief Complaint   Patient presents with    Leg Pain     L    Follow-up     anemia, LFT       History obtained from the patient. SUBJECTIVE:  Myla Vanegas is a 44 y.o. male that presents today for:     -L leg pain:  Started about 1.5 wks ago  Starts in anterior knee and radiates down to ankle  Walking makes it worse  Better at rest  No injury that he can recall  Knee will swell if on it too long  Once rests, will go down. No locking or catching  Doesn't give out  Seems to be getting worse  Also c/o L calf pain and swelling with this the last few days      -Anemia LAST VISIT:  Anemia still present, mild  b12 and iron studies WNL in last SEPT and early OCT  Plan is to repeat labs in [facility]  Denies bleeding, melena or hematochezia  No fevers, chills or night sweats     UPDATE TODAY:   Found to have low iron on last lab check for anemia  Denies bleeding, melena or hematochezia  Placed on iron  F/u labs ordered for 6 wks  Pt is to call Donte SALAZAR's office for an apt, he is EST there. He has not done so yet, but plans to soon       -Elevated LFT:  Noted on labs  Are improving as lipids come down  Denies ETOH use  Denies RUQ pain or jaundice.       Age/Gender Health Maintenance    Lipid -     No components found for: CHLPL  Lab Results   Component Value Date    TRIG 195 01/21/2020    TRIG 656 (H) 11/19/2019    TRIG 443 (H) 09/20/2019     Lab Results   Component Value Date    HDL 34 01/21/2020    HDL 25 11/19/2019    HDL 25 09/20/2019     Lab Results   Component Value Date    LDLCALC 108 01/21/2020    [facility] SEE BELOW 11/19/2019    LDLCALC SEE BELOW 09/20/2019     Lab Results   Component Value Date    LDLCALC 108 01/21/2020    LDLDIRECT 116.17 01/21/2020       TSH - 3.260 (AUG 2018)  Lab Results   Component Value Date    TSH 2.630 11/19/2019       Colon Cancer Screening - NEG CANCER FEB 2015    Tetanus - UTD June 2014  Influenza Vaccine - UTD FALL 2019  Pneumonia Vaccine - UTD June 2014 PPV 23  Zoster - discuss next visit     PSA testing discussion - Age 52  AAA Screening - Age 70      Diabetes Health Maintenance    A1C -   Lab Results   Component Value Date    LABA1C 11.2 11/21/2019    LABA1C 7.8 08/20/2019    LABA1C 10.7 05/20/2019    LABA1C 11.2 02/18/2019    LABA1C 12.2 11/16/2018    LABA1C 7.1 08/16/2018       ACE/ARB - yes, lisinopril 2.5mg daily  Eye - UTD MARCH 2019  Foot - UTD FEB 2019  ASA - yes, 81mg  Microal/Cr -   Lab Results   Component Value Date    LABMICR < 1.20 02/18/2019    LABCREA 36.7 02/18/2019    MACRR 33 (H) 02/18/2019     eGFR -   Lab Results   Component Value Date    LABGLOM 89 11/19/2019       Statin - yes, lipitor 80mg qhs      Specialist Lists:    Dr Farhan Cortes Dr [facility] Cardiology  Dr Richard Olvera:  Urology  Dr Ghada Winston Pulmonology/Sleep  Dr Dahlia Duckworth surgeon  [facility] ENT  Dr Buffy Segundo: Neurology      Current Outpatient Medications   Medication Sig Dispense Refill    azelastine (ASTELIN) 0.1 % nasal spray Inhale 2 sprays in each nostril twice a day as needed      TOVIAZ 4 MG TB24 ER tablet TAKE 1 TABLET BY MOUTH ONE TIME A DAY      lamoTRIgine (LAMICTAL) 200 MG tablet Take 1 tablet by mouth daily 30 tablet 5    ferrous sulfate 325 (65 Fe) MG tablet Take 1 tablet by mouth 2 times daily (with meals) 180 tablet 1    vitamin B-12 (CYANOCOBALAMIN) 1000 MCG tablet TAKE 1 TABLET BY MOUTH ONE TIME A DAY 90 tablet 3    aspirin (SM ASPIRIN ADULT LOW STRENGTH) 81 MG EC tablet TAKE 1 TABLET BY MOUTH ONE TIME A DAY 90 tablet 3    amphetamine-dextroamphetamine (ADDERALL, 30MG,) 30 MG tablet Take 1 tablet by mouth 2 times daily for 30 days.  60 tablet 0    insulin glargine (BASAGLAR KWIKPEN) 100 UNIT/ML injection pen Inject 65 Units into the skin 2 times daily 10 pen 5    Insulin Pen Needle (UNIFINE PENTIPS) 31G X 8 MM MISC Use DAILY as directed WITH INSULIN PENS-- DO NOT RE-USE needles 100 each 3    MEIJER LANCETS UNIVERSAL 33G MISC use catheterization: 9/30/2013: Normal coronaries 09/30/2013 9/30/2013: Normal coronaries. Radial approach. Dr. Mayda Hobson Dysfunctional voiding of urine 06/20/2013     Medications he changes to follow,  Adding ditropan  to the present schedule      ED (erectile dysfunction) of organic origin 05/01/2013       Past Medical History:   Diagnosis Date    ADHD (attention deficit hyperactivity disorder)     Allergic rhinitis     Benign esophageal stricture     s/p dilation may 2014    Benign prostatic hyperplasia     Bipolar 1 disorder ([facility]     CAD (coronary artery disease)     Unclear history, cath 2013 with patent arteries. Records pending.  Chronic back pain     Depression     DM2 (diabetes mellitus, type 2) ([facility]     Dysfunctional voiding of urine 6/20/2013    Medications he changes to follow,  Adding ditropan  to the present schedule     ED (erectile dysfunction) of organic origin 5/1/2013    Fibromyalgia     Former smoker     GERD (gastroesophageal reflux disease)     History of colon polyps     Pt unsure when last colo.  History of TIA (transient ischemic attack)     x2     Hyperlipidemia     Hypertension     Hypertriglyceridemia 10/27/2015    Migraine headache     Morbid obesity with BMI of 40.0-44.9, adult ([facility]     Obstructive sleep apnea on CPAP 11/3/2015    Peripheral neuropathy     Pulmonary nodule     stable >2 years, no further w/u required.  Rotator cuff injury     Left side    S/P cardiac catheterization: 9/30/2013: Normal coronaries 9/30/2013 9/30/2013: Normal coronaries. Radial approach. Dr. Alexis Benoit Short-segment Vásquez's esophagus 6/17/2015    Tarsal tunnel syndrome     Bilateral       Past Surgical History:   Procedure Laterality Date    CARDIAC CATHETERIZATION  10/2013    9/30/2013: Normal coronaries. Radial approach.  Dr. Alexis Benoit COLONOSCOPY  2008, 2012    KNEE SURGERY Left 2012    ROTATOR CUFF REPAIR Left 2013    SHOULDER SURGERY Bilateral 2011 [address] [place] [address]    See[place] occ.  TONSILLECTOMY      TYMPANOSTOMY TUBE PLACEMENT      UPPER GASTROINTESTINAL ENDOSCOPY  2008 with dilation,     UPPER GASTROINTESTINAL ENDOSCOPY      UPPER GASTROINTESTINAL ENDOSCOPY  MAY 2014, 2017    Dilation       Allergies   Allergen Reactions    Morphine Nausea And Vomiting and Rash         Social History     Tobacco Use    Smoking status: Former Smoker     Packs/day: 0.75     Years: 30.00     Pack years: 22.50     Types: Cigarettes     Start date: 1984     Last attempt to quit: 2018     Years since quittin.7    Smokeless tobacco: Never Used   Substance Use Topics    Alcohol use: No     Alcohol/week: 0.0 standard drinks       Family History   Problem Relation Age of Onset    Cancer Father 77        lung     Rheum Arthritis Father     Colon Cancer Maternal Aunt 61    Cancer Maternal Aunt 76        \"throat\"    Colon Cancer Maternal Uncle 48    Cancer Maternal Uncle 60        pancreatic    Cancer Paternal Aunt 52        brain    Colon Cancer Paternal Uncle 72    Cancer Paternal Uncle 61        hepatic     Prostate Cancer Paternal Uncle 61    Cancer Maternal Uncle 79        pancreatic     Ovarian Cancer Paternal Aunt 48    Rheum Arthritis Mother          I have reviewed the patient's past medical history, past surgical history, allergies, medications, social and family history and I have made updates where appropriate.       Review of Systems  Positive responses are highlighted in bold    Constitutional:  Fever, Chills, Night Sweats, Fatigue, Unexpected changes in weight  HENT:  Ear pain, Tinnitus, Nosebleeds, Trouble swallowing, Hearing loss, Sore throat  Cardiovascular:  Chest Pain, Palpitations, Orthopnea, Paroxysmal Nocturnal Dyspnea  Respiratory:  Cough, Wheezing, Shortness of breath, Chest tightness, Apnea  Gastrointestinal:  Nausea, Vomiting, Diarrhea, Constipation, Heartburn, Blood in stool  Genitourinary: management: none    Discussed use, benefit, and side effects of prescribed medications. Barriers to medication compliance addressed. All patient questions answered. Pt voiced understanding.        Electronically signed by Yvette Tom DO on 1/30/2020 at 9:04 AM

## 2020-01-30 ENCOUNTER — HOSPITAL ENCOUNTER (OUTPATIENT)
Dept: INTERVENTIONAL RADIOLOGY/VASCULAR | Age: 50
Discharge: HOME | End: 2020-01-30
Payer: COMMERCIAL

## 2020-01-30 ENCOUNTER — OFFICE VISIT (OUTPATIENT)
Dept: FAMILY MEDICINE CLINIC | Age: 50
End: 2020-01-30
Payer: COMMERCIAL

## 2020-01-30 VITALS
BODY MASS INDEX: 43.95 KG/M2 | TEMPERATURE: 97.7 F | HEIGHT: 67 IN | RESPIRATION RATE: 20 BRPM | SYSTOLIC BLOOD PRESSURE: 146 MMHG | WEIGHT: 280 LBS | HEART RATE: 92 BPM | DIASTOLIC BLOOD PRESSURE: 84 MMHG

## 2020-01-30 PROCEDURE — 93971 EXTREMITY STUDY: CPT

## 2020-01-30 PROCEDURE — 99214 OFFICE O/P EST MOD 30 MIN: CPT | Performed by: FAMILY MEDICINE

## 2020-01-30 PROCEDURE — G8482 FLU IMMUNIZE ORDER/ADMIN: HCPCS | Performed by: FAMILY MEDICINE

## 2020-01-30 PROCEDURE — 3017F COLORECTAL CA SCREEN DOC REV: CPT | Performed by: FAMILY MEDICINE

## 2020-01-30 PROCEDURE — 1036F TOBACCO NON-USER: CPT | Performed by: FAMILY MEDICINE

## 2020-01-30 PROCEDURE — G8417 CALC BMI ABV UP PARAM F/U: HCPCS | Performed by: FAMILY MEDICINE

## 2020-01-30 PROCEDURE — G8427 DOCREV CUR MEDS BY ELIG CLIN: HCPCS | Performed by: FAMILY MEDICINE

## 2020-02-04 ENCOUNTER — HOSPITAL ENCOUNTER (OUTPATIENT)
Dept: GENERAL RADIOLOGY | Age: 50
Discharge: HOME | End: 2020-02-04
Payer: COMMERCIAL

## 2020-02-04 ENCOUNTER — HOSPITAL ENCOUNTER (OUTPATIENT)
Age: 50
Discharge: HOME | End: 2020-02-04
Payer: COMMERCIAL

## 2020-02-04 PROCEDURE — 73564 X-RAY EXAM KNEE 4 OR MORE: CPT

## 2020-02-05 ENCOUNTER — TELEPHONE (OUTPATIENT)
Dept: FAMILY MEDICINE CLINIC | Age: 50
End: 2020-02-05

## 2020-02-05 NOTE — TELEPHONE ENCOUNTER
Pt states he already sees Dr Navi Vanegas  And he does not need a referral . Pt says  He will just make an appointment with Dr Navi Vanegas .

## 2020-02-05 NOTE — TELEPHONE ENCOUNTER
Referral to Dr Emiliano Moulton for patient has been declined. He was dismissed from their practice on 03-. Cancelling referral and will need a new one placed, please.

## 2020-02-19 ENCOUNTER — TELEPHONE (OUTPATIENT)
Dept: FAMILY MEDICINE CLINIC | Age: 50
End: 2020-02-19

## 2020-02-19 PROBLEM — Z63.4 BEREAVEMENT: Status: RESOLVED | Noted: 2019-09-09 | Resolved: 2020-02-19

## 2020-02-19 NOTE — PROGRESS NOTES
June 2014  Influenza Vaccine - UTD FALL 2019  Pneumonia Vaccine - UTD June 2014 PPV 23  Zoster - discuss next visit     PSA testing discussion - follows with urology  Lab Results   Component Value Date    PSA 0.27 03/15/2019       AAA Screening - Age 70      Diabetes Health Maintenance    A1C -   Lab Results   Component Value Date    LABA1C 7.2 02/20/2020    LABA1C 11.2 11/21/2019    LABA1C 7.8 08/20/2019    LABA1C 10.7 05/20/2019    LABA1C 11.2 02/18/2019    LABA1C 12.2 11/16/2018    LABA1C 7.1 08/16/2018       ACE/ARB - yes, lisinopril 2.5mg daily  Eye - UTD MARCH 2019  Foot - UTD FEB 2020  ASA - yes, 81mg  Microal/Cr -   Lab Results   Component Value Date    LABMICR < 1.20 02/18/2019    LABCREA 36.7 02/18/2019    MACRR 33 (H) 02/18/2019     eGFR -   Lab Results   Component Value Date    LABGLOM 89 11/19/2019       Statin - yes, lipitor 80mg qhs      Specialist Lists:    Dr Omar Clements Dr [facility] Cardiology  Dr Francine Alejandra:  Urology  Dr Keagan Flanagan Pulmonology/Sleep  Dr Kristine Aase surgeon  [facility] ENT  Dr Kimberly Seth: Neurology      Current Outpatient Medications   Medication Sig Dispense Refill    azelastine (ASTELIN) 0.1 % nasal spray Inhale 2 sprays in each nostril twice a day as needed      lamoTRIgine (LAMICTAL) 200 MG tablet Take 1 tablet by mouth daily 30 tablet 5    ferrous sulfate 325 (65 Fe) MG tablet Take 1 tablet by mouth 2 times daily (with meals) 180 tablet 1    vitamin B-12 (CYANOCOBALAMIN) 1000 MCG tablet TAKE 1 TABLET BY MOUTH ONE TIME A DAY 90 tablet 3    aspirin (SM ASPIRIN ADULT LOW STRENGTH) 81 MG EC tablet TAKE 1 TABLET BY MOUTH ONE TIME A DAY 90 tablet 3    amphetamine-dextroamphetamine (ADDERALL, 30MG,) 30 MG tablet Take 1 tablet by mouth 2 times daily for 30 days.  60 tablet 0    insulin glargine (BASAGLAR KWIKPEN) 100 UNIT/ML injection pen Inject 65 Units into the skin 2 times daily 10 pen 5    Insulin Pen Needle ([facility] tablet Take 1 tablet by mouth every 6 hours as needed for Migraine 60 tablet 0    cetirizine (ZYRTEC) 10 MG tablet daily       EYE ITCH RELIEF 0.025 % ophthalmic solution Place 2 drops into both eyes as needed       propranolol (INDERAL LA) 160 MG ER capsule Take 160 mg by mouth daily       fluticasone (FLONASE) 50 MCG/ACT nasal spray 1 spray by Nasal route daily as needed.  gabapentin (NEURONTIN) 800 MG tablet   Take 800 mg by mouth 4 times daily       TOVIAZ 4 MG TB24 ER tablet TAKE 1 TABLET BY MOUTH ONE TIME A DAY      FORFIVO  MG TB24 TAKE 1 TABLET BY MOUTH ONE TIME A DAY  (Patient not taking: Reported on 2/20/2020) 30 tablet 3     No current facility-administered medications for this visit. No orders of the defined types were placed in this encounter. All medications reviewed and reconciled, including OTC and herbal medications. Updated list given to patient. Patient Active Problem List    Diagnosis Date Noted    Obstructive sleep apnea on CPAP 11/03/2015     Priority: High    Closed fracture of right tibial plateau 05/15/0353    Normocytic anemia 02/19/2019    B12 deficiency     DM2 (diabetes mellitus, type 2) ([facility]     Benign prostatic hyperplasia     Morbid obesity with BMI of 40.0-44.9, adult ([facility]     Hypertriglyceridemia 10/27/2015    BPPV (benign paroxysmal positional vertigo) 09/29/2015    Short-segment Vásquez's esophagus 06/17/2015     Following with [facility] Cox for this.  Pulmonary nodule      stable >2 years, no further w/u required.  Bipolar 1 disorder ([facility]     CAD (coronary artery disease)      Unclear history, cath 2013 with patent arteries. Records pending.        History of TIA (transient ischemic attack)      x2      Hypertension     Fibromyalgia     Depression     History of colon polyps     Chronic back pain     ADHD (attention deficit hyperactivity disorder)     Former smoker     Peripheral neuropathy     Tarsal tunnel syndrome Date    CARDIAC CATHETERIZATION  10/2013    2013: Normal coronaries. Radial approach. Dr. Tonya Arizmendi COLONOSCOPY  ,    [facility] KNEE SURGERY Left 2012    ROTATOR CUFF REPAIR Left 2013   [facility] SHOULDER SURGERY Bilateral 2011   [facility] SINUS SURGERY  2004    See's Moon occ.  TONSILLECTOMY  2002    TYMPANOSTOMY TUBE PLACEMENT      UPPER GASTROINTESTINAL ENDOSCOPY  ,  with dilation,     UPPER GASTROINTESTINAL ENDOSCOPY      UPPER GASTROINTESTINAL ENDOSCOPY  MAY 2014, 2017    Dilation       Allergies   Allergen Reactions    Morphine Nausea And Vomiting and Rash         Social History     Tobacco Use    Smoking status: Former Smoker     Packs/day: 0.75     Years: 30.00     Pack years: 22.50     Types: Cigarettes     Start date: 1984     Last attempt to quit: 2018     Years since quittin.7    Smokeless tobacco: Never Used   Substance Use Topics    Alcohol use: No     Alcohol/week: 0.0 standard drinks       Family History   Problem Relation Age of Onset    Cancer Father 77        lung     Rheum Arthritis Father     Colon Cancer Maternal Aunt 61    Cancer Maternal Aunt 76        \"throat\"    Colon Cancer Maternal Uncle 48    Cancer Maternal Uncle 60        pancreatic    Cancer Paternal Aunt 52        brain    Colon Cancer Paternal Uncle 72    Cancer Paternal Uncle 61        hepatic     Prostate Cancer Paternal Uncle 61    Cancer Maternal Uncle 79        pancreatic     Ovarian Cancer Paternal Aunt 48    Rheum Arthritis Mother          I have reviewed the patient's past medical history, past surgical history, allergies, medications, social and family history and I have made updates where appropriate.       Review of Systems  Positive responses are highlighted in bold    Constitutional:  Fever, Chills, Night Sweats, Fatigue, Unexpected changes in weight  HENT:  Ear pain, Tinnitus, Nosebleeds, Trouble swallowing, Hearing loss, Sore throat  Cardiovascular:  Chest Pain, Palpitations, Orthopnea, Paroxysmal Nocturnal Dyspnea  Respiratory:  Cough, Wheezing, Shortness of breath, Chest tightness, Apnea  Gastrointestinal:  Nausea, Vomiting, Diarrhea, Constipation, Heartburn, Blood in stool  Genitourinary:  Difficulty or painful urination, Flank pain, Change in frequency, Urgency  Skin:  Color change, Rash, Itching, Wound  Musculoskeletal:  Joint pain, Back pain, Gait problems, Joint swelling, Myalgias  Neurological:  Dizziness, Headaches, Presyncope, Numbness, Seizures, Tremors  Endocrine:  Heat Intolerance, Cold Intolerance, Polydipsia, Polyphagia, Polyuria      PHYSICAL EXAM:  Vitals:    02/20/20 0751 02/20/20 0810   BP: 138/88    Pulse: 112 90   Resp: 16    Temp: 97.6 °F (36.4 °C)    TempSrc: Oral    Weight: 268 lb (121.6 kg)    Height: 5' 6.5\" (1.689 m)      Body mass index is 42.61 kg/m². Pain Score: 2(Back/Joints)    Wt Readings from Last 3 Encounters:   02/20/20 268 lb (121.6 kg)   01/30/20 280 lb (127 kg)   01/22/20 277 lb 4.8 oz (125.8 kg)     VS Reviewed  General Appearance: A&O x 3, No acute distress,well developed and well- nourished  Eyes: pupils equal, round, and reactive to light, extraocular eye movements intact, conjunctivae and eye lids without erythema  ENT: external ear and ear canal clear bilaterally, TMs intact and regular, nose without deformity, nasal mucosa and turbinates normal without polyps, oropharynx normal, dentition is normal for age  Neck: supple and non-tender without mass, no thyromegaly or thyroid nodules, no cervical lymphadenopathy  Pulmonary/Chest: clear to auscultation bilaterally- no wheezes, rales or rhonchi, normal air movement, no respiratory distress or retractions  Cardiovascular: S1 and S2 auscultated w/ RRR. No murmurs, rubs, clicks, or gallops, distal pulses intact. Abdomen: soft, non-tender, non-distended, bowl sounds physiologic,  no rebound or guarding, no masses or hernias noted. Liver and spleen without enlargement.   Extremities: 450 ug/dL Final    Retic Ct Abs 01/21/2020 1.6  0.5 - 2.0 % Final    Absolute Retic # 01/21/2020 72.0  20.0 - 115.0 thou/mm3 Final    Immature Retic Fract 01/21/2020 18.8* 2.3 - 13.4 % Final    Retic Hemoglobin 01/21/2020 33.7  28.2 - 35.7 pg Final    Cholesterol, Total 01/21/2020 181  100 - 199 mg/dL Final    Triglycerides 01/21/2020 195  0 - 199 mg/dL Final    HDL 01/21/2020 34  mg/dL Final    LDL Calculated 01/21/2020 108  mg/dL Final    Alb 01/21/2020 4.4  3.5 - 5.1 g/dL Final    Total Bilirubin 01/21/2020 0.3  0.3 - 1.2 mg/dL Final    Bilirubin, Direct 01/21/2020 <0.2  0.0 - 0.3 mg/dL Final    Alkaline Phosphatase 01/21/2020 100  38 - 126 U/L Final    AST 01/21/2020 52* 5 - 40 U/L Final    ALT 01/21/2020 84* 11 - 66 U/L Final    Total Protein 01/21/2020 6.8  6.1 - 8.0 g/dL Final    LDL Direct 01/21/2020 116.17  mg/dl Final       ASSESSMENT & PLAN  1. Type 2 diabetes mellitus with microalbuminuria, with long-term current use of insulin (HCC)    Improved  con't current regimen  At goal  F/u 3 months    - POCT glycosylated hemoglobin (Hb A1C)  -  DIABETES FOOT EXAM  - Microalbumin / Creatinine Urine Ratio; Future    2. Morbid obesity with BMI of 40.0-44.9, adult ([facility]    con't with wt loss strategies. 3. Dyslipidemia    TG and LFT sig improved  Likely related to elevated sugars  Now that sugars are better, labs are improving.   con't lipitor/feno  con't adjusted DM regimen    4. Abnormal transaminases    Sig improved  Will repeat again mid March  Has lab orders already  Suspect related to DM that is now doing better    5. Iron deficiency anemia, unspecified iron deficiency anemia type    Mild DYAN  con't iron supplementation  Has GI w/u scheduled with Clint Guadalupeot  Repeat labs mid to end of March 2020    6. B12 deficiency    con't b12  Labs mid to end March 2020    7. Essential hypertension    Stable  At goal  con't meds    8.  Special screening for malignant neoplasm of prostate    Discussed prostate screening guidelines and with shared decision making, he elects to proceed with psa    - Psa screening; Future      DISPOSITION    Return in about 3 months (around 5/20/2020) for Follow-up Diabetes, sooner as needed. Az released without restrictions. Future Appointments   Date Time Provider Mikey Painting   2/25/2020  9:00 AM MD Maite Alba 2000 5/20/2020  7:40 AM [facility] [email or web address]   1/26/2021  9:00 AM Ca Varner MD [facility] [email or web address] [facility] [place]     PATIENT COUNSELING    Melissa Pantoja received counseling on the following healthy behaviors: nutrition, exercise and medication adherence    Patient given educational materials on: See Attached    I have instructed Melissa Pantoja to complete a self tracking handout on blood sugars, smoking and instructed them to bring it with them to his next appointment. Barriers to learning and self management: none    Discussed use, benefit, and side effects of prescribed medications. Barriers to medication compliance addressed. All patient questions answered. Pt voiced understanding.        Electronically signed by Leo Coffey DO on 2/20/2020 at 8:10 AM

## 2020-02-19 NOTE — TELEPHONE ENCOUNTER
appt reminder   Future Appointments   Date Time Provider Mikey Painting   2/20/2020  7:40 AM Mi Dixon DO [facility]  [email or web address]   2/25/2020  9:00 AM Thad Juan MD [facility] SHREYA [place]  [place] [email or web address]   1/26/2021  9:00 AM MD SHREYA Dillon AM [place] Urology [facility] SHREYA [place]  [place] [address]

## 2020-02-20 ENCOUNTER — OFFICE VISIT (OUTPATIENT)
Dept: FAMILY MEDICINE CLINIC | Age: 50
End: 2020-02-20
Payer: COMMERCIAL

## 2020-02-20 VITALS
HEIGHT: 67 IN | RESPIRATION RATE: 16 BRPM | BODY MASS INDEX: 42.06 KG/M2 | SYSTOLIC BLOOD PRESSURE: 138 MMHG | HEART RATE: 90 BPM | TEMPERATURE: 97.6 F | WEIGHT: 268 LBS | DIASTOLIC BLOOD PRESSURE: 88 MMHG

## 2020-02-20 LAB — HBA1C MFR BLD: 7.2 % (ref 4.3–5.7)

## 2020-02-20 PROCEDURE — 3051F HG A1C>EQUAL 7.0%<8.0%: CPT | Performed by: FAMILY MEDICINE

## 2020-02-20 PROCEDURE — G8417 CALC BMI ABV UP PARAM F/U: HCPCS | Performed by: FAMILY MEDICINE

## 2020-02-20 PROCEDURE — 2022F DILAT RTA XM EVC RTNOPTHY: CPT | Performed by: FAMILY MEDICINE

## 2020-02-20 PROCEDURE — 99214 OFFICE O/P EST MOD 30 MIN: CPT | Performed by: FAMILY MEDICINE

## 2020-02-20 PROCEDURE — G8482 FLU IMMUNIZE ORDER/ADMIN: HCPCS | Performed by: FAMILY MEDICINE

## 2020-02-20 PROCEDURE — G8427 DOCREV CUR MEDS BY ELIG CLIN: HCPCS | Performed by: FAMILY MEDICINE

## 2020-02-20 PROCEDURE — 1036F TOBACCO NON-USER: CPT | Performed by: FAMILY MEDICINE

## 2020-02-20 PROCEDURE — 83036 HEMOGLOBIN GLYCOSYLATED A1C: CPT | Performed by: FAMILY MEDICINE

## 2020-02-20 PROCEDURE — 3017F COLORECTAL CA SCREEN DOC REV: CPT | Performed by: FAMILY MEDICINE

## 2020-02-20 NOTE — PATIENT INSTRUCTIONS
LAB INSTRUCTIONS:    Please complete labs end of [facility] 2020. Please fast for 8 hours prior to lab collection. The clinic will call you within 1 week of collection. If you have not heard from us within that amount of time, please call us at [phone]. Patient Education        Diabetes Foot Health: Care Instructions  Your Care Instructions    When you have diabetes, your feet need extra care and attention. Diabetes can damage the nerve endings and blood vessels in your feet, making you less likely to notice when your feet are injured. Diabetes also limits your body's ability to fight infection and get blood to areas that need it. If you get a minor foot injury, it could become an ulcer or a serious infection. With good foot care, you can prevent most of these problems. Caring for your feet can be quick and easy. Most of the care can be done when you are bathing or getting ready for bed. Follow-up care is a key part of your treatment and safety. Be sure to make and go to all appointments, and call your doctor if you are having problems. It's also a good idea to know your test results and keep a list of the medicines you take. How can you care for yourself at home? · Keep your blood sugar close to normal by watching what and how much you eat, monitoring blood sugar, taking medicines if prescribed, and getting regular exercise. · Do not smoke. Smoking affects blood flow and can make foot problems worse. If you need help quitting, talk to your doctor about stop-smoking programs and medicines. These can increase your chances of quitting for good. · Eat a diet that is low in fats. High fat intake can cause fat to build up in your blood vessels and decrease blood flow. · Inspect your feet daily for blisters, cuts, cracks, or sores. If you cannot see well, use a mirror or have someone help you. · Take care of your feet:  ? Wash your feet every day. Use warm (not hot) water.  Check the water temperature with your wrists or other part of your body, not your feet. ? Dry your feet well. Pat them dry. Do not rub the skin on your feet too hard. Dry well between your toes. If the skin on your feet stays moist, bacteria or a fungus can grow, which can lead to infection. ? Keep your skin soft. Use moisturizing skin cream to keep the skin on your feet soft and prevent calluses and cracks. But do not put the cream between your toes, and stop using any cream that causes a rash. ? Clean underneath your toenails carefully. Do not use a sharp object to clean underneath your toenails. Use the blunt end of a nail file or other rounded tool. ? Trim and file your toenails straight across to prevent ingrown toenails. Use a nail clipper, not scissors. Use an emery board to smooth the edges. · Change socks daily. Socks without seams are best, because seams often rub the feet. You can find socks for people with diabetes from specialty catalogs. · Look inside your shoes every day for things like gravel or torn linings, which could cause blisters or sores. · Buy shoes that fit well:  ? Look for shoes that have plenty of space around the toes. This helps prevent bunions and blisters. ? Try on shoes while wearing the kind of socks you will usually wear with the shoes. ? Avoid plastic shoes. They may rub your feet and cause blisters. Good shoes should be made of materials that are flexible and breathable, such as leather or cloth. ? Break in new shoes slowly by wearing them for no more than an hour a day for several days. Take extra time to check your feet for red areas, blisters, or other problems after you wear new shoes. · Do not go barefoot. Do not wear sandals, and do not wear shoes with very thin soles. Thin soles are easy to puncture. They also do not protect your feet from hot pavement or cold weather. · Have your doctor check your feet during each visit. If you have a foot problem, see your doctor.  Do not try to treat an

## 2020-02-24 RX ORDER — DEXTROAMPHETAMINE SACCHARATE, AMPHETAMINE ASPARTATE, DEXTROAMPHETAMINE SULFATE AND AMPHETAMINE SULFATE 7.5; 7.5; 7.5; 7.5 MG/1; MG/1; MG/1; MG/1
30 TABLET ORAL 2 TIMES DAILY
Qty: 60 TABLET | Refills: 0 | Status: SHIPPED | OUTPATIENT
Start: 2020-02-24 | End: 2020-03-24 | Stop reason: SDUPTHER

## 2020-03-05 ENCOUNTER — HOSPITAL ENCOUNTER (OUTPATIENT)
Dept: ULTRASOUND IMAGING | Age: 50
Discharge: HOME | End: 2020-03-05
Payer: COMMERCIAL

## 2020-03-05 PROCEDURE — 76705 ECHO EXAM OF ABDOMEN: CPT

## 2020-03-18 ENCOUNTER — TELEPHONE (OUTPATIENT)
Dept: FAMILY MEDICINE CLINIC | Age: 50
End: 2020-03-18

## 2020-03-24 ENCOUNTER — OFFICE VISIT (OUTPATIENT)
Dept: PSYCHIATRY | Age: 50
End: 2020-03-24
Payer: COMMERCIAL

## 2020-03-24 PROBLEM — F90.8 ADHD, ADULT RESIDUAL TYPE: Status: ACTIVE | Noted: 2020-03-24

## 2020-03-24 PROCEDURE — G8428 CUR MEDS NOT DOCUMENT: HCPCS | Performed by: PSYCHIATRY & NEUROLOGY

## 2020-03-24 PROCEDURE — 3017F COLORECTAL CA SCREEN DOC REV: CPT | Performed by: PSYCHIATRY & NEUROLOGY

## 2020-03-24 PROCEDURE — G8417 CALC BMI ABV UP PARAM F/U: HCPCS | Performed by: PSYCHIATRY & NEUROLOGY

## 2020-03-24 PROCEDURE — G8482 FLU IMMUNIZE ORDER/ADMIN: HCPCS | Performed by: PSYCHIATRY & NEUROLOGY

## 2020-03-24 PROCEDURE — 99213 OFFICE O/P EST LOW 20 MIN: CPT | Performed by: PSYCHIATRY & NEUROLOGY

## 2020-03-24 PROCEDURE — 1036F TOBACCO NON-USER: CPT | Performed by: PSYCHIATRY & NEUROLOGY

## 2020-03-24 RX ORDER — DEXTROAMPHETAMINE SACCHARATE, AMPHETAMINE ASPARTATE, DEXTROAMPHETAMINE SULFATE AND AMPHETAMINE SULFATE 7.5; 7.5; 7.5; 7.5 MG/1; MG/1; MG/1; MG/1
30 TABLET ORAL 2 TIMES DAILY
Qty: 60 TABLET | Refills: 0 | Status: SHIPPED | OUTPATIENT
Start: 2020-03-24 | End: 2020-05-11 | Stop reason: SDUPTHER

## 2020-03-24 RX ORDER — TRAZODONE HYDROCHLORIDE 300 MG/1
300 TABLET ORAL NIGHTLY
Qty: 30 TABLET | Refills: 5 | Status: SHIPPED | OUTPATIENT
Start: 2020-03-24 | End: 2021-01-11

## 2020-03-24 RX ORDER — BUPROPION HYDROCHLORIDE 450 MG/1
450 TABLET, FILM COATED, EXTENDED RELEASE ORAL DAILY
Qty: 30 TABLET | Refills: 5 | Status: SHIPPED | OUTPATIENT
Start: 2020-03-24 | End: 2020-11-02 | Stop reason: SDUPTHER

## 2020-03-24 NOTE — PROGRESS NOTES
fair  Insight:  good  Judgment:  good  Anxiety:   Generalized: No  Excessive Worry: No  Panic Attacks: No  Frequency:  Housebound: No   Obsession: No   Compulsion: No  Flashbacks:No  Nightmares No  Hyperarousal No    DIAGNOSTIC IMPRESSION  Bipolar I dep  ADHD    Plan  No changes  Current Outpatient Medications   Medication Sig Dispense Refill    buPROPion HCl ER, XL, 450 MG TB24 Take 450 mg by mouth daily 30 tablet 5    amphetamine-dextroamphetamine (ADDERALL, 30MG,) 30 MG tablet Take 1 tablet by mouth 2 times daily for 30 days.  60 tablet 0    traZODone (DESYREL) 300 MG tablet Take 1 tablet by mouth nightly 30 tablet 5    metFORMIN (GLUCOPHAGE) 1000 MG tablet TAKE 1 TABLET BY MOUTH TWO TIMES A DAY WITH MEALS 180 tablet 3    piroxicam (FELDENE) 20 MG capsule TAKE 1 CAPSULE BY MOUTH ONE TIME A DAY AS NEEDED FOR PAIN 90 capsule 0    azelastine (ASTELIN) 0.1 % nasal spray Inhale 2 sprays in each nostril twice a day as needed      TOVIAZ 4 MG TB24 ER tablet TAKE 1 TABLET BY MOUTH ONE TIME A DAY      lamoTRIgine (LAMICTAL) 200 MG tablet Take 1 tablet by mouth daily 30 tablet 5    ferrous sulfate 325 (65 Fe) MG tablet Take 1 tablet by mouth 2 times daily (with meals) 180 tablet 1    vitamin B-12 (CYANOCOBALAMIN) 1000 MCG tablet TAKE 1 TABLET BY MOUTH ONE TIME A DAY 90 tablet 3    aspirin (SM ASPIRIN ADULT LOW STRENGTH) 81 MG EC tablet TAKE 1 TABLET BY MOUTH ONE TIME A DAY 90 tablet 3    insulin glargine (BASAGLAR KWIKPEN) 100 UNIT/ML injection pen Inject 65 Units into the skin 2 times daily 10 pen 5    Insulin Pen Needle (UNIFINE PENTIPS) 31G X 8 MM MISC Use DAILY as directed WITH INSULIN PENS-- DO NOT RE-USE needles 100 each 3    MEIJER LANCETS UNIVERSAL 33G MISC use to test blood sugar once a day 100 each 3    lurasidone (LATUDA) 120 MG tablet Take 1 tablet by mouth daily 30 tablet 5    tamsulosin (FLOMAX) 0.4 MG capsule TAKE 1 CAPSULE BY MOUTH ONE TIME A DAY  90 capsule 3    atorvastatin (LIPITOR) 80 MG tablet TAKE 1 TABLET BY MOUTH ONE TIME A DAY 90 tablet 3    JANUVIA 100 MG tablet TAKE 1 TABLET BY MOUTH ONE TIME A DAY  90 tablet 3    fenofibrate 160 MG tablet TAKE 1 TABLET BY MOUTH ONE TIME A DAY  90 tablet 3    VORTIoxetine HBr (TRINTELLIX) 20 MG TABS tablet Take 1 tablet by mouth daily 30 tablet 5    lisinopril (PRINIVIL;ZESTRIL) 5 MG tablet Take 0.5 tablets by mouth daily 45 tablet 3    ARTIFICIAL TEARS 1.4 % ophthalmic solution Place 1 drop into both eyes 4 times daily  0    omeprazole (PRILOSEC) 20 MG delayed release capsule TAKE 2 CAPSULES BY MOUTH ONE TIME A DAY  180 capsule 3    sildenafil (VIAGRA) 50 MG tablet Take 1 tablet by mouth daily as needed for Erectile Dysfunction Take one tablet one hour before sexual activity. 6 tablet 5    clotrimazole-betamethasone (LOTRISONE) 1-0.05 % cream Apply topically 2 times daily. Not to exceed 2 weeks consecutively. 1 Tube 1    TRUE METRIX BLOOD GLUCOSE TEST strip check blood sugar one time daily 100 each 2    rOPINIRole (REQUIP) 3 MG tablet Take 3 mg by mouth nightly      ranitidine (ZANTAC) 150 MG tablet Take 150 mg by mouth nightly       butalbital-acetaminophen-caffeine (FIORICET) -40 MG per tablet Take 1 tablet by mouth every 6 hours as needed for Migraine 60 tablet 0    cetirizine (ZYRTEC) 10 MG tablet daily       EYE ITCH RELIEF 0.025 % ophthalmic solution Place 2 drops into both eyes as needed       propranolol (INDERAL LA) 160 MG ER capsule Take 160 mg by mouth daily       fluticasone (FLONASE) 50 MCG/ACT nasal spray 1 spray by Nasal route daily as needed.  gabapentin (NEURONTIN) 800 MG tablet   Take 800 mg by mouth 4 times daily        No current facility-administered medications for this visit.

## 2020-04-11 RX ORDER — LISINOPRIL 5 MG/1
TABLET ORAL
Qty: 45 TABLET | Refills: 3 | Status: SHIPPED | OUTPATIENT
Start: 2020-04-11 | End: 2021-05-10

## 2020-04-11 RX ORDER — OMEPRAZOLE 20 MG/1
CAPSULE, DELAYED RELEASE ORAL
Qty: 180 CAPSULE | Refills: 3 | Status: SHIPPED | OUTPATIENT
Start: 2020-04-11 | End: 2021-04-06

## 2020-04-13 RX ORDER — VORTIOXETINE 20 MG/1
TABLET, FILM COATED ORAL
Qty: 30 TABLET | Refills: 0 | Status: SHIPPED | OUTPATIENT
Start: 2020-04-13 | End: 2020-05-08 | Stop reason: SDUPTHER

## 2020-04-13 RX ORDER — PEN NEEDLE, DIABETIC 31 GX5/16"
NEEDLE, DISPOSABLE MISCELLANEOUS
Qty: 100 EACH | Refills: 3 | Status: SHIPPED | OUTPATIENT
Start: 2020-04-13 | End: 2020-06-15 | Stop reason: SDUPTHER

## 2020-04-13 NOTE — TELEPHONE ENCOUNTER
Leif Sykes has requested a refill of Trintellix 20mg/d on [employer]. He attended an appointment in the office 3/24 and I scheduled to return 6/23.

## 2020-04-20 ENCOUNTER — TELEPHONE (OUTPATIENT)
Dept: UROLOGY | Age: 50
End: 2020-04-20

## 2020-05-06 ENCOUNTER — TELEPHONE (OUTPATIENT)
Dept: FAMILY MEDICINE CLINIC | Age: 50
End: 2020-05-06

## 2020-05-07 NOTE — TELEPHONE ENCOUNTER
Mahin Valentin called requesting a refill on the following medications:  Requested Prescriptions     Pending Prescriptions Disp Refills    sildenafil (VIAGRA) 50 MG tablet 6 tablet 5     Sig: Take 1 tablet by mouth daily as needed for Erectile Dysfunction Take one tablet one hour before sexual activity.        Date of last visit: 1/22/20  Date of next visit (if applicable): 7/1124

## 2020-05-08 RX ORDER — SILDENAFIL 50 MG/1
50 TABLET, FILM COATED ORAL DAILY PRN
Qty: 6 TABLET | Refills: 5 | Status: SHIPPED | OUTPATIENT
Start: 2020-05-08

## 2020-05-08 NOTE — TELEPHONE ENCOUNTER
Claribel Celis called the office to obtain refills of Adderall 30mg BID and Trintellix 20mg/d. He attended an appointment in the office 3/24 and is scheduled to return 6/23.

## 2020-05-11 RX ORDER — DEXTROAMPHETAMINE SACCHARATE, AMPHETAMINE ASPARTATE, DEXTROAMPHETAMINE SULFATE AND AMPHETAMINE SULFATE 7.5; 7.5; 7.5; 7.5 MG/1; MG/1; MG/1; MG/1
30 TABLET ORAL 2 TIMES DAILY
Qty: 60 TABLET | Refills: 0 | Status: SHIPPED | OUTPATIENT
Start: 2020-05-11 | End: 2020-06-19 | Stop reason: SDUPTHER

## 2020-05-18 RX ORDER — INSULIN GLARGINE 100 [IU]/ML
INJECTION, SOLUTION SUBCUTANEOUS
Qty: 30 ML | Refills: 5 | Status: SHIPPED | OUTPATIENT
Start: 2020-05-18 | End: 2020-10-05

## 2020-05-19 ENCOUNTER — NURSE ONLY (OUTPATIENT)
Dept: LAB | Age: 50
End: 2020-05-19

## 2020-05-19 LAB
ALBUMIN SERPL-MCNC: 4.3 G/DL (ref 3.5–5.1)
ALP BLD-CCNC: 99 U/L (ref 38–126)
ALT SERPL-CCNC: 94 U/L (ref 11–66)
AST SERPL-CCNC: 65 U/L (ref 5–40)
BASOPHILS # BLD: 0.9 %
BASOPHILS ABSOLUTE: 0.1 THOU/MM3 (ref 0–0.1)
BILIRUB SERPL-MCNC: 0.3 MG/DL (ref 0.3–1.2)
BILIRUBIN DIRECT: < 0.2 MG/DL (ref 0–0.3)
CREATININE, URINE: 204.9 MG/DL
EOSINOPHIL # BLD: 3.7 %
EOSINOPHILS ABSOLUTE: 0.3 THOU/MM3 (ref 0–0.4)
ERYTHROCYTE [DISTWIDTH] IN BLOOD BY AUTOMATED COUNT: 13.5 % (ref 11.5–14.5)
ERYTHROCYTE [DISTWIDTH] IN BLOOD BY AUTOMATED COUNT: 46.7 FL (ref 35–45)
FERRITIN: 130 NG/ML (ref 22–322)
HCT VFR BLD CALC: 44 % (ref 42–52)
HEMOGLOBIN: 14.5 GM/DL (ref 14–18)
IMMATURE GRANS (ABS): 0.12 THOU/MM3 (ref 0–0.07)
IMMATURE GRANULOCYTES: 1.7 %
IRON: 111 UG/DL (ref 65–195)
LYMPHOCYTES # BLD: 22 %
LYMPHOCYTES ABSOLUTE: 1.6 THOU/MM3 (ref 1–4.8)
MCH RBC QN AUTO: 31.3 PG (ref 26–33)
MCHC RBC AUTO-ENTMCNC: 33 GM/DL (ref 32.2–35.5)
MCV RBC AUTO: 94.8 FL (ref 80–94)
MICROALBUMIN UR-MCNC: 1.82 MG/DL
MICROALBUMIN/CREAT UR-RTO: 9 MG/G (ref 0–30)
MONOCYTES # BLD: 6.8 %
MONOCYTES ABSOLUTE: 0.5 THOU/MM3 (ref 0.4–1.3)
NUCLEATED RED BLOOD CELLS: 0 /100 WBC
PLATELET # BLD: 145 THOU/MM3 (ref 130–400)
PMV BLD AUTO: 11.5 FL (ref 9.4–12.4)
PROSTATE SPECIFIC ANTIGEN: 0.31 NG/ML (ref 0–1)
RBC # BLD: 4.64 MILL/MM3 (ref 4.7–6.1)
SEG NEUTROPHILS: 64.9 %
SEGMENTED NEUTROPHILS ABSOLUTE COUNT: 4.6 THOU/MM3 (ref 1.8–7.7)
TOTAL IRON BINDING CAPACITY: 368 UG/DL (ref 171–450)
TOTAL PROTEIN: 6.8 G/DL (ref 6.1–8)
WBC # BLD: 7.1 THOU/MM3 (ref 4.8–10.8)

## 2020-05-20 ENCOUNTER — OFFICE VISIT (OUTPATIENT)
Dept: FAMILY MEDICINE CLINIC | Age: 50
End: 2020-05-20
Payer: COMMERCIAL

## 2020-05-20 VITALS
BODY MASS INDEX: 45.64 KG/M2 | DIASTOLIC BLOOD PRESSURE: 80 MMHG | HEART RATE: 96 BPM | SYSTOLIC BLOOD PRESSURE: 136 MMHG | OXYGEN SATURATION: 98 % | WEIGHT: 284 LBS | TEMPERATURE: 97.9 F | RESPIRATION RATE: 16 BRPM | HEIGHT: 66 IN

## 2020-05-20 LAB — HBA1C MFR BLD: 7 % (ref 4.3–5.7)

## 2020-05-20 PROCEDURE — 1036F TOBACCO NON-USER: CPT | Performed by: FAMILY MEDICINE

## 2020-05-20 PROCEDURE — 99214 OFFICE O/P EST MOD 30 MIN: CPT | Performed by: FAMILY MEDICINE

## 2020-05-20 PROCEDURE — G8427 DOCREV CUR MEDS BY ELIG CLIN: HCPCS | Performed by: FAMILY MEDICINE

## 2020-05-20 PROCEDURE — G8417 CALC BMI ABV UP PARAM F/U: HCPCS | Performed by: FAMILY MEDICINE

## 2020-05-20 PROCEDURE — 3017F COLORECTAL CA SCREEN DOC REV: CPT | Performed by: FAMILY MEDICINE

## 2020-05-20 PROCEDURE — 2022F DILAT RTA XM EVC RTNOPTHY: CPT | Performed by: FAMILY MEDICINE

## 2020-05-20 PROCEDURE — 83036 HEMOGLOBIN GLYCOSYLATED A1C: CPT | Performed by: FAMILY MEDICINE

## 2020-05-20 PROCEDURE — 3051F HG A1C>EQUAL 7.0%<8.0%: CPT | Performed by: FAMILY MEDICINE

## 2020-05-20 NOTE — PROGRESS NOTES
capsule TAKE 2 CAPSULES BY MOUTH ONE TIME A DAY  180 capsule 3    lisinopril (PRINIVIL;ZESTRIL) 5 MG tablet TAKE 1/2 TABLET BY MOUTH ONE TIME A DAY  45 tablet 3    buPROPion HCl ER, XL, 450 MG TB24 Take 450 mg by mouth daily 30 tablet 5    traZODone (DESYREL) 300 MG tablet Take 1 tablet by mouth nightly 30 tablet 5    metFORMIN (GLUCOPHAGE) 1000 MG tablet TAKE 1 TABLET BY MOUTH TWO TIMES A DAY WITH MEALS 180 tablet 3    azelastine (ASTELIN) 0.1 % nasal spray Inhale 2 sprays in each nostril twice a day as needed      TOVIAZ 4 MG TB24 ER tablet TAKE 1 TABLET BY MOUTH ONE TIME A DAY      lamoTRIgine (LAMICTAL) 200 MG tablet Take 1 tablet by mouth daily 30 tablet 5    vitamin B-12 (CYANOCOBALAMIN) 1000 MCG tablet TAKE 1 TABLET BY MOUTH ONE TIME A DAY 90 tablet 3    aspirin (SM ASPIRIN ADULT LOW STRENGTH) 81 MG EC tablet TAKE 1 TABLET BY MOUTH ONE TIME A DAY 90 tablet 3    MEIJER LANCETS UNIVERSAL 33G MISC use to test blood sugar once a day 100 each 3    tamsulosin (FLOMAX) 0.4 MG capsule TAKE 1 CAPSULE BY MOUTH ONE TIME A DAY  90 capsule 3    atorvastatin (LIPITOR) 80 MG tablet TAKE 1 TABLET BY MOUTH ONE TIME A DAY 90 tablet 3    JANUVIA 100 MG tablet TAKE 1 TABLET BY MOUTH ONE TIME A DAY  90 tablet 3    fenofibrate 160 MG tablet TAKE 1 TABLET BY MOUTH ONE TIME A DAY  90 tablet 3    ARTIFICIAL TEARS 1.4 % ophthalmic solution Place 1 drop into both eyes 4 times daily  0    clotrimazole-betamethasone (LOTRISONE) 1-0.05 % cream Apply topically 2 times daily. Not to exceed 2 weeks consecutively.  1 Tube 1    TRUE METRIX BLOOD GLUCOSE TEST strip check blood sugar one time daily 100 each 2    rOPINIRole (REQUIP) 3 MG tablet Take 3 mg by mouth nightly      ranitidine (ZANTAC) 150 MG tablet Take 150 mg by mouth nightly       butalbital-acetaminophen-caffeine (FIORICET) -40 MG per tablet Take 1 tablet by mouth every 6 hours as needed for Migraine 60 tablet 0    cetirizine (ZYRTEC) 10 MG tablet daily  EYE ITCH RELIEF 0.025 % ophthalmic solution Place 2 drops into both eyes as needed       propranolol (INDERAL LA) 160 MG ER capsule Take 160 mg by mouth daily       fluticasone (FLONASE) 50 MCG/ACT nasal spray 1 spray by Nasal route daily as needed.  gabapentin (NEURONTIN) 800 MG tablet   Take 800 mg by mouth 4 times daily        No current facility-administered medications for this visit. No orders of the defined types were placed in this encounter. All medications reviewed and reconciled, including OTC and herbal medications. Updated list given to patient. Patient Active Problem List    Diagnosis Date Noted    Obstructive sleep apnea on CPAP 11/03/2015     Priority: High    WESLEY (nonalcoholic steatohepatitis)     ADHD, adult residual type 03/24/2020    Closed fracture of right tibial plateau 26/28/7332    Normocytic anemia 02/19/2019    B12 deficiency     [facility], type 2) [facility]     Benign prostatic hyperplasia     Morbid obesity with BMI of 40.0-44.9, adult ([facility]     Hypertriglyceridemia 10/27/2015    BPPV (benign paroxysmal positional vertigo) 09/29/2015    Short-segment Vásquez's esophagus 06/17/2015     Following with Sussy Webster for this.  Pulmonary nodule      stable >2 years, no further w/u required.  Bipolar 1 disorder ([facility]     CAD (coronary artery disease)      Unclear history, cath 2013 with patent arteries. Records pending.  History of TIA (transient ischemic attack)      x2      Hypertension     Fibromyalgia     Depression     History of colon polyps     Chronic back pain     ADHD (attention deficit hyperactivity disorder)     Former smoker     Peripheral neuropathy     Tarsal tunnel syndrome     Benign esophageal stricture     Hyperlipidemia     Allergic rhinitis     Migraine headache     Dysphagia s/p dilation.  04/01/2014    GERD (gastroesophageal reflux disease) 10/15/2013    S/P cardiac catheterization: 9/30/2013: with them to his next appointment. Barriers to learning and self management: none    Discussed use, benefit, and side effects of prescribed medications. Barriers to medication compliance addressed. All patient questions answered. Pt voiced understanding.        Electronically signed by Ranelle Kayser, DO on 5/20/2020 at 8:01 AM

## 2020-05-20 NOTE — PATIENT INSTRUCTIONS
LAB INSTRUCTIONS:    Please complete labs within 4 week(s). Please fast for 8 hours prior to lab collection. The clinic will call you within 1 week of collection. If you have not heard from us within that amount of time, please call us at [phone]. Patient Education        Diabetes Foot Health: Care Instructions  Your Care Instructions    When you have diabetes, your feet need extra care and attention. Diabetes can damage the nerve endings and blood vessels in your feet, making you less likely to notice when your feet are injured. Diabetes also limits your body's ability to fight infection and get blood to areas that need it. If you get a minor foot injury, it could become an ulcer or a serious infection. With good foot care, you can prevent most of these problems. Caring for your feet can be quick and easy. Most of the care can be done when you are bathing or getting ready for bed. Follow-up care is a key part of your treatment and safety. Be sure to make and go to all appointments, and call your doctor if you are having problems. It's also a good idea to know your test results and keep a list of the medicines you take. How can you care for yourself at home? · Keep your blood sugar close to normal by watching what and how much you eat, monitoring blood sugar, taking medicines if prescribed, and getting regular exercise. · Do not smoke. Smoking affects blood flow and can make foot problems worse. If you need help quitting, talk to your doctor about stop-smoking programs and medicines. These can increase your chances of quitting for good. · Eat a diet that is low in fats. High fat intake can cause fat to build up in your blood vessels and decrease blood flow. · Inspect your feet daily for blisters, cuts, cracks, or sores. If you cannot see well, use a mirror or have someone help you. · Take care of your feet:  ? Wash your feet every day. Use warm (not hot) water.  Check the water temperature with

## 2020-05-28 ENCOUNTER — OFFICE VISIT (OUTPATIENT)
Dept: PULMONOLOGY | Age: 50
End: 2020-05-28
Payer: COMMERCIAL

## 2020-05-28 VITALS
BODY MASS INDEX: 46.45 KG/M2 | WEIGHT: 278.8 LBS | OXYGEN SATURATION: 98 % | TEMPERATURE: 99.6 F | HEIGHT: 65 IN | HEART RATE: 94 BPM | SYSTOLIC BLOOD PRESSURE: 138 MMHG | DIASTOLIC BLOOD PRESSURE: 76 MMHG

## 2020-05-28 PROCEDURE — G8427 DOCREV CUR MEDS BY ELIG CLIN: HCPCS | Performed by: NURSE PRACTITIONER

## 2020-05-28 PROCEDURE — 1036F TOBACCO NON-USER: CPT | Performed by: NURSE PRACTITIONER

## 2020-05-28 PROCEDURE — 3017F COLORECTAL CA SCREEN DOC REV: CPT | Performed by: NURSE PRACTITIONER

## 2020-05-28 PROCEDURE — G8417 CALC BMI ABV UP PARAM F/U: HCPCS | Performed by: NURSE PRACTITIONER

## 2020-05-28 PROCEDURE — 99213 OFFICE O/P EST LOW 20 MIN: CPT | Performed by: NURSE PRACTITIONER

## 2020-05-28 NOTE — PROGRESS NOTES
Unclear history, cath  with patent arteries. Records pending.  Chronic back pain     Depression     [facility]2 [facility]diabetes mellitus, type 2) [facility]     Dysfunctional voiding of urine 2013    Medications he changes to follow,  Adding ditropan  to the present schedule     ED (erectile dysfunction) of organic origin 2013    Fibromyalgia     Former smoker     GERD (gastroesophageal reflux disease)     History of colon polyps     Pt unsure when last colo.  History of TIA (transient ischemic attack)     x2     Hyperlipidemia     Hypertension     Hypertriglyceridemia 10/27/2015    Migraine headache     Morbid obesity with BMI of 40.0-44.9, adult ([facility]     WESLEY (nonalcoholic steatohepatitis)     Obstructive sleep apnea on CPAP 11/3/2015    Peripheral neuropathy     Pulmonary nodule     stable >2 years, no further w/u required.  Rotator cuff injury     Left side    S/P cardiac catheterization: 2013: Normal coronaries 2013: Normal coronaries. Radial approach. Dr. Anjali Garcia Short-segment Vásquez's esophagus 2015    Tarsal tunnel syndrome     Bilateral       Past Surgical History:   Procedure Laterality Date    CARDIAC CATHETERIZATION  10/2013    2013: Normal coronaries. Radial approach. Dr. Anjali Garcia COLONOSCOPY  ,    [place] KNEE SURGERY Left 2012    ROTATOR CUFF REPAIR Left 2013   [place] SHOULDER SURGERY Bilateral    [place] SINUS SURGERY  2004    [place] occ.      TONSILLECTOMY  2002    TYMPANOSTOMY TUBE PLACEMENT      UPPER GASTROINTESTINAL ENDOSCOPY  ,  with dilation,     UPPER GASTROINTESTINAL ENDOSCOPY      UPPER GASTROINTESTINAL ENDOSCOPY  MAY 2014,     Dilation       Social History     Tobacco Use    Smoking status: Former Smoker     Packs/day: 0.75     Years: 30.00     Pack years: 22.50     Types: Cigarettes     Start date: 1984     Last attempt to quit: 2018     Years since quittin.0    Smokeless tobacco: edema.      ASSESSMENT/DIAGNOSIS     Diagnosis Orders   1. IRISH treated with BiPAP  DME Order for CPAP as OP            Plan   Do you need any equipment today? Yes Rx for new supplies.  -Discussed weight gain and possible need for increased pressure (despite controlled AHI). He defers any further evaluation/adjustments at this time. Advised to call with worsening symptoms. -Re-educated on cleaning and when to replace supplies.    - He was advised to continue current positive airway pressure therapy with above described pressure. - He was advised to keep good compliance with current recommended pressure to get optimal results and clinical improvement.  - Recommend 7-9 hours of sleep with PAP treatment. - He was advised to call DME company regarding supplies if needed.   -He is to call my office for earlier appointment if needed for worsening of sleep symptoms.   - He was instructed on weight loss. - Amber Alfaro was educated about my impression and plan and verbalizes understanding. We will see Adebayo De back in: 1 year with download.     MIGNON Childs - CNP  5/28/2020 1:54 PM

## 2020-06-15 RX ORDER — PEN NEEDLE, DIABETIC 31 GX5/16"
NEEDLE, DISPOSABLE MISCELLANEOUS
Qty: 100 EACH | Refills: 3 | Status: SHIPPED | OUTPATIENT
Start: 2020-06-15 | End: 2021-04-16 | Stop reason: SDUPTHER

## 2020-06-15 RX ORDER — TROSPIUM CHLORIDE 20 MG/1
TABLET, FILM COATED ORAL
Qty: 60 TABLET | Refills: 0 | Status: SHIPPED | OUTPATIENT
Start: 2020-06-15 | End: 2020-07-14

## 2020-06-19 RX ORDER — DEXTROAMPHETAMINE SACCHARATE, AMPHETAMINE ASPARTATE, DEXTROAMPHETAMINE SULFATE AND AMPHETAMINE SULFATE 7.5; 7.5; 7.5; 7.5 MG/1; MG/1; MG/1; MG/1
30 TABLET ORAL 2 TIMES DAILY
Qty: 60 TABLET | Refills: 0 | Status: SHIPPED | OUTPATIENT
Start: 2020-06-19 | End: 2020-07-17 | Stop reason: SDUPTHER

## 2020-06-23 ENCOUNTER — VIRTUAL VISIT (OUTPATIENT)
Dept: PSYCHIATRY | Age: 50
End: 2020-06-23
Payer: COMMERCIAL

## 2020-06-23 PROCEDURE — G8428 CUR MEDS NOT DOCUMENT: HCPCS | Performed by: PSYCHIATRY & NEUROLOGY

## 2020-06-23 PROCEDURE — 99213 OFFICE O/P EST LOW 20 MIN: CPT | Performed by: PSYCHIATRY & NEUROLOGY

## 2020-06-23 PROCEDURE — G8417 CALC BMI ABV UP PARAM F/U: HCPCS | Performed by: PSYCHIATRY & NEUROLOGY

## 2020-06-23 PROCEDURE — 1036F TOBACCO NON-USER: CPT | Performed by: PSYCHIATRY & NEUROLOGY

## 2020-06-23 PROCEDURE — 3017F COLORECTAL CA SCREEN DOC REV: CPT | Performed by: PSYCHIATRY & NEUROLOGY

## 2020-06-23 NOTE — PROGRESS NOTES
Chief Complaint   Patient presents with    3 Month Follow-Up     Bipolar ADHD      Cheyenne Dillon is a 44 y.o. male being evaluated by a Virtual Visit (video visit) encounter to address concerns as mentioned above. A caregiver was present when appropriate. Due to this being a TeleHealth encounter (During CASQ-61 public health emergency), evaluation of the following organ systems was limited: Vitals/Constitutional/EENT/Resp/CV/GI//MS/Neuro/Skin/Heme-Lymph-Imm. Pursuant to the emergency declaration under the [facility], [address] [place] [address] authority and the [employer] Act, this Virtual Visit was conducted with patient's (and/or legal guardian's) consent, to reduce the patient's risk of exposure to COVID-19 and provide necessary medical care. The patient (and/or legal guardian) has also been advised to contact this office for worsening conditions or problems, and seek emergency medical treatment and/or call 911 if deemed necessary. Patient identification was verified at the start of the visit: yes    Total time spent for this encounter: Not billed by time    Services were provided through a video synchronous discussion virtually to substitute for in-person clinic visit. Patient and provider were located at their individual homes. --John Davidson MD on 6/23/2020 at 9:26 AM    An electronic signature was used to authenticate this note. [place] tristan was interviewed by fernanda bennett for this appointment. He told me that he is still having some mood swings, but not as severe. He did not ask for any changes in the regimen. He is on quite a few things. He also told me that the anniversary of his wife's death comes up in August, and would like to have an appointment around that time. I will ask the office to schedule him in early August.    I did not make any changes today.   He did need refills on vortioxetine which was provided without change.     Mental Status Examination    Level of consciousness:  within normal limits  Appearance:  well-appearing, street clothes, in chair and fair grooming  Behavior/Motor:  no abnormalities noted  Attitude toward examiner:  cooperative, attentive and fair eye contact  Speech:  spontaneous, normal rate, normal volume and well articulated  Mood:  mild depression  Affect:  mood congruent  Thought processes:  linear, goal directed and coherent  Thought content:  Homocidal ideation: none  Suicidal Ideation:  denies suicidal ideation  Delusions:  no evidence of delusions  Perceptual Disturbance:  denies any perceptual disturbance  Cognition:  oriented to person, place, and time  Concentration succeeded  Memory intact  Fund of knowledge:  good  Abstract thinking:  fair  Insight:  good  Judgment:  good  Anxiety:   Generalized: No  Excessive Worry: No  Panic Attacks: No  Frequency:  Housebound: No   Obsession: No   Compulsion: No  Flashbacks:No  Nightmares No  Hyperarousal No

## 2020-07-13 RX ORDER — FERROUS SULFATE 325(65) MG
TABLET ORAL
Qty: 180 TABLET | Refills: 0 | OUTPATIENT
Start: 2020-07-13

## 2020-07-13 RX ORDER — LAMOTRIGINE 200 MG/1
TABLET ORAL
Qty: 30 TABLET | Refills: 0 | Status: SHIPPED | OUTPATIENT
Start: 2020-07-13 | End: 2020-08-05 | Stop reason: SDUPTHER

## 2020-07-17 RX ORDER — DEXTROAMPHETAMINE SACCHARATE, AMPHETAMINE ASPARTATE, DEXTROAMPHETAMINE SULFATE AND AMPHETAMINE SULFATE 7.5; 7.5; 7.5; 7.5 MG/1; MG/1; MG/1; MG/1
30 TABLET ORAL 2 TIMES DAILY
Qty: 60 TABLET | Refills: 0 | Status: SHIPPED | OUTPATIENT
Start: 2020-07-17 | End: 2020-08-05 | Stop reason: SDUPTHER

## 2020-08-05 ENCOUNTER — VIRTUAL VISIT (OUTPATIENT)
Dept: PSYCHIATRY | Age: 50
End: 2020-08-05
Payer: COMMERCIAL

## 2020-08-05 PROCEDURE — 3017F COLORECTAL CA SCREEN DOC REV: CPT | Performed by: PSYCHIATRY & NEUROLOGY

## 2020-08-05 PROCEDURE — G8428 CUR MEDS NOT DOCUMENT: HCPCS | Performed by: PSYCHIATRY & NEUROLOGY

## 2020-08-05 PROCEDURE — 1036F TOBACCO NON-USER: CPT | Performed by: PSYCHIATRY & NEUROLOGY

## 2020-08-05 PROCEDURE — G8417 CALC BMI ABV UP PARAM F/U: HCPCS | Performed by: PSYCHIATRY & NEUROLOGY

## 2020-08-05 PROCEDURE — 99213 OFFICE O/P EST LOW 20 MIN: CPT | Performed by: PSYCHIATRY & NEUROLOGY

## 2020-08-05 RX ORDER — DEXTROAMPHETAMINE SACCHARATE, AMPHETAMINE ASPARTATE, DEXTROAMPHETAMINE SULFATE AND AMPHETAMINE SULFATE 7.5; 7.5; 7.5; 7.5 MG/1; MG/1; MG/1; MG/1
30 TABLET ORAL 2 TIMES DAILY
Qty: 60 TABLET | Refills: 0 | Status: SHIPPED | OUTPATIENT
Start: 2020-08-05 | End: 2020-09-15 | Stop reason: SDUPTHER

## 2020-08-05 RX ORDER — LAMOTRIGINE 200 MG/1
TABLET ORAL
Qty: 30 TABLET | Refills: 5 | Status: SHIPPED | OUTPATIENT
Start: 2020-08-05 | End: 2021-01-21 | Stop reason: SDUPTHER

## 2020-08-05 NOTE — PROGRESS NOTES
were provided without any changes. Mental Status Examination    Level of consciousness:  within normal limits  Appearance:  well-appearing, street clothes, in, good grooming and good hygiene  Behavior/Motor:  no noted  Attitude toward examiner:  cooperative, attentive and good eye contact  Speech:  spontaneous, normal rate, normal volume and well articulated  Mood:  depressed, mild  Affect:  mood congruent  Thought processes:  linear, goal directed and coherent  Thought content:  Homocidal ideation: none  Suicidal Ideation:  denies suicidal ideation  Delusions:  no evidence of delusions  Perceptual Disturbance:  denies any perceptual disturbance  Cognition:  oriented to person, place, and time  Concentration succeeded  Memory intact  Fund of knowledge:  good  Abstract thinking:  good  Insight:  good  Judgment:  good  Anxiety:   Generalized: No  Excessive Worry: No  Panic Attacks: No  Frequency:  Housebound: No   Obsession: No   Compulsion: No  Flashbacks:No  Nightmares No  Hyperarousal No     DIAGNOSTIC IMPRESSION  Bipolar I dep  ADHD  Bereavement    Plan  No changes  Refer to Dr. Dora Hogan. Current Outpatient Medications   Medication Sig Dispense Refill    amphetamine-dextroamphetamine (ADDERALL, 30MG,) 30 MG tablet Take 1 tablet by mouth 2 times daily for 30 days.  60 tablet 0    lamoTRIgine (LAMICTAL) 200 MG tablet TAKE 1 TABLET BY MOUTH ONE TIME A DAY 30 tablet 5    trospium (SANCTURA) 20 MG tablet TAKE 1 TABLET BY MOUTH TWO TIMES A DAY  60 tablet 5    VORTIoxetine HBr (TRINTELLIX) 20 MG TABS tablet TAKE 1 TABLET BY MOUTH ONE TIME A DAY 30 tablet 5    Insulin Pen Needle (UNIFINE PENTIPS) 31G X 8 MM MISC Use DAILY as directed WITH INSULIN PENS-- DO NOT RE-USE needles 100 each 3    BASAGLAR KWIKPEN 100 UNIT/ML injection pen INJECT 65 UNITS INTO THE SKIN TWO TIMES A DAY 30 mL 5    LATUDA 120 MG tablet TAKE 1 TABLET BY MOUTH ONE TIME A DAY  30 tablet 5    piroxicam (FELDENE) 20 MG capsule TAKE 1 CAPSULE BY MOUTH ONE TIME A DAY AS NEEDED FOR PAIN 90 capsule 0    sildenafil (VIAGRA) 50 MG tablet Take 1 tablet by mouth daily as needed for Erectile Dysfunction Take one tablet one hour before sexual activity. 6 tablet 5    omeprazole (PRILOSEC) 20 MG delayed release capsule TAKE 2 CAPSULES BY MOUTH ONE TIME A DAY  180 capsule 3    lisinopril (PRINIVIL;ZESTRIL) 5 MG tablet TAKE 1/2 TABLET BY MOUTH ONE TIME A DAY  45 tablet 3    buPROPion HCl ER, XL, 450 MG TB24 Take 450 mg by mouth daily 30 tablet 5    traZODone (DESYREL) 300 MG tablet Take 1 tablet by mouth nightly 30 tablet 5    metFORMIN (GLUCOPHAGE) 1000 MG tablet TAKE 1 TABLET BY MOUTH TWO TIMES A DAY WITH MEALS 180 tablet 3    azelastine (ASTELIN) 0.1 % nasal spray Inhale 2 sprays in each nostril twice a day as needed      TOVIAZ 4 MG TB24 ER tablet TAKE 1 TABLET BY MOUTH ONE TIME A DAY      vitamin B-12 (CYANOCOBALAMIN) 1000 MCG tablet TAKE 1 TABLET BY MOUTH ONE TIME A DAY 90 tablet 3    aspirin (SM ASPIRIN ADULT LOW STRENGTH) 81 MG EC tablet TAKE 1 TABLET BY MOUTH ONE TIME A DAY 90 tablet 3    MEIJER LANCETS UNIVERSAL 33G MISC use to test blood sugar once a day 100 each 3    tamsulosin (FLOMAX) 0.4 MG capsule TAKE 1 CAPSULE BY MOUTH ONE TIME A DAY  90 capsule 3    atorvastatin (LIPITOR) 80 MG tablet TAKE 1 TABLET BY MOUTH ONE TIME A DAY 90 tablet 3    JANUVIA 100 MG tablet TAKE 1 TABLET BY MOUTH ONE TIME A DAY  90 tablet 3    fenofibrate 160 MG tablet TAKE 1 TABLET BY MOUTH ONE TIME A DAY  90 tablet 3    ARTIFICIAL TEARS 1.4 % ophthalmic solution Place 1 drop into both eyes 4 times daily  0    clotrimazole-betamethasone (LOTRISONE) 1-0.05 % cream Apply topically 2 times daily. Not to exceed 2 weeks consecutively.  1 Tube 1    TRUE METRIX BLOOD GLUCOSE TEST strip check blood sugar one time daily 100 each 2    rOPINIRole (REQUIP) 3 MG tablet Take 3 mg by mouth nightly      ranitidine (ZANTAC) 150 MG tablet Take 150 mg by mouth nightly       butalbital-acetaminophen-caffeine (FIORICET) -40 MG per tablet Take 1 tablet by mouth every 6 hours as needed for Migraine 60 tablet 0    cetirizine (ZYRTEC) 10 MG tablet daily       EYE ITCH RELIEF 0.025 % ophthalmic solution Place 2 drops into both eyes as needed       propranolol (INDERAL LA) 160 MG ER capsule Take 160 mg by mouth daily       fluticasone (FLONASE) 50 MCG/ACT nasal spray 1 spray by Nasal route daily as needed.  gabapentin (NEURONTIN) 800 MG tablet   Take 800 mg by mouth 4 times daily        No current facility-administered medications for this visit.

## 2020-08-21 ENCOUNTER — TELEPHONE (OUTPATIENT)
Dept: FAMILY MEDICINE CLINIC | Age: 50
End: 2020-08-21

## 2020-08-21 NOTE — TELEPHONE ENCOUNTER
Please let pt know he is due for 3 month f/u labs to monitor iron levels off iron    He's still also due for labs ordered in May    I've ordered new labs and printed out old ones    All by printer tray    Fasting    Let me know if questions, thanks! Diagnosis Orders   1.  Iron deficiency anemia, unspecified iron deficiency anemia type  CBC Auto Differential    Ferritin    Iron    Iron Binding Capacity       Future Appointments   Date Time Provider Department Center   11/2/2020  9:40 AM Willie Chavarria MD [facility] PSYCHIA [facility] [email or web address]   11/20/2020  7:40 AM Elliot Tam DO [facility] [place] [facility] [place]   1/26/2021  9:00 AM MD [place] Akins [place] Urology [facility] [place] [facility] [email or web address]   5/28/2021  8:00 AM MIGNON Eisenberg - CNP Pulm Med [facility] [place]

## 2020-09-07 ENCOUNTER — NURSE ONLY (OUTPATIENT)
Dept: LAB | Age: 50
End: 2020-09-07

## 2020-09-07 LAB
ALBUMIN SERPL-MCNC: 4.2 G/DL (ref 3.5–5.1)
ALP BLD-CCNC: 105 U/L (ref 38–126)
ALT SERPL-CCNC: 103 U/L (ref 11–66)
AST SERPL-CCNC: 84 U/L (ref 5–40)
BASOPHILS # BLD: 0.7 %
BASOPHILS ABSOLUTE: 0 THOU/MM3 (ref 0–0.1)
BILIRUB SERPL-MCNC: 0.2 MG/DL (ref 0.3–1.2)
BILIRUBIN DIRECT: < 0.2 MG/DL (ref 0–0.3)
EOSINOPHIL # BLD: 3.3 %
EOSINOPHILS ABSOLUTE: 0.2 THOU/MM3 (ref 0–0.4)
ERYTHROCYTE [DISTWIDTH] IN BLOOD BY AUTOMATED COUNT: 13 % (ref 11.5–14.5)
ERYTHROCYTE [DISTWIDTH] IN BLOOD BY AUTOMATED COUNT: 44.6 FL (ref 35–45)
FERRITIN: 110 NG/ML (ref 22–322)
HCT VFR BLD CALC: 40.5 % (ref 42–52)
HEMOGLOBIN: 13.4 GM/DL (ref 14–18)
HEPATITIS B SURFACE ANTIGEN: NEGATIVE
HEPATITIS C ANTIBODY: NEGATIVE
IMMATURE GRANS (ABS): 0.04 THOU/MM3 (ref 0–0.07)
IMMATURE GRANULOCYTES: 0.9 %
IRON: 64 UG/DL (ref 65–195)
LYMPHOCYTES # BLD: 22.8 %
LYMPHOCYTES ABSOLUTE: 1 THOU/MM3 (ref 1–4.8)
MCH RBC QN AUTO: 31.5 PG (ref 26–33)
MCHC RBC AUTO-ENTMCNC: 33.1 GM/DL (ref 32.2–35.5)
MCV RBC AUTO: 95.1 FL (ref 80–94)
MONOCYTES # BLD: 7.4 %
MONOCYTES ABSOLUTE: 0.3 THOU/MM3 (ref 0.4–1.3)
NUCLEATED RED BLOOD CELLS: 0 /100 WBC
PLATELET # BLD: 112 THOU/MM3 (ref 130–400)
PMV BLD AUTO: 11.6 FL (ref 9.4–12.4)
RBC # BLD: 4.26 MILL/MM3 (ref 4.7–6.1)
SEG NEUTROPHILS: 64.9 %
SEGMENTED NEUTROPHILS ABSOLUTE COUNT: 3 THOU/MM3 (ref 1.8–7.7)
TOTAL IRON BINDING CAPACITY: 341 UG/DL (ref 171–450)
TOTAL PROTEIN: 6.6 G/DL (ref 6.1–8)
WBC # BLD: 4.6 THOU/MM3 (ref 4.8–10.8)

## 2020-09-08 LAB — ALPHA-1 ANTITRYPSIN: 154 MG/DL (ref 90–200)

## 2020-09-09 LAB — CERULOPLASMIN: 22 MG/DL (ref 17–54)

## 2020-09-10 LAB — ANA SCREEN: NORMAL

## 2020-09-11 LAB — IMMUNOFIXATION WITH QUANT: NORMAL

## 2020-09-14 ENCOUNTER — TELEPHONE (OUTPATIENT)
Dept: FAMILY MEDICINE CLINIC | Age: 50
End: 2020-09-14

## 2020-09-14 NOTE — TELEPHONE ENCOUNTER
----- Message from [employer], DO sent at 9/13/2020  4:24 PM EDT -----  Please let pt know that labs ok other than LFT still mildly elevated and has VERY mild anemia  Recommend f/u GI, Brad Westbrook to f/u on liver. When is his next apt? Let me know, thanks! As for anemia etc, would like to repeat cbc in 1 wk, non-fasting is fine. Lab did not run TSH, will have that done at same time as cbc  Let me know if questions, thanks!

## 2020-09-15 RX ORDER — DEXTROAMPHETAMINE SACCHARATE, AMPHETAMINE ASPARTATE, DEXTROAMPHETAMINE SULFATE AND AMPHETAMINE SULFATE 7.5; 7.5; 7.5; 7.5 MG/1; MG/1; MG/1; MG/1
30 TABLET ORAL 2 TIMES DAILY
Qty: 60 TABLET | Refills: 0 | Status: SHIPPED | OUTPATIENT
Start: 2020-09-15 | End: 2020-10-16 | Stop reason: SDUPTHER

## 2020-09-15 NOTE — TELEPHONE ENCOUNTER
[facility] called in requesting a refill on adderall 30mg with disp. #60. [facility] was seen in the office on 8/5 and scheduled for future appt. On 11/2.  Loaded pending your approval.

## 2020-10-05 ENCOUNTER — HOSPITAL ENCOUNTER (OUTPATIENT)
Dept: PHYSICAL THERAPY | Age: 50
Setting detail: THERAPIES SERIES
Discharge: HOME | End: 2020-10-05
Payer: COMMERCIAL

## 2020-10-05 PROCEDURE — 97116 GAIT TRAINING THERAPY: CPT

## 2020-10-05 PROCEDURE — 97162 PT EVAL MOD COMPLEX 30 MIN: CPT

## 2020-10-05 RX ORDER — INSULIN GLARGINE 100 [IU]/ML
INJECTION, SOLUTION SUBCUTANEOUS
Qty: 15 PEN | Refills: 5 | Status: SHIPPED | OUTPATIENT
Start: 2020-10-05 | End: 2021-01-11 | Stop reason: SDUPTHER

## 2020-10-05 NOTE — FLOWSHEET NOTE
** PLEASE SIGN, DATE AND TIME CERTIFICATION BELOW AND RETURN TO [facility] OUTPATIENT REHABILITATION (FAX #: [phone]). ATTEST/CO-SIGN IF ACCESSING VIA [facility]. THANK YOU.**    I certify that I have examined the patient below and determined that Physical Medicine and Rehabilitation service is necessary and that I approve the established plan of care for up to 90 days or as specifically noted. Attestation, signature or co-signature of physician indicates approval of certification requirements.    ________________________ ____________ __________  Physician Signature   Date   Time  [address] [facility]  PHYSICAL THERAPY  [x] EVALUATION  [] DAILY NOTE (LAND) [] DAILY NOTE (AQUATIC ) [] PROGRESS NOTE [] DISCHARGE NOTE    [x] [facility]   [] [facility]    [] [facility]   [] Lety Mcadams    Date: 10/5/2020  Patient Name:  Yanet Herrera  : 1970  MRN: [id number]    Referring Practitioner Quentin Freeman MD   Diagnosis LEFT KNEE PAIN  Patellofemoral pain syndrome. Treatment Diagnosis Left knee pain with standing and walking. Date of Evaluation 10/5/20    Additional Pertinent History HTN, DM, history of tarsal tunnel syndrome, TIA,- left sided weakness. chronic back pain, ADHD, bipolar, history of fracture right tibia 2018. Functional Outcome Measure Used LE FUNCTIONAL INDEX   Functional Outcome Score 27/80 (10/5/20)       Insurance: Primary: Payor: [address] [place] [address]  [address] /  /  / ,   Secondary:    Authorization Information: No precertification, PT, OT, ST are allowed 30 visits each per calendar year. Aquatic yes, modalities yes iontophoresis, HP, CP are not covered. telehealth covered. Visit # 1, 1/10 for progress note   Visits Allowed:    Recertification Date:    Physician Follow-Up: Following PT    Physician Orders: PT left knee pain, patellofemoral pain, TIA, left sided weakness.  Evaluate border of left foot with foot rolling into supination with standing and walking due to weakness at left ankle. ER of right/left LE with toeing out. Heavy lean on quad cane. Range of Motion Left knee AROM: 20 degrees to 108 painfree ranges. Strength Strength left knee Not assessed due to pain with AROM. Ankle df strength right 5/10 left ankle eversion 3+/5. Inversion 3+5. Dorsiflexion left 4-/5        Sensation Reports pins and needles with touch on left ue/le. numnbess left elbow and up to shoulder. Bed Mobility N/A   Transfers Modified independent with heavy lean on UEs with sit<>stand. Ambulation Patient ambulates with quad cane on left side. With correction to right side noted patient more off balance. Note with ambulation mild foot drop and supination of foot with walking on outer border of left foot. ER at hip, lateral shift of trunk to left, decreased wt shift through LLE. Stairs Not assessed. Knee pain with walking level surface. Balance Note wide stance. With narrowing stance patient leans backwars and requires support of therapist. Tandem stance and step stance 0. Special Tests Apprehension and pain with patellar mobility. Even did not tolerate light touch to knee or patellar tendon region. TREATMENT   Precautions: Left hemiparesis, LLE weakness, unsteady gait, decreased balance, shortness of breath with activity. Pain: 1/10 resting pain 8/10 with standing and walking    X in shaded column indicates activity completed today   Modalities Parameters/  Location  Notes                     Manual Therapy Time/Technique  Notes                     Exercise/Intervention   Notes   Rock Tape (kinesiotape to left knee) for patellofemoral malalignment   x Horseshoe shape around the left knee. Anchoring at patellar tendon.            Gait training with cane in right hand instead of left.    x High fall risk with leaning and CGA of therapist. Switched cane back to left hand more steady    Cane height adjustment   x                                                       Specific Interventions Next Treatment: KT tape, ROM, strengthening VMO portion, work on hip and ankle strength as well, balance in // bars. Unsteady gait, moderate fall risk. Safer with cane in left UE . Trialed it in right hand but unsteady and high fall risk. Activity/Treatment Tolerance:painful left knee patellofemoral region. Note malalignment at patella as well as at hip, ankle due to weakness LLE. []  Patient tolerated treatment well  []  Patient limited by fatigue  [x]  Patient limited by pain   []  Patient limited by medical complications  []  Other:     Assessment: Patient has left knee pain with walking, standing, bending. Elevated pain levels can get as high as an 8/10. Patient has limited knee ROM actively 20 degrees to 108 degrees flexion, decreased strength at hip, knee and ankle muscles LLE, decreased core strength, decreased balance, Note unsteady gait pattern with lateral shift of trunk, Cane height readjusted and tried ambulation with cane in right hand instead of left hand to increase base of support and decrease stress on left knee but further fall risk and unsteady. Will follow patient 2x per week instead of 3x per week per patient preference. Body Structures/Functions/Activity Limitations: edema, impaired activity tolerance, impaired balance, impaired endurance, impaired ROM, impaired safety awareness, impaired sensation, impaired strength, pain and abnormal gait  Prognosis: fair    GOALS:  Patient Goal: To have less knee pain, walk better, better balance. Short Term Goals:  Time Frame: 4 weeks  1.  Patient to report of decreased left knee pain with walking and standing, ROM with pain level from 8/10 to 4/10.  2. Patient to demonstrate increased painfree AROM left knee from 20 degrees to 108 degrees to 10 degrees to 115 degrees with increased ease with transfers, ambulation, negotiating single step at his home. 3. Patient to demonstrate increased LLE strength at hip musculature 4-/5, knee no quad lag,ankle eversion, dorsiflexion 3+/5 to 4/5 with improved placement and alignment at LLE, improved stability through LLE hip, knee and ankle with wb.   4.  Improved sit<>stand transition from standard chair with armrests from heavy lean with UE to light support of UEs sit <>stand  5. Increased balance in stance with no leaning backwards and ability to complete step stance for 15 seconds. Long Term Goals:  Time Frame: 8 weeks  1. LE functional index from 27/80 to 35/80  2. Patient independent in HEP to follow through with for strength, ROM, balance, improved walking pattern. Patient Education: KT tape wear for 4-5 days. Do not apply heat to area. Remove if any skin irritation. Ice application to left knee. Wear knee support for walking and standing. [x]  HEP/Education Completed: Plan of Care, Goals,    Medbridge Access Code:  []  No new Education completed  []  Reviewed Prior HEP      []  Patient verbalized and/or demonstrated understanding of education provided. []  Patient unable to verbalize and/or demonstrate understanding of education provided. Will continue education. [x]  Barriers to learning: none    PLAN:  Treatment Recommendations: Strengthening, Range of Motion, Balance Training, Functional Mobility Training, Transfer Training, Endurance Training, Gait Training, Stair Training, Pain Management, Home Exercise Program and Patient Education    [x]  Plan of care initiated. Plan to see patient 2-3 times per week for 8  weeks to address the treatment planned outlined above.   []  Continue with current plan of care  []  Modify plan of care as follows:    []  Hold pending physician visit  []  Discharge    Time In 0936   Time Out 1015   Timed Code Minutes: 12 min   Total Treatment Time: 39 min       Electronically Signed by: Cornelio Khoury

## 2020-10-05 NOTE — TELEPHONE ENCOUNTER
Future Appointments   Date Time Provider [employer]   10/5/2020  9:30 AM Seun Lassiter PT [facility] PT [address] [place] [address] HOD   11/2/2020  9:40 AM Armond Grullon MD [facility] PSYCHIA [address]   11/20/2020  7:40 AM Abhishek Mckenzie DO Fam Med [address]   1/26/2021  9:00 AM Benji Thapa MD [address] [place] [address] Urology [address]   5/28/2021  8:00 AM Jaziel Veloz APRN - CNP Pulm Med [address]       Recent Visits  Date Type Provider Dept   05/20/20 Office Visit Abhishek Mckenzie, DO [facility] [place]   02/20/20 Office Visit Abhishek Mckenzie, DO [facility]   01/30/20 Office Visit Abhishek Mckenzie [place] [facility]   11/21/19 Office Visit Abhishek Mckenzie, DO [facility]   10/29/19 Office Visit Abhishek Mckenzie, DO [facility]   08/20/19 Office Visit Abhishek Mckenzie, DO [facility]   05/20/19 Office Visit Abhishek Mckenzie, DO [facility]   Showing recent visits within past 540 days with a meds authorizing provider and meeting all other requirements     Future Appointments  Date Type Provider Dept   11/20/20 Appointment Abhishek Mckenzie DO [facility]   Showing future appointments within next 150 days with a meds authorizing provider and meeting all other requirements

## 2020-10-12 RX ORDER — SITAGLIPTIN 100 MG/1
TABLET, FILM COATED ORAL
Qty: 90 TABLET | Refills: 3 | Status: SHIPPED | OUTPATIENT
Start: 2020-10-12 | End: 2021-12-15 | Stop reason: SDUPTHER

## 2020-10-12 RX ORDER — ATORVASTATIN CALCIUM 80 MG/1
TABLET, FILM COATED ORAL
Qty: 90 TABLET | Refills: 3 | Status: SHIPPED | OUTPATIENT
Start: 2020-10-12 | End: 2021-11-08 | Stop reason: SDUPTHER

## 2020-10-12 NOTE — TELEPHONE ENCOUNTER
Future Appointments   Date Time Provider [employer]   10/13/2020  8:15 AM Osmani Finger, PT [facility] PT [facility] HOD   10/14/2020  7:40 AM Margarette Guerrier DO Fam Med [address]   10/15/2020  8:00 AM Shannon Nares, PTA [facility] PT [place] HOD   10/20/2020  7:30 AM Osmani Finger, PT [facility] PT [place] HOD   10/22/2020  9:30 AM Shannon Nares, PTA [facility] PT [facility] HOD   10/27/2020  8:00 AM Osmani Finger, PT [facility] PT [facility] HOD   10/29/2020  8:00 AM Shannon Nares, PTA [facility] PT [facility] HOD   11/2/2020  9:40 AM Enmanuel Dukes MD [facility] PSYCHIA [facility]   11/3/2020  8:45 AM Osmani Finger, PT STR[facility] PT [place] HOD   11/20/2020  7:40 AM Margarette Guerrier DO Fam Med [facility]   1/26/2021  9:00 AM Sylvia Dewitt MD [facility] Urology [facility]   5/28/2021  8:00 AM MIGNON Au - [facility] Pul Med [facility]     Recent Visits  Date Type Provider Dept   05/20/20 Office Visit Margarette Guerrier, DO [facility]   02/20/20 Office Visit Margarette Guerrier DO [facility]   01/30/20 Office Visit Margarette Guerrier DO [facility] [place]   11/21/19 Office Visit Margarette Guerrier DO [facility] [place]   10/29/19 Office Visit Margarette Guerrier DO [facility] [place]   08/20/19 Office Visit Margarette Guerrier DO [facility] [place]   05/20/19 Office Visit Margarette Guerrier DO [facility] [place]   Showing recent visits within past 540 days with a meds authorizing provider and meeting all other requirements     Future Appointments  Date Type Provider Dept   10/14/20 Appointment Margarette Guerrier DO [facility]   11/20/20 Appointment Margarette Guerrier DO [facility]   Showing future appointments within next 150 days with a meds authorizing provider and meeting all other requirements

## 2020-10-13 ENCOUNTER — APPOINTMENT (OUTPATIENT)
Dept: PHYSICAL THERAPY | Age: 50
End: 2020-10-13
Payer: COMMERCIAL

## 2020-10-13 NOTE — PROGRESS NOTES
Chief Complaint   Patient presents with    Annual Exam     Pt presents for an annual physical.        History obtained from the patient. SUBJECTIVE:  Yessi Horton is a 44 y.o. male that presents today for:       -Prev Med: Vaccines reviewed. Reviewed if routine labs are due. + fam hx of cancer as noted below in Fam hx. Denies LUTS or bowl habit changes. Denies fevers, chills, night sweats or wt loss.      Diet - ok  Exercise - not really  Sleep - ok      Age/Gender Health Maintenance    Lipid -     No components found for: CHLPL  Lab Results   Component Value Date    TRIG 195 01/21/2020    TRIG 656 (H) 11/19/2019    TRIG 443 (H) 09/20/2019     Lab Results   Component Value Date    HDL 34 01/21/2020    HDL 25 11/19/2019    HDL 25 09/20/2019     Lab Results   Component Value Date    LDLCALC 108 01/21/2020    [address] SEE BELOW 11/19/2019    [address] SEE BELOW 09/20/2019     Lab Results   Component Value Date    LDLCALC 108 01/21/2020    LDLDIRECT 116.17 01/21/2020       TSH -   Lab Results   Component Value Date    TSH 2.630 11/19/2019       Colon Cancer Screening - NEG CANCER FEB 2015    Tetanus - UTD June 2014  Influenza Vaccine - UTD FALL 2020  Pneumonia Vaccine - UTD June 2014 PPV 23  Zoster - 1/2 OCT 2020     PSA testing discussion - follows with urology  Lab Results   Component Value Date    PSA 0.31 05/19/2020    PSA 0.27 03/15/2019       AAA Screening - Age 70      Diabetes Health Maintenance    A1C -   Lab Results   Component Value Date    LABA1C 7.0 05/20/2020    LABA1C 7.2 02/20/2020    LABA1C 11.2 11/21/2019    LABA1C 7.8 08/20/2019    LABA1C 10.7 05/20/2019    LABA1C 11.2 02/18/2019    LABA1C 12.2 11/16/2018    LABA1C 7.1 08/16/2018       ACE/ARB - yes, lisinopril 2.5mg daily  Eye - UTD MARCH 2019  Foot - UTD FEB 2020  ASA - yes, 81mg    Microal/Cr -   Lab Results   Component Value Date    LABMICR 1.82 05/19/2020    LABCREA 204.9 05/19/2020    MACRR 9 05/19/2020     eGFR -   Lab Results   Component Value Date    LABGLOM 89 11/19/2019       Statin - yes, lipitor 80mg qhs      Specialist Lists:    Dr Eleazar Calix [facility] Cardiology  Dr Chuck Freed:  Urology  Dr Annemarie Gannon Pulmonology/Sleep  Dr Earlene Teixeira surgeon  [facility] ENT  Dr Chandler Purdy: Neurology      Current Outpatient Medications   Medication Sig Dispense Refill    JANUVIA 100 MG tablet TAKE 1 TABLET BY MOUTH ONE TIME A DAY  90 tablet 3    atorvastatin (LIPITOR) 80 MG tablet TAKE 1 TABLET BY MOUTH ONE TIME A DAY  90 tablet 3    BASAGLAR KWIKPEN 100 UNIT/ML injection pen INJECT 65 UNITS INTO THE SKIN TWO TIMES A DAY 15 pen 5    amphetamine-dextroamphetamine (ADDERALL, 30MG,) 30 MG tablet Take 1 tablet by mouth 2 times daily for 30 days. 60 tablet 0    piroxicam (FELDENE) 20 MG capsule TAKE 1 CAPSULE BY MOUTH ONE TIME A DAY AS NEEDED FOR PAIN 90 capsule 0    lamoTRIgine (LAMICTAL) 200 MG tablet TAKE 1 TABLET BY MOUTH ONE TIME A DAY 30 tablet 5    trospium (SANCTURA) 20 MG tablet TAKE 1 TABLET BY MOUTH TWO TIMES A DAY  60 tablet 5    VORTIoxetine HBr (TRINTELLIX) 20 MG TABS tablet TAKE 1 TABLET BY MOUTH ONE TIME A DAY 30 tablet 5    Insulin Pen Needle (UNIFINE PENTIPS) 31G X 8 MM MISC Use DAILY as directed WITH INSULIN PENS-- DO NOT RE-USE needles 100 each 3    LATUDA 120 MG tablet TAKE 1 TABLET BY MOUTH ONE TIME A DAY  30 tablet 5    sildenafil (VIAGRA) 50 MG tablet Take 1 tablet by mouth daily as needed for Erectile Dysfunction Take one tablet one hour before sexual activity.  6 tablet 5    omeprazole (PRILOSEC) 20 MG delayed release capsule TAKE 2 CAPSULES BY MOUTH ONE TIME A DAY  180 capsule 3    lisinopril (PRINIVIL;ZESTRIL) 5 MG tablet TAKE 1/2 TABLET BY MOUTH ONE TIME A DAY  45 tablet 3    buPROPion HCl ER, XL, 450 MG TB24 Take 450 mg by mouth daily 30 tablet 5    traZODone (DESYREL) 300 MG tablet Take 1 tablet by mouth nightly 30 tablet 5    metFORMIN (GLUCOPHAGE) 1000 MG tablet TAKE 1 TABLET BY MOUTH TWO TIMES A DAY WITH MEALS 180 tablet 3    azelastine (ASTELIN) 0.1 % nasal spray Inhale 2 sprays in each nostril twice a day as needed      TOVIAZ 4 MG TB24 ER tablet TAKE 1 TABLET BY MOUTH ONE TIME A DAY      vitamin B-12 (CYANOCOBALAMIN) 1000 MCG tablet TAKE 1 TABLET BY MOUTH ONE TIME A DAY 90 tablet 3    aspirin (SM ASPIRIN ADULT LOW STRENGTH) 81 MG EC tablet TAKE 1 TABLET BY MOUTH ONE TIME A DAY 90 tablet 3    MEIJER LANCETS UNIVERSAL 33G MISC use to test blood sugar once a day 100 each 3    tamsulosin (FLOMAX) 0.4 MG capsule TAKE 1 CAPSULE BY MOUTH ONE TIME A DAY  90 capsule 3    fenofibrate 160 MG tablet TAKE 1 TABLET BY MOUTH ONE TIME A DAY  90 tablet 3    ARTIFICIAL TEARS 1.4 % ophthalmic solution Place 1 drop into both eyes 4 times daily  0    clotrimazole-betamethasone (LOTRISONE) 1-0.05 % cream Apply topically 2 times daily. Not to exceed 2 weeks consecutively. 1 Tube 1    TRUE METRIX BLOOD GLUCOSE TEST strip check blood sugar one time daily 100 each 2    rOPINIRole (REQUIP) 3 MG tablet Take 3 mg by mouth nightly      butalbital-acetaminophen-caffeine (FIORICET) -40 MG per tablet Take 1 tablet by mouth every 6 hours as needed for Migraine 60 tablet 0    cetirizine (ZYRTEC) 10 MG tablet daily       EYE ITCH RELIEF 0.025 % ophthalmic solution Place 2 drops into both eyes as needed       propranolol (INDERAL LA) 160 MG ER capsule Take 160 mg by mouth daily       fluticasone (FLONASE) 50 MCG/ACT nasal spray 1 spray by Nasal route daily as needed.  gabapentin (NEURONTIN) 800 MG tablet   Take 800 mg by mouth 4 times daily        No current facility-administered medications for this visit. No orders of the defined types were placed in this encounter. All medications reviewed and reconciled, including OTC and herbal medications. Updated list given to patient.        Patient Active Problem List    Diagnosis Date Noted    Obstructive sleep apnea on CPAP 11/03/2015     Priority: High    WESLEY (nonalcoholic steatohepatitis)     ADHD, adult residual type 03/24/2020    Closed fracture of right tibial plateau 85/86/1552    Normocytic anemia 02/19/2019    B12 deficiency     [facility]2 [facility]diabetes mellitus, [facility] 2) [facility]     Benign prostatic hyperplasia     Morbid obesity with BMI of 40.0-44.9, adult [facility]     Hypertriglyceridemia 10/27/2015    BPPV (benign paroxysmal positional vertigo) 09/29/2015    Short-segment Vásquez's esophagus 06/17/2015     [facility] with [facility] for this.  Pulmonary nodule      stable >2 years, no further w/u required.  Bipolar 1 disorder ([facility]     CAD (coronary artery disease)      Unclear history, cath 2013 with patent arteries. Records pending.  History of TIA (transient ischemic attack)      x2      Hypertension     Fibromyalgia     Depression     History of colon polyps     Chronic back pain     ADHD (attention deficit hyperactivity disorder)     Former smoker     Peripheral neuropathy     Tarsal tunnel syndrome     Benign esophageal stricture     Hyperlipidemia     Allergic rhinitis     Migraine headache     Dysphagia s/p dilation. 04/01/2014    GERD (gastroesophageal reflux disease) 10/15/2013    S/P cardiac catheterization: 9/30/2013: Normal coronaries 09/30/2013 9/30/2013: Normal coronaries. Radial approach. Dr. Corry Dueñas Dysfunctional voiding of urine 06/20/2013     Medications he changes to follow,  Adding ditropan  to the present schedule      ED (erectile dysfunction) of organic origin 05/01/2013       Past Medical History:   Diagnosis Date    ADHD (attention deficit hyperactivity disorder)     Allergic rhinitis     Benign esophageal stricture     s/p dilation may 2014    Benign prostatic hyperplasia     B[facility] 1 disorder ([facility]     CAD (coronary artery disease)     Unclear history, cath 2013 with patent arteries. Records pending.      Chronic back pain     Depression     DM2 (diabetes mellitus, type 2) ([facility]     Dysfunctional voiding of urine 2013    Medications he changes to follow,  Adding ditropan  to the present schedule     ED (erectile dysfunction) of organic origin 2013    Fibromyalgia     Former smoker     GERD (gastroesophageal reflux disease)     History of colon polyps     Pt unsure when last colo.  History of TIA (transient ischemic attack)     x2     Hyperlipidemia     Hypertension     Hypertriglyceridemia 10/27/2015    Migraine headache     Morbid obesity with BMI of 40.0-44.9, adult ([facility]     WESLEY (nonalcoholic steatohepatitis)     Obstructive sleep apnea on CPAP 11/3/2015    Peripheral neuropathy     Pulmonary nodule     stable >2 years, no further w/u required.  Rotator cuff injury     Left side    S/P cardiac catheterization: 2013: Normal coronaries 2013: Normal coronaries. Radial approach. Dr. Seth Hunter Short-segment Vásquez's esophagus 2015    Tarsal tunnel syndrome     Bilateral       Past Surgical History:   Procedure Laterality Date    CARDIAC CATHETERIZATION  10/2013    2013: Normal coronaries. Radial approach. Dr. Seth Hunter COLONOSCOPY  ,    [place] KNEE SURGERY Left 2012    ROTATOR CUFF REPAIR Left 2013   [place] SHOULDER SURGERY Bilateral    [place] SINUS SURGERY  2004    [place] occ.      TONSILLECTOMY  2002    TYMPANOSTOMY TUBE PLACEMENT      UPPER GASTROINTESTINAL ENDOSCOPY  ,  with dilation,     UPPER GASTROINTESTINAL ENDOSCOPY      UPPER GASTROINTESTINAL ENDOSCOPY  MAY 2014, 2017    Dilation       Allergies   Allergen Reactions    Morphine Nausea And Vomiting and Rash         Social History     Tobacco Use    Smoking status: Former Smoker     Packs/day: 0.75     Years: 30.00     Pack years: 22.50     Types: Cigarettes     Start date: 1984     Last attempt to quit: 2018     Years since quittin.4    Smokeless tobacco: Never Used   Substance Use Topics    Alcohol use: No     Alcohol/week: 0.0 standard drinks       Family History   Problem Relation Age of Onset    Cancer Father 77        lung     Rheum Arthritis Father     Colon Cancer Maternal Aunt 61    Cancer Maternal Aunt 76        \"throat\"    Colon Cancer Maternal Uncle 48    Cancer Maternal Uncle 60        pancreatic    Cancer Paternal Aunt 52        brain    Colon Cancer Paternal Uncle 72    Cancer Paternal Uncle 61        hepatic     Prostate Cancer Paternal Uncle 61    Cancer Maternal Uncle 79        pancreatic     Ovarian Cancer Paternal Aunt 48    Rheum Arthritis Mother     Pancreatic Cancer Mother     Brain Cancer Brother          I have reviewed the patient's past medical history, past surgical history, allergies, medications, social and family history and I have made updates where appropriate.       Review of Systems  Positive responses are highlighted in bold    Constitutional:  Fever, Chills, Night Sweats, Fatigue, Unexpected changes in weight  Eyes:  Eye discharge, Eye pain, Eye redness, Visual disturbances   HENT:  Ear pain, Tinnitus, Nosebleeds, Trouble swallowing, Hearing loss, Sore throat  Cardiovascular:  Chest Pain, Palpitations, Orthopnea, Paroxysmal Nocturnal Dyspnea  Respiratory:  Cough, Wheezing, Shortness of breath, Chest tightness, Apnea  Gastrointestinal:  Nausea, Vomiting, Diarrhea, Constipation, Heartburn, Blood in stool  Genitourinary:  Difficulty or painful urination, Flank pain, Change in frequency, Urgency  Skin:  Color change, Rash, Itching, Wound  Psychiatric:  Hallucinations, Anxiety, Depression, Suicidal ideation  Hematological:  Enlarged glands, Easy bleeding, Easily bruising  Musculoskeletal:  Joint pain, Back pain, Gait problems, Joint swelling, Myalgias  Neurological:  Dizziness, Headaches, Presyncope, Numbness, Seizures, Tremors  Allergy:  Environmental allergies, Food allergies  Endocrine:  Heat Intolerance, Cold Intolerance, Polydipsia, Polyphagia, Polyuria      PHYSICAL EXAM:  Vitals:    10/14/20 0742   BP: 136/80   Pulse: 96   Resp: 18   Temp: 98.3 °F (36.8 °C)   SpO2: 99%   Weight: 283 lb 3.2 oz (128.5 kg)   Height: 5' 5\" (1.651 m)     Body mass index is 47.13 kg/m². Pain Score:   0 - No pain    VS Reviewed  General Appearance: A&O x 3, No acute distress,well developed and well- nourished  Head: normocephalic and atraumatic  Eyes: pupils equal, round, and reactive to light, extraocular eye movements intact, conjunctivae and eye lids without erythema  ENT: external ear and ear canal clear bilaterally, TMs intact and regular, nose without deformity, nasal mucosa and turbinates normal without polyps, oropharynx normal, dentition is normal for age  Neck: supple and non-tender without mass, no thyromegaly or thyroid nodules, no cervical lymphadenopathy  Pulmonary/Chest: clear to auscultation bilaterally- no wheezes, rales or rhonchi, normal air movement, no respiratory distress or retractions  Cardiovascular: S1 and S2 auscultated w/ RRR. No murmurs, rubs, clicks, or gallops, distal pulses intact. Abdomen: soft, non-tender, non-distended, bowel sounds physiologic,  no rebound or guarding, no masses or hernias noted. Liver and spleen without enlargement. Extremities: no cyanosis, clubbing or edema of the lower extremities. +2 PT/DP bilaterally. Musculoskeletal: No joint swelling or gross deformity   Neuro:  Alert, 5/5 strength globally and symmetrically, 2+ patellar reflexes bilaterally  Psych: Affect appropriate. Mood normal. Thought process is normal without evidence of depression or psychosis. Good insight and appropriate interaction. Cognition and memory appear to be intact. Skin: warm and dry, no rash or erythema  Lymph:  No cervical, auricular or supraclavicular lymph nodes palpated      ASSESSMENT & PLAN  1.  Visit for preventive health examination    Vaccines reviewed and update recommendations made  Otherwise UTD on age appropriate screenigns    2. Need for vaccination    - INFLUENZA, QUADV, 3 YRS AND OLDER, IM, MDV, 0.5ML (AFLURIA QUADV)  - Zoster recombinant [facility])      DISPOSITION    Return in 5 weeks (on 11/20/2020) for Follow-up Diabetes, follow-up on chronic medical conditions, sooner as needed. Az released without restrictions. Future Appointments   Date Time Provider [employer]key [employer]ting   10/15/2020  8:00 AM Lindy May, PTA [facility] [place] [facility]   10/20/2020  7:30 AM Josepha Hands, PT [facility] [place] [facility] [place]   10/22/2020  9:30 AM Lindy May, PTA [facility] [place] [facility]   10/27/2020  8:00 AM Josepha Hands, PT [facility] [place] [facility] [facility]   10/29/2020  8:00 AM Lindy May, PTA [facility] [place]   11/2/2020  9:40 AM Da Quiros MD [facility] - [place]   11/3/2020  8:45 AM Josepha Hands, PT [facility] [place]   11/20/2020  7:40 AM Maurizio Lazaro DO [facility] [place]   1/26/2021  9:00 AM MD SHREYA [place] Urology [facility] - [place]   5/28/2021  8:00 AM MIGNON Orr - CNP Pulm Med [facility] - [address] received counseling on the following healthy behaviors: nutrition, exercise and medication adherence    Patient given educational materials on: See Attached    I have instructed Korin Apolinar to complete a self tracking handout on blood sugars, instructed them to bring it with them to his next appointment. Barriers to learning and self management: none    Discussed use, benefit, and side effects of prescribed medications. Barriers to medication compliance addressed. All patient questions answered. Pt voiced understanding.        Electronically signed by Maurizio Lazaro DO on 10/14/2020 at 8:04 AM

## 2020-10-14 ENCOUNTER — OFFICE VISIT (OUTPATIENT)
Dept: FAMILY MEDICINE CLINIC | Age: 50
End: 2020-10-14
Payer: COMMERCIAL

## 2020-10-14 VITALS
OXYGEN SATURATION: 99 % | WEIGHT: 283.2 LBS | BODY MASS INDEX: 47.18 KG/M2 | DIASTOLIC BLOOD PRESSURE: 80 MMHG | SYSTOLIC BLOOD PRESSURE: 136 MMHG | HEART RATE: 96 BPM | TEMPERATURE: 98.3 F | HEIGHT: 65 IN | RESPIRATION RATE: 18 BRPM

## 2020-10-14 PROCEDURE — 90750 HZV VACC RECOMBINANT IM: CPT | Performed by: FAMILY MEDICINE

## 2020-10-14 PROCEDURE — 90471 IMMUNIZATION ADMIN: CPT | Performed by: FAMILY MEDICINE

## 2020-10-14 PROCEDURE — G8482 FLU IMMUNIZE ORDER/ADMIN: HCPCS | Performed by: FAMILY MEDICINE

## 2020-10-14 PROCEDURE — 90688 IIV4 VACCINE SPLT 0.5 ML IM: CPT | Performed by: FAMILY MEDICINE

## 2020-10-14 PROCEDURE — 99396 PREV VISIT EST AGE 40-64: CPT | Performed by: FAMILY MEDICINE

## 2020-10-14 PROCEDURE — 90472 IMMUNIZATION ADMIN EACH ADD: CPT | Performed by: FAMILY MEDICINE

## 2020-10-14 NOTE — PATIENT INSTRUCTIONS
LAB INSTRUCTIONS:    Please complete labs within 2 week(s). Please fast for 8 hours prior to lab collection. The clinic will call you within 1 week of collection. If you have not heard from us within that amount of time, please call us at [phone]. Patient Education        Well Visit, Men 48 to 72: Care Instructions  Your Care Instructions     Physical exams can help you stay healthy. Your doctor has checked your overall health and may have suggested ways to take good care of yourself. He or she also may have recommended tests. At home, you can help prevent illness with healthy eating, regular exercise, and other steps. Follow-up care is a key part of your treatment and safety. Be sure to make and go to all appointments, and call your doctor if you are having problems. It's also a good idea to know your test results and keep a list of the medicines you take. How can you care for yourself at home? · Reach and stay at a healthy weight. This will lower your risk for many problems, such as obesity, diabetes, heart disease, and high blood pressure. · Get at least 30 minutes of exercise on most days of the week. Walking is a good choice. You also may want to do other activities, such as running, swimming, cycling, or playing tennis or team sports. · Do not smoke. Smoking can make health problems worse. If you need help quitting, talk to your doctor about stop-smoking programs and medicines. These can increase your chances of quitting for good. · Protect your skin from too much sun. When you're outdoors from 10 a.m. to 4 p.m., stay in the shade or cover up with clothing and a hat with a wide brim. Wear sunglasses that block UV rays. Even when it's cloudy, put broad-spectrum sunscreen (SPF 30 or higher) on any exposed skin. · See a dentist one or two times a year for checkups and to have your teeth cleaned. · Wear a seat belt in the car. Follow your doctor's advice about when to have certain tests.  These tests can spot problems early. · Cholesterol. Your doctor will tell you how often to have this done based on your overall health and other things that can increase your risk for heart attack and stroke. · Blood pressure. Have your blood pressure checked during a routine doctor visit. Your doctor will tell you how often to check your blood pressure based on your age, your blood pressure results, and other factors. · Prostate exam. Talk to your doctor about whether you should have a blood test (called a PSA test) for prostate cancer. Experts recommend that you discuss the benefits and risks of the test with your doctor before you decide whether to have this test.  · Diabetes. Ask your doctor whether you should have tests for diabetes. · Vision. Some experts recommend that you have yearly exams for glaucoma and other age-related eye problems starting at age 48. · Hearing. Tell your doctor if you notice any change in your hearing. You can have tests to find out how well you hear. · Colorectal cancer. Your risk for colorectal cancer gets higher as you get older. Some experts say that adults should start regular screening at age 48 and stop at age 76. Others say to start before age 48 or continue after age 76. Talk with your doctor about your risk and when to start and stop screening. · Heart attack and stroke risk. At least every 4 to 6 years, you should have your risk for heart attack and stroke assessed. Your doctor uses factors such as your age, blood pressure, cholesterol, and whether you smoke or have diabetes to show what your risk for a heart attack or stroke is over the next 10 years. · Abdominal aortic aneurysm. Ask your doctor whether you should have a test to check for an aneurysm. You may need a test if you ever smoked or if your parent, brother, sister, or child has had an aneurysm. When should you call for help?   Watch closely for changes in your health, and be sure to contact your doctor if you have any problems or symptoms that concern you. Where can you learn more? Go to [email or web address] and sign in to your [employer] account. Enter Y502 in the Glyde box to learn more about \"Well Visit, Men 48 to 72: Care Instructions. \"     If you do not have an account, please click on the \"Sign Up Now\" link. Current as of: May 27, 2020               Content Version: 12.6  © 0537-0318 [employer]. Care instructions adapted under license by [facility] ([facility]). If you have questions about a medical condition or this instruction, always ask your healthcare professional. [address] any warranty or liability for your use of this information.

## 2020-10-14 NOTE — PROGRESS NOTES
Vaccine Information Sheet, \"Influenza - Inactivated\"  given to Zenon Section, or parent/legal guardian of  Zenon Section and verbalized understanding. Patient responses:    Have you ever had a reaction to a flu vaccine? No  Do you have an allergy to eggs, neomycin or polymixin? No  Do you have an allergy to Thimerosal, contact lens solution, or Merthiolate? No  Have you ever had [place] Syndrome? No  Do you have any current illness? No  Do you have a temperature above 100 degrees? No  Are you pregnant? No  If pregnant, permission obtained from physician? No  Do you have an active neurological disorder? No      Flu vaccine given per order. Please see immunization tab.     Immunization(s) given during visit:    Immunizations Administered     Name Date Dose Route    Influenza, Quadv, IM, (6 mo and older Fluzone, Flulaval, Fluarix and 3 yrs and older Afluria) 10/14/2020 0.5 mL Intramuscular    Site: Deltoid- Left    Lot: [id number]    NDC: 78888-904-35    Zoster Recombinant (Shingrix) 10/14/2020 0.5 mL Intramuscular    Site: Deltoid- Right    Lot: [id number]    ND: 46193-686-38          Most recent Vaccine Information Sheet dated 8/15/19 and 10/30/19 given to pt

## 2020-10-15 ENCOUNTER — HOSPITAL ENCOUNTER (OUTPATIENT)
Dept: PHYSICAL THERAPY | Age: 50
Setting detail: THERAPIES SERIES
Discharge: HOME | End: 2020-10-15
Payer: COMMERCIAL

## 2020-10-16 RX ORDER — DEXTROAMPHETAMINE SACCHARATE, AMPHETAMINE ASPARTATE, DEXTROAMPHETAMINE SULFATE AND AMPHETAMINE SULFATE 7.5; 7.5; 7.5; 7.5 MG/1; MG/1; MG/1; MG/1
30 TABLET ORAL 2 TIMES DAILY
Qty: 60 TABLET | Refills: 0 | Status: SHIPPED | OUTPATIENT
Start: 2020-10-16 | End: 2020-11-02 | Stop reason: SDUPTHER

## 2020-10-16 NOTE — TELEPHONE ENCOUNTER
Covering for Dr. Cameron Wells. OARRs report reviewed. Chart reviewed. Refill request approved and sent to pharmacy.

## 2020-10-20 ENCOUNTER — APPOINTMENT (OUTPATIENT)
Dept: PHYSICAL THERAPY | Age: 50
End: 2020-10-20
Payer: COMMERCIAL

## 2020-10-22 ENCOUNTER — APPOINTMENT (OUTPATIENT)
Dept: PHYSICAL THERAPY | Age: 50
End: 2020-10-22
Payer: COMMERCIAL

## 2020-10-27 ENCOUNTER — APPOINTMENT (OUTPATIENT)
Dept: PHYSICAL THERAPY | Age: 50
End: 2020-10-27
Payer: COMMERCIAL

## 2020-10-29 ENCOUNTER — APPOINTMENT (OUTPATIENT)
Dept: PHYSICAL THERAPY | Age: 50
End: 2020-10-29
Payer: COMMERCIAL

## 2020-11-02 ENCOUNTER — VIRTUAL VISIT (OUTPATIENT)
Dept: PSYCHIATRY | Age: 50
End: 2020-11-02
Payer: COMMERCIAL

## 2020-11-02 PROCEDURE — 99214 OFFICE O/P EST MOD 30 MIN: CPT | Performed by: PSYCHIATRY & NEUROLOGY

## 2020-11-02 PROCEDURE — G8482 FLU IMMUNIZE ORDER/ADMIN: HCPCS | Performed by: PSYCHIATRY & NEUROLOGY

## 2020-11-02 PROCEDURE — 3017F COLORECTAL CA SCREEN DOC REV: CPT | Performed by: PSYCHIATRY & NEUROLOGY

## 2020-11-02 PROCEDURE — G8417 CALC BMI ABV UP PARAM F/U: HCPCS | Performed by: PSYCHIATRY & NEUROLOGY

## 2020-11-02 PROCEDURE — G8427 DOCREV CUR MEDS BY ELIG CLIN: HCPCS | Performed by: PSYCHIATRY & NEUROLOGY

## 2020-11-02 PROCEDURE — 1036F TOBACCO NON-USER: CPT | Performed by: PSYCHIATRY & NEUROLOGY

## 2020-11-02 RX ORDER — BUPROPION HYDROCHLORIDE 450 MG/1
450 TABLET, FILM COATED, EXTENDED RELEASE ORAL DAILY
Qty: 30 TABLET | Refills: 5 | Status: SHIPPED | OUTPATIENT
Start: 2020-11-02 | End: 2021-05-05 | Stop reason: SDUPTHER

## 2020-11-02 RX ORDER — DEXTROAMPHETAMINE SACCHARATE, AMPHETAMINE ASPARTATE, DEXTROAMPHETAMINE SULFATE AND AMPHETAMINE SULFATE 7.5; 7.5; 7.5; 7.5 MG/1; MG/1; MG/1; MG/1
30 TABLET ORAL 2 TIMES DAILY
Qty: 60 TABLET | Refills: 0 | Status: SHIPPED | OUTPATIENT
Start: 2020-11-02 | End: 2020-12-09 | Stop reason: SDUPTHER

## 2020-11-02 NOTE — PROGRESS NOTES
that he is struggling I will see him again in 4 weeks to see if the bupropion has been helpful. If not we may need to look at alternatives like different antidepressants and/or light treatment. Mental Status Examination    Level of consciousness:  within normal limits  Appearance:  well-appearing, street clothes, in chair, fair grooming and good hygiene  Behavior/Motor:  no abnormalities noted  Attitude toward examiner:  cooperative, attentive and good eye contact  Speech:  spontaneous, normal rate, normal volume and well articulated  Mood:  euthymic  Affect:  mood congruent  Thought processes:  linear, goal directed and coherent  Thought content:  Homocidal ideation: none  Suicidal Ideation:  denies suicidal ideation  Delusions:  no evidence of delusions  Perceptual Disturbance:  denies any perceptual disturbance  Cognition:  oriented to person, place, and time  Concentration succeeded  Memory intact  Fund of knowledge:  good  Abstract thinking:  fair  Insight:  good  Judgment:  good  Anxiety:   Generalized: No  Excessive Worry: No  Panic Attacks: No  Frequency:  Housebound: No   Obsession: No   Compulsion: No  Flashbacks:No  Nightmares No  Hyperarousal No      DIAGNOSTIC IMPRESSION  Bipolar dep  ADHD    Plan  Restart bupropion  Current Outpatient Medications   Medication Sig Dispense Refill    buPROPion HCl ER, XL, 450 MG TB24 Take 450 mg by mouth daily 30 tablet 5    amphetamine-dextroamphetamine (ADDERALL, 30MG,) 30 MG tablet Take 1 tablet by mouth 2 times daily for 30 days.  60 tablet 0    lurasidone (LATUDA) 120 MG tablet TAKE 1 TABLET BY MOUTH ONE TIME A DAY 30 tablet 5    JANUVIA 100 MG tablet TAKE 1 TABLET BY MOUTH ONE TIME A DAY  90 tablet 3    atorvastatin (LIPITOR) 80 MG tablet TAKE 1 TABLET BY MOUTH ONE TIME A DAY  90 tablet 3    BASAGLAR KWIKPEN 100 UNIT/ML injection pen INJECT 65 UNITS INTO THE SKIN TWO TIMES A DAY 15 pen 5    piroxicam (FELDENE) 20 MG capsule TAKE 1 CAPSULE BY MOUTH ONE TIME A DAY AS NEEDED FOR PAIN 90 capsule 0    lamoTRIgine (LAMICTAL) 200 MG tablet TAKE 1 TABLET BY MOUTH ONE TIME A DAY 30 tablet 5    trospium (SANCTURA) 20 MG tablet TAKE 1 TABLET BY MOUTH TWO TIMES A DAY  60 tablet 5    VORTIoxetine HBr (TRINTELLIX) 20 MG TABS tablet TAKE 1 TABLET BY MOUTH ONE TIME A DAY 30 tablet 5    Insulin Pen Needle (UNIFINE PENTIPS) 31G X 8 MM MISC Use DAILY as directed WITH INSULIN PENS-- DO NOT RE-USE needles 100 each 3    sildenafil (VIAGRA) 50 MG tablet Take 1 tablet by mouth daily as needed for Erectile Dysfunction Take one tablet one hour before sexual activity. 6 tablet 5    omeprazole (PRILOSEC) 20 MG delayed release capsule TAKE 2 CAPSULES BY MOUTH ONE TIME A DAY  180 capsule 3    lisinopril (PRINIVIL;ZESTRIL) 5 MG tablet TAKE 1/2 TABLET BY MOUTH ONE TIME A DAY  45 tablet 3    traZODone (DESYREL) 300 MG tablet Take 1 tablet by mouth nightly 30 tablet 5    metFORMIN (GLUCOPHAGE) 1000 MG tablet TAKE 1 TABLET BY MOUTH TWO TIMES A DAY WITH MEALS 180 tablet 3    azelastine (ASTELIN) 0.1 % nasal spray Inhale 2 sprays in each nostril twice a day as needed      TOVIAZ 4 MG TB24 ER tablet TAKE 1 TABLET BY MOUTH ONE TIME A DAY      vitamin B-12 (CYANOCOBALAMIN) 1000 MCG tablet TAKE 1 TABLET BY MOUTH ONE TIME A DAY 90 tablet 3    aspirin (SM ASPIRIN ADULT LOW STRENGTH) 81 MG EC tablet TAKE 1 TABLET BY MOUTH ONE TIME A DAY 90 tablet 3    MEIJER LANCETS UNIVERSAL 33G MISC use to test blood sugar once a day 100 each 3    tamsulosin (FLOMAX) 0.4 MG capsule TAKE 1 CAPSULE BY MOUTH ONE TIME A DAY  90 capsule 3    fenofibrate 160 MG tablet TAKE 1 TABLET BY MOUTH ONE TIME A DAY  90 tablet 3    ARTIFICIAL TEARS 1.4 % ophthalmic solution Place 1 drop into both eyes 4 times daily  0    clotrimazole-betamethasone (LOTRISONE) 1-0.05 % cream Apply topically 2 times daily. Not to exceed 2 weeks consecutively.  1 Tube 1    TRUE METRIX BLOOD GLUCOSE TEST strip check blood sugar one time daily 100 each 2    rOPINIRole (REQUIP) 3 MG tablet Take 3 mg by mouth nightly      butalbital-acetaminophen-caffeine (FIORICET) -40 MG per tablet Take 1 tablet by mouth every 6 hours as needed for Migraine 60 tablet 0    cetirizine (ZYRTEC) 10 MG tablet daily       EYE ITCH RELIEF 0.025 % ophthalmic solution Place 2 drops into both eyes as needed       propranolol (INDERAL LA) 160 MG ER capsule Take 160 mg by mouth daily       fluticasone (FLONASE) 50 MCG/ACT nasal spray 1 spray by Nasal route daily as needed.  gabapentin (NEURONTIN) 800 MG tablet   Take 800 mg by mouth 4 times daily        No current facility-administered medications for this visit.

## 2020-11-03 ENCOUNTER — HOSPITAL ENCOUNTER (OUTPATIENT)
Dept: PHYSICAL THERAPY | Age: 50
Setting detail: THERAPIES SERIES
Discharge: HOME | End: 2020-11-03
Payer: COMMERCIAL

## 2020-11-10 ENCOUNTER — HOSPITAL ENCOUNTER (OUTPATIENT)
Dept: PHYSICAL THERAPY | Age: 50
Setting detail: THERAPIES SERIES
Discharge: HOME | End: 2020-11-10
Payer: COMMERCIAL

## 2020-11-10 NOTE — PROGRESS NOTES
[address] [address]  Therapy Contact Note      Date: 11/10/2020  Patient Name: Yessi Horton        MRN: [id number]    Account Number: [de-identified]  : 1970  (46 y.o.)  Gender: male           Patient presents to PT with new injuries from fall 2 days ago when he went to grab his dog and fell forward. He states his left ankle slipped and landed on both knees. He is now having excruciating pain at his left ankle /10, right knee /10, left knee 9/10. Held on PT session till further follow up with physician. Will need new orders to treat after medically released by physician due to new injuries and reinjury to left knee.          Jose Guadalupe Schroeder, [address]

## 2020-11-11 RX ORDER — FENOFIBRATE 160 MG/1
TABLET ORAL
Qty: 90 TABLET | Refills: 3 | Status: SHIPPED | OUTPATIENT
Start: 2020-11-11 | End: 2021-11-08 | Stop reason: SDUPTHER

## 2020-11-11 NOTE — TELEPHONE ENCOUNTER
Future Appointments   Date Time Provider Mikey Painting   11/18/2020 10:20 AM [place] [facility] [address]   12/9/2020  9:00 AM Maribel Sanches MD [facility] [place]   1/26/2021  9:00 AM MD SHREYA Lamar [address] U[facility] KATLINNEA [address]   5/28/2021  8:00 AM MIGNON Humphries - CNP Pulm [facility] KATSABINEEIN [address]       Recent Visits  Date Type Provider Dept   10/14/20 Office Visit Claude Pilot, DO [facility]   05/20/20 Office Visit Claude Pilot, DO [facility]   02/20/20 Office Visit Claude Pilot, DO [facility]   01/30/20 Office Visit Claude Pilot, [place] [facility]   11/21/19 Office Visit Claude , DO [facility]   10/29/19 Office Visit Claude , DO [facility]   08/20/19 Office Visit Claude Pilot, DO [facility]   Showing recent visits within past 540 days with a meds authorizing provider and meeting all other requirements     Future Appointments  Date Type Provider Dept   11/18/20 Appointment Claude Pilot, DO [facility]   Showing future appointments within next 150 days with a meds authorizing provider and meeting all other requirements

## 2020-11-15 RX ORDER — CALCIUM CITRATE/VITAMIN D3 200MG-6.25
TABLET ORAL
Qty: 100 EACH | Refills: 2 | Status: SHIPPED | OUTPATIENT
Start: 2020-11-15 | End: 2022-02-10 | Stop reason: SDUPTHER

## 2020-11-17 NOTE — PROGRESS NOTES
Chief Complaint   Patient presents with    Follow-up     issues as noted below       History obtained from the patient. SUBJECTIVE:  Yanet Herrera is a 44 y.o. male that presents today for:       -DM2: PRIOR VISIT: Pt admitted to [facility] from 12/3 to 12/5 for new onset DM. I was called last SAT morning by lab with critical values. Contacted pt and sent to ER. Sadly no discharge summary done, nor is the latest progress note, 4 days after discharge. It appears, anyway, that he was started on metformin and glyburide and d/c home. Labs were improving. Was not started on insulin.      Since discharge has done well. Keeping glucose log at home. Sugars 200-400 depending. Tolerating metformin and glyburide. No lows. Would like to see DM ed. Denies polyuria at this point.      UPDATE PRIOR VISIT: doing well on 1000mg bid of metformin. Sugars trending down to the mid 100s the last wk. No lows. Has DM ed consult. Denies polyuria, dypsa or phagia.      UPDATE PRIOR VISIT: doing well with glyburide and metformin 1000mg bid. Sugars  most days in the AM. No lows. Doing well with diet. Exercise not so much.      UPDATE PRIOR VISIT: doing well with metfromin and glyburide. a1c down to 6.5. No lows.      UPDATE PRIOR VISIT: a1c down to 5.5. Not checking sugars. Feels occ hypoglycemia. On glyburide and metformin. UPDATE PRIOR VISIT: on metformin monotherapy now, glyuride stopped in July d/t low sugars. Admits to poor diet choices last 3 months. a1c up to 7.1. UPDATE PRIOR VISIT: a1c higher. Pt not sure why. Diet poor. Not exercising and wts up. Taking metformin as rx'd. UPDATE PRIOR VISIT: a1c much improved. Diet better. januvia added to metformin, doing great. FBS . No lows. UPDATE PRIOR VISIT: pt called early NOV with sig elevated sugars. Was told to go to ER. Refused. Has to take care of his wife. See care coordination note 11/5. So insulin started via phone.  Since then, sugars starting to come down, 200-300 mostly, up to 16 units of basalgar. On metfomrin and [place] and [place] as well. Only recently change is was started on zyprexa. However that was recently stopped d/t concern for his sugars. No fevers. No s/s infection. Feels fine    UPDATE PRIOR VISIT: sugars had improved, but high again. Up to 56 units of basaglar. FBS were running 300-400  The last month. We inc his insulin 10 units a few wks ago with instructions go up 3 units every 3 days until FBS . No lows. UPDATE PRIOR VISIT: sugars running 200-300 the last 4 wks. Down to jsut about 200 the last few days. Up to 77 units of basiglar. No lows. UPDATE PRIOr VISIT: -190. Higher if snacks during the night (snacks d/t hunger not low sugars). Currently on up to 74 units qhs pf glargine, plans to inc to 77 units tonight. Still taking metformin and januvia. Appears didn't inc like he should. wts down a few lbs. Diet improving some. Willing to see DM clinic     UPDATE PRIOR VISIT:   -130  Pt weaned himself off glargine 4 wks, was having low sugars  a1c's at goal w/o insulin  Still on metfmormin and januvia  Never did go to DM clinic since doing better   Diet better  Working on wt loss  No low sugars now    UPDATE PRIOR VISIT:   Sugars worse the last few months  Not totally clear why  Back on insulin the last month  Up to 96 units basaglar qhs  On metfomrin and januiva  +  Only new med is trintillix  Wife recently , so not eating the best  wts steady     UPDATE PRIOR VISIT:   Sugars sig improved  On 65 units basalgar bid  On metformin and januvia  FBs   No lows. a1c sig improved. UPDATE LAST VISIT:   Sugars sig improved  On 65 units basalgar bid  On metformin and januvia  FBs   No lows. a1c stable  wts worse of late    UPDATE TODAY:   a1c a little worse at 8.0  On 65 units basalgar bid  On metformin and januvia  FBs   No lows.     a1c stable  wts worse of late     Lab Results   Component Value Date Medications   Medication Sig Dispense Refill    blood glucose test strips (TRUE METRIX BLOOD GLUCOSE TEST) strip check blood sugar one time daily 100 each 2    fenofibrate (TRIGLIDE) 160 MG tablet TAKE 1 TABLET BY MOUTH ONE TIME A DAY  90 tablet 3    buPROPion HCl ER, XL, 450 MG TB24 Take 450 mg by mouth daily 30 tablet 5    amphetamine-dextroamphetamine (ADDERALL, 30MG,) 30 MG tablet Take 1 tablet by mouth 2 times daily for 30 days. 60 tablet 0    lurasidone (LATUDA) 120 MG tablet TAKE 1 TABLET BY MOUTH ONE TIME A DAY 30 tablet 5    JANUVIA 100 MG tablet TAKE 1 TABLET BY MOUTH ONE TIME A DAY  90 tablet 3    atorvastatin (LIPITOR) 80 MG tablet TAKE 1 TABLET BY MOUTH ONE TIME A DAY  90 tablet 3    BASAGLAR KWIKPEN 100 UNIT/ML injection pen INJECT 65 UNITS INTO THE SKIN TWO TIMES A DAY 15 pen 5    piroxicam (FELDENE) 20 MG capsule TAKE 1 CAPSULE BY MOUTH ONE TIME A DAY AS NEEDED FOR PAIN 90 capsule 0    lamoTRIgine (LAMICTAL) 200 MG tablet TAKE 1 TABLET BY MOUTH ONE TIME A DAY 30 tablet 5    trospium (SANCTURA) 20 MG tablet TAKE 1 TABLET BY MOUTH TWO TIMES A DAY  60 tablet 5    VORTIoxetine HBr (TRINTELLIX) 20 MG TABS tablet TAKE 1 TABLET BY MOUTH ONE TIME A DAY 30 tablet 5    Insulin Pen Needle (UNIFINE PENTIPS) 31G X 8 MM MISC Use DAILY as directed WITH INSULIN PENS-- DO NOT RE-USE needles 100 each 3    sildenafil (VIAGRA) 50 MG tablet Take 1 tablet by mouth daily as needed for Erectile Dysfunction Take one tablet one hour before sexual activity.  6 tablet 5    omeprazole (PRILOSEC) 20 MG delayed release capsule TAKE 2 CAPSULES BY MOUTH ONE TIME A DAY  180 capsule 3    lisinopril (PRINIVIL;ZESTRIL) 5 MG tablet TAKE 1/2 TABLET BY MOUTH ONE TIME A DAY  45 tablet 3    traZODone (DESYREL) 300 MG tablet Take 1 tablet by mouth nightly 30 tablet 5    metFORMIN (GLUCOPHAGE) 1000 MG tablet TAKE 1 TABLET BY MOUTH TWO TIMES A DAY WITH MEALS 180 tablet 3    azelastine (ASTELIN) 0.1 % nasal spray Inhale 2 sprays in each nostril twice a day as needed      TOVIAZ 4 MG TB24 ER tablet TAKE 1 TABLET BY MOUTH ONE TIME A DAY      vitamin B-12 (CYANOCOBALAMIN) 1000 MCG tablet TAKE 1 TABLET BY MOUTH ONE TIME A DAY 90 tablet 3    aspirin (SM ASPIRIN ADULT LOW STRENGTH) 81 MG EC tablet TAKE 1 TABLET BY MOUTH ONE TIME A DAY 90 tablet 3    MEIJER LANCETS UNIVERSAL 33G MISC use to test blood sugar once a day 100 each 3    tamsulosin (FLOMAX) 0.4 MG capsule TAKE 1 CAPSULE BY MOUTH ONE TIME A DAY  90 capsule 3    ARTIFICIAL TEARS 1.4 % ophthalmic solution Place 1 drop into both eyes 4 times daily  0    clotrimazole-betamethasone (LOTRISONE) 1-0.05 % cream Apply topically 2 times daily. Not to exceed 2 weeks consecutively. 1 Tube 1    rOPINIRole (REQUIP) 3 MG tablet Take 3 mg by mouth nightly      butalbital-acetaminophen-caffeine (FIORICET) -40 MG per tablet Take 1 tablet by mouth every 6 hours as needed for Migraine 60 tablet 0    cetirizine (ZYRTEC) 10 MG tablet daily       EYE ITCH RELIEF 0.025 % ophthalmic solution Place 2 drops into both eyes as needed       propranolol (INDERAL LA) 160 MG ER capsule Take 160 mg by mouth daily       fluticasone (FLONASE) 50 MCG/ACT nasal spray 1 spray by Nasal route daily as needed.  gabapentin (NEURONTIN) 800 MG tablet   Take 800 mg by mouth 4 times daily        No current facility-administered medications for this visit. No orders of the defined types were placed in this encounter. All medications reviewed and reconciled, including OTC and herbal medications. Updated list given to patient.        Patient Active Problem List    Diagnosis Date Noted    Obstructive sleep apnea on CPAP 11/03/2015     Priority: High    WESLEY (nonalcoholic steatohepatitis)     ADHD, adult residual type 03/24/2020    Closed fracture of right tibial plateau 77/59/1833    Normocytic anemia 02/19/2019    B12 deficiency     DM[facility]di[facility] mellitus[facility] type 2[facility]     Benign of colon polyps     Pt unsure when last colo.  History of TIA (transient ischemic attack)     x2     Hyperlipidemia     Hypertension     Hypertriglyceridemia 10/27/2015    Migraine headache     Morbid obesity ([address]     Morbid obesity with BMI of 40.0-44.9, adult [facility]     WESLEY (nonalcoholic steatohepatitis)     Obstructive sleep apnea on CPAP 11/3/2015    Peripheral neuropathy     Pulmonary nodule     stable >2 years, no further w/u required.  Rotator cuff injury     Left side    S/P cardiac catheterization: 2013: Normal coronaries 2013: Normal coronaries. Radial approach. Dr. Nida Garcia Short-segment Vásquez's esophagus 2015    Tarsal tunnel syndrome     Bilateral       Past Surgical History:   Procedure Laterality Date    CARDIAC CATHETERIZATION  10/2013    2013: Normal coronaries. Radial approach. Dr. Nida Garcia COLONOSCOPY  ,    Rachel Mosher KNEE SURGERY Left 2012    ROTATOR CUFF REPAIR Left 2013   Rachel Mosher SHOULDER SURGERY Bilateral    Rachel Mosher SINUS SURGERY  2004    [place] occ.      TONSILLECTOMY  2002    TYMPANOSTOMY TUBE PLACEMENT      UPPER GASTROINTESTINAL ENDOSCOPY  ,  with dilation,     UPPER GASTROINTESTINAL ENDOSCOPY      UPPER GASTROINTESTINAL ENDOSCOPY  MAY 2014, 2017    Dilation       Allergies   Allergen Reactions    Morphine Nausea And Vomiting and Rash         Social History     Tobacco Use    Smoking status: Former Smoker     Packs/day: 0.75     Years: 30.00     Pack years: 22.50     Types: Cigarettes     Start date: 1984     Last attempt to quit: 2018     Years since quittin.5    Smokeless tobacco: Never Used   Substance Use Topics    Alcohol use: No     Alcohol/week: 0.0 standard drinks       Family History   Problem Relation Age of Onset    Cancer Father 77        lung     Rheum Arthritis Father     Colon Cancer Maternal Aunt 61    Cancer Maternal Aunt 76        \"throat\"    Colon Cancer Maternal Uncle 48  Cancer Maternal Uncle 61        pancreatic    Cancer Paternal Aunt 52        brain    Colon Cancer Paternal Uncle 72    Cancer Paternal Uncle 61        hepatic     Prostate Cancer Paternal Uncle 61    Cancer Maternal Uncle 79        pancreatic     Ovarian Cancer Paternal Aunt 48   Shadasammi [place] Rheum Arthritis Mother     Pancreatic Cancer Mother     Brain Cancer Brother          I have reviewed the patient's past medical history, past surgical history, allergies, medications, social and family history and I have made updates where appropriate. Review of Systems  Positive responses are highlighted in bold    Constitutional:  Fever, Chills, Night Sweats, Fatigue, Unexpected changes in weight  HENT:  Ear pain, Tinnitus, Nosebleeds, Trouble swallowing, Hearing loss, Sore throat  Cardiovascular:  Chest Pain, Palpitations, Orthopnea, Paroxysmal Nocturnal Dyspnea  Respiratory:  Cough, Wheezing, Shortness of breath, Chest tightness, Apnea  Gastrointestinal:  Nausea, Vomiting, Diarrhea, Constipation, Heartburn, Blood in stool  Genitourinary:  Difficulty or painful urination, Flank pain, Change in frequency, Urgency  Skin:  Color change, Rash, Itching, Wound  Musculoskeletal:  Joint pain, Back pain, Gait problems, Joint swelling, Myalgias  Neurological:  Dizziness, Headaches, Presyncope, Numbness, Seizures, Tremors  Endocrine:  Heat Intolerance, Cold Intolerance, Polydipsia, Polyphagia, Polyuria      PHYSICAL EXAM:  Vitals:    11/18/20 1036   BP: 136/86   Pulse: 95   Resp: 18   Temp: 98.5 °F (36.9 °C)   SpO2: 99%   Weight: 284 lb 12.8 oz (129.2 kg)   Height: 5' 5\" (1.651 m)     Body mass index is 47.39 kg/m².   Pain Score:   0 - No pain    Wt Readings from Last 3 Encounters:   11/18/20 284 lb 12.8 oz (129.2 kg)   10/14/20 283 lb 3.2 oz (128.5 kg)   05/28/20 278 lb 12.8 oz (126.5 kg)     VS Reviewed  General Appearance: A&O x 3, No acute distress,well developed and well- nourished  Eyes: pupils equal, round, and reactive to light, extraocular eye movements intact, conjunctivae and eye lids without erythema  ENT: external ear and ear canal clear bilaterally, TMs intact and regular, nose without deformity, nasal mucosa and turbinates normal without polyps, oropharynx normal, dentition is normal for age  Neck: supple and non-tender without mass, no thyromegaly or thyroid nodules, no cervical lymphadenopathy  Pulmonary/Chest: clear to auscultation bilaterally- no wheezes, rales or rhonchi, normal air movement, no respiratory distress or retractions  Cardiovascular: S1 and S2 auscultated w/ RRR. No murmurs, rubs, clicks, or gallops, distal pulses intact. Abdomen: soft, non-tender, non-distended, bowl sounds physiologic,  no rebound or guarding, no masses or hernias noted. Liver and spleen without enlargement. Extremities: no cyanosis, clubbing or edema of the lower extremities. +2 PT/DP bilaterally. Skin: warm and dry, no rash or erythema      Results for POC orders placed in visit on 11/18/20   POCT glycosylated hemoglobin (Hb A1C)   Result Value Ref Range    Hemoglobin A1C 8.0 (H) 4.3 - 5.7 %       ASSESSMENT & PLAN  1. Type 2 diabetes mellitus with other specified complication, with long-term current use of insulin (HCC)    A little worse, but at goal  Work on diet changes  con't current regimen  F/u 6 months    - CBC Auto Differential; Future  - Comprehensive Metabolic Panel; Future  - TSH with Reflex; Future  - Lipid Panel; Future  - Microalbumin / Creatinine Urine Ratio; Future  - POCT glycosylated hemoglobin (Hb A1C)    2. Morbid obesity ([address]    con't wt loss strategies    - CBC Auto Differential; Future  - Comprehensive Metabolic Panel; Future  - TSH with Reflex; Future  - Lipid Panel; Future  - Microalbumin / Creatinine Urine Ratio; Future    3.  Abnormal transaminases    Work on wt loss  F/u labs to monitor  Needs to f/u GI, Rigo Murillo, plans to soon    - CBC Auto Differential; Future  - Comprehensive Metabolic Panel; Future  - TSH with Reflex; Future  - Lipid Panel; Future  - Microalbumin / Creatinine Urine Ratio; Future    4. WESLEY (nonalcoholic steatohepatitis)    - CBC Auto Differential; Future  - Comprehensive Metabolic Panel; Future  - TSH with Reflex; Future  - Lipid Panel; Future  - Microalbumin / Creatinine Urine Ratio; Future    5. Iron deficiency anemia, unspecified iron deficiency anemia type    In remission  Neg w/u  Monitor with labs, ordered today    - CBC Auto Differential; Future  - Comprehensive Metabolic Panel; Future  - TSH with Reflex; Future  - Lipid Panel; Future  - Microalbumin / Creatinine Urine Ratio; Future  - Ferritin; Future  - Iron; Future  - Iron Binding Capacity; Future  - Vitamin B12; Future  - Folate; Future  - Reticulocytes; Future    6. B12 deficiency    con't supplementation  Due for labs    - CBC Auto Differential; Future  - Comprehensive Metabolic Panel; Future  - TSH with Reflex; Future  - Lipid Panel; Future  - Microalbumin / Creatinine Urine Ratio; Future  - Ferritin; Future  - Iron; Future  - Iron Binding Capacity; Future  - Vitamin B12; Future  - Folate; Future  - Reticulocytes; Future    7. Essential hypertension    At goal  con't meds  Labs ordered    - CBC Auto Differential; Future  - Comprehensive Metabolic Panel; Future  - TSH with Reflex; Future  - Lipid Panel; Future  - Microalbumin / Creatinine Urine Ratio; Future    8. Mixed hyperlipidemia    con't statin  Due for albs    - CBC Auto Differential; Future  - Comprehensive Metabolic Panel; Future  - TSH with Reflex; Future  - Lipid Panel; Future  - Microalbumin / Creatinine Urine Ratio; Future      DISPOSITION    Return in about 6 months (around 5/18/2021) for Follow-up Diabetes, follow-up on chronic medical conditions, sooner as needed. Az released without restrictions.     Future Appointments   Date Time Provider Mikey Drummondi   12/9/2020  9:00 AM Radha Powers MD [address] [place] [address]  [facility]   1/26/2021  9:00 AM Raad Barkley

## 2020-11-17 NOTE — PATIENT INSTRUCTIONS
LAB INSTRUCTIONS:    Please complete labs within 4 week(s). Please fast for 8 hours prior to lab collection. The clinic will call you within 1 week of collection. If you have not heard from us within that amount of time, please call us at [phone]. Patient Education        Type 2 Diabetes: Care Instructions  Your Care Instructions     Type 2 diabetes is a disease that develops when the body's tissues cannot use insulin properly. Over time, the pancreas cannot make enough insulin. Insulin is a hormone that helps the body's cells use sugar (glucose) for energy. It also helps the body store extra sugar in muscle, fat, and liver cells. Without insulin, the sugar cannot get into the cells to do its work. It stays in the blood instead. This can cause high blood sugar levels. A person has diabetes when the blood sugar stays too high too much of the time. Over time, diabetes can lead to diseases of the heart, blood vessels, nerves, kidneys, and eyes. You may be able to control your blood sugar by losing weight, eating a healthy diet, and getting daily exercise. You may also have to take insulin or other diabetes medicine. Follow-up care is a key part of your treatment and safety. Be sure to make and go to all appointments. Call your doctor if you are having problems. It's also a good idea to know your test results and keep a list of the medicines you take. How can you care for yourself at home? · Keep your blood sugar at a target level (which you set with your doctor). ? Eat a good diet that spreads carbohydrate throughout the day. Carbohydrate--the body's main source of fuel--affects blood sugar more than any other nutrient. Carbohydrate is in fruits, vegetables, milk, and yogurt. It also is in breads, cereals, vegetables such as potatoes and corn, and sugary foods such as candy and cakes. ? Aim for 30 minutes of exercise on most, preferably all, days of the week. Walking is a good choice.  You also may want to do other activities, such as running, swimming, cycling, or playing tennis or team sports. If your doctor says it's okay, do muscle-strengthening exercises at least 2 times a week. ? Take your medicines exactly as prescribed. Call your doctor if you think you are having a problem with your medicine. You will get more details on the specific medicines your doctor prescribes. · Check your blood sugar as often as your doctor recommends. It is important to keep track of any symptoms you have, such as low blood sugar. Also tell your doctor if you have any changes in your activities, diet, or insulin use. · Talk to your doctor before you start taking aspirin every day. Aspirin can help certain people lower their risk of a heart attack or stroke. But taking aspirin isn't right for everyone, because it can cause serious bleeding. · Do not smoke. If you need help quitting, talk to your doctor about stop-smoking programs and medicines. These can increase your chances of quitting for good. · Keep your cholesterol and blood pressure at normal levels. You may need to take one or more medicines to reach your goals. Take them exactly as directed. Do not stop or change a medicine without talking to your doctor first.  When should you call for help? Call 911 anytime you think you may need emergency care. For example, call if:    · You passed out (lost consciousness), or you suddenly become very sleepy or confused. (You may have very low blood sugar.)   Call your doctor now or seek immediate medical care if:    · Your blood sugar is 300 mg/dL or is higher than the level your doctor has set for you.     · You have symptoms of low blood sugar, such as:  ? Sweating. ? Feeling nervous, shaky, and weak. ? Extreme hunger and slight nausea. ? Dizziness and headache.  ? Blurred vision. ? Confusion.    Watch closely for changes in your health, and be sure to contact your doctor if:    · You often have problems controlling your blood sugar.     · You have symptoms of long-term diabetes problems, such as:  ? New vision changes. ? New pain, numbness, or tingling in your hands or feet. ? Skin problems. Where can you learn more? Go to [email or web address]://aleaeb.[email or web address] org and sign in to your [employer] account. Enter C553 in the T[employer]inci box to learn more about \"Type 2 Diabetes: Care Instructions. \"     If you do not have an account, please click on the \"Sign Up Now\" link. Current as of: December 20, 2019               Content Version: 12.6  © 9219-4555 [employer]. Care instructions adapted under license by [facility] ([facility]). If you have questions about a medical condition or this instruction, always ask your healthcare professional. [address] any warranty or liability for your use of this information.

## 2020-11-18 ENCOUNTER — OFFICE VISIT (OUTPATIENT)
Dept: FAMILY MEDICINE CLINIC | Age: 50
End: 2020-11-18
Payer: COMMERCIAL

## 2020-11-18 VITALS
TEMPERATURE: 98.5 F | HEART RATE: 95 BPM | OXYGEN SATURATION: 99 % | DIASTOLIC BLOOD PRESSURE: 86 MMHG | BODY MASS INDEX: 47.45 KG/M2 | HEIGHT: 65 IN | RESPIRATION RATE: 18 BRPM | WEIGHT: 284.8 LBS | SYSTOLIC BLOOD PRESSURE: 136 MMHG

## 2020-11-18 LAB — HBA1C MFR BLD: 8 % (ref 4.3–5.7)

## 2020-11-18 PROCEDURE — G8417 CALC BMI ABV UP PARAM F/U: HCPCS | Performed by: FAMILY MEDICINE

## 2020-11-18 PROCEDURE — G8482 FLU IMMUNIZE ORDER/ADMIN: HCPCS | Performed by: FAMILY MEDICINE

## 2020-11-18 PROCEDURE — 3017F COLORECTAL CA SCREEN DOC REV: CPT | Performed by: FAMILY MEDICINE

## 2020-11-18 PROCEDURE — 3052F HG A1C>EQUAL 8.0%<EQUAL 9.0%: CPT | Performed by: FAMILY MEDICINE

## 2020-11-18 PROCEDURE — 99214 OFFICE O/P EST MOD 30 MIN: CPT | Performed by: FAMILY MEDICINE

## 2020-11-18 PROCEDURE — 2022F DILAT RTA XM EVC RTNOPTHY: CPT | Performed by: FAMILY MEDICINE

## 2020-11-18 PROCEDURE — G8427 DOCREV CUR MEDS BY ELIG CLIN: HCPCS | Performed by: FAMILY MEDICINE

## 2020-11-18 PROCEDURE — 1036F TOBACCO NON-USER: CPT | Performed by: FAMILY MEDICINE

## 2020-11-24 NOTE — DISCHARGE SUMMARY
[facility]    Patient Name: Tomas Gambino        CSN: [id number]   YOB: 1970  Gender: male  Luis Beach MD,    LEFT KNEE PAIN  ,      Patient is discharged from Physical Therapy services at this time. See last note for details related to results of therapy and goal achievement. Reason for discharge: Patient came into PT on 11/10/2020 with new injuries from fall at home. Patient had reinjury of his knee. It was recommended that he follow up with physician and was placed on hold. At this time have received no further orders for PT. Patient attended one session on 10/5/2020 for evaluation only. He has not attended any further appointments. .     Will require new orders to have further PT. Will discharge at this time. Discharge note completed 11/24/2020.       Octaviano Baltazar, [address]

## 2020-12-09 ENCOUNTER — VIRTUAL VISIT (OUTPATIENT)
Dept: PSYCHIATRY | Age: 50
End: 2020-12-09
Payer: COMMERCIAL

## 2020-12-09 PROCEDURE — 99213 OFFICE O/P EST LOW 20 MIN: CPT | Performed by: PSYCHIATRY & NEUROLOGY

## 2020-12-09 PROCEDURE — 1036F TOBACCO NON-USER: CPT | Performed by: PSYCHIATRY & NEUROLOGY

## 2020-12-09 PROCEDURE — 3017F COLORECTAL CA SCREEN DOC REV: CPT | Performed by: PSYCHIATRY & NEUROLOGY

## 2020-12-09 PROCEDURE — G8482 FLU IMMUNIZE ORDER/ADMIN: HCPCS | Performed by: PSYCHIATRY & NEUROLOGY

## 2020-12-09 PROCEDURE — G8428 CUR MEDS NOT DOCUMENT: HCPCS | Performed by: PSYCHIATRY & NEUROLOGY

## 2020-12-09 PROCEDURE — G8417 CALC BMI ABV UP PARAM F/U: HCPCS | Performed by: PSYCHIATRY & NEUROLOGY

## 2020-12-09 RX ORDER — DEXTROAMPHETAMINE SACCHARATE, AMPHETAMINE ASPARTATE, DEXTROAMPHETAMINE SULFATE AND AMPHETAMINE SULFATE 7.5; 7.5; 7.5; 7.5 MG/1; MG/1; MG/1; MG/1
30 TABLET ORAL 2 TIMES DAILY
Qty: 60 TABLET | Refills: 0 | Status: SHIPPED | OUTPATIENT
Start: 2020-12-09 | End: 2021-01-11 | Stop reason: SDUPTHER

## 2020-12-09 NOTE — PROGRESS NOTES
Chief Complaint   Patient presents with    1 Month Follow-Up     Bipolar ADHD     Tabatha Nazario is a 44 y.o. male being evaluated by a Virtual Visit (video visit) encounter to address concerns as mentioned above. A caregiver was present when appropriate. Due to this being a TeleHealth encounter (During AQPED-18 public health emergency), evaluation of the following organ systems was limited: Vitals/Constitutional/EENT/Resp/CV/GI//MS/Neuro/Skin/Heme-Lymph-Imm. Pursuant to the emergency declaration under the [facility], [address] [place] [address] authority and the [employer] Act, this Virtual Visit was conducted with patient's (and/or legal guardian's) consent, to reduce the patient's risk of exposure to COVID-19 and provide necessary medical care. The patient (and/or legal guardian) has also been advised to contact this office for worsening conditions or problems, and seek emergency medical treatment and/or call 911 if deemed necessary. Patient identification was verified at the start of the visit: Yes    Total time spent for this encounter: Not billed by time    Services were provided through a video synchronous discussion virtually to substitute for in-person clinic visit. Patient and provider were located at their individual homes. --Ewelina Pacheco MD on 12/9/2020 at 9:12 AM    An electronic signature was used to authenticate this note. Cony Pete was interviewed by video for this appointment. He continues to feel depressed. He complains most about broken sleep. He will sleep for a couple of hours, then awakens  and is up a few hours, then naps and is asleep a few hours again. Apparently this continues all day and all night. He does not feel rested. He is down and depressed, but not suicidal.  He is currently on maximal doses of bupropion and lurasidone, along with Adderall and maximal Trintellix.   Additionally he uses (ADDERALL, 30MG,) 30 MG tablet Take 1 tablet by mouth 2 times daily for 30 days. 60 tablet 0    piroxicam (FELDENE) 20 MG capsule TAKE 1 CAPSULE BY MOUTH ONE TIME A DAY AS NEEDED FOR PAIN 90 capsule 3    blood glucose test strips (TRUE METRIX BLOOD GLUCOSE TEST) strip check blood sugar one time daily 100 each 2    fenofibrate (TRIGLIDE) 160 MG tablet TAKE 1 TABLET BY MOUTH ONE TIME A DAY  90 tablet 3    buPROPion HCl ER, XL, 450 MG TB24 Take 450 mg by mouth daily 30 tablet 5    lurasidone (LATUDA) 120 MG tablet TAKE 1 TABLET BY MOUTH ONE TIME A DAY 30 tablet 5    JANUVIA 100 MG tablet TAKE 1 TABLET BY MOUTH ONE TIME A DAY  90 tablet 3    atorvastatin (LIPITOR) 80 MG tablet TAKE 1 TABLET BY MOUTH ONE TIME A DAY  90 tablet 3    BASAGLAR KWIKPEN 100 UNIT/ML injection pen INJECT 65 UNITS INTO THE SKIN TWO TIMES A DAY 15 pen 5    lamoTRIgine (LAMICTAL) 200 MG tablet TAKE 1 TABLET BY MOUTH ONE TIME A DAY 30 tablet 5    trospium (SANCTURA) 20 MG tablet TAKE 1 TABLET BY MOUTH TWO TIMES A DAY  60 tablet 5    VORTIoxetine HBr (TRINTELLIX) 20 MG TABS tablet TAKE 1 TABLET BY MOUTH ONE TIME A DAY 30 tablet 5    Insulin Pen Needle (UNIFINE PENTIPS) 31G X 8 MM MISC Use DAILY as directed WITH INSULIN PENS-- DO NOT RE-USE needles 100 each 3    sildenafil (VIAGRA) 50 MG tablet Take 1 tablet by mouth daily as needed for Erectile Dysfunction Take one tablet one hour before sexual activity.  6 tablet 5    omeprazole (PRILOSEC) 20 MG delayed release capsule TAKE 2 CAPSULES BY MOUTH ONE TIME A DAY  180 capsule 3    lisinopril (PRINIVIL;ZESTRIL) 5 MG tablet TAKE 1/2 TABLET BY MOUTH ONE TIME A DAY  45 tablet 3    traZODone (DESYREL) 300 MG tablet Take 1 tablet by mouth nightly 30 tablet 5    metFORMIN (GLUCOPHAGE) 1000 MG tablet TAKE 1 TABLET BY MOUTH TWO TIMES A DAY WITH MEALS 180 tablet 3    azelastine (ASTELIN) 0.1 % nasal spray Inhale 2 sprays in each nostril twice a day as needed      TOVIAZ 4 MG TB24 ER tablet TAKE 1

## 2021-01-07 DIAGNOSIS — R80.9 TYPE 2 DIABETES MELLITUS WITH MICROALBUMINURIA, WITHOUT LONG-TERM CURRENT USE OF INSULIN (HCC): Chronic | ICD-10-CM

## 2021-01-07 DIAGNOSIS — I25.10 ASCVD (ARTERIOSCLEROTIC CARDIOVASCULAR DISEASE): ICD-10-CM

## 2021-01-07 DIAGNOSIS — E11.29 TYPE 2 DIABETES MELLITUS WITH MICROALBUMINURIA, WITHOUT LONG-TERM CURRENT USE OF INSULIN (HCC): Chronic | ICD-10-CM

## 2021-01-08 RX ORDER — ASPIRIN 81 MG/1
TABLET ORAL
Qty: 90 TABLET | Refills: 3 | Status: SHIPPED | OUTPATIENT
Start: 2021-01-08 | End: 2022-01-06 | Stop reason: SDUPTHER

## 2021-01-08 NOTE — TELEPHONE ENCOUNTER
Recent Visits  Date Type Provider Dept   11/18/20 Office Visit Shahzad Pantoja, DO [facility]   10/14/20 Office Visit Shahzad Pantoja, DO [facility]   05/20/20 Office Visit Shahzad Pantoja, DO [facility]   02/20/20 Office Visit Shahzad Pantoja, [place] [facility]   01/30/20 Office Visit Shahzad Pantoja, [place] [facility]   11/21/19 Office Visit Shahzad Pantoja, DO [facility]   10/29/19 Office Visit Shahzad Pantoja, DO [facility]   08/20/19 Office Visit Shahzad Pantoja, DO [facility]   Showing recent visits within past 540 days with a meds authorizing provider and meeting all other requirements     Future Appointments  Date Type Provider Dept   05/18/21 Appointment Shahzad Pantoja, DO [facility]   Showing future appointments within next 150 days with a meds authorizing provider and meeting all other requirements       Future Appointments   Date Time Provider Mikey [employer]ting   1/26/2021  9:00 AM Teresa Saldaña, [address]   5/18/2021  9:00 AM Shahzad Pantoja, [facility]   5/28/2021  8:00 AM MIGNON Marshall - CNP [facility]

## 2021-01-11 ENCOUNTER — TELEPHONE (OUTPATIENT)
Dept: FAMILY MEDICINE CLINIC | Age: 51
End: 2021-01-11

## 2021-01-11 DIAGNOSIS — F90.8 ADHD, ADULT RESIDUAL TYPE: ICD-10-CM

## 2021-01-11 DIAGNOSIS — R80.9 TYPE 2 DIABETES MELLITUS WITH MICROALBUMINURIA, WITHOUT LONG-TERM CURRENT USE OF INSULIN (HCC): ICD-10-CM

## 2021-01-11 DIAGNOSIS — E11.29 TYPE 2 DIABETES MELLITUS WITH MICROALBUMINURIA, WITHOUT LONG-TERM CURRENT USE OF INSULIN (HCC): ICD-10-CM

## 2021-01-11 RX ORDER — TRAZODONE HYDROCHLORIDE 300 MG/1
TABLET ORAL
Qty: 30 TABLET | Refills: 0 | Status: SHIPPED | OUTPATIENT
Start: 2021-01-11 | End: 2021-02-04

## 2021-01-11 RX ORDER — DEXTROAMPHETAMINE SACCHARATE, AMPHETAMINE ASPARTATE, DEXTROAMPHETAMINE SULFATE AND AMPHETAMINE SULFATE 7.5; 7.5; 7.5; 7.5 MG/1; MG/1; MG/1; MG/1
30 TABLET ORAL 2 TIMES DAILY
Qty: 60 TABLET | Refills: 0 | Status: SHIPPED | OUTPATIENT
Start: 2021-01-11 | End: 2021-02-12 | Stop reason: SDUPTHER

## 2021-01-11 RX ORDER — INSULIN GLARGINE 100 [IU]/ML
INJECTION, SOLUTION SUBCUTANEOUS
Qty: 15 PEN | Refills: 3 | Status: SHIPPED | OUTPATIENT
Start: 2021-01-11 | End: 2021-05-10

## 2021-01-11 NOTE — TELEPHONE ENCOUNTER
Janice Arenas [facility] called stating pt's insurance will no longer pay for the E[place], but will cover Levemier or Lantus.   Please advise

## 2021-01-11 NOTE — TELEPHONE ENCOUNTER
Diagnosis Orders   1.  Type 2 diabetes mellitus with microalbuminuria, without long-term current use of insulin (HCC)  insulin glargine (LANTUS SOLOSTAR) 100 UNIT/ML injection pen

## 2021-01-11 NOTE — TELEPHONE ENCOUNTER
Ryann Lopez is a former patient of Dr. Tahir Anthony. He called to request a refill of Adderall 30mg BID and Trintellix 20mg/d. He attended an appointment 12/9 and is to resume care with Susanne Esqueda 1/21/21.

## 2021-01-14 NOTE — TELEPHONE ENCOUNTER
[facility] called the office on Monday to obtain a refill of Trintellix 20mg/d. A script was sent to the pharmacy on Monday and according to Epic, was received by the pharmacy. Sean Quijano called today to say that the pharmacy still had not received the medication. This writer reached out to the pharmacy to verify if the e-script was received and Melquiades Lacy stated that it was not.      A new order has been loaded pending approval.

## 2021-01-21 ENCOUNTER — VIRTUAL VISIT (OUTPATIENT)
Dept: PSYCHIATRY | Age: 51
End: 2021-01-21
Payer: COMMERCIAL

## 2021-01-21 DIAGNOSIS — F17.210 TOBACCO DEPENDENCE DUE TO CIGARETTES: ICD-10-CM

## 2021-01-21 DIAGNOSIS — E66.01 MORBID OBESITY WITH BMI OF 45.0-49.9, ADULT (HCC): ICD-10-CM

## 2021-01-21 DIAGNOSIS — Z63.4 BEREAVEMENT: Primary | ICD-10-CM

## 2021-01-21 DIAGNOSIS — F31.9 BIPOLAR 1 DISORDER (HCC): ICD-10-CM

## 2021-01-21 DIAGNOSIS — F90.8 ADHD, ADULT RESIDUAL TYPE: ICD-10-CM

## 2021-01-21 DIAGNOSIS — F90.9 ATTENTION DEFICIT HYPERACTIVITY DISORDER (ADHD), UNSPECIFIED ADHD TYPE: ICD-10-CM

## 2021-01-21 PROCEDURE — G8482 FLU IMMUNIZE ORDER/ADMIN: HCPCS | Performed by: REGISTERED NURSE

## 2021-01-21 PROCEDURE — 1036F TOBACCO NON-USER: CPT | Performed by: REGISTERED NURSE

## 2021-01-21 PROCEDURE — G8428 CUR MEDS NOT DOCUMENT: HCPCS | Performed by: REGISTERED NURSE

## 2021-01-21 PROCEDURE — G8417 CALC BMI ABV UP PARAM F/U: HCPCS | Performed by: REGISTERED NURSE

## 2021-01-21 PROCEDURE — 3017F COLORECTAL CA SCREEN DOC REV: CPT | Performed by: REGISTERED NURSE

## 2021-01-21 PROCEDURE — 99214 OFFICE O/P EST MOD 30 MIN: CPT | Performed by: REGISTERED NURSE

## 2021-01-21 RX ORDER — LAMOTRIGINE 200 MG/1
TABLET ORAL
Qty: 30 TABLET | Refills: 5 | Status: SHIPPED | OUTPATIENT
Start: 2021-01-21 | End: 2021-08-31 | Stop reason: SDUPTHER

## 2021-01-21 SDOH — SOCIAL STABILITY - SOCIAL INSECURITY: DISSAPEARANCE AND DEATH OF FAMILY MEMBER: Z63.4

## 2021-01-21 NOTE — PROGRESS NOTES
This note will not be viewable in [email or web address] for the following reason(s). This is a Psychotherapy Note. [address] [address]  Dept: [phone]  Dept Fax: [phone]  Loc: [phone]    Visit Date: 1/21/2021    SUBJECTIVE DATA     CHIEF COMPLAINT:    Chief Complaint   Patient presents with    Depression    Follow-up    Medication Refill       History obtained from: patient    HISTORY OF PRESENT ILLNESS:    Lenon Schirmer is a 44 y.o. male who presents to the office with complaints of increasing depression related to recent stressors. HPI     Depression  Excessive irritability as a child  Began experiencing symptoms as a teenager  Overdosed as a teen \"not to die, just to get some sleep. I couldn't sleep\" (did not receive treatment at this time)  Has more depressive episodes than manic episodes    Anxiety  Denies any concerns    Venessa  Episodes began in his late teens    ADHD  Inattention symptoms have persisted for at least six months to a degree that is inconsistent with developmental level and that negatively impacts directly on social and academic/occupational activities: The patient often:  Fails to give close attention to details or makes careless mistakes at work. Has difficulty sustaining attention in tasks. Does not seem to listen when spoken to directly. Has difficulty organizing tasks and activities. Avoids, dislikes, or is reluctant to engage in tasks that require sustained mental effort. Loses things necessary for tasks or activities. Is easily distracted by extraneous stimuli. Is forgetful in daily activities like paying bills.   Hyperactivity and impulsivity symptoms have persisted for at least six months to a degree that is inconsistent with developmental level and that negatively impacts directly on social and academic/occupational activities:  the patient often:  Fidgets with or taps hands or feet or squirms in seat. Has a hard time sitting for long periods and is often \"wandering\" at work and home. Feels he is too restless. Talks excessively. Has a habit of completing people's sentences; cannot wait for turn in conversation. Has difficulty waiting his or her turn. Interrupts or intrudes on others. Several inattentive or hyperactive-impulsive symptoms were present prior to age 13 years. present at home, work; with friends or relatives; in other activities. There is clear evidence that the symptoms interfere with, or reduce the quality of, social, academic, or occupational functioning. Combined presentation: both inattention hyperactivity-impulsivity have been present over the past six months. Severe: symptoms result in marked impairment in social or occupational functioning. Trauma  1. Mother suffered from effects of a mental disorder while Hugh Velasco was growing up and she had four \"nervous breakdowns\" that required hospitalization, two of which occurred when he was a young child    2. Wife passed away November 2019       Recent stressors  Brother recently passed away from brain cancer (1/11/2021)  Mom has stage IV pancreatic cancer and does not have much time left  --Hugh Velasco feels he is coping fairly well with his losses: states \"I will definitely call if I need to see you before my next appointment    Medications  Latuda  Adderall  Trintellix  Bupropion  Lamictal    PSYCHIATRIC HISTORY:  Patient has had prior care with the following:    [x] Psychiatrist--was seeing Dr. Shimon Mccarty since November 2016    [] Psychologist    [] Other Therapist    [] None    The patient has had 1 suspected lifetime suicide attempts. Methods used for the suicide attempts include overdose. The patient's most recent suicide attempt was as a teenager.     Patient reports 0 psych hospital admissions   Past psychiatric medications include:   Lexapro  Lithium  Abilify  Effexor  Remeron  Paxil  Viibryd  Lamotrigine Concerta    Adverse reactionsfrom psychotropic medications:  denies      Lifetime Psychiatric Review of Systems         Venessa or Hypomania:  yes - began as a teenager     Panic Attacks:  no     Phobias:  no     Obsessions and Compulsions:  no     Body or Vocal Tics:  no     Hallucinations:  no     Delusions:  no    SOCIAL HISTORY:  Patient was born in [place], [place] and raised by his biological parents     Was not close with his mother growing up as she was isolative, distant and cold. She was not very involved with the children    Father was more physically and emotionally available for Suri Linn during his childhood    Father passed away a few years ago and the family completely fell apart. Suri Linn did not have much contact with siblings until recently hearing of his brother's diagnosis and his mother's illness. Was  23 years    Has 3 children (2 daughters, 1 son)        Social History     Socioeconomic History    Marital status:       Spouse name: Not on file    Number of children: Not on file    Years of education: Not on file    Highest education level: Not on file   Occupational History    Not on file   Social Needs    Financial resource strain: Not on file    Food insecurity     Worry: Not on file     Inability: Not on file    Transportation needs     Medical: Not on file     Non-medical: Not on file   Tobacco Use    Smoking status: Former Smoker     Packs/day: 0.75     Years: 30.00     Pack years: 22.50     Types: Cigarettes     Start date: 1984     Quit date: 2018     Years since quittin.7    Smokeless tobacco: Never Used   Substance and Sexual Activity    Alcohol use: No     Alcohol/week: 0.0 standard drinks    Drug use: No    Sexual activity: Never   Lifestyle    Physical activity     Days per week: Not on file     Minutes per session: Not on file    Stress: Not on file   Relationships    Social connections     Talks on phone: Not on file     Gets together: Not on file Attends Zoroastrianism service: Not on file     Active member of club or organization: Not on file     Attends meetings of clubs or organizations: Not on file     Relationship status: Not on file    Intimate partner violence     Fear of current or ex partner: Not on file     Emotionally abused: Not on file     Physically abused: Not on file     Forced sexual activity: Not on file   Other Topics Concern    Not on file   Social History Narrative    Not on file       FAMILY HISTORY:   Family History   Problem Relation Age of Onset    Cancer Father 77        lung     Rheum Arthritis Father     Colon Cancer Maternal Aunt 61    Cancer Maternal Aunt 76        \"throat\"    Colon Cancer Maternal Uncle 48    Cancer Maternal Uncle 60        pancreatic    Cancer Paternal Aunt 52        brain    Colon Cancer Paternal Uncle 72    Cancer Paternal Uncle 61        hepatic     Prostate Cancer Paternal Uncle 61    Cancer Maternal Uncle 79        pancreatic     Ovarian Cancer Paternal Aunt 48   Nitin Mitchell Rheum Arthritis Mother     Pancreatic Cancer Mother     Brain Cancer Brother        Psychiatric Family History  Bipolar disorder in 2 daughters, siblings, mother  ADHD in daughter, siblings  Depression in mother and \"other members of her family\"    PAST MEDICAL HISTORY:    Past Medical History:   Diagnosis Date    ADHD (attention deficit hyperactivity disorder)     Allergic rhinitis     Benign esophageal stricture     s/p dilation may 2014    Benign prostatic hyperplasia     B[facility]     CAD (coronary artery disease)     Unclear history, cath 2013 with patent arteries. Records pending.      Chronic back pain     Depression     DM2 (diabetes mellitus, type 2) ([facility]     Dysfunctional voiding of urine 6/20/2013    Medications he changes to follow,  Adding ditropan  to the present schedule     ED (erectile dysfunction) of organic origin 5/1/2013    Fibromyalgia     Former smoker     GERD (gastroesophageal reflux disease)     History of colon polyps     Pt unsure when last colo.  History of TIA (transient ischemic attack)     x2     Hyperlipidemia     Hypertension     Hypertriglyceridemia 10/27/2015    Migraine headache     [facility]bid [facility]     Morbid obesity with BMI of 40.0-44.9, adult ([facility]     WESLEY (nonalcoholic steatohepatitis)     Obstructive sleep apnea on CPAP 11/3/2015    Peripheral neuropathy     Pulmonary nodule     stable >2 years, no further w/u required.  Rotator cuff injury     Left side    S/P cardiac catheterization: 9/30/2013: Normal coronaries 9/30/2013 9/30/2013: Normal coronaries. Radial approach. Dr. Michelle Garcia Short-segment Vásquez's esophagus 6/17/2015    Tarsal tunnel syndrome     Bilateral       PAST SURGICAL HISTORY:    Past Surgical History:   Procedure Laterality Date    CARDIAC CATHETERIZATION  10/2013    9/30/2013: Normal coronaries. Radial approach. Dr. Michelle Garcia COLONOSCOPY  2008, 2012   [facility] KNEE SURGERY Left 2012    ROTATOR CUFF REPAIR Left 2013   [facility] SHOULDER SURGERY Bilateral 2011   [facility] SINUS SURGERY  2004    [place] occ.  TONSILLECTOMY  2002    TYMPANOSTOMY TUBE PLACEMENT      UPPER GASTROINTESTINAL ENDOSCOPY  2008, 2008 with dilation, 2012    UPPER GASTROINTESTINAL ENDOSCOPY  2013    UPPER GASTROINTESTINAL ENDOSCOPY  MAY 2014, 2017    Dilation       PREVIOUSMEDICATIONS:  Outpatient Medications Prior to Visit   Medication Sig Dispense Refill    VORTIoxetine HBr (TRINTELLIX) 20 MG TABS tablet TAKE 1 TABLET BY MOUTH ONE TIME A DAY 30 tablet 0    amphetamine-dextroamphetamine (ADDERALL, 30MG,) 30 MG tablet Take 1 tablet by mouth 2 times daily for 30 days.  60 tablet 0    buPROPion HCl ER, XL, 450 MG TB24 Take 450 mg by mouth daily 30 tablet 5    lurasidone (LATUDA) 120 MG tablet TAKE 1 TABLET BY MOUTH ONE TIME A DAY 30 tablet 5    traZODone (DESYREL) 300 MG tablet TAKE 1 TABLET BY MOUTH ONE TIME A DAY AT NIGHT 30 tablet 0    insulin glargine (LANTUS SOLOSTAR) 100 UNIT/ML injection pen INJECT 65 UNITS INTO THE SKIN TWO TIMES A DAY 15 pen 3    aspirin (SM ASPIRIN ADULT LOW STRENGTH) 81 MG EC tablet TAKE 1 TABLET BY MOUTH ONE TIME A DAY 90 tablet 3    piroxicam (FELDENE) 20 MG capsule TAKE 1 CAPSULE BY MOUTH ONE TIME A DAY AS NEEDED FOR PAIN 90 capsule 3    blood glucose test strips (TRUE METRIX BLOOD GLUCOSE TEST) strip check blood sugar one time daily 100 each 2    fenofibrate (TRIGLIDE) 160 MG tablet TAKE 1 TABLET BY MOUTH ONE TIME A DAY  90 tablet 3    JANUVIA 100 MG tablet TAKE 1 TABLET BY MOUTH ONE TIME A DAY  90 tablet 3    atorvastatin (LIPITOR) 80 MG tablet TAKE 1 TABLET BY MOUTH ONE TIME A DAY  90 tablet 3    lamoTRIgine (LAMICTAL) 200 MG tablet TAKE 1 TABLET BY MOUTH ONE TIME A DAY 30 tablet 5    trospium (SANCTURA) 20 MG tablet TAKE 1 TABLET BY MOUTH TWO TIMES A DAY  60 tablet 5    Insulin Pen Needle (UNIFINE PENTIPS) 31G X 8 MM MISC Use DAILY as directed WITH INSULIN PENS-- DO NOT RE-USE needles 100 each 3    sildenafil (VIAGRA) 50 MG tablet Take 1 tablet by mouth daily as needed for Erectile Dysfunction Take one tablet one hour before sexual activity.  6 tablet 5    omeprazole (PRILOSEC) 20 MG delayed release capsule TAKE 2 CAPSULES BY MOUTH ONE TIME A DAY  180 capsule 3    lisinopril (PRINIVIL;ZESTRIL) 5 MG tablet TAKE 1/2 TABLET BY MOUTH ONE TIME A DAY  45 tablet 3    metFORMIN (GLUCOPHAGE) 1000 MG tablet TAKE 1 TABLET BY MOUTH TWO TIMES A DAY WITH MEALS 180 tablet 3    azelastine (ASTELIN) 0.1 % nasal spray Inhale 2 sprays in each nostril twice a day as needed      TOVIAZ 4 MG TB24 ER tablet TAKE 1 TABLET BY MOUTH ONE TIME A DAY      vitamin B-12 (CYANOCOBALAMIN) 1000 MCG tablet TAKE 1 TABLET BY MOUTH ONE TIME A DAY 90 tablet 3    MEIJER LANCETS UNIVERSAL 33G MISC use to test blood sugar once a day 100 each 3    tamsulosin (FLOMAX) 0.4 MG capsule TAKE 1 CAPSULE BY MOUTH ONE TIME A DAY  90 capsule 3    ARTIFICIAL TEARS 1.4 % ophthalmic solution Place 1 drop into both eyes 4 times daily  0    clotrimazole-betamethasone (LOTRISONE) 1-0.05 % cream Apply topically 2 times daily. Not to exceed 2 weeks consecutively. 1 Tube 1    rOPINIRole (REQUIP) 3 MG tablet Take 3 mg by mouth nightly      butalbital-acetaminophen-caffeine (FIORICET) -40 MG per tablet Take 1 tablet by mouth every 6 hours as needed for Migraine 60 tablet 0    cetirizine (ZYRTEC) 10 MG tablet daily       EYE ITCH RELIEF 0.025 % ophthalmic solution Place 2 drops into both eyes as needed       propranolol (INDERAL LA) 160 MG ER capsule Take 160 mg by mouth daily       fluticasone (FLONASE) 50 MCG/ACT nasal spray 1 spray by Nasal route daily as needed.  gabapentin (NEURONTIN) 800 MG tablet   Take 800 mg by mouth 4 times daily        No facility-administered medications prior to visit. ALLERGIES:    Morphine    REVIEW OF SYSTEMS:    Review of Systems    The patient sees Nona Freeman DO as his primary care provider. SPECIALISTS: denies    OBJECTIVE DATA     There were no vitals taken for this visit. Physical Exam    Mental Status Evaluation:   Orientation: Alert, oriented, thought content appropriate   Mood:. Within Normal Limits      Affect:  Normal      Appearance:  Age Appropriate, Casually Dressed and Within Normal Limits   Activity:  Within Normal Limits, Cooperative, Good Eye Contact and Seated Calmly   Gait/Posture: Normal   Speech:  Clear, Fluent, Normal Pitch and Volume, Age and Situation Appropriate   Thought Process: Within Normal Limits   Thought Content:   Within Normal Limits   Cognition:  Grossly Intact   Memory: Intact   Insight:  Good   Judgment: Good   Suicidal Ideations: Denies Suicidal Ideation   Homicidal Ideations: Negative for homicidal ideation   Medication Side Effects: Absent       Attention Span Attention span and concentration were age appropriate       Screenings Completed in This Encounter:     Anxiety and Depression:                    DIAGNOSIS AND ASSESSMENT DATA     DIAGNOSIS:   1. Bereavement    2. ADHD, adult residual type    3. Bipolar 1 disorder ([facility]    4. Attention deficit hyperactivity disorder (ADHD), unspecified ADHD type    5. Tobacco dependence due to cigarettes    6. Morbid obesity with BMI of 45.0-49.9, adult ([facility]        PLAN   Follow-up:  Return in about 3 months (around 4/21/2021), or if symptoms worsen or fail to improve, for medication management, follow-up. 1. Continue medications as prescribed as Maria L Cole believes he is doing well    Prescriptions for this encounter:  New Prescriptions    No medications on file       Orders Placed This Encounter   Medications    lamoTRIgine (LAMICTAL) 200 MG tablet     Sig: TAKE 1 TABLET BY MOUTH ONE TIME A DAY     Dispense:  30 tablet     Refill:  5       Medications Discontinued During This Encounter   Medication Reason    lamoTRIgine (LAMICTAL) 200 MG tablet REORDER       Additional orders:  No orders of the defined types were placed in this encounter. Risks, potential side effects, possibledrug-drug interactions, benefits and alternate treatments discussed in detail. All questions answered. Patient stated understanding and is agreeable to treatment plan. Patient has been instructed to seek emergency help via the emergency and/or calling 911 should symptoms become severe, worsen, or with other concerning symptoms. Patient instructed to goimmediately to the emergency room and/or call 911 with any suicidal or homicidal ideations or if audio/visual hallucinations develop  Patient stated understanding and agrees. Patient given crisis center information. I spent a total of 60 minutes with the patient and over half of that time was spent on counselingand coordination of care regarding topics discussed above.     Provider Signature:  Electronically signed by MIGNON Valdivia CNP on 1/21/2021 at 7:01 PM    Giorgi Bhakta is a 44 y.o. male being evaluated by a Virtual Visit (video visit) encounter to address concerns as mentioned above. A caregiver was present when appropriate. Due to this being a TeleHealth encounter (During TIVIV-36 public health emergency), evaluation of the following organ systems was limited: Vitals/Constitutional/EENT/Resp/CV/GI//MS/Neuro/Skin/Heme-Lymph-Imm. Pursuant to the emergency declaration under the [address] [place] [address] and the [employer] Act, this Virtual Visit was conducted with patient's (and/or legal guardian's) consent, to reduce the patient's risk of exposure to COVID-19 and provide necessary medical care. The patient (and/or legal guardian) has also been advised to contact this office for worsening conditions or problems, and seek emergency medical treatment and/or call 911 if deemed necessary. Patient identification was verified at the start of the visit: Yes    Total time spent for this encounter: Not billed by time    Services were provided through a video synchronous discussion virtually to substitute for in-person clinic visit. Patient and provider were located at their individual homes. --MIGNON Valdivia CNP on 1/21/2021 at 7:02 PM    An electronic signature was used to authenticate this note.

## 2021-02-04 ENCOUNTER — OFFICE VISIT (OUTPATIENT)
Dept: UROLOGY | Age: 51
End: 2021-02-04
Payer: COMMERCIAL

## 2021-02-04 VITALS — TEMPERATURE: 97 F | WEIGHT: 279 LBS | BODY MASS INDEX: 43.79 KG/M2 | HEIGHT: 67 IN

## 2021-02-04 DIAGNOSIS — N40.1 BENIGN PROSTATIC HYPERPLASIA WITH LOWER URINARY TRACT SYMPTOMS, SYMPTOM DETAILS UNSPECIFIED: Primary | ICD-10-CM

## 2021-02-04 LAB — POST VOID RESIDUAL (PVR): 63 ML

## 2021-02-04 PROCEDURE — 1036F TOBACCO NON-USER: CPT | Performed by: UROLOGY

## 2021-02-04 PROCEDURE — G8482 FLU IMMUNIZE ORDER/ADMIN: HCPCS | Performed by: UROLOGY

## 2021-02-04 PROCEDURE — G8427 DOCREV CUR MEDS BY ELIG CLIN: HCPCS | Performed by: UROLOGY

## 2021-02-04 PROCEDURE — G8417 CALC BMI ABV UP PARAM F/U: HCPCS | Performed by: UROLOGY

## 2021-02-04 PROCEDURE — 3017F COLORECTAL CA SCREEN DOC REV: CPT | Performed by: UROLOGY

## 2021-02-04 PROCEDURE — 51798 US URINE CAPACITY MEASURE: CPT | Performed by: UROLOGY

## 2021-02-04 PROCEDURE — 99212 OFFICE O/P EST SF 10 MIN: CPT | Performed by: UROLOGY

## 2021-02-04 RX ORDER — OXYBUTYNIN CHLORIDE 5 MG/1
5 TABLET ORAL 3 TIMES DAILY
Qty: 90 TABLET | Refills: 5 | Status: SHIPPED | OUTPATIENT
Start: 2021-02-04 | End: 2021-08-10 | Stop reason: SDUPTHER

## 2021-02-04 RX ORDER — TAMSULOSIN HYDROCHLORIDE 0.4 MG/1
0.4 CAPSULE ORAL DAILY
Qty: 30 CAPSULE | Refills: 5 | Status: SHIPPED | OUTPATIENT
Start: 2021-02-04 | End: 2021-07-16

## 2021-02-04 NOTE — PROGRESS NOTES
78-year-old white male returns today for follow-up regarding his chronic voiding symptoms. He is currently on Flomax and Sanctura 20 mg p.o. twice daily. This combination has improved his voiding pattern significantly. Urinary flow is much stronger, no hesitancy, and no urgency, frequency or nocturia. Has minimal minimal dryness of the mouth. Insurance will not cover sanctura any longer. Renu Mckay). PSA in May,2020 was 0.31 ng/ml. No FH of PCa. Imp: LUTS, especially severe nf. PVR is 60 mL today. PLAN: restart tamsulosin and try oxybutynin 5 mg #90 1 po tid. common side effects especially xerostomia and constipation reviewed. Return in 6 weeks.

## 2021-02-08 NOTE — TELEPHONE ENCOUNTER
Giuliano Mejia has requested a refill of Trintellix 20mg/d on [employer]. He attended an appointment 1/21 and is to follow up in three months but does not have a future appointment at this time. The pharmacy indicates that the last 30 day refill was dispensed 1/4/21.

## 2021-02-12 DIAGNOSIS — F90.8 ADHD, ADULT RESIDUAL TYPE: ICD-10-CM

## 2021-02-12 RX ORDER — DEXTROAMPHETAMINE SACCHARATE, AMPHETAMINE ASPARTATE, DEXTROAMPHETAMINE SULFATE AND AMPHETAMINE SULFATE 7.5; 7.5; 7.5; 7.5 MG/1; MG/1; MG/1; MG/1
30 TABLET ORAL 2 TIMES DAILY
Qty: 60 TABLET | Refills: 0 | Status: SHIPPED | OUTPATIENT
Start: 2021-02-12 | End: 2021-03-15 | Stop reason: SDUPTHER

## 2021-02-12 NOTE — TELEPHONE ENCOUNTER
Mason Landeros called to request a refill of Adderall 30mg BID. He attended an appointment 1/21 and is to return 5/5/21.

## 2021-03-08 DIAGNOSIS — R80.9 TYPE 2 DIABETES MELLITUS WITH MICROALBUMINURIA, WITHOUT LONG-TERM CURRENT USE OF INSULIN (HCC): Chronic | ICD-10-CM

## 2021-03-08 DIAGNOSIS — E11.29 TYPE 2 DIABETES MELLITUS WITH MICROALBUMINURIA, WITHOUT LONG-TERM CURRENT USE OF INSULIN (HCC): Chronic | ICD-10-CM

## 2021-03-08 NOTE — TELEPHONE ENCOUNTER
Recent Visits  Date Type Provider Dept   11/18/20 Office Visit Mora Preciado, DO [facility]   10/14/20 Office Visit Mora Preciado, DO [facility]   05/20/20 Office Visit Mora Preciado, DO [facility]   02/20/20 Office Visit Mora Preciado, [place] [facility]   01/30/20 Office Visit Mora Preciado, [place] [facility]   11/21/19 Office Visit Mora Preciado, DO [facility]   10/29/19 Office Visit Mora Preciado, DO [facility]   Showing recent visits within past 540 days with a meds authorizing provider and meeting all other requirements     Future Appointments  Date Type Provider Dept   05/18/21 Appointment Mora Preciado, DO [facility]   Showing future appointments within next 150 days with a meds authorizing provider and meeting all other requirements     Future Appointments   Date Time Provider Mikey Painting   3/18/2021  8:30 AM Erlin Lan, [address]   5/5/2021  9:30 AM MIGNON Lopez - CHRISTIANO Autumn Jolly [facility]   5/18/2021  9:00 AM Mora Preciado, [address] [facility]   5/28/2021  8:00 AM [facility] [address]

## 2021-03-08 NOTE — TELEPHONE ENCOUNTER
[facility] is requesting a medication refill on Az's behalf for:     Trintellix 20mg;#30 with 0 refills;last with a start and refill date of 02/08/21. Medication is pending your approval for a 30 day supply with 1 refills; he is scheduled with this provider for 05/05/21; seen by this provider on 01/21/21.

## 2021-03-15 DIAGNOSIS — F90.8 ADHD, ADULT RESIDUAL TYPE: ICD-10-CM

## 2021-03-15 RX ORDER — DEXTROAMPHETAMINE SACCHARATE, AMPHETAMINE ASPARTATE, DEXTROAMPHETAMINE SULFATE AND AMPHETAMINE SULFATE 7.5; 7.5; 7.5; 7.5 MG/1; MG/1; MG/1; MG/1
30 TABLET ORAL 2 TIMES DAILY
Qty: 60 TABLET | Refills: 0 | Status: SHIPPED | OUTPATIENT
Start: 2021-03-15 | End: 2021-04-13 | Stop reason: SDUPTHER

## 2021-03-15 NOTE — TELEPHONE ENCOUNTER
[facility] called into the office stating that he needs a refill on his Adderall XR 30mg;#60 with 0 refills;last with a start date of 02/12/21. Medication is pending your approval for a 30 day supply with 0 refills; he is scheduled to return on 05/05/21.

## 2021-04-06 DIAGNOSIS — R10.13 EPIGASTRIC PAIN: ICD-10-CM

## 2021-04-06 DIAGNOSIS — K21.9 GASTROESOPHAGEAL REFLUX DISEASE: ICD-10-CM

## 2021-04-06 DIAGNOSIS — R13.14 PHARYNGOESOPHAGEAL DYSPHAGIA: ICD-10-CM

## 2021-04-06 DIAGNOSIS — R10.13 DYSPEPSIA: ICD-10-CM

## 2021-04-06 RX ORDER — OMEPRAZOLE 20 MG/1
CAPSULE, DELAYED RELEASE ORAL
Qty: 180 CAPSULE | Refills: 3 | Status: SHIPPED | OUTPATIENT
Start: 2021-04-06 | End: 2022-04-05

## 2021-04-06 NOTE — TELEPHONE ENCOUNTER
Recent Visits  Date Type Provider Dept   11/18/20 Office Visit Mathieu Franco, DO [facility]   10/14/20 Office Visit Mathieu Franco, DO [facility]   05/20/20 Office Visit Mathieu Franco, DO [facility]   02/20/20 Office Visit Mathieu Franco, [place] [facility]   01/30/20 Office Visit Mathieu Franco, [place] [facility]   11/21/19 Office Visit Mathieu Franco, DO [facility]   10/29/19 Office Visit Mathieu Franco, DO [facility]   Showing recent visits within past 540 days with a meds authorizing provider and meeting all other requirements     Future Appointments  Date Type Provider Dept   05/18/21 Appointment Mathieu Franco, DO [facility]   Showing future appointments within next 150 days with a meds authorizing provider and meeting all other requirements     Future Appointments   Date Time Provider Mikey Painting   5/5/2021  9:30 AM MIGNON Gambino - CNP Woodroe Caddy [facility]   5/18/2021  9:00 AM Mathieu Franco, [facility]   5/28/2021  8:00 [facility]

## 2021-04-13 DIAGNOSIS — F90.8 ADHD, ADULT RESIDUAL TYPE: ICD-10-CM

## 2021-04-13 RX ORDER — DEXTROAMPHETAMINE SACCHARATE, AMPHETAMINE ASPARTATE, DEXTROAMPHETAMINE SULFATE AND AMPHETAMINE SULFATE 7.5; 7.5; 7.5; 7.5 MG/1; MG/1; MG/1; MG/1
30 TABLET ORAL 2 TIMES DAILY
Qty: 60 TABLET | Refills: 0 | Status: SHIPPED | OUTPATIENT
Start: 2021-04-13 | End: 2021-05-05 | Stop reason: SDUPTHER

## 2021-04-13 NOTE — TELEPHONE ENCOUNTER
Approved--Controlled substances monitoring: possible medication side effects, risk of tolerance and/or dependence, and alternative treatments discussed, no signs of potential drug abuse or diversion identified, and [place] report reviewed today- activity consistent with treatment plan    Electronically signed by MIGNON Spencer CNP on 4/13/21 at 2:33 PM EDT

## 2021-04-13 NOTE — TELEPHONE ENCOUNTER
Kennis Pearson called the office to obtain a refill of Adderall 30mg BID. He attended an appointment 1/21 and is to return 5/5.

## 2021-04-16 DIAGNOSIS — E11.65 UNCONTROLLED TYPE 2 DIABETES MELLITUS WITH HYPERGLYCEMIA, WITHOUT LONG-TERM CURRENT USE OF INSULIN (HCC): ICD-10-CM

## 2021-04-16 RX ORDER — PEN NEEDLE, DIABETIC 31 GX5/16"
NEEDLE, DISPOSABLE MISCELLANEOUS
Qty: 100 EACH | Refills: 3 | Status: SHIPPED | OUTPATIENT
Start: 2021-04-16 | End: 2021-10-11 | Stop reason: SDUPTHER

## 2021-05-05 ENCOUNTER — VIRTUAL VISIT (OUTPATIENT)
Dept: PSYCHIATRY | Age: 51
End: 2021-05-05
Payer: COMMERCIAL

## 2021-05-05 DIAGNOSIS — F31.9 BIPOLAR 1 DISORDER (HCC): Primary | ICD-10-CM

## 2021-05-05 DIAGNOSIS — F90.8 ADHD, ADULT RESIDUAL TYPE: ICD-10-CM

## 2021-05-05 DIAGNOSIS — E66.01 MORBID OBESITY WITH BMI OF 45.0-49.9, ADULT (HCC): ICD-10-CM

## 2021-05-05 DIAGNOSIS — F17.210 TOBACCO DEPENDENCE DUE TO CIGARETTES: ICD-10-CM

## 2021-05-05 PROCEDURE — G8428 CUR MEDS NOT DOCUMENT: HCPCS | Performed by: REGISTERED NURSE

## 2021-05-05 PROCEDURE — 3017F COLORECTAL CA SCREEN DOC REV: CPT | Performed by: REGISTERED NURSE

## 2021-05-05 PROCEDURE — 1036F TOBACCO NON-USER: CPT | Performed by: REGISTERED NURSE

## 2021-05-05 PROCEDURE — 99213 OFFICE O/P EST LOW 20 MIN: CPT | Performed by: REGISTERED NURSE

## 2021-05-05 PROCEDURE — G8417 CALC BMI ABV UP PARAM F/U: HCPCS | Performed by: REGISTERED NURSE

## 2021-05-05 RX ORDER — DEXTROAMPHETAMINE SACCHARATE, AMPHETAMINE ASPARTATE, DEXTROAMPHETAMINE SULFATE AND AMPHETAMINE SULFATE 7.5; 7.5; 7.5; 7.5 MG/1; MG/1; MG/1; MG/1
30 TABLET ORAL 2 TIMES DAILY
Qty: 60 TABLET | Refills: 0 | Status: SHIPPED | OUTPATIENT
Start: 2021-05-05 | End: 2021-06-10 | Stop reason: SDUPTHER

## 2021-05-05 RX ORDER — BUPROPION HYDROCHLORIDE 450 MG/1
450 TABLET, FILM COATED, EXTENDED RELEASE ORAL DAILY
Qty: 30 TABLET | Refills: 5 | Status: SHIPPED | OUTPATIENT
Start: 2021-05-05 | End: 2021-12-08 | Stop reason: SDUPTHER

## 2021-05-05 NOTE — PROGRESS NOTES
This note will not be viewable in MyChart for the following reason(s). This is a Psychotherapy Note. [facility]   Progress Note     Chief Complaint   Patient presents with    3 Month Follow-Up    Medication Refill          SUBJECTIVE:    [facility] reports he continues to do well without any concerns. He feels his medication helps him control his temper and irritability enough that he can change unhealthy behaviours. Sleep is good and appetite has not changed. Denies thoughts to harm self  Denies homicidal ideations  Denies hallucinations  Denies delusional thinking  Denies manic symptoms      OBJECTIVE  Mental Status Exam:   Level of consciousness:  Mild dysattention (reduced clarity of awareness with impaired ability to focus, sustain, or shift attention), appeared distracted as he was looking around at people walking by his car  Appearance:  well-appearing, street clothes, good grooming and fair hygiene  Behavior/Motor:  no abnormalities noted  Attitude toward examiner:  cooperative, fair eye contact and withdrawn  Speech:  normal rate and normal volume  Mood:  \"I'm okay\"  Affect:  mood congruent and flat  Thought processes:  linear, goal directed and coherent  Thought content:  Denies homicidal ideation  Suicidal Ideation:  denies suicidal ideation  Delusions:  no evidence of delusions  Perceptual Disturbance:  denies any perceptual disturbance  Cognition: normal  Memory: intact  Insight & Judgement: fair     Medications  Latuda  Adderall  Trintellix  Bupropion  Lamictal    Current Outpatient Medications   Medication Sig Dispense Refill    buPROPion HCl ER, XL, 450 MG TB24 Take 450 mg by mouth daily 30 tablet 5    lurasidone (LATUDA) 120 MG tablet TAKE 1 TABLET BY MOUTH ONE TIME A DAY 30 tablet 5    amphetamine-dextroamphetamine (ADDERALL, 30MG,) 30 MG tablet Take 1 tablet by mouth 2 times daily for 30 days.  60 tablet 0    Insulin Pen Needle (UNIFINE PENTIPS) 31G X 8 MM MISC Use DAILY as directed WITH INSULIN PENS-- DO NOT RE-USE needles 100 each 3    omeprazole (PRILOSEC) 20 MG delayed release capsule TAKE 2 CAPSULES BY MOUTH ONE TIME A DAY  180 capsule 3    metFORMIN (GLUCOPHAGE) 1000 MG tablet TAKE 1 TABLET BY MOUTH TWO TIMES A DAY WITH MEALS 180 tablet 3    VORTIoxetine HBr (TRINTELLIX) 20 MG TABS tablet TAKE 1 TABLET BY MOUTH EVERY DAY 30 tablet 1    tamsulosin (FLOMAX) 0.4 MG capsule Take 1 capsule by mouth daily 30 capsule 5    oxybutynin (DITROPAN) 5 MG tablet Take 1 tablet by mouth 3 times daily 90 tablet 5    lamoTRIgine (LAMICTAL) 200 MG tablet TAKE 1 TABLET BY MOUTH ONE TIME A DAY 30 tablet 5    insulin glargine (LANTUS SOLOSTAR) 100 UNIT/ML injection pen INJECT 65 UNITS INTO THE SKIN TWO TIMES A DAY 15 pen 3    aspirin (SM ASPIRIN ADULT LOW STRENGTH) 81 MG EC tablet TAKE 1 TABLET BY MOUTH ONE TIME A DAY 90 tablet 3    piroxicam (FELDENE) 20 MG capsule TAKE 1 CAPSULE BY MOUTH ONE TIME A DAY AS NEEDED FOR PAIN 90 capsule 3    blood glucose test strips (TRUE METRIX BLOOD GLUCOSE TEST) strip check blood sugar one time daily 100 each 2    fenofibrate (TRIGLIDE) 160 MG tablet TAKE 1 TABLET BY MOUTH ONE TIME A DAY  90 tablet 3    JANUVIA 100 MG tablet TAKE 1 TABLET BY MOUTH ONE TIME A DAY  90 tablet 3    atorvastatin (LIPITOR) 80 MG tablet TAKE 1 TABLET BY MOUTH ONE TIME A DAY  90 tablet 3    trospium (SANCTURA) 20 MG tablet TAKE 1 TABLET BY MOUTH TWO TIMES A DAY  (Patient not taking: Reported on 2/4/2021) 60 tablet 5    sildenafil (VIAGRA) 50 MG tablet Take 1 tablet by mouth daily as needed for Erectile Dysfunction Take one tablet one hour before sexual activity.  (Patient not taking: Reported on 2/4/2021) 6 tablet 5    lisinopril (PRINIVIL;ZESTRIL) 5 MG tablet TAKE 1/2 TABLET BY MOUTH ONE TIME A DAY  45 tablet 3    azelastine (ASTELIN) 0.1 % nasal spray Inhale 2 sprays in each nostril twice a day as needed      vitamin B-12 (CYANOCOBALAMIN) 1000 MCG tablet TAKE 1 TABLET BY MOUTH ONE TIME A DAY 90 tablet 3    MEIJER LANCETS UNIVERSAL 33G MISC use to test blood sugar once a day 100 each 3    ARTIFICIAL TEARS 1.4 % ophthalmic solution Place 1 drop into both eyes 4 times daily  0    clotrimazole-betamethasone (LOTRISONE) 1-0.05 % cream Apply topically 2 times daily. Not to exceed 2 weeks consecutively. 1 Tube 1    rOPINIRole (REQUIP) 3 MG tablet Take 3 mg by mouth nightly      butalbital-acetaminophen-caffeine (FIORICET) -40 MG per tablet Take 1 tablet by mouth every 6 hours as needed for Migraine 60 tablet 0    cetirizine (ZYRTEC) 10 MG tablet daily       EYE ITCH RELIEF 0.025 % ophthalmic solution Place 2 drops into both eyes as needed       propranolol (INDERAL LA) 160 MG ER capsule Take 160 mg by mouth daily       fluticasone (FLONASE) 50 MCG/ACT nasal spray 1 spray by Nasal route daily as needed.  gabapentin (NEURONTIN) 800 MG tablet   Take 800 mg by mouth 4 times daily        No current facility-administered medications for this visit. ASSESSMENT   Problem List Items Addressed This Visit        Psych/BH Problems    Bipolar 1 disorder ([facility] [address] - Primary (Chronic)    ADHD, adult residual type    Relevant Medications    amphetamine-dextroamphetamine (ADDERALL, 30MG,) 30 MG tablet       Other    Tobacco dependence due to cigarettes    Morbid obesity with BMI of 45.0-49.9, adult (HCC)    Relevant Medications    amphetamine-dextroamphetamine (ADDERALL, 30MG,) 30 MG tablet           PLAN   1. Continue medications (Latuda, Adderall, Trintellix, bupropion, Lamictal) as prescribed    Follow up Return in about 3 months (around 8/5/2021) for medication management, follow-up. , sooner PRN    Physicians Signature:  Electronically signed by MIGNON Green CNP on 5/5/21 at 9:45 AM EDWIN Larsen, was evaluated through a synchronous (real-time) audio-video encounter. The patient (or guardian if applicable) is aware that this is a billable service. Verbal consent to proceed has been obtained within the past 12 months. The visit was conducted pursuant to the emergency declaration under the [facility] [address] authority and the [employer] General Act. Patient identification was verified, and a caregiver was present when appropriate. The patient was located in a state where the provider was credentialed to provide care. Patient was located sitting in his car in [facility]. Provider was located at home in [facility]. Total time spent for this encounter: 25 minutes    --MIGNON Guaman CNP on 5/5/2021 at 9:56 AM    An electronic signature was used to authenticate this note.

## 2021-05-10 DIAGNOSIS — E11.29 TYPE 2 DIABETES MELLITUS WITH MICROALBUMINURIA, WITHOUT LONG-TERM CURRENT USE OF INSULIN (HCC): ICD-10-CM

## 2021-05-10 DIAGNOSIS — R80.9 TYPE 2 DIABETES MELLITUS WITH MICROALBUMINURIA, WITHOUT LONG-TERM CURRENT USE OF INSULIN (HCC): ICD-10-CM

## 2021-05-10 RX ORDER — INSULIN GLARGINE 100 [IU]/ML
INJECTION, SOLUTION SUBCUTANEOUS
Qty: 45 ML | Refills: 3 | Status: SHIPPED | OUTPATIENT
Start: 2021-05-10 | End: 2021-10-22

## 2021-05-10 RX ORDER — LISINOPRIL 5 MG/1
TABLET ORAL
Qty: 45 TABLET | Refills: 3 | Status: SHIPPED | OUTPATIENT
Start: 2021-05-10 | End: 2021-05-26 | Stop reason: SDUPTHER

## 2021-05-10 NOTE — TELEPHONE ENCOUNTER
Titus Zafar is requesting a medication refill on Az's behalf for Trintellix 20mg;#30 with 1 refill;last with a start date of 03/08/21 and last refill date of 4/10/21. Medication is pending your approval for a 30 day supply with 1 refill; he was last seen on 05/05/21 with instructions to follow up in 3 months; at this time, he does not have a future appt.

## 2021-05-17 PROBLEM — F17.210 TOBACCO DEPENDENCE DUE TO CIGARETTES: Status: RESOLVED | Noted: 2021-01-21 | Resolved: 2021-05-17

## 2021-05-17 PROBLEM — Z63.4 BEREAVEMENT: Status: RESOLVED | Noted: 2019-09-09 | Resolved: 2021-05-17

## 2021-05-17 PROBLEM — E66.01 MORBID OBESITY WITH BMI OF 45.0-49.9, ADULT (HCC): Status: RESOLVED | Noted: 2021-01-21 | Resolved: 2021-05-17

## 2021-05-20 ENCOUNTER — TELEPHONE (OUTPATIENT)
Dept: FAMILY MEDICINE CLINIC | Age: 51
End: 2021-05-20

## 2021-05-20 NOTE — TELEPHONE ENCOUNTER
1st attempt to contact the pt re:overdue labs that Dr Tommy Castañeda ordered on 11/18/2020. Did not leave a voicemail message because the voicemail box voice was a female and no name was stated. HIPAA form is up to date I am going to mail the labs to address on file.

## 2021-05-25 NOTE — PROGRESS NOTES
Chief Complaint   Patient presents with    Follow-up     DM/Chronic issues       History obtained from the patient. SUBJECTIVE:  Daniel Hendricks is a 44 y.o. male that presents today for:       -DM2: PRIOR VISIT: Pt admitted to [facility] from 12/3 to 12/5 for new onset DM. I was called last SAT morning by lab with critical values. Contacted pt and sent to ER. Sadly no discharge summary done, nor is the latest progress note, 4 days after discharge. It appears, anyway, that he was started on metformin and glyburide and d/c home. Labs were improving. Was not started on insulin.      Since discharge has done well. Keeping glucose log at home. Sugars 200-400 depending. Tolerating metformin and glyburide. No lows. Would like to see DM ed. Denies polyuria at this point.      UPDATE PRIOR VISIT: doing well on 1000mg bid of metformin. Sugars trending down to the mid 100s the last wk. No lows. Has DM ed consult. Denies polyuria, dypsa or phagia.      UPDATE PRIOR VISIT: doing well with glyburide and metformin 1000mg bid. Sugars  most days in the AM. No lows. Doing well with diet. Exercise not so much.      UPDATE PRIOR VISIT: doing well with metfromin and glyburide. a1c down to 6.5. No lows.      UPDATE PRIOR VISIT: a1c down to 5.5. Not checking sugars. Feels occ hypoglycemia. On glyburide and metformin. UPDATE PRIOR VISIT: on metformin monotherapy now, glyuride stopped in July d/t low sugars. Admits to poor diet choices last 3 months. a1c up to 7.1. UPDATE PRIOR VISIT: a1c higher. Pt not sure why. Diet poor. Not exercising and wts up. Taking metformin as rx'd. UPDATE PRIOR VISIT: a1c much improved. Diet better. januvia added to metformin, doing great. FBS . No lows. UPDATE PRIOR VISIT: pt called early NOV with sig elevated sugars. Was told to go to ER. Refused. Has to take care of his wife. See care coordination note 11/5. So insulin started via phone.  Since then, sugars starting to come down, 200-300 mostly, up to 16 units of basalgar. On metfomrin and [place] and [place] as well. Only recently change is was started on zyprexa. However that was recently stopped d/t concern for his sugars. No fevers. No s/s infection. Feels fine    UPDATE PRIOR VISIT: sugars had improved, but high again. Up to 56 units of basaglar. FBS were running 300-400  The last month. We inc his insulin 10 units a few wks ago with instructions go up 3 units every 3 days until FBS . No lows. UPDATE PRIOR VISIT: sugars running 200-300 the last 4 wks. Down to jsut about 200 the last few days. Up to 77 units of basiglar. No lows. UPDATE PRIOr VISIT: -190. Higher if snacks during the night (snacks d/t hunger not low sugars). Currently on up to 74 units qhs pf glargine, plans to inc to 77 units tonight. Still taking metformin and januvia. Appears didn't inc like he should. wts down a few lbs. Diet improving some. Willing to see DM clinic     UPDATE PRIOR VISIT:   -130  Pt weaned himself off glargine 4 wks, was having low sugars  a1c's at goal w/o insulin  Still on metfmormin and januvia  Never did go to DM clinic since doing better   Diet better  Working on wt loss  No low sugars now    UPDATE PRIOR VISIT:   Sugars worse the last few months  Not totally clear why  Back on insulin the last month  Up to 96 units basaglar qhs  On metfomrin and januiva  +  Only new med is trintillix  Wife recently , so not eating the best  wts steady     UPDATE PRIOR VISIT:   Sugars sig improved  On 65 units basalgar bid  On metformin and januvia  FBs   No lows. a1c sig improved. UPDATE PRIOR VISIT:   Sugars sig improved  On 65 units basalgar bid  On metformin and januvia  FBs   No lows. a1c stable  wts worse of late    UPDATE LAST VISIT:   a1c a little worse at 8.0  On 65 units basalgar bid  On metformin and januvia  FBs   No lows.     a1c stable  wts worse of late     UPDATE TODAY:   On 65 units basalgar bid  On metformin and januvia  FBs   No lows. a1c improved    Lab Results   Component Value Date    LABA1C 6.8 05/26/2021    LABA1C 8.0 11/18/2020    LABA1C 7.0 05/20/2020    LABA1C 7.2 02/20/2020    LABA1C 11.2 11/21/2019    LABA1C 7.8 08/20/2019    LABA1C 10.7 05/20/2019    LABA1C 11.2 02/18/2019    LABA1C 12.2 11/16/2018    LABA1C 7.1 08/16/2018       Wt Readings from Last 3 Encounters:   05/26/21 279 lb (126.6 kg)   02/04/21 279 lb (126.6 kg)   11/18/20 284 lb 12.8 oz (129.2 kg)        -Elevated LFT PRIOR VISIT:  Noted on labs  Mild at this point, but still elevated  Denies ETOH use  Denies RUQ pain or jaundice. GI did US a few months ago, showed fatty liver    UPDATE LAST VISIT:   Labs table with persistent elevated, though mild  W/u for cause neg  Suspected WESLEY  Was to f/u with Mesha SALAZAR, has not yet   No RUQ pain or jaundice    UPDATE TODAY:   Had f/u labs ordered, never done  Has not f/u with Daniel Herd yet either   No RUQ pain or jaundice      -Anemia PRIOR VISIT:  Anemia still present, mild  b12 and iron studies WNL in last SEPT and early OCT  Plan is to repeat labs in [facility]  Denies bleeding, melena or hematochezia  No fevers, chills or night sweats      UPDATE PRIOR VISIT:   Found to have low iron on last lab check for anemia  Denies bleeding, melena or hematochezia  Placed on iron  F/u labs ordered for 6 wks  Pt is to call Donte SALAZAR's office for an apt, he is EST there. He has not done so yet, but plans to soon    UPDATE PRIOR VISIT:   On iron  Saw Yonis Machado, GI  EGD scheduled  Denies bleeding, melena or hematochezia.       UPDATE PRIOR VISIT:   Had EGD, neg  UTD on colo  Labs done yesterday and normal  Taking iron yet  Denies bleeding, melena or hematochezia     UPDATE TODAY:   Overeue for f/u labs to monitor, ordered 6 months ago, never done  Off iron tabs, and b12 tabs  Denies bleeding, melena or hematochezia        -HTN:    HPI:  High on intake  Better at d/c  High at home though, 140s/90s    Taking meds as prescribed ?: yes  Tolerating well ?: yes  Side Effects ?: denies  BP at home ?: <140/90  Working on TLCS ?: yes  Chest Pain/SOB/Palpitations? denies    BP Readings from Last 3 Encounters:   05/26/21 138/80   11/18/20 136/86   10/14/20 136/80       -HLD:    HPI:    Taking meds as prescribed ?: yes  Tolerating well ?: yes  Side Effects ?: denies  Muscle Pain?: denies  Working on TLCS ?: yes      Age/Gender Health Maintenance    Lipid -     No components found for: CHLPL  Lab Results   Component Value Date    TRIG 195 01/21/2020    TRIG 656 (H) 11/19/2019    TRIG 443 (H) 09/20/2019     Lab Results   Component Value Date    HDL 34 01/21/2020    HDL 25 11/19/2019    HDL 25 09/20/2019     Lab Results   Component Value Date    LDLCALC 108 01/21/2020    [address] SEE BELOW 11/19/2019    [address] SEE BELOW 09/20/2019     Lab Results   Component Value Date    LDLCALC 108 01/21/2020    LDLDIRECT 116.17 01/21/2020       TSH - 3.260 (AUG 2018)  Lab Results   Component Value Date    TSH 2.630 11/19/2019       Colon Cancer Screening - NEG CANCER FEB 2015    Tetanus - UTD June 2014  Influenza Vaccine - UTD FALL 2020  Pneumonia Vaccine - UTD June 2014 PPV 23  Zoster - UTD x 2    PSA testing discussion - follows with urology  Lab Results   Component Value Date    PSA 0.31 05/19/2020    PSA 0.27 03/15/2019       AAA Screening - Age 70      Diabetes Health Maintenance    A1C -   Lab Results   Component Value Date    LABA1C 6.8 05/26/2021    LABA1C 8.0 11/18/2020    LABA1C 7.0 05/20/2020    LABA1C 7.2 02/20/2020    LABA1C 11.2 11/21/2019    LABA1C 7.8 08/20/2019    LABA1C 10.7 05/20/2019    LABA1C 11.2 02/18/2019    LABA1C 12.2 11/16/2018    LABA1C 7.1 08/16/2018       ACE/ARB - yes, lisinopril 2.5mg daily  Eye - UTD June 2020  Foot - UTD MAY 2021  ASA - yes, 81mg  Microal/Cr -   Lab Results   Component Value Date    LABMICR 1.82 05/19/2020    LABCREA 204.9 05/19/2020    MACRR 9 05/19/2020     eGFR - Lab Results   Component Value Date    LABGLOM 89 11/19/2019       Statin - yes, lipitor 80mg qhs      Specialist Lists:    Dr Elias Dos Santos Dr [facility] Cardiology  Dr Ferdinand Carrion:  Urology  Dr Nye Offer Pulmonology/Sleep  Dr Francesca Starks surgeon  [facility] ENT  Dr Florinda Bella: Neurology      Current Outpatient Medications   Medication Sig Dispense Refill    lisinopril (PRINIVIL;ZESTRIL) 5 MG tablet Take 1 tablet by mouth daily 90 tablet 3    LANTUS SOLOSTAR 100 UNIT/ML injection pen inject 65 units into the skin two times a day  45 mL 3    VORTIoxetine HBr (TRINTELLIX) 20 MG TABS tablet TAKE 1 TABLET BY MOUTH EVERY DAY 30 tablet 1    buPROPion HCl ER, XL, 450 MG TB24 Take 450 mg by mouth daily 30 tablet 5    lurasidone (LATUDA) 120 MG tablet TAKE 1 TABLET BY MOUTH ONE TIME A DAY 30 tablet 5    amphetamine-dextroamphetamine (ADDERALL, 30MG,) 30 MG tablet Take 1 tablet by mouth 2 times daily for 30 days.  60 tablet 0    Insulin Pen Needle (UNIFINE PENTIPS) 31G X 8 MM MISC Use DAILY as directed WITH INSULIN PENS-- DO NOT RE-USE needles 100 each 3    omeprazole (PRILOSEC) 20 MG delayed release capsule TAKE 2 CAPSULES BY MOUTH ONE TIME A DAY  180 capsule 3    metFORMIN (GLUCOPHAGE) 1000 MG tablet TAKE 1 TABLET BY MOUTH TWO TIMES A DAY WITH MEALS 180 tablet 3    tamsulosin (FLOMAX) 0.4 MG capsule Take 1 capsule by mouth daily 30 capsule 5    oxybutynin (DITROPAN) 5 MG tablet Take 1 tablet by mouth 3 times daily 90 tablet 5    lamoTRIgine (LAMICTAL) 200 MG tablet TAKE 1 TABLET BY MOUTH ONE TIME A DAY 30 tablet 5    aspirin (SM ASPIRIN ADULT LOW STRENGTH) 81 MG EC tablet TAKE 1 TABLET BY MOUTH ONE TIME A DAY 90 tablet 3    piroxicam (FELDENE) 20 MG capsule TAKE 1 CAPSULE BY MOUTH ONE TIME A DAY AS NEEDED FOR PAIN 90 capsule 3    blood glucose test strips (TRUE METRIX BLOOD GLUCOSE TEST) strip check blood sugar one time daily 100 each 2    fenofibrate (TRIGLIDE) 160 MG tablet TAKE 1 TABLET BY MOUTH ONE TIME A DAY  90 tablet 3    JANUVIA 100 MG tablet TAKE 1 TABLET BY MOUTH ONE TIME A DAY  90 tablet 3    atorvastatin (LIPITOR) 80 MG tablet TAKE 1 TABLET BY MOUTH ONE TIME A DAY  90 tablet 3    sildenafil (VIAGRA) 50 MG tablet Take 1 tablet by mouth daily as needed for Erectile Dysfunction Take one tablet one hour before sexual activity. 6 tablet 5    azelastine (ASTELIN) 0.1 % nasal spray Inhale 2 sprays in each nostril twice a day as needed      vitamin B-12 (CYANOCOBALAMIN) 1000 MCG tablet TAKE 1 TABLET BY MOUTH ONE TIME A DAY 90 tablet 3    MEIJER LANCETS UNIVERSAL 33G MISC use to test blood sugar once a day 100 each 3    ARTIFICIAL TEARS 1.4 % ophthalmic solution Place 1 drop into both eyes 4 times daily  0    clotrimazole-betamethasone (LOTRISONE) 1-0.05 % cream Apply topically 2 times daily. Not to exceed 2 weeks consecutively. 1 Tube 1    rOPINIRole (REQUIP) 3 MG tablet Take 3 mg by mouth nightly      cetirizine (ZYRTEC) 10 MG tablet daily       EYE ITCH RELIEF 0.025 % ophthalmic solution Place 2 drops into both eyes as needed       propranolol (INDERAL LA) 160 MG ER capsule Take 160 mg by mouth daily       fluticasone (FLONASE) 50 MCG/ACT nasal spray 1 spray by Nasal route daily as needed.  gabapentin (NEURONTIN) 800 MG tablet   Take 800 mg by mouth 4 times daily       butalbital-acetaminophen-caffeine (FIORICET, ESGIC) -40 MG per tablet        No current facility-administered medications for this visit. Orders Placed This Encounter   Medications    lisinopril (PRINIVIL;ZESTRIL) 5 MG tablet     Sig: Take 1 tablet by mouth daily     Dispense:  90 tablet     Refill:  3         All medications reviewed and reconciled, including OTC and herbal medications. Updated list given to patient.        Patient Active Problem List    Diagnosis Date Noted    Obstructive sleep apnea on CPAP 11/03/2015     Priority: High    Attention deficit hyperactivity disorder (ADHD) 01/21/2021    WESLEY (nonalcoholic steatohepatitis)     ADHD, adult residual type 03/24/2020    Closed fracture of right tibial plateau 94/67/4526    Normocytic anemia 02/19/2019    B12 deficiency     DM2 [facility]diabetes mellitus, type 2) [facility]     Benign prostatic hyperplasia     Morbid obesity ([address]     Hypertriglyceridemia 10/27/2015    BPPV (benign paroxysmal positional vertigo) 09/29/2015    Short-segment Vásquez's esophagus 06/17/2015     Following with Luis Venegas for this.  Pulmonary nodule      stable >2 years, no further w/u required.  Bipolar 1 disorder ([address]     CAD (coronary artery disease)      Unclear history, cath 2013 with patent arteries. Records pending.  History of TIA (transient ischemic attack)      x2      Hypertension     Fibromyalgia     Depression     History of colon polyps     Chronic back pain     ADHD (attention deficit hyperactivity disorder)     Former smoker     Peripheral neuropathy     Tarsal tunnel syndrome     Benign esophageal stricture     Hyperlipidemia     Allergic rhinitis     Migraine headache     Dysphagia s/p dilation. 04/01/2014    GERD (gastroesophageal reflux disease) 10/15/2013    S/P cardiac catheterization: 9/30/2013: Normal coronaries 09/30/2013 9/30/2013: Normal coronaries. Radial approach. Dr. Kaushik Geronimo Dysfunctional voiding of urine 06/20/2013     Medications he changes to follow,  Adding ditropan  to the present schedule      ED (erectile dysfunction) of organic origin 05/01/2013       Past Medical History:   Diagnosis Date    ADHD (attention deficit hyperactivity disorder)     Allergic rhinitis     Benign esophageal stricture     s/p dilation may 2014    Benign prostatic hyperplasia     Bipolar 1 disorder ([facility]     CAD (coronary artery disease)     Unclear history, cath 2013 with patent arteries. Records pending.      Chronic back pain     Depression     DM2 [facility]diabetes mellitus, type 2) [facility]     Dysfunctional voiding of urine 6/20/2013    Medications he changes to follow,  Adding ditropan  to the present schedule     ED (erectile dysfunction) of organic origin 5/1/2013    Fibromyalgia     Former smoker     GERD (gastroesophageal reflux disease)     History of colon polyps     Pt unsure when last colo.  History of TIA (transient ischemic attack)     x2     Hyperlipidemia     Hypertension     Hypertriglyceridemia 10/27/2015    Migraine headache     Morbid obesity ([facility]     Morbid obesity with BMI of 40.0-44.9, adult ([facility]     WESLEY (nonalcoholic steatohepatitis)     Obstructive sleep apnea on CPAP 11/3/2015    Peripheral neuropathy     Pulmonary nodule     stable >2 years, no further w/u required.  Rotator cuff injury     Left side    S/P cardiac catheterization: 9/30/2013: Normal coronaries 9/30/2013 9/30/2013: Normal coronaries. Radial approach. Dr. Ellis Singh Short-segment Vásquez's esophagus 6/17/2015    Tarsal tunnel syndrome     Bilateral       Past Surgical History:   Procedure Laterality Date    CARDIAC CATHETERIZATION  10/2013    9/30/2013: Normal coronaries. Radial approach. Dr. Ellis Singh COLONOSCOPY  2008, 2012   Gennette Norwegian KNEE SURGERY Left 2012    ROTATOR CUFF REPAIR Left 2013   Gennette Norwegian SHOULDER SURGERY Bilateral 2011   Gennette Norwegian SINUS SURGERY  2004    [place] occ.      TONSILLECTOMY  2002    TYMPANOSTOMY TUBE PLACEMENT      UPPER GASTROINTESTINAL ENDOSCOPY  2008, 2008 with dilation, 2012    UPPER GASTROINTESTINAL ENDOSCOPY  2013    UPPER GASTROINTESTINAL ENDOSCOPY  MAY 2014, 2017    Dilation       Allergies   Allergen Reactions    Morphine Nausea And Vomiting and Rash         Social History     Tobacco Use    Smoking status: Former Smoker     Packs/day: 0.75     Years: 30.00     Pack years: 22.50     Types: Cigarettes     Start date: 2/1/1984     Quit date: 5/8/2018     Years since quitting: 3.0    Smokeless tobacco: Never Used   Substance Use Topics    Alcohol use: No     Alcohol/week: 0.0 standard drinks       Family History   Problem Relation Age of Onset    Cancer Father 77        lung     Rheum Arthritis Father     Colon Cancer Maternal Aunt 61    Cancer Maternal Aunt 76        \"throat\"    Colon Cancer Maternal Uncle 48    Cancer Maternal Uncle 60        pancreatic    Cancer Paternal Aunt 52        brain    Colon Cancer Paternal Uncle 72    Cancer Paternal Uncle 61        hepatic     Prostate Cancer Paternal Uncle 61    Cancer Maternal Uncle 79        pancreatic     Ovarian Cancer Paternal Aunt 48    Rheum Arthritis Mother     Pancreatic Cancer Mother     Brain Cancer Brother          I have reviewed the patient's past medical history, past surgical history, allergies, medications, social and family history and I have made updates where appropriate.       Review of Systems  Positive responses are highlighted in bold    Constitutional:  Fever, Chills, Night Sweats, Fatigue, Unexpected changes in weight  HENT:  Ear pain, Tinnitus, Nosebleeds, Trouble swallowing, Hearing loss, Sore throat  Cardiovascular:  Chest Pain, Palpitations, Orthopnea, Paroxysmal Nocturnal Dyspnea  Respiratory:  Cough, Wheezing, Shortness of breath, Chest tightness, Apnea  Gastrointestinal:  Nausea, Vomiting, Diarrhea, Constipation, Heartburn, Blood in stool  Genitourinary:  Difficulty or painful urination, Flank pain, Change in frequency, Urgency  Skin:  Color change, Rash, Itching, Wound  Musculoskeletal:  Joint pain, Back pain, Gait problems, Joint swelling, Myalgias  Neurological:  Dizziness, Headaches, Presyncope, Numbness, Seizures, Tremors  Endocrine:  Heat Intolerance, Cold Intolerance, Polydipsia, Polyphagia, Polyuria      PHYSICAL EXAM:  Vitals:    05/26/21 0758 05/26/21 0818   BP: (!) 144/84 138/80   Pulse: 80    Resp: 14    Temp: 98.5 °F (36.9 °C)    TempSrc: Oral    SpO2: 99%    Weight: 279 lb (126.6 kg)    Height: 5' 6.5\" (1.689 m)      Body mass index is 44.36 kg/m². Pain Score:   0 - No pain    Wt Readings from Last 3 Encounters:   05/26/21 279 lb (126.6 kg)   02/04/21 279 lb (126.6 kg)   11/18/20 284 lb 12.8 oz (129.2 kg)     VS Reviewed  General Appearance: A&O x 3, No acute distress,well developed and well- nourished  Eyes: pupils equal, round, and reactive to light, extraocular eye movements intact, conjunctivae and eye lids without erythema  ENT: external ear and ear canal clear bilaterally, TMs intact and regular, nose without deformity, nasal mucosa and turbinates normal without polyps, oropharynx normal, dentition is normal for age  Neck: supple and non-tender without mass, no thyromegaly or thyroid nodules, no cervical lymphadenopathy  Pulmonary/Chest: clear to auscultation bilaterally- no wheezes, rales or rhonchi, normal air movement, no respiratory distress or retractions  Cardiovascular: S1 and S2 auscultated w/ RRR. No murmurs, rubs, clicks, or gallops, distal pulses intact. Abdomen: soft, non-tender, non-distended, bowl sounds physiologic,  no rebound or guarding, no masses or hernias noted. Liver and spleen without enlargement. Extremities: no cyanosis, clubbing or edema of the lower extremities. +2 PT/DP bilaterally. Skin: warm and dry, no rash or erythema  Foot: Bilat 2+DP/PT bilaterally. Skin warm, dry and intact. Sensation normal throughout to touch and with monofilament. Results for POC orders placed in visit on 05/26/21   POCT glycosylated hemoglobin (Hb A1C)   Result Value Ref Range    Hemoglobin A1C 6.8 (H) 4.3 - 5.7 %       ASSESSMENT & PLAN  1. Type 2 diabetes mellitus with other specified complication, with long-term current use of insulin (HCC)    Improved  At goal  con't glargine, metformin and januvia  Labs due, ordered  F/u 6 months  Reminded about yearly eye exam    - POCT glycosylated hemoglobin (Hb A1C)  - CBC Auto Differential; Future  - Comprehensive Metabolic Panel; Future  - Lipid Panel;  Future  - Microalbumin / Creatinine Urine Ratio; Future  - TSH with Reflex; Future  - HM DIABETES FOOT EXAM    2. Morbid obesity ([address]    Discussed wt loss strategies    3. Abnormal transaminases    Work on wt loss  F/u labs to monitor  Needs to f/u Caty SALAZAR, plans to soon    4. WESLEY (nonalcoholic steatohepatitis)    As per # 3    5. Iron deficiency anemia, unspecified iron deficiency anemia type    In remission  Neg w/u  Monitor with labs, ordered today    - Ferritin; Future  - Iron; Future  - Iron Binding Capacity; Future  - Vitamin B12; Future  - Folate; Future  - Reticulocytes; Future    6. B12 deficiency    Off supplementation  Due for labs, ordered to monitor    - Ferritin; Future  - Iron; Future  - Iron Binding Capacity; Future  - Vitamin B12; Future  - Folate; Future  - Reticulocytes; Future    7. Essential hypertension    Not at goal  con't Inderal, inc lisinopril to 5mg daily  Call pt in 4 wks with BP log    - lisinopril (PRINIVIL;ZESTRIL) 5 MG tablet; Take 1 tablet by mouth daily  Dispense: 90 tablet; Refill: 3    8. Mixed hyperlipidemia    Stable  con't lipitor, labs ordered    9. Special screening for malignant neoplasm of prostate    Discussed prostate screening guidelines and with shared decision making, he elects to proceed with psa    - PSA screening; Future    10. Need for shingles vaccine    - Zoster recombinant [facility])          DISPOSITION    Return in about 6 months (around 11/26/2021) for Follow-up Diabetes, follow-up on chronic medical conditions, sooner as needed. Az released without restrictions.     Future Appointments   Date Time Provider Mikey Painting   6/3/2021  9:30 AM Cecil Carl   11/29/2021  9:00 AM Pio Bolivar DO [facility] Zulma Rawls [facility] [address] received counseling on the following healthy behaviors: nutrition, exercise and medication adherence    Patient given educational materials on: See Attached    I have instructed Katiuska José to complete a self tracking handout on blood sugars, instructed them to bring it with them to his next appointment. Barriers to learning and self management: none    Discussed use, benefit, and side effects of prescribed medications. Barriers to medication compliance addressed. All patient questions answered. Pt voiced understanding.        Electronically signed by Stalin Sánchez DO on 5/26/2021 at 8:20 AM

## 2021-05-25 NOTE — PATIENT INSTRUCTIONS
LAB INSTRUCTIONS:    Please complete labs within 4 week(s). Please fast for 8 hours prior to lab collection. The clinic will call you within 1 week of collection. If you have not heard from us within that amount of time, please call us at [phone]. Patient Education        High Blood Pressure: Care Instructions  Overview     It's normal for blood pressure to go up and down throughout the day. But if it stays up, you have high blood pressure. Another name for high blood pressure is hypertension. Despite what a lot of people think, high blood pressure usually doesn't cause headaches or make you feel dizzy or lightheaded. It usually has no symptoms. But it does increase your risk of stroke, heart attack, and other problems. You and your doctor will talk about your risks of these problems based on your blood pressure. Your doctor will give you a goal for your blood pressure. Your goal will be based on your health and your age. Lifestyle changes, such as eating healthy and being active, are always important to help lower blood pressure. You might also take medicine to reach your blood pressure goal.  Follow-up care is a key part of your treatment and safety. Be sure to make and go to all appointments, and call your doctor if you are having problems. It's also a good idea to know your test results and keep a list of the medicines you take. How can you care for yourself at home? Medical treatment  · If you stop taking your medicine, your blood pressure will go back up. You may take one or more types of medicine to lower your blood pressure. Be safe with medicines. Take your medicine exactly as prescribed. Call your doctor if you think you are having a problem with your medicine. · Talk to your doctor before you start taking aspirin every day. Aspirin can help certain people lower their risk of a heart attack or stroke.  But taking aspirin isn't right for everyone, because it can cause serious bleeding. · See your doctor regularly. You may need to see the doctor more often at first or until your blood pressure comes down. · If you are taking blood pressure medicine, talk to your doctor before you take decongestants or anti-inflammatory medicine, such as ibuprofen. Some of these medicines can raise blood pressure. · Learn how to check your blood pressure at home. Lifestyle changes  · Stay at a healthy weight. This is especially important if you put on weight around the waist. Losing even 10 pounds can help you lower your blood pressure. · If your doctor recommends it, get more exercise. Walking is a good choice. Bit by bit, increase the amount you walk every day. Try for at least 30 minutes on most days of the week. You also may want to swim, bike, or do other activities. · Avoid or limit alcohol. Talk to your doctor about whether you can drink any alcohol. · Try to limit how much sodium you eat to less than 2,300 milligrams (mg) a day. Your doctor may ask you to try to eat less than 1,500 mg a day. · Eat plenty of fruits (such as bananas and oranges), vegetables, legumes, whole grains, and low-fat dairy products. · Lower the amount of saturated fat in your diet. Saturated fat is found in animal products such as milk, cheese, and meat. Limiting these foods may help you lose weight and also lower your risk for heart disease. · Do not smoke. Smoking increases your risk for heart attack and stroke. If you need help quitting, talk to your doctor about stop-smoking programs and medicines. These can increase your chances of quitting for good. When should you call for help? Call  911 anytime you think you may need emergency care. This may mean having symptoms that suggest that your blood pressure is causing a serious heart or blood vessel problem. Your blood pressure may be over 180/120. For example, call 911 if:    · You have symptoms of a heart attack. These may include:  ?  Chest pain or pressure, or a strange feeling in the chest.  ? Sweating. ? Shortness of breath. ? Nausea or vomiting. ? Pain, pressure, or a strange feeling in the back, neck, jaw, or upper belly or in one or both shoulders or arms. ? Lightheadedness or sudden weakness. ? A fast or irregular heartbeat.     · You have symptoms of a stroke. These may include:  ? Sudden numbness, tingling, weakness, or loss of movement in your face, arm, or leg, especially on only one side of your body. ? Sudden vision changes. ? Sudden trouble speaking. ? Sudden confusion or trouble understanding simple statements. ? Sudden problems with walking or balance. ? A sudden, severe headache that is different from past headaches.     · You have severe back or belly pain. Do not wait until your blood pressure comes down on its own. Get help right away. Call your doctor now or seek immediate care if:    · Your blood pressure is much higher than normal (such as 180/120 or higher), but you don't have symptoms.     · You think high blood pressure is causing symptoms, such as:  ? Severe headache.  ? Blurry vision. Watch closely for changes in your health, and be sure to contact your doctor if:    · Your blood pressure measures higher than your doctor recommends at least 2 times. That means the top number is higher or the bottom number is higher, or both.     · You think you may be having side effects from your blood pressure medicine. Where can you learn more? Go to [email or web address] and sign in to your [facility] account. Enter J373 in the [employer] box to learn more about \"High Blood Pressure: Care Instructions. \"     If you do not have an account, please click on the \"Sign Up Now\" link. Current as of: August 31, 2020               Content Version: 12.8  © 7924-8026 Healthwise, [email or web address]. Care instructions adapted under license by [facility] ([facility]).  If you have questions about a medical condition or this instruction, always ask your healthcare professional. [place] [address] any warranty or liability for your use of this information.

## 2021-05-26 ENCOUNTER — OFFICE VISIT (OUTPATIENT)
Dept: FAMILY MEDICINE CLINIC | Age: 51
End: 2021-05-26
Payer: COMMERCIAL

## 2021-05-26 VITALS
BODY MASS INDEX: 43.79 KG/M2 | HEART RATE: 80 BPM | WEIGHT: 279 LBS | OXYGEN SATURATION: 99 % | SYSTOLIC BLOOD PRESSURE: 138 MMHG | RESPIRATION RATE: 14 BRPM | DIASTOLIC BLOOD PRESSURE: 80 MMHG | TEMPERATURE: 98.5 F | HEIGHT: 67 IN

## 2021-05-26 DIAGNOSIS — Z12.5 SPECIAL SCREENING FOR MALIGNANT NEOPLASM OF PROSTATE: ICD-10-CM

## 2021-05-26 DIAGNOSIS — K75.81 NASH (NONALCOHOLIC STEATOHEPATITIS): ICD-10-CM

## 2021-05-26 DIAGNOSIS — R74.8 ABNORMAL TRANSAMINASES: ICD-10-CM

## 2021-05-26 DIAGNOSIS — E11.69 TYPE 2 DIABETES MELLITUS WITH OTHER SPECIFIED COMPLICATION, WITH LONG-TERM CURRENT USE OF INSULIN (HCC): Primary | ICD-10-CM

## 2021-05-26 DIAGNOSIS — E53.8 B12 DEFICIENCY: ICD-10-CM

## 2021-05-26 DIAGNOSIS — E66.01 MORBID OBESITY (HCC): ICD-10-CM

## 2021-05-26 DIAGNOSIS — Z79.4 TYPE 2 DIABETES MELLITUS WITH OTHER SPECIFIED COMPLICATION, WITH LONG-TERM CURRENT USE OF INSULIN (HCC): Primary | ICD-10-CM

## 2021-05-26 DIAGNOSIS — E78.2 MIXED HYPERLIPIDEMIA: ICD-10-CM

## 2021-05-26 DIAGNOSIS — I10 ESSENTIAL HYPERTENSION: ICD-10-CM

## 2021-05-26 DIAGNOSIS — Z23 NEED FOR SHINGLES VACCINE: ICD-10-CM

## 2021-05-26 DIAGNOSIS — D50.9 IRON DEFICIENCY ANEMIA, UNSPECIFIED IRON DEFICIENCY ANEMIA TYPE: ICD-10-CM

## 2021-05-26 LAB — HBA1C MFR BLD: 6.8 % (ref 4.3–5.7)

## 2021-05-26 PROCEDURE — 3044F HG A1C LEVEL LT 7.0%: CPT | Performed by: FAMILY MEDICINE

## 2021-05-26 PROCEDURE — 3017F COLORECTAL CA SCREEN DOC REV: CPT | Performed by: FAMILY MEDICINE

## 2021-05-26 PROCEDURE — 2022F DILAT RTA XM EVC RTNOPTHY: CPT | Performed by: FAMILY MEDICINE

## 2021-05-26 PROCEDURE — G8427 DOCREV CUR MEDS BY ELIG CLIN: HCPCS | Performed by: FAMILY MEDICINE

## 2021-05-26 PROCEDURE — G8417 CALC BMI ABV UP PARAM F/U: HCPCS | Performed by: FAMILY MEDICINE

## 2021-05-26 PROCEDURE — 99214 OFFICE O/P EST MOD 30 MIN: CPT | Performed by: FAMILY MEDICINE

## 2021-05-26 PROCEDURE — 1036F TOBACCO NON-USER: CPT | Performed by: FAMILY MEDICINE

## 2021-05-26 PROCEDURE — 90750 HZV VACC RECOMBINANT IM: CPT | Performed by: FAMILY MEDICINE

## 2021-05-26 RX ORDER — BUTALBITAL, ACETAMINOPHEN AND CAFFEINE 50; 325; 40 MG/1; MG/1; MG/1
1 TABLET ORAL EVERY 6 HOURS PRN
COMMUNITY
Start: 2021-05-25

## 2021-05-26 RX ORDER — LISINOPRIL 5 MG/1
5 TABLET ORAL DAILY
Qty: 90 TABLET | Refills: 3 | Status: SHIPPED | OUTPATIENT
Start: 2021-05-26 | End: 2021-06-23 | Stop reason: DRUGHIGH

## 2021-05-26 NOTE — PROGRESS NOTES
Immunization(s) given during visit:    Immunizations Administered     Name Date Dose Route    Zoster Recombinant (Shingrix) 5/26/2021 0.5 mL Intramuscular    Site: Deltoid- Right    Lot: [id number]    NDC: 67215-461-82          Most recent Vaccine Information Sheet dated 10/30/2019 given to pt

## 2021-06-10 DIAGNOSIS — F90.8 ADHD, ADULT RESIDUAL TYPE: ICD-10-CM

## 2021-06-10 RX ORDER — DEXTROAMPHETAMINE SACCHARATE, AMPHETAMINE ASPARTATE, DEXTROAMPHETAMINE SULFATE AND AMPHETAMINE SULFATE 7.5; 7.5; 7.5; 7.5 MG/1; MG/1; MG/1; MG/1
30 TABLET ORAL 2 TIMES DAILY
Qty: 60 TABLET | Refills: 0 | Status: SHIPPED | OUTPATIENT
Start: 2021-06-10 | End: 2021-08-31 | Stop reason: SDUPTHER

## 2021-06-10 NOTE — TELEPHONE ENCOUNTER
This is a one-time refill for the patient as he will no longer be seen at [facility] due to unavailability of his provider.

## 2021-06-10 NOTE — TELEPHONE ENCOUNTER
Cruz Edin called into the office stating that he received a letter in the mail stating that he will need to continue his care elsewhere due to Isaac leaving the practice; he was instructed to to go this PCP; he understood. He has requested his last 30 days of Adderall 30mg;#60 with 0 refills medication; last sent on 05/05/21. He was last seen then as well.     Medication is pending your approval for a 30 day supply with 0 refills

## 2021-06-15 ENCOUNTER — NURSE ONLY (OUTPATIENT)
Dept: LAB | Age: 51
End: 2021-06-15

## 2021-06-15 DIAGNOSIS — Z79.4 TYPE 2 DIABETES MELLITUS WITH OTHER SPECIFIED COMPLICATION, WITH LONG-TERM CURRENT USE OF INSULIN (HCC): ICD-10-CM

## 2021-06-15 DIAGNOSIS — E53.8 B12 DEFICIENCY: ICD-10-CM

## 2021-06-15 DIAGNOSIS — E11.69 TYPE 2 DIABETES MELLITUS WITH OTHER SPECIFIED COMPLICATION, WITH LONG-TERM CURRENT USE OF INSULIN (HCC): ICD-10-CM

## 2021-06-15 DIAGNOSIS — Z12.5 SPECIAL SCREENING FOR MALIGNANT NEOPLASM OF PROSTATE: ICD-10-CM

## 2021-06-15 DIAGNOSIS — D50.9 IRON DEFICIENCY ANEMIA, UNSPECIFIED IRON DEFICIENCY ANEMIA TYPE: ICD-10-CM

## 2021-06-15 LAB
ABSOLUTE RETIC #: 90 THOU/MM3 (ref 20–115)
ALBUMIN SERPL-MCNC: 4.7 G/DL (ref 3.5–5.1)
ALP BLD-CCNC: 88 U/L (ref 38–126)
ALT SERPL-CCNC: 81 U/L (ref 11–66)
ANION GAP SERPL CALCULATED.3IONS-SCNC: 12 MEQ/L (ref 8–16)
AST SERPL-CCNC: 69 U/L (ref 5–40)
BASOPHILS # BLD: 0.7 %
BASOPHILS ABSOLUTE: 0.1 THOU/MM3 (ref 0–0.1)
BILIRUB SERPL-MCNC: 0.2 MG/DL (ref 0.3–1.2)
BUN BLDV-MCNC: 14 MG/DL (ref 7–22)
CALCIUM SERPL-MCNC: 9.7 MG/DL (ref 8.5–10.5)
CHLORIDE BLD-SCNC: 105 MEQ/L (ref 98–111)
CHOLESTEROL, TOTAL: 210 MG/DL (ref 100–199)
CO2: 24 MEQ/L (ref 23–33)
CREAT SERPL-MCNC: 0.8 MG/DL (ref 0.4–1.2)
EOSINOPHIL # BLD: 3.3 %
EOSINOPHILS ABSOLUTE: 0.3 THOU/MM3 (ref 0–0.4)
ERYTHROCYTE [DISTWIDTH] IN BLOOD BY AUTOMATED COUNT: 13.3 % (ref 11.5–14.5)
ERYTHROCYTE [DISTWIDTH] IN BLOOD BY AUTOMATED COUNT: 45.1 FL (ref 35–45)
FERRITIN: 33 NG/ML (ref 22–322)
FOLATE: 11.5 NG/ML (ref 4.8–24.2)
GFR SERPL CREATININE-BSD FRML MDRD: > 90 ML/MIN/1.73M2
GLUCOSE BLD-MCNC: 118 MG/DL (ref 70–108)
HCT VFR BLD CALC: 41.7 % (ref 42–52)
HDLC SERPL-MCNC: 35 MG/DL
HEMOGLOBIN: 13.5 GM/DL (ref 14–18)
IMMATURE GRANS (ABS): 0.09 THOU/MM3 (ref 0–0.07)
IMMATURE GRANULOCYTES: 1.2 %
IMMATURE RETIC FRACT: 16.6 % (ref 2.3–13.4)
IRON: 70 UG/DL (ref 65–195)
LDL CHOLESTEROL CALCULATED: 116 MG/DL
LYMPHOCYTES # BLD: 22.8 %
LYMPHOCYTES ABSOLUTE: 1.7 THOU/MM3 (ref 1–4.8)
MCH RBC QN AUTO: 30.3 PG (ref 26–33)
MCHC RBC AUTO-ENTMCNC: 32.4 GM/DL (ref 32.2–35.5)
MCV RBC AUTO: 93.5 FL (ref 80–94)
MONOCYTES # BLD: 7.9 %
MONOCYTES ABSOLUTE: 0.6 THOU/MM3 (ref 0.4–1.3)
NUCLEATED RED BLOOD CELLS: 0 /100 WBC
PLATELET # BLD: 141 THOU/MM3 (ref 130–400)
PMV BLD AUTO: 11.3 FL (ref 9.4–12.4)
POTASSIUM SERPL-SCNC: 4.3 MEQ/L (ref 3.5–5.2)
PROSTATE SPECIFIC ANTIGEN: 0.37 NG/ML (ref 0–1)
RBC # BLD: 4.46 MILL/MM3 (ref 4.7–6.1)
RETIC HEMOGLOBIN: 34.6 PG (ref 28.2–35.7)
RETICULOCYTE ABSOLUTE COUNT: 2 % (ref 0.5–2)
SEG NEUTROPHILS: 64.1 %
SEGMENTED NEUTROPHILS ABSOLUTE COUNT: 4.9 THOU/MM3 (ref 1.8–7.7)
SODIUM BLD-SCNC: 141 MEQ/L (ref 135–145)
TOTAL IRON BINDING CAPACITY: 467 UG/DL (ref 171–450)
TOTAL PROTEIN: 7.2 G/DL (ref 6.1–8)
TRIGL SERPL-MCNC: 296 MG/DL (ref 0–199)
TSH SERPL DL<=0.05 MIU/L-ACNC: 2.86 UIU/ML (ref 0.4–4.2)
VITAMIN B-12: 472 PG/ML (ref 211–911)
WBC # BLD: 7.6 THOU/MM3 (ref 4.8–10.8)

## 2021-06-16 ENCOUNTER — TELEPHONE (OUTPATIENT)
Dept: FAMILY MEDICINE CLINIC | Age: 51
End: 2021-06-16

## 2021-06-16 NOTE — TELEPHONE ENCOUNTER
Pt notified and has not followed up with him but he said he will call and make an appt with dr Wing Loco

## 2021-06-17 ENCOUNTER — TELEPHONE (OUTPATIENT)
Dept: FAMILY MEDICINE CLINIC | Age: 51
End: 2021-06-17

## 2021-06-17 LAB
CREATININE, URINE: 145.7 MG/DL
MICROALBUMIN UR-MCNC: 2.21 MG/DL
MICROALBUMIN/CREAT UR-RTO: 15 MG/G (ref 0–30)

## 2021-06-17 NOTE — TELEPHONE ENCOUNTER
----- Message from Allegra Carmona DO sent at 6/17/2021  2:05 PM EDT -----  Please let pt know that urine negative for protein. Let me know if questions, thanks!

## 2021-06-23 ENCOUNTER — TELEPHONE (OUTPATIENT)
Dept: FAMILY MEDICINE CLINIC | Age: 51
End: 2021-06-23

## 2021-06-23 DIAGNOSIS — I10 ESSENTIAL HYPERTENSION: ICD-10-CM

## 2021-06-23 RX ORDER — LISINOPRIL 20 MG/1
20 TABLET ORAL DAILY
Qty: 30 TABLET | Refills: 3 | Status: SHIPPED | OUTPATIENT
Start: 2021-06-23 | End: 2021-07-09 | Stop reason: DRUGHIGH

## 2021-06-23 NOTE — TELEPHONE ENCOUNTER
----- Message from Jenny Mcfadden DO sent at 6/23/2021  5:24 AM EDT -----  Please call pt and see how Bps are on inc dose lisinopril. Let me know, thanks!

## 2021-06-23 NOTE — TELEPHONE ENCOUNTER
Ok  Recommend inc lisinopril to 20mg  Will call him in 2 wks  Let me know if questions, thanks! Diagnosis Orders   1.  Essential hypertension  lisinopril (PRINIVIL;ZESTRIL) 20 MG tablet       Medications Discontinued During This Encounter   Medication Reason    lisinopril (PRINIVIL;ZESTRIL) 5 MG tablet DOSE ADJUSTMENT

## 2021-07-07 ENCOUNTER — TELEPHONE (OUTPATIENT)
Dept: FAMILY MEDICINE CLINIC | Age: 51
End: 2021-07-07

## 2021-07-07 DIAGNOSIS — I10 ESSENTIAL HYPERTENSION: ICD-10-CM

## 2021-07-08 NOTE — TELEPHONE ENCOUNTER
Patient states that with the increased dose of the lisinopril his BP   140-150 /80-89    Patient states that \"he received a letter in the mail stating that he will need to continue his care elsewhere due to Isaac leaving the practice; he was instructed to to go this PCP\"  And with these changes he has been left with needing a refill on trintellix to be sent in to [facility]    See refill enc [phone]

## 2021-07-09 RX ORDER — LISINOPRIL 40 MG/1
40 TABLET ORAL DAILY
Qty: 90 TABLET | Refills: 1 | Status: SHIPPED | OUTPATIENT
Start: 2021-07-09 | End: 2022-01-31

## 2021-07-09 NOTE — TELEPHONE ENCOUNTER
Recommend we inc lisinopril to 40mg from 20mg    Will call him in 4 wks re: BP's    As for the psych office. We have have spoken to the  and are working on that office keeping this patient on. We should know something soon. Let me know if questions, thanks! Diagnosis Orders   1.  Essential hypertension  lisinopril (PRINIVIL;ZESTRIL) 40 MG tablet     Medications Discontinued During This Encounter   Medication Reason    lisinopril (PRINIVIL;ZESTRIL) 20 MG tablet DOSE ADJUSTMENT

## 2021-07-12 DIAGNOSIS — F31.9 BIPOLAR 1 DISORDER (HCC): Primary | ICD-10-CM

## 2021-07-16 ENCOUNTER — OFFICE VISIT (OUTPATIENT)
Dept: UROLOGY | Age: 51
End: 2021-07-16
Payer: COMMERCIAL

## 2021-07-16 VITALS — BODY MASS INDEX: 44.32 KG/M2 | HEIGHT: 67 IN | RESPIRATION RATE: 18 BRPM | WEIGHT: 282.4 LBS

## 2021-07-16 DIAGNOSIS — N40.1 BENIGN PROSTATIC HYPERPLASIA WITH LOWER URINARY TRACT SYMPTOMS, SYMPTOM DETAILS UNSPECIFIED: Primary | ICD-10-CM

## 2021-07-16 DIAGNOSIS — N32.81 OAB (OVERACTIVE BLADDER): ICD-10-CM

## 2021-07-16 LAB
BILIRUBIN URINE: NEGATIVE
BLOOD URINE, POC: NEGATIVE
CHARACTER, URINE: CLEAR
COLOR, URINE: YELLOW
GLUCOSE URINE: NEGATIVE MG/DL
KETONES, URINE: ABNORMAL
LEUKOCYTE CLUMPS, URINE: NEGATIVE
NITRITE, URINE: NEGATIVE
PH, URINE: 6 (ref 5–9)
POST VOID RESIDUAL (PVR): 108 ML
PROTEIN, URINE: NEGATIVE MG/DL
SPECIFIC GRAVITY, URINE: >= 1.03 (ref 1–1.03)
UROBILINOGEN, URINE: 0.2 EU/DL (ref 0–1)

## 2021-07-16 PROCEDURE — G8427 DOCREV CUR MEDS BY ELIG CLIN: HCPCS | Performed by: UROLOGY

## 2021-07-16 PROCEDURE — 81003 URINALYSIS AUTO W/O SCOPE: CPT | Performed by: UROLOGY

## 2021-07-16 PROCEDURE — 1036F TOBACCO NON-USER: CPT | Performed by: UROLOGY

## 2021-07-16 PROCEDURE — 51798 US URINE CAPACITY MEASURE: CPT | Performed by: UROLOGY

## 2021-07-16 PROCEDURE — 3017F COLORECTAL CA SCREEN DOC REV: CPT | Performed by: UROLOGY

## 2021-07-16 PROCEDURE — G8417 CALC BMI ABV UP PARAM F/U: HCPCS | Performed by: UROLOGY

## 2021-07-16 PROCEDURE — 99214 OFFICE O/P EST MOD 30 MIN: CPT | Performed by: UROLOGY

## 2021-07-16 RX ORDER — TROSPIUM CHLORIDE ER 60 MG/1
60 CAPSULE ORAL DAILY
Qty: 30 CAPSULE | Refills: 6 | Status: SHIPPED | OUTPATIENT
Start: 2021-07-16 | End: 2022-03-07

## 2021-07-16 RX ORDER — TAMSULOSIN HYDROCHLORIDE 0.4 MG/1
0.4 CAPSULE ORAL DAILY
Qty: 90 CAPSULE | Refills: 0 | Status: SHIPPED | OUTPATIENT
Start: 2021-07-16 | End: 2021-11-09

## 2021-07-16 NOTE — PROGRESS NOTES
POC Negative NEGATIVE    pH, Urine 6.00 5.0 - 9.0    Protein, Urine Negative NEGATIVE mg/dl    Urobilinogen, Urine 0.20 0.0 - 1.0 eu/dl    Nitrite, Urine Negative NEGATIVE    Leukocyte Clumps, Urine Negative NEGATIVE    Color, Urine Yellow YELLOW-STRAW    Character, Urine Clear [id number].[id number]   poct post void residual   Result Value Ref Range    post void residual 108 ml         Last BUN and creatinine:  Lab Results   Component Value Date    BUN 14 06/15/2021     Lab Results   Component Value Date    CREATININE 0.8 06/15/2021       Imaging Reviewed during this Office Visit:   (results were independently reviewed by physician and radiology report verified)    PAST MEDICAL, FAMILY AND SOCIAL HISTORY:  Past Medical History:   Diagnosis Date    ADHD (attention deficit hyperactivity disorder)     Allergic rhinitis     Benign esophageal stricture     s/p dilation may 2014    Benign prostatic hyperplasia     Bipolar 1 disorder ([facility]     CAD (coronary artery disease)     Unclear history, cath 2013 with patent arteries. Records pending.  Chronic back pain     Depression     [facility] type [facility]) [facility]     Dysfunctional voiding of urine 6/20/2013    Medications he changes to follow,  Adding ditropan  to the present schedule     ED (erectile dysfunction) of organic origin 5/1/2013    Fibromyalgia     Former smoker     GERD (gastroesophageal reflux disease)     History of colon polyps     Pt unsure when last colo.  History of TIA (transient ischemic attack)     x2     Hyperlipidemia     Hypertension     Hypertriglyceridemia 10/27/2015    Migraine headache     Morbid obesity ([facility]     Morbid obesity with BMI of 40.0-44.9, adult ([facility]     WESLEY (nonalcoholic steatohepatitis)     Obstructive sleep apnea on CPAP 11/3/2015    Peripheral neuropathy     Pulmonary nodule     stable >2 years, no further w/u required.      Rotator cuff injury     Left side    S/P cardiac catheterization: 9/30/2013: Normal coronaries 9/30/2013 9/30/2013: Normal coronaries. Radial approach. Dr. Manuelito Collins Short-segment Vásquez's esophagus 6/17/2015    Tarsal tunnel syndrome     Bilateral     Past Surgical History:   Procedure Laterality Date    CARDIAC CATHETERIZATION  10/2013    9/30/2013: Normal coronaries. Radial approach. Dr. Manuelito Collins COLONOSCOPY  2008, 2012   Brittnee.Natalie KNEE SURGERY Left 2012    ROTATOR CUFF REPAIR Left 2013   [place] SHOULDER SURGERY Bilateral 2011   [place] SINUS SURGERY  2004    [place] occ.  TONSILLECTOMY  2002    TYMPANOSTOMY TUBE PLACEMENT      UPPER GASTROINTESTINAL ENDOSCOPY  2008, 2008 with dilation, 2012    UPPER GASTROINTESTINAL ENDOSCOPY  2013    UPPER GASTROINTESTINAL ENDOSCOPY  MAY 2014, 2017    Dilation     Family History   Problem Relation Age of Onset    Cancer Father 77        lung     Rheum Arthritis Father     Colon Cancer Maternal Aunt 61    Cancer Maternal Aunt 76        \"throat\"    Colon Cancer Maternal Uncle 48    Cancer Maternal Uncle 61        pancreatic    Cancer Paternal Aunt 52        brain    Colon Cancer Paternal Uncle 72    Cancer Paternal Uncle 61        hepatic     Prostate Cancer Paternal Uncle 61    Cancer Maternal Uncle 79        pancreatic     Ovarian Cancer Paternal Aunt 48    Rheum Arthritis Mother     Pancreatic Cancer Mother     Brain Cancer Brother      Outpatient Medications Marked as Taking for the 7/16/21 encounter (Office Visit) with Patito Garcia MD   Medication Sig Dispense Refill    VORTIoxetine HBr (TRINTELLIX) 20 MG TABS tablet TAKE 1 TABLET BY MOUTH EVERY DAY 30 tablet 1    lisinopril (PRINIVIL;ZESTRIL) 40 MG tablet Take 1 tablet by mouth daily 90 tablet 1    amphetamine-dextroamphetamine (ADDERALL, 30MG,) 30 MG tablet Take 1 tablet by mouth 2 times daily for 30 days.  60 tablet 0    butalbital-acetaminophen-caffeine (FIORICET, ESGIC) -40 MG per tablet       LANTUS SOLOSTAR 100 UNIT/ML injection pen inject 65 units into the ITCH RELIEF 0.025 % ophthalmic solution Place 2 drops into both eyes as needed       propranolol (INDERAL LA) 160 MG ER capsule Take 160 mg by mouth daily       fluticasone (FLONASE) 50 MCG/ACT nasal spray 1 spray by Nasal route daily as needed.  gabapentin (NEURONTIN) 800 MG tablet   Take 800 mg by mouth 4 times daily          Morphine  Social History     Tobacco Use   Smoking Status Former Smoker    Packs/day: 0.75    Years: 30.00    Pack years: 22.50    Types: Cigarettes    Start date: 2/1/1984   Floydene Ada Quit date: 5/8/2018    Years since quitting: 3.1   Smokeless Tobacco Never Used       Social History     Substance and Sexual Activity   Alcohol Use No    Alcohol/week: 0.0 standard drinks       REVIEW OF SYSTEMS:  Constitutional: negative  Eyes: negative  Respiratory: negative  Cardiovascular: negative  Gastrointestinal: negative  Musculoskeletal: negative  Genitourinary: negative  Skin: negative   Neurological: negative  Hematological/Lymphatic: negative  Psychological: negative    Physical Exam:    This a 44 y.o. male   Vitals:    07/16/21 0810   Resp: 18     Constitutional: Patient in no acute distress   Neuro: alert and oriented to person place and time. Psych: Mood and affect normal.  Head: atraumatic normocephalic  Eyes: EOMi  HEENT: neck supple, trachea midline  Lungs: Respiratory effort normal  Cardiovascular:  Normal peripheral pulses  Abdomen: Soft, non-tender, non-distended, No CVA  Bladder: non-tender and not distended. FROMx4, no cyanosis clubbing edema  Skin: warm and dry      Assessment and Plan      1. Benign prostatic hyperplasia with lower urinary tract symptoms, symptom details unspecified           Plan:      No follow-ups on file.   Continue Flomax for BPH   Continue sanctura for overactivity  PSA reviewed -wnl  Old notes reviewed  Will schedule cystoscopy for BPH

## 2021-08-09 ENCOUNTER — OFFICE VISIT (OUTPATIENT)
Dept: PULMONOLOGY | Age: 51
End: 2021-08-09
Payer: COMMERCIAL

## 2021-08-09 ENCOUNTER — TELEPHONE (OUTPATIENT)
Dept: UROLOGY | Age: 51
End: 2021-08-09

## 2021-08-09 VITALS
HEART RATE: 83 BPM | TEMPERATURE: 97 F | OXYGEN SATURATION: 97 % | HEIGHT: 67 IN | DIASTOLIC BLOOD PRESSURE: 70 MMHG | WEIGHT: 281.6 LBS | SYSTOLIC BLOOD PRESSURE: 138 MMHG | BODY MASS INDEX: 44.2 KG/M2

## 2021-08-09 DIAGNOSIS — F90.9 ATTENTION DEFICIT HYPERACTIVITY DISORDER (ADHD), UNSPECIFIED ADHD TYPE: ICD-10-CM

## 2021-08-09 DIAGNOSIS — G47.10 HYPERSOMNIA: ICD-10-CM

## 2021-08-09 DIAGNOSIS — F31.9 BIPOLAR 1 DISORDER (HCC): ICD-10-CM

## 2021-08-09 DIAGNOSIS — G47.33 OSA TREATED WITH BIPAP: Primary | ICD-10-CM

## 2021-08-09 DIAGNOSIS — E66.01 MORBID OBESITY WITH BMI OF 40.0-44.9, ADULT (HCC): ICD-10-CM

## 2021-08-09 PROCEDURE — G8427 DOCREV CUR MEDS BY ELIG CLIN: HCPCS | Performed by: PHYSICIAN ASSISTANT

## 2021-08-09 PROCEDURE — 99214 OFFICE O/P EST MOD 30 MIN: CPT | Performed by: PHYSICIAN ASSISTANT

## 2021-08-09 PROCEDURE — 1036F TOBACCO NON-USER: CPT | Performed by: PHYSICIAN ASSISTANT

## 2021-08-09 PROCEDURE — G8417 CALC BMI ABV UP PARAM F/U: HCPCS | Performed by: PHYSICIAN ASSISTANT

## 2021-08-09 PROCEDURE — 3017F COLORECTAL CA SCREEN DOC REV: CPT | Performed by: PHYSICIAN ASSISTANT

## 2021-08-09 RX ORDER — DOXEPIN HYDROCHLORIDE 10 MG/1
10 CAPSULE ORAL NIGHTLY
Qty: 30 CAPSULE | Refills: 3 | Status: SHIPPED | OUTPATIENT
Start: 2021-08-09 | End: 2021-12-06

## 2021-08-09 ASSESSMENT — ENCOUNTER SYMPTOMS
DIARRHEA: 0
CHEST TIGHTNESS: 0
ALLERGIC/IMMUNOLOGIC NEGATIVE: 1
SHORTNESS OF BREATH: 1
BACK PAIN: 0
WHEEZING: 0
EYES NEGATIVE: 1
COUGH: 0
STRIDOR: 0
NAUSEA: 0

## 2021-08-09 NOTE — TELEPHONE ENCOUNTER
Prior Auth initiated for Trospium . PA sent to Erick Arizmendi. Should receive response in 3-5 days.     Trospium approved for 1 year

## 2021-08-09 NOTE — PROGRESS NOTES
[facility]      Tracey Nunes         [id number]  8/9/2021   Chief Complaint   Patient presents with    Follow-up     IRISH 1 year with download         Pt of Dr. Lauren MONTGOMERY Download:   Original or initial AHI: 100.8     Date of initial study: 2003      Compliant  100%     Noncompliant 0 %     PAP Type Aircurve 10 Vauto Level  19/14   Avg Hrs/Day 13 hr 13 min  AHI: 0.3   Recorded compliance dates , 7/5/2021  to 8/3/2021   Machine/Mfg:   [x] ResMed    [] Respironics/Dreamstation   Interface:   [] Nasal    [] Nasal pillows   [x] FFM      Provider:      [x] [facility]     [][facility]     [] [employer]    [] Normal    [] [place]               [] [facility]      [] [employer]    [] [address]      [] Other    Neck Size: 17.5  Mallampati Mallampati 3  ESS:  8  SAQLI: 43    Here is a scan of the most recent download:          Presentation:   Reyes Huston presents for sleep medicine follow up for obstructive sleep apnea  Since the last visit, Reyes Huston is doing well with PAP. He is suffering from insomnia. He has trouble falling asleep and feels tired all day. He states that he has been on trazodone but makes him feel foggy the next day and he does not like the feeling. Motor life has exceeded its life. Equipment issues: The pressure is  acceptable, the mask is acceptable     Sleep issues:  Do you feel better? No  More rested? No   Better concentration? no    Progress History:   Since last visit any new medical issues? No  New ER or hospital visits? No  Any new or changes in medicines? No  Any new sleep medicines? No    Review of Systems -   Review of Systems   Constitutional: Positive for fatigue. Negative for activity change, appetite change, chills and fever. HENT: Negative for congestion and postnasal drip. Eyes: Negative. Respiratory: Positive for shortness of breath. Negative for cough, chest tightness, wheezing and stridor.     Cardiovascular: Negative for chest pain and leg swelling. Gastrointestinal: Negative for diarrhea and nausea. Endocrine: Negative. Genitourinary: Negative. Musculoskeletal: Positive for arthralgias. Negative for back pain. Skin: Negative. Allergic/Immunologic: Negative. Neurological: Negative. Negative for dizziness and light-headedness. Psychiatric/Behavioral: Negative. All other systems reviewed and are negative. Physical Exam:    BMI:  Body mass index is 44.77 kg/m². Wt Readings from Last 3 Encounters:   08/09/21 281 lb 9.6 oz (127.7 kg)   07/16/21 282 lb 6.4 oz (128.1 kg)   05/26/21 279 lb (126.6 kg)     Weight stable / unchanged  Vitals: /70 (Site: Right Upper Arm, Position: Sitting, Cuff Size: Large Adult)   Pulse 83   Temp 97 °F (36.1 °C) (Temporal)   Ht 5' 6.5\" (1.689 m)   Wt 281 lb 9.6 oz (127.7 kg)   SpO2 97% Comment: rm air at rest  BMI 44.77 kg/m²       Physical Exam  Constitutional:       Appearance: He is well-developed. HENT:      Head: Normocephalic and atraumatic. Right Ear: External ear normal.      Left Ear: External ear normal.   Eyes:      Conjunctiva/sclera: Conjunctivae normal.      Pupils: Pupils are equal, round, and reactive to light. Cardiovascular:      Rate and Rhythm: Normal rate and regular rhythm. Heart sounds: Normal heart sounds. Pulmonary:      Effort: Pulmonary effort is normal.      Breath sounds: Normal breath sounds. Musculoskeletal:         General: Normal range of motion. Cervical back: Normal range of motion and neck supple. Skin:     General: Skin is warm and dry. Neurological:      Mental Status: He is alert and oriented to person, place, and time. Psychiatric:         Behavior: Behavior normal.         Thought Content: Thought content normal.         Judgment: Judgment normal.           ASSESSMENT/DIAGNOSIS     Diagnosis Orders   1. IRISH treated with BiPAP     2. Morbid obesity with BMI of 40.0-44.9, adult ([facility]     3.  Attention deficit hyperactivity disorder (ADHD), unspecified ADHD type     4. Bipolar 1 disorder ([facility]     5. Hypersomnia              Plan   Do you need any equipment today? Yes update supplies- needs new PAP- motor life exceeded   - Will try Doxepin for insomnia  - Download reviewed and discussed with patient  - He  was advised to continue current positive airway pressure therapy with above described pressure. - He  advised to keep good compliance with current recommended pressure to get optimal results and clinical improvement  - Recommend 7-9 hours of sleep with PAP  - He was advised to call [employer] company regarding supplies if needed.   -He call my office for earlier appointment if needed for worsening of sleep symptoms.   - He was instructed on weight loss  - Susy Feliz was educated about my impression and plan. Patient verbalizesunderstanding.   We will see Darrin Fallen back in: 2 months with download    Information added by my medical assistant/LPN was reviewed today         Neto Rocha PA-C, MPAS  8/9/2021

## 2021-08-10 ENCOUNTER — TELEPHONE (OUTPATIENT)
Dept: FAMILY MEDICINE CLINIC | Age: 51
End: 2021-08-10

## 2021-08-10 DIAGNOSIS — I10 ESSENTIAL HYPERTENSION: Primary | ICD-10-CM

## 2021-08-10 RX ORDER — AMLODIPINE BESYLATE 5 MG/1
5 TABLET ORAL DAILY
Qty: 30 TABLET | Refills: 3 | Status: SHIPPED | OUTPATIENT
Start: 2021-08-10 | End: 2021-12-06

## 2021-08-10 RX ORDER — OXYBUTYNIN CHLORIDE 5 MG/1
5 TABLET ORAL 3 TIMES DAILY
Qty: 90 TABLET | Refills: 5 | Status: SHIPPED | OUTPATIENT
Start: 2021-08-10 | End: 2022-10-27 | Stop reason: ALTCHOICE

## 2021-08-10 NOTE — TELEPHONE ENCOUNTER
----- Message from Gera River DO sent at 8/6/2021  6:34 AM EDT -----  Please call pt and see how BP's are on 40mg lisinopril from 20   Let me know, thanks!

## 2021-08-10 NOTE — TELEPHONE ENCOUNTER
Avni Serna called requesting a refill on the following medications:  Requested Prescriptions     Pending Prescriptions Disp Refills    oxybutynin (DITROPAN) 5 MG tablet 90 tablet 5     Sig: Take 1 tablet by mouth 3 times daily       Date of last visit:7/16/21 with [facility]   Date of next visit (if applicable):

## 2021-08-10 NOTE — TELEPHONE ENCOUNTER
Ok  Will add norvasc 5mg daily  con't lisinopril 40mg  Will call 2 wks for BP's  Let me know if questions, thanks! Diagnosis Orders   1.  Essential hypertension  amLODIPine (NORVASC) 5 MG tablet       Future Appointments   Date Time Provider Mikey Painting   8/16/2021  8:30 AM Liam Jalloh RCP [facility] [place] [facility]   8/20/2021  9:30 AM Patito Garcia MD [address] [place] [address]   10/13/2021  9:00 AM Joey Jackson PA-C N [facility]   11/29/2021  9:00 AM Jo Clemons DO [facility]

## 2021-08-10 NOTE — TELEPHONE ENCOUNTER
Pt states b/p is still a little high   182/102- 138/82   Usually 160's/90  Pt doesn't have any HR numbers

## 2021-08-30 ENCOUNTER — TELEPHONE (OUTPATIENT)
Dept: FAMILY MEDICINE CLINIC | Age: 51
End: 2021-08-30

## 2021-08-30 NOTE — PROGRESS NOTES
Chief Complaint   Patient presents with    Annual Exam       History obtained from the patient. SUBJECTIVE:  Tabatha Nazario is a 46 y.o. male that presents today for:       -Prev Med: Vaccines reviewed. Reviewed if routine labs are due. + fam hx of cancer as noted below in Fam hx. Denies LUTS or bowl habit changes. Denies fevers, chills, night sweats or wt loss.      Diet - ok  Exercise - not really  Sleep - ok      Age/Gender Health Maintenance    Lipid -     No components found for: CHLPL  Lab Results   Component Value Date    TRIG 296 (H) 06/15/2021    TRIG 195 01/21/2020    TRIG 656 (H) 11/19/2019     Lab Results   Component Value Date    HDL 35 06/15/2021    HDL 34 01/21/2020    HDL 25 11/19/2019     Lab Results   Component Value Date    LDLCALC 116 06/15/2021    LDLCALC 108 01/21/2020    [address] SEE BELOW 11/19/2019     Lab Results   Component Value Date    LDLCALC 116 06/15/2021    LDLDIRECT 116.17 01/21/2020       TSH -   Lab Results   Component Value Date    TSH 2.860 06/15/2021       Colon Cancer Screening - NEG CANCER FEB 2015    Tetanus - UTD June 2014  Influenza Vaccine - UTD FALL 2020  Pneumonia Vaccine - UTD June 2014 PPV 23  Zoster - UTD x 2    PSA testing discussion - follows with urology  Lab Results   Component Value Date    PSA 0.37 06/15/2021    PSA 0.31 05/19/2020    PSA 0.27 03/15/2019       AAA Screening - Age 70      Diabetes Health Maintenance    A1C -   Lab Results   Component Value Date    LABA1C 6.8 05/26/2021    LABA1C 8.0 11/18/2020    LABA1C 7.0 05/20/2020    LABA1C 7.2 02/20/2020    LABA1C 11.2 11/21/2019    LABA1C 7.8 08/20/2019    LABA1C 10.7 05/20/2019    LABA1C 11.2 02/18/2019    LABA1C 12.2 11/16/2018    LABA1C 7.1 08/16/2018       ACE/ARB - yes, lisinopril 2.5mg daily  Eye - UTD June 2020  Foot - UTD MAY 2021  ASA - yes, 81mg    Microal/Cr -   Lab Results   Component Value Date    LABMICR 2.21 06/17/2021    LABCREA 145.7 06/17/2021    MACRR 15 06/17/2021     eGFR -   Lab Results   Component Value Date    LABGLOM >90 06/15/2021       Statin - yes, lipitor 80mg qhs      Specialist Lists:    Dr Kay May Dr [facility] Cardiology  Dr Angelika Tolbert:  Urology  Dr Jazmine Durán Pulmonology/Sleep  Dr Shala Merlos surgeon  [address] [facility] ENT  Dr Real Rapp Spotted: Neurology      Current Outpatient Medications   Medication Sig Dispense Refill    oxybutynin (DITROPAN) 5 MG tablet Take 1 tablet by mouth 3 times daily 90 tablet 5    amLODIPine (NORVASC) 5 MG tablet Take 1 tablet by mouth daily 30 tablet 3    doxepin (SINEQUAN) 10 MG capsule Take 1 capsule by mouth nightly 30 capsule 3    tamsulosin (FLOMAX) 0.4 MG capsule Take 1 capsule by mouth daily Take one capsule daily to facilitate passage of ureteral stone 90 capsule 0    trospium (SANCTURA) 60 MG CP24 extended release capsule Take 1 capsule by mouth daily 30 capsule 6    lisinopril (PRINIVIL;ZESTRIL) 40 MG tablet Take 1 tablet by mouth daily 90 tablet 1    butalbital-acetaminophen-caffeine (FIORICET, ESGIC) -40 MG per tablet       LANTUS SOLOSTAR 100 UNIT/ML injection pen inject 65 units into the skin two times a day  45 mL 3    buPROPion HCl ER, XL, 450 MG TB24 Take 450 mg by mouth daily 30 tablet 5    lurasidone (LATUDA) 120 MG tablet TAKE 1 TABLET BY MOUTH ONE TIME A DAY 30 tablet 5    Insulin Pen Needle (UNIFINE PENTIPS) 31G X 8 MM MISC Use DAILY as directed WITH INSULIN PENS-- DO NOT RE-USE needles 100 each 3    omeprazole (PRILOSEC) 20 MG delayed release capsule TAKE 2 CAPSULES BY MOUTH ONE TIME A DAY  180 capsule 3    metFORMIN (GLUCOPHAGE) 1000 MG tablet TAKE 1 TABLET BY MOUTH TWO TIMES A DAY WITH MEALS 180 tablet 3    lamoTRIgine (LAMICTAL) 200 MG tablet TAKE 1 TABLET BY MOUTH ONE TIME A DAY 30 tablet 5    aspirin (SM ASPIRIN ADULT LOW STRENGTH) 81 MG EC tablet TAKE 1 TABLET BY MOUTH ONE TIME A DAY 90 tablet 3    piroxicam (FELDENE) 20 MG capsule TAKE 1 CAPSULE BY MOUTH types were placed in this encounter. All medications reviewed and reconciled, including OTC and herbal medications. Updated list given to patient. Patient Active Problem List    Diagnosis Date Noted    Obstructive sleep apnea on CPAP 11/03/2015     Priority: High    Attention deficit hyperactivity disorder (ADHD) 01/21/2021    WESLEY (nonalcoholic steatohepatitis)     ADHD, adult residual type 03/24/2020    Closed fracture of right tibial plateau 31/31/9795    Normocytic anemia 02/19/2019    B12 deficiency     DM2 [facility]diabetes mellitus, type 2) [facility]     Benign prostatic hyperplasia     Morbid obesity ([facility]     Hypertriglyceridemia 10/27/2015    BPPV (benign paroxysmal positional vertigo) 09/29/2015    Short-segment Vásquez's esophagus 06/17/2015     Following with [employer] for this.  Pulmonary nodule      stable >2 years, no further w/u required.  Bipolar 1 disorder ([facility]     CAD (coronary artery disease)      Unclear history, cath 2013 with patent arteries. Records pending.  History of TIA (transient ischemic attack)      x2      Hypertension     Fibromyalgia     Depression     History of colon polyps     Chronic back pain     ADHD (attention deficit hyperactivity disorder)     Former smoker     Peripheral neuropathy     Tarsal tunnel syndrome     Benign esophageal stricture     Hyperlipidemia     Allergic rhinitis     Migraine headache     Dysphagia s/p dilation. 04/01/2014    GERD (gastroesophageal reflux disease) 10/15/2013    S/P cardiac catheterization: 9/30/2013: Normal coronaries 09/30/2013 9/30/2013: Normal coronaries. Radial approach.   Dr. Colonel Quintero Dysfunctional voiding of urine 06/20/2013     Medications he changes to follow,  Adding ditropan  to the present schedule      ED (erectile dysfunction) of organic origin 05/01/2013       Past Medical History:   Diagnosis Date    ADHD (attention deficit hyperactivity disorder)     Allergic rhinitis  Benign esophageal stricture     s/p dilation may 2014    Benign prostatic hyperplasia     Bipolar 1 disorder ([facility]     CAD (coronary artery disease)     Unclear history, cath 2013 with patent arteries. Records pending.  Chronic back pain     Depression     DM[facility] 2) [facility]     Dysfunctional voiding of urine 6/20/2013    Medications he changes to follow,  Adding ditropan  to the present schedule     ED (erectile dysfunction) of organic origin 5/1/2013    Fibromyalgia     Former smoker     GERD (gastroesophageal reflux disease)     History of colon polyps     Pt unsure when last colo.  History of TIA (transient ischemic attack)     x2     Hyperlipidemia     Hypertension     Hypertriglyceridemia 10/27/2015    Migraine headache     [facility]bid [facility]     Morbid obesity with BMI of 40.0-44.9, adult ([facility]     WESLEY (nonalcoholic steatohepatitis)     Obstructive sleep apnea on CPAP 11/3/2015    Peripheral neuropathy     Pulmonary nodule     stable >2 years, no further w/u required.  Rotator cuff injury     Left side    S/P cardiac catheterization: 9/30/2013: Normal coronaries 9/30/2013 9/30/2013: Normal coronaries. Radial approach. Dr. China Rosen Short-segment Vásquez's esophagus 6/17/2015    Tarsal tunnel syndrome     Bilateral       Past Surgical History:   Procedure Laterality Date    CARDIAC CATHETERIZATION  10/2013    9/30/2013: Normal coronaries. Radial approach. Dr. China Rosen COLONOSCOPY  2008, 2012   [facility] KNEE SURGERY Left 2012    ROTATOR CUFF REPAIR Left 2013   [facility] SHOULDER SURGERY Bilateral 2011   [facility] SINUS SURGERY  2004    See's Moon occ.      TONSILLECTOMY  2002    TYMPANOSTOMY TUBE PLACEMENT      UPPER GASTROINTESTINAL ENDOSCOPY  2008, 2008 with dilation, 2012    UPPER GASTROINTESTINAL ENDOSCOPY  2013    UPPER GASTROINTESTINAL ENDOSCOPY  MAY 2014, 2017    Dilation       Allergies   Allergen Reactions    Morphine Nausea And Vomiting and Rash bruising  Musculoskeletal:  Joint pain, Back pain, Gait problems, Joint swelling, Myalgias  Neurological:  Dizziness, Headaches, Presyncope, Numbness, Seizures, Tremors  Allergy:  Environmental allergies, Food allergies  Endocrine:  Heat Intolerance, Cold Intolerance, Polydipsia, Polyphagia, Polyuria      PHYSICAL EXAM:  Vitals:    08/31/21 0815   BP: 136/82   Pulse: 97   Resp: 14   Temp: 98.5 °F (36.9 °C)   TempSrc: Oral   SpO2: 96%   Weight: 281 lb 12.8 oz (127.8 kg)   Height: 5' 6.5\" (1.689 m)     Body mass index is 44.8 kg/m². Pain Score:   0 - No pain    VS Reviewed  General Appearance: A&O x 3, No acute distress,well developed and well- nourished  Head: normocephalic and atraumatic  Eyes: pupils equal, round, and reactive to light, extraocular eye movements intact, conjunctivae and eye lids without erythema  ENT: external ear and ear canal clear bilaterally, TMs intact and regular, nose without deformity, nasal mucosa and turbinates normal without polyps, oropharynx normal, dentition is normal for age  Neck: supple and non-tender without mass, no thyromegaly or thyroid nodules, no cervical lymphadenopathy  Pulmonary/Chest: clear to auscultation bilaterally- no wheezes, rales or rhonchi, normal air movement, no respiratory distress or retractions  Cardiovascular: S1 and S2 auscultated w/ RRR. No murmurs, rubs, clicks, or gallops, distal pulses intact. Abdomen: soft, non-tender, non-distended, bowel sounds physiologic,  no rebound or guarding, no masses or hernias noted. Liver and spleen without enlargement. Extremities: no cyanosis, clubbing or edema of the lower extremities. +2 PT/DP bilaterally. Musculoskeletal: No joint swelling or gross deformity   Neuro:  Alert, 5/5 strength globally and symmetrically, 2+ patellar reflexes bilaterally  Psych: Affect appropriate. Mood normal. Thought process is normal without evidence of depression or psychosis. Good insight and appropriate interaction.   Cognition and memory appear to be intact. Skin: warm and dry, no rash or erythema  Lymph:  No cervical, auricular or supraclavicular lymph nodes palpated      ASSESSMENT & PLAN  1. Visit for preventive health examination    Vaccines reviewed and update recommendations made  Otherwise UTD on age appropriate screenings    2. Personal history of tobacco use    LDCT ordered after shared decision making  Qualifies under new USPSTF guidelines  Age 52+ and 20+ pack year hx    - MI VISIT TO DISCUSS LUNG CA SCREEN W LDCT  - CT Lung Screen (Annual); Future      DISPOSITION    Return in 3 months (on 11/29/2021) for Follow-up Diabetes, follow-up on chronic medical conditions, sooner as needed. Az released without restrictions. Future Appointments   Date Time Provider [employer]   9/27/2021  8:45 AM MD Valery Gallegos [facility] [address]   10/13/2021  9:00 AM Cecil Davis   11/29/2021  9:00 AM Maurizio Lazaro DO [facility] received counseling on the following healthy behaviors: nutrition, exercise and medication adherence    Patient given educational materials on: See Attached    I have instructed [employer] to complete a self tracking handout on blood sugars, instructed them to bring it with them to his next appointment. Barriers to learning and self management: none    Discussed use, benefit, and side effects of prescribed medications. Barriers to medication compliance addressed. All patient questions answered. Pt voiced understanding.      Low Dose CT (LDCT) Lung Screening criteria met   Age 51-72   Pack year smoking >30   Still smoking or less than 15 year since quit   No sign or symptoms of lung cancer   > 11 months since last LDCT     Risks and benefits of lung cancer screening with LDCT scans discussed:    Significance of positive screen - False-positive LDCT results often occur. 95% of all positive results do not lead to a diagnosis of cancer. Usually further imaging can resolve most false-positive results; however, some patients may require invasive procedures. Over diagnosis risk - 10% to 12% of screen-detected lung cancer cases are over diagnosedthat is, the cancer would not have been detected in the patient's lifetime without the screening. Need for follow up screens annually to continue lung cancer screening effectiveness     Risks associated with radiation from annual LDCT- Radiation exposure is about the same as for a mammogram, which is about 1/3 of the annual background radiation exposure from everyday life. Starting screening at age 54 is not likely to increase cancer risk from radiation exposure. Patients with comorbidities resulting in life expectancy of < 10 years, or that would preclude treatment of an abnormality identified on CT, should not be screened due to lack of benefit.     To obtain maximal benefit from this screening, smoking cessation and long-term abstinence from smoking is critical      Electronically signed by Maurizio Lazaro DO on 8/31/2021 at 8:31 AM

## 2021-08-30 NOTE — TELEPHONE ENCOUNTER
Pt scheduled for appt tomorrow 8/31/21 with  Dr Tej Starks at 8:00 . Provider requested that we call pt to see if this time still works for pt. Left detailed message on ludwin informing pt of appt. Requested that he call back if time and date does not work.

## 2021-08-31 ENCOUNTER — OFFICE VISIT (OUTPATIENT)
Dept: FAMILY MEDICINE CLINIC | Age: 51
End: 2021-08-31
Payer: COMMERCIAL

## 2021-08-31 VITALS
TEMPERATURE: 98.5 F | RESPIRATION RATE: 14 BRPM | SYSTOLIC BLOOD PRESSURE: 136 MMHG | OXYGEN SATURATION: 96 % | DIASTOLIC BLOOD PRESSURE: 82 MMHG | HEIGHT: 67 IN | WEIGHT: 281.8 LBS | HEART RATE: 97 BPM | BODY MASS INDEX: 44.23 KG/M2

## 2021-08-31 DIAGNOSIS — F31.9 BIPOLAR 1 DISORDER (HCC): Primary | ICD-10-CM

## 2021-08-31 DIAGNOSIS — Z00.00 VISIT FOR PREVENTIVE HEALTH EXAMINATION: Primary | ICD-10-CM

## 2021-08-31 DIAGNOSIS — Z87.891 PERSONAL HISTORY OF TOBACCO USE: ICD-10-CM

## 2021-08-31 DIAGNOSIS — F90.8 ADHD, ADULT RESIDUAL TYPE: ICD-10-CM

## 2021-08-31 PROCEDURE — 99396 PREV VISIT EST AGE 40-64: CPT | Performed by: FAMILY MEDICINE

## 2021-08-31 PROCEDURE — G0296 VISIT TO DETERM LDCT ELIG: HCPCS | Performed by: FAMILY MEDICINE

## 2021-08-31 RX ORDER — DEXTROAMPHETAMINE SACCHARATE, AMPHETAMINE ASPARTATE, DEXTROAMPHETAMINE SULFATE AND AMPHETAMINE SULFATE 7.5; 7.5; 7.5; 7.5 MG/1; MG/1; MG/1; MG/1
30 TABLET ORAL 2 TIMES DAILY
Qty: 60 TABLET | Refills: 0 | Status: SHIPPED | OUTPATIENT
Start: 2021-08-31 | End: 2021-10-11 | Stop reason: SDUPTHER

## 2021-08-31 RX ORDER — LAMOTRIGINE 200 MG/1
TABLET ORAL
Qty: 30 TABLET | Refills: 2 | Status: SHIPPED | OUTPATIENT
Start: 2021-08-31 | End: 2021-12-06

## 2021-08-31 NOTE — TELEPHONE ENCOUNTER
ASSESSMENT & PLAN   Diagnosis Orders   1. Bipolar 1 disorder (HCC)  lamoTRIgine (LAMICTAL) 200 MG tablet   2. ADHD, adult residual type  amphetamine-dextroamphetamine (ADDERALL, 30MG,) 30 MG tablet       OAARS reviewed and appropriate. Controlled Substances Monitoring: Periodic Controlled Substance Monitoring: Possible medication side effects, risk of tolerance/dependence & alternative treatments discussed., No signs of potential drug abuse or diversion identified.  Constance Cooper DO)      Future Appointments   Date Time Provider [facility]   9/27/2021  8:45 AM Samson Kothari  [facility]   10/13/2021  9:00 AM Cecil Olvera   11/29/2021  9:00 AM Constance Cooper DO [address] [place] [address]

## 2021-09-03 ENCOUNTER — TELEPHONE (OUTPATIENT)
Dept: FAMILY MEDICINE CLINIC | Age: 51
End: 2021-09-03

## 2021-09-03 NOTE — TELEPHONE ENCOUNTER
Patient is scheduled for CT lung Screening September 20th @ 7am at [facility]. He will need to arrive in main radiology. Detailed message left.

## 2021-09-20 ENCOUNTER — TELEPHONE (OUTPATIENT)
Dept: FAMILY MEDICINE CLINIC | Age: 51
End: 2021-09-20

## 2021-09-20 ENCOUNTER — HOSPITAL ENCOUNTER (OUTPATIENT)
Dept: CT IMAGING | Age: 51
Discharge: HOME | End: 2021-09-20
Payer: COMMERCIAL

## 2021-09-20 DIAGNOSIS — Z87.891 PERSONAL HISTORY OF TOBACCO USE: ICD-10-CM

## 2021-09-20 PROCEDURE — 71271 CT THORAX LUNG CANCER SCR C-: CPT

## 2021-09-20 NOTE — TELEPHONE ENCOUNTER
----- Message from Alyson Gomez DO sent at 9/20/2021 12:26 PM EDT -----  Please let pt know that LDCT of chest is negative for cancer. Repeat 1 year per current guidelines. Let me know if questions, thanks!

## 2021-09-27 ENCOUNTER — PROCEDURE VISIT (OUTPATIENT)
Dept: UROLOGY | Age: 51
End: 2021-09-27
Payer: COMMERCIAL

## 2021-09-27 VITALS — HEIGHT: 67 IN | WEIGHT: 284.9 LBS | RESPIRATION RATE: 18 BRPM | BODY MASS INDEX: 44.72 KG/M2

## 2021-09-27 DIAGNOSIS — N40.1 BENIGN PROSTATIC HYPERPLASIA WITH LOWER URINARY TRACT SYMPTOMS, SYMPTOM DETAILS UNSPECIFIED: Primary | ICD-10-CM

## 2021-09-27 PROCEDURE — G8417 CALC BMI ABV UP PARAM F/U: HCPCS | Performed by: UROLOGY

## 2021-09-27 PROCEDURE — G8427 DOCREV CUR MEDS BY ELIG CLIN: HCPCS | Performed by: UROLOGY

## 2021-09-27 PROCEDURE — 99213 OFFICE O/P EST LOW 20 MIN: CPT | Performed by: UROLOGY

## 2021-09-27 PROCEDURE — 3017F COLORECTAL CA SCREEN DOC REV: CPT | Performed by: UROLOGY

## 2021-09-27 PROCEDURE — 1036F TOBACCO NON-USER: CPT | Performed by: UROLOGY

## 2021-09-27 PROCEDURE — 52000 CYSTOURETHROSCOPY: CPT | Performed by: UROLOGY

## 2021-09-27 NOTE — PROGRESS NOTES
Dr. Alva Dunbar MD MD  [facility] Consultation / New Patient Visit    Patient:  Rachel Bell  YOB: 1970  Date: 9/27/2021  Consult requested from Valentin Farooq DO     HISTORY OF PRESENT ILLNESS:   The patient is a 46 y.o. male who presents today for follow-up for the following problem(s): BPH with LUTs  Overall the problem(s) : are worsening. Associated Symptoms: No dysuria, gross hematuria. Pain Severity:      Today visit:   9/27/21   Presents as a previous patient of Dr. Dana Muhammad. He has BPH with LUTs, and is being treated with Flomax and Sanctura. He states his symptoms are still severe, and bothersome. We had a discussion regarding continuing medication vs Urolift. Cystoscopy Operative Note  Surgeon: Alva Dunbar MD   Anesthesia: Urethral 2%  Indications: BPH   Position: supine  Findings:   The patient was prepped and draped in the usual sterile fashion. The flexible cystoscope was advanced through the urethra and into the bladder. The bladder was thoroughly inspected and the following was noted:  Residual Urine: Minimal   Urethra: No abnormalities of the urethra are noted. Prostate: Medium gland (<80 gm) Complete obstruction by lateral lobe of prostate. Bladder: No tumors or CIS noted. No bladder diverticulum. Moderate trabeculation noted. Ureters: Clear efflux from both ureters. Orifices with normal configuration and location. The cystoscope was removed. The patient tolerated the procedure well. Plan:   Good candidate for Urolift      Summary of old records:   (Patient's old records, notes and chart reviewed and summarized above.)  49-year-old white male returns today for follow-up regarding his chronic voiding symptoms. He is currently on Flomax and Sanctura 20 mg p.o. twice daily. This combination has improved his voiding pattern significantly. Urinary flow is much stronger, no hesitancy, and no urgency, frequency or nocturia.   Has steatohepatitis)     Obstructive sleep apnea on CPAP 11/3/2015    Peripheral neuropathy     Pulmonary nodule     stable >2 years, no further w/u required.  Rotator cuff injury     Left side    S/P cardiac catheterization: 9/30/2013: Normal coronaries 9/30/2013 9/30/2013: Normal coronaries. Radial approach. Dr. Sharmila Reynaga Short-segment Vásquez's esophagus 6/17/2015    Tarsal tunnel syndrome     Bilateral     Past Surgical History:   Procedure Laterality Date    CARDIAC CATHETERIZATION  10/2013    9/30/2013: Normal coronaries. Radial approach. Dr. Sharmila Reynaga COLONOSCOPY  2008, 2012   [facility] KNEE SURGERY Left 2012    ROTATOR CUFF REPAIR Left 2013   [facility] SHOULDER SURGERY Bilateral 2011   [facility] SINUS SURGERY  2004    See's Moon occ.  TONSILLECTOMY  2002    TYMPANOSTOMY TUBE PLACEMENT      UPPER GASTROINTESTINAL ENDOSCOPY  2008, 2008 with dilation, 2012    UPPER GASTROINTESTINAL ENDOSCOPY  2013    UPPER GASTROINTESTINAL ENDOSCOPY  MAY 2014, 2017    Dilation     Family History   Problem Relation Age of Onset    Cancer Father 77        lung     Rheum Arthritis Father     Colon Cancer Maternal Aunt 61    Cancer Maternal Aunt 76        \"throat\"    Colon Cancer Maternal Uncle 48    Cancer Maternal Uncle 61        pancreatic    Cancer Paternal Aunt 52        brain    Colon Cancer Paternal Uncle 72    Cancer Paternal Uncle 61        hepatic     Prostate Cancer Paternal Uncle 61    Cancer Maternal Uncle 79        pancreatic     Ovarian Cancer Paternal Aunt 48    Rheum Arthritis Mother     Pancreatic Cancer Mother     Brain Cancer Brother      Outpatient Medications Marked as Taking for the 9/27/21 encounter (Procedure visit) with Sg Foote MD   Medication Sig Dispense Refill    VORTIoxetine HBr (TRINTELLIX) 20 MG TABS tablet TAKE 1 TABLET BY MOUTH EVERY DAY 30 tablet 3    amphetamine-dextroamphetamine (ADDERALL, 30MG,) 30 MG tablet Take 1 tablet by mouth 2 times daily for 30 days.  60 tablet 0    lamoTRIgine (LAMICTAL) 200 MG tablet TAKE 1 TABLET BY MOUTH ONE TIME A DAY 30 tablet 2    oxybutynin (DITROPAN) 5 MG tablet Take 1 tablet by mouth 3 times daily 90 tablet 5    amLODIPine (NORVASC) 5 MG tablet Take 1 tablet by mouth daily 30 tablet 3    doxepin (SINEQUAN) 10 MG capsule Take 1 capsule by mouth nightly 30 capsule 3    tamsulosin (FLOMAX) 0.4 MG capsule Take 1 capsule by mouth daily Take one capsule daily to facilitate passage of ureteral stone 90 capsule 0    lisinopril (PRINIVIL;ZESTRIL) 40 MG tablet Take 1 tablet by mouth daily 90 tablet 1    butalbital-acetaminophen-caffeine (FIORICET, ESGIC) -40 MG per tablet       LANTUS SOLOSTAR 100 UNIT/ML injection pen inject 65 units into the skin two times a day  45 mL 3    buPROPion HCl ER, XL, 450 MG TB24 Take 450 mg by mouth daily 30 tablet 5    lurasidone (LATUDA) 120 MG tablet TAKE 1 TABLET BY MOUTH ONE TIME A DAY 30 tablet 5    Insulin Pen Needle (UNIFINE PENTIPS) 31G X 8 MM MISC Use DAILY as directed WITH INSULIN PENS-- DO NOT RE-USE needles 100 each 3    omeprazole (PRILOSEC) 20 MG delayed release capsule TAKE 2 CAPSULES BY MOUTH ONE TIME A DAY  180 capsule 3    metFORMIN (GLUCOPHAGE) 1000 MG tablet TAKE 1 TABLET BY MOUTH TWO TIMES A DAY WITH MEALS 180 tablet 3    aspirin (SM ASPIRIN ADULT LOW STRENGTH) 81 MG EC tablet TAKE 1 TABLET BY MOUTH ONE TIME A DAY 90 tablet 3    piroxicam (FELDENE) 20 MG capsule TAKE 1 CAPSULE BY MOUTH ONE TIME A DAY AS NEEDED FOR PAIN 90 capsule 3    blood glucose test strips (TRUE METRIX BLOOD GLUCOSE TEST) strip check blood sugar one time daily 100 each 2    fenofibrate (TRIGLIDE) 160 MG tablet TAKE 1 TABLET BY MOUTH ONE TIME A DAY  90 tablet 3    JANUVIA 100 MG tablet TAKE 1 TABLET BY MOUTH ONE TIME A DAY  90 tablet 3    atorvastatin (LIPITOR) 80 MG tablet TAKE 1 TABLET BY MOUTH ONE TIME A DAY  90 tablet 3    sildenafil (VIAGRA) 50 MG tablet Take 1 tablet by mouth daily as needed for Erectile Dysfunction Take one tablet one hour before sexual activity. 6 tablet 5    azelastine (ASTELIN) 0.1 % nasal spray Inhale 2 sprays in each nostril twice a day as needed      MEIJER LANCETS UNIVERSAL 33G MISC use to test blood sugar once a day 100 each 3    ARTIFICIAL TEARS 1.4 % ophthalmic solution Place 1 drop into both eyes 4 times daily  0    clotrimazole-betamethasone (LOTRISONE) 1-0.05 % cream Apply topically 2 times daily. Not to exceed 2 weeks consecutively. 1 Tube 1    rOPINIRole (REQUIP) 3 MG tablet Take 3 mg by mouth nightly      cetirizine (ZYRTEC) 10 MG tablet daily       EYE ITCH RELIEF 0.025 % ophthalmic solution Place 2 drops into both eyes as needed       propranolol (INDERAL LA) 160 MG ER capsule Take 160 mg by mouth daily       fluticasone (FLONASE) 50 MCG/ACT nasal spray 1 spray by Nasal route daily as needed.  gabapentin (NEURONTIN) 800 MG tablet   Take 800 mg by mouth 4 times daily          Morphine  Social History     Tobacco Use   Smoking Status Former Smoker    Packs/day: 0.75    Years: 30.00    Pack years: 22.50    Types: Cigarettes    Start date: 2/1/1984   [facility] Quit date: 5/8/2018    Years since quitting: 3.3   Smokeless Tobacco Never Used       Social History     Substance and Sexual Activity   Alcohol Use No    Alcohol/week: 0.0 standard drinks       REVIEW OF SYSTEMS:  Constitutional: negative  Eyes: negative  Respiratory: negative  Cardiovascular: negative  Gastrointestinal: negative  Musculoskeletal: negative  Genitourinary: negative  Skin: negative   Neurological: negative  Hematological/Lymphatic: negative  Psychological: negative    Physical Exam:    This a 46 y.o. male   Vitals:    09/27/21 0903   Resp: 18     Constitutional: Patient in no acute distress   Neuro: alert and oriented to person place and time.     Psych: Mood and affect normal.  Head: atraumatic normocephalic  Eyes: EOMi  HEENT: neck supple, trachea midline  Lungs: Respiratory effort normal  Cardiovascular:  Normal peripheral pulses  Abdomen: Soft, non-tender, non-distended, No CVA  Bladder: non-tender and not distended. FROMx4, no cyanosis clubbing edema  Skin: warm and dry      Assessment and Plan      1. Benign prostatic hyperplasia with lower urinary tract symptoms, symptom details unspecified           Plan:      No follow-ups on file. Continue Flomax for BPH   Continue sanctura for overactivity  PSA reviewed -wnl  Old notes reviewed  Cysto - BPH  - would be a good candidate for Urolift. Literature given.

## 2021-10-11 DIAGNOSIS — E11.65 UNCONTROLLED TYPE 2 DIABETES MELLITUS WITH HYPERGLYCEMIA, WITHOUT LONG-TERM CURRENT USE OF INSULIN (HCC): ICD-10-CM

## 2021-10-11 DIAGNOSIS — F90.8 ADHD, ADULT RESIDUAL TYPE: ICD-10-CM

## 2021-10-11 RX ORDER — DEXTROAMPHETAMINE SACCHARATE, AMPHETAMINE ASPARTATE, DEXTROAMPHETAMINE SULFATE AND AMPHETAMINE SULFATE 7.5; 7.5; 7.5; 7.5 MG/1; MG/1; MG/1; MG/1
30 TABLET ORAL 2 TIMES DAILY
Qty: 60 TABLET | Refills: 0 | Status: SHIPPED | OUTPATIENT
Start: 2021-10-11 | End: 2021-11-08 | Stop reason: SDUPTHER

## 2021-10-11 RX ORDER — PEN NEEDLE, DIABETIC 31 GX5/16"
NEEDLE, DISPOSABLE MISCELLANEOUS
Qty: 100 EACH | Refills: 3 | Status: SHIPPED | OUTPATIENT
Start: 2021-10-11 | End: 2022-02-10 | Stop reason: SDUPTHER

## 2021-10-21 DIAGNOSIS — R80.9 TYPE 2 DIABETES MELLITUS WITH MICROALBUMINURIA, WITHOUT LONG-TERM CURRENT USE OF INSULIN (HCC): ICD-10-CM

## 2021-10-21 DIAGNOSIS — E11.29 TYPE 2 DIABETES MELLITUS WITH MICROALBUMINURIA, WITHOUT LONG-TERM CURRENT USE OF INSULIN (HCC): ICD-10-CM

## 2021-10-22 DIAGNOSIS — R80.9 TYPE 2 DIABETES MELLITUS WITH MICROALBUMINURIA, WITHOUT LONG-TERM CURRENT USE OF INSULIN (HCC): ICD-10-CM

## 2021-10-22 DIAGNOSIS — E11.29 TYPE 2 DIABETES MELLITUS WITH MICROALBUMINURIA, WITHOUT LONG-TERM CURRENT USE OF INSULIN (HCC): ICD-10-CM

## 2021-10-22 RX ORDER — INSULIN GLARGINE 100 [IU]/ML
65 INJECTION, SOLUTION SUBCUTANEOUS 2 TIMES DAILY
Qty: 117 ML | Refills: 3 | Status: SHIPPED | OUTPATIENT
Start: 2021-10-22

## 2021-10-22 RX ORDER — INSULIN GLARGINE 100 [IU]/ML
65 INJECTION, SOLUTION SUBCUTANEOUS 2 TIMES DAILY
Qty: 117 ML | Refills: 3 | OUTPATIENT
Start: 2021-10-22

## 2021-10-22 NOTE — TELEPHONE ENCOUNTER
Last visit- 8/31/2021  Next visit- 11/29/2021    Requested Prescriptions     Pending Prescriptions Disp Refills    insulin glargine (LANTUS SOLOSTAR) 100 UNIT/ML injection pen 117 mL 3     Sig: Inject 65 Units into the skin 2 times daily

## 2021-10-22 NOTE — TELEPHONE ENCOUNTER
Name of Medication? insulin glargine (LANTUS SOLOSTAR) 100 UNIT/ML   injection pen   Patient-reported dosage and instructions? 65 Units, b.i.d. How many days do you have left? 1   Preferred Pharmacy? [address]   Pharmacy phone number (if available)? [phone]   Additional Information for Provider? [facility] received a call from Pt Neno Villalobos); Reason? Pt needs this prescription refilled ASAP, today, as he   does not have enough units to get through the weekend. Pt only has a   maximum of a day and a half left as he has 3 doses/units left. Pt aware   that the office closes at 12pm today. Pt would like call when this is   completed; Pt will have phone on him.

## 2021-11-08 DIAGNOSIS — F90.8 ADHD, ADULT RESIDUAL TYPE: ICD-10-CM

## 2021-11-08 DIAGNOSIS — E11.29 TYPE 2 DIABETES MELLITUS WITH MICROALBUMINURIA, WITHOUT LONG-TERM CURRENT USE OF INSULIN (HCC): Chronic | ICD-10-CM

## 2021-11-08 DIAGNOSIS — E78.1 HYPERTRIGLYCERIDEMIA: Chronic | ICD-10-CM

## 2021-11-08 DIAGNOSIS — F31.9 BIPOLAR 1 DISORDER (HCC): Primary | ICD-10-CM

## 2021-11-08 DIAGNOSIS — N40.1 BENIGN PROSTATIC HYPERPLASIA WITH LOWER URINARY TRACT SYMPTOMS, SYMPTOM DETAILS UNSPECIFIED: ICD-10-CM

## 2021-11-08 DIAGNOSIS — R80.9 TYPE 2 DIABETES MELLITUS WITH MICROALBUMINURIA, WITHOUT LONG-TERM CURRENT USE OF INSULIN (HCC): Chronic | ICD-10-CM

## 2021-11-08 DIAGNOSIS — N32.81 OAB (OVERACTIVE BLADDER): ICD-10-CM

## 2021-11-08 RX ORDER — FENOFIBRATE 160 MG/1
TABLET ORAL
Qty: 90 TABLET | Refills: 3 | Status: SHIPPED | OUTPATIENT
Start: 2021-11-08 | End: 2022-11-01

## 2021-11-08 RX ORDER — ATORVASTATIN CALCIUM 80 MG/1
TABLET, FILM COATED ORAL
Qty: 90 TABLET | Refills: 3 | Status: SHIPPED | OUTPATIENT
Start: 2021-11-08 | End: 2022-11-01

## 2021-11-08 RX ORDER — DEXTROAMPHETAMINE SACCHARATE, AMPHETAMINE ASPARTATE, DEXTROAMPHETAMINE SULFATE AND AMPHETAMINE SULFATE 7.5; 7.5; 7.5; 7.5 MG/1; MG/1; MG/1; MG/1
30 TABLET ORAL 2 TIMES DAILY
Qty: 60 TABLET | Refills: 0 | Status: SHIPPED | OUTPATIENT
Start: 2021-11-08 | End: 2021-12-15 | Stop reason: SDUPTHER

## 2021-11-08 NOTE — TELEPHONE ENCOUNTER
Patient called to request the following  Please approve or refuse Rx request:  Requested Prescriptions     Pending Prescriptions Disp Refills    lurasidone (LATUDA) 120 MG tablet 30 tablet 5     Sig: TAKE 1 TABLET BY MOUTH ONE TIME A DAY    atorvastatin (LIPITOR) 80 MG tablet 90 tablet 3    fenofibrate (TRIGLIDE) 160 MG tablet 90 tablet 3    amphetamine-dextroamphetamine (ADDERALL, 30MG,) 30 MG tablet 60 tablet 0     Sig: Take 1 tablet by mouth 2 times daily for 30 days.        Next appointment:  11/29/2021

## 2021-11-08 NOTE — TELEPHONE ENCOUNTER
ASSESSMENT & PLAN   Diagnosis Orders   1. Bipolar 1 disorder (HCC)  lurasidone (LATUDA) 120 MG tablet   2. Type 2 diabetes mellitus with microalbuminuria, without long-term current use of insulin (HCC)  atorvastatin (LIPITOR) 80 MG tablet   3. Hypertriglyceridemia  fenofibrate (TRIGLIDE) 160 MG tablet   4. ADHD, adult residual type  amphetamine-dextroamphetamine (ADDERALL, 30MG,) 30 MG tablet       OAARS reviewed and appropriate. Controlled Substances Monitoring: Periodic Controlled Substance Monitoring: No signs of potential drug abuse or diversion identified. , Possible medication side effects, risk of tolerance/dependence & alternative treatments discussed.  Bella Blanton, DO)      Future Appointments   Date Time Provider Mikey Painting   11/29/2021  9:00 AM [address] [place] [address] LETICIA Mauro   12/22/2021  8:45 AM [facility] [address]

## 2021-11-09 RX ORDER — TAMSULOSIN HYDROCHLORIDE 0.4 MG/1
CAPSULE ORAL
Qty: 90 CAPSULE | Refills: 0 | Status: SHIPPED | OUTPATIENT
Start: 2021-11-09 | End: 2021-12-06

## 2021-11-09 NOTE — TELEPHONE ENCOUNTER
Nury Rubalcava called requesting a refill on the following medications:  Requested Prescriptions     Pending Prescriptions Disp Refills    tamsulosin (FLOMAX) 0.4 MG capsule [Pharmacy Med Name: Tamsulosin HCl Oral Capsule 0.4 MG] 90 capsule 0     Sig: TAKE 1 CAPSULE BY MOUTH EVERY DAY to facilitate passage of ureteral stone     Pharmacy verified:  [facility]      Date of last visit: cysto 9/27/21 with Dr. Edwin Antonio  Date of next visit (if applicable):  No follow up noted.

## 2021-11-10 DIAGNOSIS — M25.512 CHRONIC LEFT SHOULDER PAIN: ICD-10-CM

## 2021-11-10 DIAGNOSIS — G89.29 CHRONIC LEFT SHOULDER PAIN: ICD-10-CM

## 2021-11-28 PROBLEM — F90.9 ATTENTION DEFICIT HYPERACTIVITY DISORDER (ADHD): Status: RESOLVED | Noted: 2021-01-21 | Resolved: 2021-11-28

## 2021-11-28 PROBLEM — F90.8 ADHD, ADULT RESIDUAL TYPE: Status: RESOLVED | Noted: 2020-03-24 | Resolved: 2021-11-28

## 2021-11-28 NOTE — PATIENT INSTRUCTIONS
LAB INSTRUCTIONS:    Please complete labs within 4 week(s). Please fast for 8 hours prior to lab collection. The clinic will call you within 1 week of collection. If you have not heard from us within that amount of time, please call us at [phone].

## 2021-11-28 NOTE — PROGRESS NOTES
Chief Complaint   Patient presents with    Follow-up     Chronic issues as noted below       History obtained from the patient. SUBJECTIVE:  Lenka Malloy is a 46 y.o. male that presents today for:     -DM2: On 65 units basalgar bid  On metformin and januvia  FBs   No lows. a1c improved yet, at 6.1  Struggling to lose wt despite diet changes and exercise as much as able      Lab Results   Component Value Date    LABA1C 6.1 11/29/2021    LABA1C 6.8 05/26/2021    LABA1C 8.0 11/18/2020    LABA1C 7.0 05/20/2020    LABA1C 7.2 02/20/2020    LABA1C 11.2 11/21/2019    LABA1C 7.8 08/20/2019    LABA1C 10.7 05/20/2019    LABA1C 11.2 02/18/2019    LABA1C 12.2 11/16/2018    LABA1C 7.1 08/16/2018       Wt Readings from Last 3 Encounters:   11/29/21 282 lb (127.9 kg)   09/27/21 284 lb 14.4 oz (129.2 kg)   08/31/21 281 lb 12.8 oz (127.8 kg)        -Elevated LFT PRIOR VISIT:  Noted on labs  Mild at this point, but still elevated  Denies ETOH use  Denies RUQ pain or jaundice. GI did US a few months ago, showed fatty liver    UPDATE LAST VISIT:   Labs table with persistent elevated, though mild  W/u for cause neg  Suspected WESLEY  Was to f/u with Alan SALAZAR, has not yet   No RUQ pain or jaundice    UPDATE TODAY:   Had f/u labs ordered, never done  Has not f/u with Susan Bears yet either   No RUQ pain or jaundice      -Anemia PRIOR VISIT:  Anemia still present, mild  b12 and iron studies WNL in last SEPT and early OCT  Plan is to repeat labs in [facility]  Denies bleeding, melena or hematochezia  No fevers, chills or night sweats      UPDATE PRIOR VISIT:   Found to have low iron on last lab check for anemia  Denies bleeding, melena or hematochezia  Placed on iron  F/u labs ordered for 6 wks  Pt is to call Donte SALAZAR's office for an apt, he is EST there. He has not done so yet, but plans to soon    UPDATE PRIOR VISIT:   On iron  Saw Malick Keen, GI  EGD scheduled  Denies bleeding, melena or hematochezia.       UPDATE PRIOR VISIT:   Had EGD, neg  UTD on colo  Labs done yesterday and normal  Taking iron yet  Denies bleeding, melena or hematochezia     UPDATE LAST VISIT:   Overeue for f/u labs to monitor, ordered 6 months ago, never done  Off iron tabs, and b12 tabs  Denies bleeding, melena or hematochezia    UPDATE TODAY:   Due for f/u labs  Off iron and b12, no bleeding, melena or hematochezia         -HTN:    HPI:    Taking meds as prescribed ?: yes  Tolerating well ?: yes  Side Effects ?: denies  BP at home ?: <140/90  Working on TLCS ?: yes  Chest Pain/SOB/Palpitations? denies    BP Readings from Last 3 Encounters:   11/29/21 134/80   08/31/21 136/82   08/09/21 138/70       -HLD:    HPI:    Taking meds as prescribed ?: yes  Tolerating well ?: yes  Side Effects ?: denies  Muscle Pain?: denies  Working on TLCS ?: yes      -Bipolar disorder/ADHD:  Was seeing psych at [facility], but his provider left, so was let go from practice  I've been filling his meds and he was to be working on f/u with f/u at [employer] [facility] [employer]  Is getting setup with a provider that does VV        -GERD/Barrets:    HPI:    Taking meds as prescribed ?: yes  Tolerating well ?: yes  Side Effects ?: denies  Dysphagia ?: denies  Odynophagia ?: denies  Melena ?: denies  Working on TLCS ?: yes      Age/Gender Health Maintenance    Lipid -     No components found for: CHLPL  Lab Results   Component Value Date    TRIG 296 (H) 06/15/2021    TRIG 195 01/21/2020    TRIG 656 (H) 11/19/2019     Lab Results   Component Value Date    HDL 35 06/15/2021    HDL 34 01/21/2020    HDL 25 11/19/2019     Lab Results   Component Value Date    LDLCALC 116 06/15/2021    [address] 108 01/21/2020 [address] SEE BELOW 11/19/2019     Lab Results   Component Value Date    [address] 116 06/15/2021    LDLDIRECT 116.17 01/21/2020       TSH - 3.260 (AUG 2018)  Lab Results   Component Value Date    TSH 2.860 06/15/2021       Colon Cancer Screening - NEG CANCER FEB 2015    Tetanus - UTD June 2014  Influenza Vaccine - UTD FALL 2021  Pneumonia Vaccine - UTD June 2014 PPV 23  Zoster - UTD x 2    PSA testing discussion - follows with urology  Lab Results   Component Value Date    PSA 0.37 06/15/2021    PSA 0.31 05/19/2020    PSA 0.27 03/15/2019       AAA Screening - Age 70      Diabetes Health Maintenance    A1C -   Lab Results   Component Value Date    LABA1C 6.1 11/29/2021    LABA1C 6.8 05/26/2021    LABA1C 8.0 11/18/2020    LABA1C 7.0 05/20/2020    LABA1C 7.2 02/20/2020    LABA1C 11.2 11/21/2019    LABA1C 7.8 08/20/2019    LABA1C 10.7 05/20/2019    LABA1C 11.2 02/18/2019    LABA1C 12.2 11/16/2018    LABA1C 7.1 08/16/2018       ACE/ARB - yes, lisinopril 2.5mg daily  Eye - UTD June 2020  Foot - UTD MAY 2021  ASA - yes, 81mg  Microal/Cr -   Lab Results   Component Value Date    LABMICR 2.21 06/17/2021    LABCREA 145.7 06/17/2021    MACRR 15 06/17/2021     eGFR -   Lab Results   Component Value Date    LABGLOM >90 06/15/2021       Statin - yes, lipitor 80mg qhs      Specialist Lists:    Dr Laury Redding Dr [facility] Cardiology  Dr Melba Jones:  Urology  Dr Pati Eric Pulmonology/Sleep  Dr Fadia Mcclellan surgeon  [facility] ENT  Dr Kaye Hinkle GI  Psych: pending new provider  Dr. Dago Leslie Said: Neurology      Current Outpatient Medications   Medication Sig Dispense Refill    Handicap [facility] Expires 29 NOV 2026 1 each 0    piroxicam (FELDENE) 20 MG capsule TAKE 1 CAPSULE BY MOUTH ONE TIME A DAY AS NEEDED FOR PAIN 90 capsule 3    tamsulosin (FLOMAX) 0.4 MG capsule TAKE 1 CAPSULE BY MOUTH EVERY DAY to facilitate passage of ureteral stone 90 capsule 0    lurasidone (LATUDA) 120 MG tablet TAKE 1 TABLET BY MOUTH ONE TIME A DAY 30 tablet 5    atorvastatin (LIPITOR) 80 MG tablet TAKE 1 TABLET BY MOUTH ONE TIME A DAY 90 tablet 3    fenofibrate (TRIGLIDE) 160 MG tablet TAKE 1 TABLET BY MOUTH ONE TIME A DAY 90 tablet 3    amphetamine-dextroamphetamine (ADDERALL, 30MG,) 30 MG tablet Take 1 tablet by mouth 2 times daily for 30 days. 60 tablet 0    insulin glargine (LANTUS SOLOSTAR) 100 UNIT/ML injection pen Inject 65 Units into the skin 2 times daily 117 mL 3    Insulin Pen Needle (UNIFINE PENTIPS) 31G X 8 MM MISC Use DAILY as directed WITH INSULIN PENS-- DO NOT RE-USE needles 100 each 3    VORTIoxetine HBr (TRINTELLIX) 20 MG TABS tablet TAKE 1 TABLET BY MOUTH EVERY DAY 30 tablet 3    lamoTRIgine (LAMICTAL) 200 MG tablet TAKE 1 TABLET BY MOUTH ONE TIME A DAY 30 tablet 2    oxybutynin (DITROPAN) 5 MG tablet Take 1 tablet by mouth 3 times daily 90 tablet 5    amLODIPine (NORVASC) 5 MG tablet Take 1 tablet by mouth daily 30 tablet 3    doxepin (SINEQUAN) 10 MG capsule Take 1 capsule by mouth nightly 30 capsule 3    trospium (SANCTURA) 60 MG CP24 extended release capsule Take 1 capsule by mouth daily 30 capsule 6    lisinopril (PRINIVIL;ZESTRIL) 40 MG tablet Take 1 tablet by mouth daily 90 tablet 1    butalbital-acetaminophen-caffeine (FIORICET, ESGIC) -40 MG per tablet       buPROPion HCl ER, XL, 450 MG TB24 Take 450 mg by mouth daily 30 tablet 5    omeprazole (PRILOSEC) 20 MG delayed release capsule TAKE 2 CAPSULES BY MOUTH ONE TIME A DAY  180 capsule 3    metFORMIN (GLUCOPHAGE) 1000 MG tablet TAKE 1 TABLET BY MOUTH TWO TIMES A DAY WITH MEALS 180 tablet 3    aspirin (SM ASPIRIN ADULT LOW STRENGTH) 81 MG EC tablet TAKE 1 TABLET BY MOUTH ONE TIME A DAY 90 tablet 3    blood glucose test strips (TRUE METRIX BLOOD GLUCOSE TEST) strip check blood sugar one time daily 100 each 2    JANUVIA 100 MG tablet TAKE 1 TABLET BY MOUTH ONE TIME A DAY  90 tablet 3    sildenafil (VIAGRA) 50 MG tablet Take 1 tablet by mouth daily as needed for Erectile Dysfunction Take one tablet one hour before sexual activity.  6 tablet 5    azelastine (ASTELIN) 0.1 % nasal spray Inhale 2 sprays in each nostril twice a day as needed      MEIJER LANCETS UNIVERSAL 33G MISC use to test blood sugar once a day 100 each 3    ARTIFICIAL TEARS 1.4 % ophthalmic solution Place 1 drop into both eyes 4 times daily  0    clotrimazole-betamethasone (LOTRISONE) 1-0.05 % cream Apply topically 2 times daily. Not to exceed 2 weeks consecutively. 1 Tube 1    rOPINIRole (REQUIP) 3 MG tablet Take 3 mg by mouth nightly      cetirizine (ZYRTEC) 10 MG tablet daily       EYE ITCH RELIEF 0.025 % ophthalmic solution Place 2 drops into both eyes as needed       propranolol (INDERAL LA) 160 MG ER capsule Take 160 mg by mouth daily       fluticasone (FLONASE) 50 MCG/ACT nasal spray 1 spray by Nasal route daily as needed.  gabapentin (NEURONTIN) 800 MG tablet   Take 800 mg by mouth 4 times daily        No current facility-administered medications for this visit. Orders Placed This Encounter   Medications    Handicap Placard MISC     Sig: Expires 29 NOV 2026     Dispense:  1 each     Refill:  0         All medications reviewed and reconciled, including OTC and herbal medications. Updated list given to patient. Patient Active Problem List    Diagnosis Date Noted    Obstructive sleep apnea on CPAP 11/03/2015     Priority: High    WESLEY (nonalcoholic steatohepatitis)     Closed fracture of right tibial plateau 98/67/1822    Normocytic anemia 02/19/2019    B12 deficiency     DM2 (diabetes mellitus, type 2) ([address]     Benign prostatic hyperplasia     Morbid obesity ([address]     Hypertriglyceridemia 10/27/2015    BPPV (benign paroxysmal positional vertigo) 09/29/2015    Short-segment Vásquez's esophagus 06/17/2015     Following with William Townsend for this.  Pulmonary nodule      stable >2 years, no further w/u required.  Bipolar 1 disorder ([address]     CAD (coronary artery disease)      Unclear history, cath 2013 with patent arteries. Records pending.        History of TIA (transient ischemic attack)      x2      Hypertension     Fibromyalgia     Depression     History of colon polyps     Chronic back pain     ADHD (attention deficit hyperactivity disorder)     Former smoker     Peripheral neuropathy     Tarsal tunnel syndrome     Benign esophageal stricture     Hyperlipidemia     Allergic rhinitis     Migraine headache     Dysphagia s/p dilation. 04/01/2014    GERD (gastroesophageal reflux disease) 10/15/2013    S/P cardiac catheterization: 9/30/2013: Normal coronaries 09/30/2013 9/30/2013: Normal coronaries. Radial approach. Dr. Fredo Vizcarra ED (erectile dysfunction) of organic origin 05/01/2013       Past Medical History:   Diagnosis Date    ADHD (attention deficit hyperactivity disorder)     Allergic rhinitis     Benign esophageal stricture     s/p dilation may 2014    Benign prostatic hyperplasia     Bipolar 1 disorder ([address]     CAD (coronary artery disease)     Unclear history, cath 2013 with patent arteries. Records pending.  Chronic back pain     Depression     DM2 (diabetes mellitus, type 2) ([facility]     Dysfunctional voiding of urine 6/20/2013    Medications he changes to follow,  Adding ditropan  to the present schedule     ED (erectile dysfunction) of organic origin 5/1/2013    Fibromyalgia     Former smoker     GERD (gastroesophageal reflux disease)     History of colon polyps     Pt unsure when last colo.  History of TIA (transient ischemic attack)     x2     Hyperlipidemia     Hypertension     Hypertriglyceridemia 10/27/2015    Migraine headache     Morbid obesity ([facility]     Morbid obesity with BMI of 40.0-44.9, adult ([facility]     WESLEY (nonalcoholic steatohepatitis)     Obstructive sleep apnea on CPAP 11/3/2015    Peripheral neuropathy     Pulmonary nodule     stable >2 years, no further w/u required.  Rotator cuff injury     Left side    S/P cardiac catheterization: 9/30/2013: Normal coronaries 9/30/2013 9/30/2013: Normal coronaries. Radial approach.  Dr. Beverly Tate Short-segment Vásquez's esophagus 6/17/2015    Tarsal tunnel syndrome     Bilateral       Past Surgical History:   Procedure Laterality Date    CARDIAC CATHETERIZATION  10/2013    9/30/2013: Normal coronaries. Radial approach. Dr. Faiza Brumfield COLONOSCOPY  2008, 2012   Rebbecca Hinders KNEE SURGERY Left 2012    ROTATOR CUFF REPAIR Left 2013   Rebbecca Hinders SHOULDER SURGERY Bilateral 2011   Rebbecca Hinders SINUS SURGERY  2004    [place] occ.  TONSILLECTOMY  2002    TYMPANOSTOMY TUBE PLACEMENT      UPPER GASTROINTESTINAL ENDOSCOPY  2008, 2008 with dilation, 2012    UPPER GASTROINTESTINAL ENDOSCOPY  2013    UPPER GASTROINTESTINAL ENDOSCOPY  MAY 2014, 2017    Dilation       Allergies   Allergen Reactions    Morphine Nausea And Vomiting and Rash         Social History     Tobacco Use    Smoking status: Former Smoker     Packs/day: 0.75     Years: 30.00     Pack years: 22.50     Types: Cigarettes     Start date: 2/1/1984     Quit date: 5/8/2018     Years since quitting: 3.5    Smokeless tobacco: Never Used   Substance Use Topics    Alcohol use: No     Alcohol/week: 0.0 standard drinks       Family History   Problem Relation Age of Onset    Cancer Father 77        lung     Rheum Arthritis Father     Colon Cancer Maternal Aunt 61    Cancer Maternal Aunt 76        \"throat\"    Colon Cancer Maternal Uncle 48    Cancer Maternal Uncle 60        pancreatic    Cancer Paternal Aunt 52        brain    Colon Cancer Paternal Uncle 72    Cancer Paternal Uncle 61        hepatic     Prostate Cancer Paternal Uncle 61    Cancer Maternal Uncle 79        pancreatic     Ovarian Cancer Paternal Aunt 48    Rheum Arthritis Mother     Pancreatic Cancer Mother     Brain Cancer Brother          I have reviewed the patient's past medical history, past surgical history, allergies, medications, social and family history and I have made updates where appropriate.       Review of Systems  Positive responses are highlighted in bold    Constitutional:  Fever, Chills, Night Sweats, Fatigue, Unexpected changes in weight  HENT:  Ear pain, Tinnitus, Nosebleeds, Trouble swallowing, Hearing loss, Sore throat  Cardiovascular:  Chest Pain, Palpitations, Orthopnea, Paroxysmal Nocturnal Dyspnea  Respiratory:  Cough, Wheezing, Shortness of breath, Chest tightness, Apnea  Gastrointestinal:  Nausea, Vomiting, Diarrhea, Constipation, Heartburn, Blood in stool  Genitourinary:  Difficulty or painful urination, Flank pain, Change in frequency, Urgency  Skin:  Color change, Rash, Itching, Wound  Musculoskeletal:  Joint pain, Back pain, Gait problems, Joint swelling, Myalgias  Neurological:  Dizziness, Headaches, Presyncope, Numbness, Seizures, Tremors  Endocrine:  Heat Intolerance, Cold Intolerance, Polydipsia, Polyphagia, Polyuria      PHYSICAL EXAM:  Vitals:    11/29/21 0902   BP: 134/80   Pulse: 85   Resp: 14   Temp: 98.4 °F (36.9 °C)   TempSrc: Oral   Weight: 282 lb (127.9 kg)   Height: 5' 6.5\" (1.689 m)     Body mass index is 44.83 kg/m². Pain Score:   3 (joints)    Wt Readings from Last 3 Encounters:   11/29/21 282 lb (127.9 kg)   09/27/21 284 lb 14.4 oz (129.2 kg)   08/31/21 281 lb 12.8 oz (127.8 kg)     VS Reviewed  General Appearance: A&O x 3, No acute distress,well developed and well- nourished  Eyes: pupils equal, round, and reactive to light, extraocular eye movements intact, conjunctivae and eye lids without erythema  ENT: external ear and ear canal clear bilaterally, TMs intact and regular, nose without deformity, nasal mucosa and turbinates normal without polyps, oropharynx normal, dentition is normal for age  Neck: supple and non-tender without mass, no thyromegaly or thyroid nodules, no cervical lymphadenopathy  Pulmonary/Chest: clear to auscultation bilaterally- no wheezes, rales or rhonchi, normal air movement, no respiratory distress or retractions  Cardiovascular: S1 and S2 auscultated w/ RRR. No murmurs, rubs, clicks, or gallops, distal pulses intact. Abdomen: soft, non-tender, non-distended, bowl sounds physiologic,  no rebound or guarding, no masses or hernias noted.  Liver and spleen without enlargement. Extremities: no cyanosis, clubbing or edema of the lower extremities. +2 PT/DP bilaterally. Skin: warm and dry, no rash or erythema  Psych: Affect appropriate. Mood euthymic. Thought process is normal without evidence of depression or psychosis. Good insight and appropriate interaction. Cognition and memory appear to be intact. Results for POC orders placed in visit on 11/29/21   POCT glycosylated hemoglobin (Hb A1C)   Result Value Ref Range    Hemoglobin A1C 6.1 (H) 4.3 - 5.7 %       ASSESSMENT & PLAN  1. Type 2 diabetes mellitus with other specified complication, with long-term current use of insulin (HCC)    Stable  At goal  con't glargine, metformin and januvia    - CBC Auto Differential; Future  - Comprehensive Metabolic Panel; Future  - Ferritin; Future  - Iron; Future  - Iron Binding Capacity; Future  - Vitamin B12; Future  - Folate; Future  - POCT glycosylated hemoglobin (Hb A1C)  - Diane Davenport MD, Dru Lees, [address]; Expires 29 NOV 2026  Dispense: 1 each; Refill: 0    2. Morbid obesity with BMI of 40.0-44.9, adult ([facility]    Discussed wt loss strategies  Refer to [address] [place], [place] [address]Mell MD, Dru [address]es, [address]; Expires 29 NOV 2026  Dispense: 1 each; Refill: 0    3. Abnormal transaminases    Work on wt loss  F/u labs to monitor  Needs to f/u GI, Jeri Snider, plans to soon, encouraged to do so    - CBC Auto Differential; Future  - Comprehensive Metabolic Panel; Future  - Ferritin; Future  - Iron; Future  - Iron Binding Capacity; Future  - Vitamin B12; Future  - Folate; Future  - Handicap Placard MISC; Expires 29 NOV 2026  Dispense: 1 each; Refill: 0    4. WESLEY (nonalcoholic steatohepatitis)    As per # 3    - CBC Auto Differential; Future  - Comprehensive Metabolic Panel; Future  - Ferritin; Future  - Iron; Future  - Iron Binding Capacity; Future  - Vitamin B12; Future  - Folate;  Future  - Handicap Placard [address] [facility]; Expires 29 NOV 2026  Dispense: 1 each; Refill: 0    5. Iron deficiency anemia, unspecified iron deficiency anemia type    In remission  Neg w/u  Monitor with labs, ordered today    - CBC Auto Differential; Future  - Comprehensive Metabolic Panel; Future  - Ferritin; Future  - Iron; Future  - Iron Binding Capacity; Future  - Vitamin B12; Future  - Folate; Future  - Handicap Placard MISC; Expires 29 NOV 2026  Dispense: 1 each; Refill: 0    6. B12 deficiency    Off supplementation  Due for labs, ordered to monitor    - CBC Auto Differential; Future  - Comprehensive Metabolic Panel; Future  - Ferritin; Future  - Iron; Future  - Iron Binding Capacity; Future  - Vitamin B12; Future  - Folate; Future  - Handicap Placard MISC; Expires 29 NOV 2026  Dispense: 1 each; Refill: 0    7. Essential hypertension    Stable  At goal inderal and lisinopril    con't   - Handicap Placard MISC; Expires 29 NOV 2026  Dispense: 1 each; Refill: 0    8. Mixed hyperlipidemia    Stable  con't Lipitor    - Handicap Placard MISC; Expires 29 NOV 2026  Dispense: 1 each; Refill: 0    9. Bipolar 1 disorder (HCC)    Stable  con't current regimen of Lamictal, Latuda, Wellbutrin, Triintellix and adderall  Should have new psych apt soon  Will fill meds until then    - Handicap Placard MISC; Expires 29 NOV 2026  Dispense: 1 each; Refill: 0    10. Attention deficit hyperactivity disorder (ADHD), unspecified ADHD type    - Handicap Placard MISC; Expires 29 NOV 2026  Dispense: 1 each; Refill: 0    11. Gastroesophageal reflux disease, unspecified whether esophagitis present    Stable  con't PPI  F/u GI, [place]    - Handicap Placard MISC; Expires 29 NOV 2026  Dispense: 1 each; Refill: 0    12. Short-segment Vásquez's esophagus    As above    - Handicap Placard MISC; Expires 29 NOV 2026  Dispense: 1 each; Refill: 0    13.  Needs flu shot    - INFLUENZA, MDCK QUADV, 2 YRS AND OLDER, IM, PF, PREFILL SYR OR SDV, 0.5ML (FLUCELVAX QUADV, PF)  - Handicap Placard MISC; Expires 29 NOV 2026  Dispense: 1 each; Refill: 0      DISPOSITION    Return in about 6 months (around 5/29/2022) for Follow-up Diabetes, follow-up on chronic medical conditions, sooner as needed. Az released without restrictions. Future Appointments   Date Time Provider Mikey Painting   12/22/2021  8:45 AM RAQUEL Mora [address] received counseling on the following healthy behaviors: nutrition, exercise and medication adherence    Patient given educational materials on: See Attached    [facility] self tracking handout on blood sugars, instructed them to bring it with them to his next appointment. Barriers to learning and self management: none    Discussed use, benefit, and side effects of prescribed medications. Barriers to medication compliance addressed. All patient questions answered. Pt voiced understanding.        Electronically signed by Dorcas Stephens DO on 11/29/2021 at 9:25 AM

## 2021-11-29 ENCOUNTER — OFFICE VISIT (OUTPATIENT)
Dept: FAMILY MEDICINE CLINIC | Age: 51
End: 2021-11-29
Payer: COMMERCIAL

## 2021-11-29 VITALS
HEIGHT: 67 IN | HEART RATE: 85 BPM | RESPIRATION RATE: 14 BRPM | WEIGHT: 282 LBS | SYSTOLIC BLOOD PRESSURE: 134 MMHG | TEMPERATURE: 98.4 F | DIASTOLIC BLOOD PRESSURE: 80 MMHG | BODY MASS INDEX: 44.26 KG/M2

## 2021-11-29 DIAGNOSIS — Z79.4 TYPE 2 DIABETES MELLITUS WITH OTHER SPECIFIED COMPLICATION, WITH LONG-TERM CURRENT USE OF INSULIN (HCC): Primary | ICD-10-CM

## 2021-11-29 DIAGNOSIS — E66.01 MORBID OBESITY WITH BMI OF 40.0-44.9, ADULT (HCC): ICD-10-CM

## 2021-11-29 DIAGNOSIS — R74.8 ABNORMAL TRANSAMINASES: ICD-10-CM

## 2021-11-29 DIAGNOSIS — E78.2 MIXED HYPERLIPIDEMIA: ICD-10-CM

## 2021-11-29 DIAGNOSIS — F31.9 BIPOLAR 1 DISORDER (HCC): Chronic | ICD-10-CM

## 2021-11-29 DIAGNOSIS — E53.8 B12 DEFICIENCY: ICD-10-CM

## 2021-11-29 DIAGNOSIS — K21.9 GASTROESOPHAGEAL REFLUX DISEASE, UNSPECIFIED WHETHER ESOPHAGITIS PRESENT: Chronic | ICD-10-CM

## 2021-11-29 DIAGNOSIS — F90.9 ATTENTION DEFICIT HYPERACTIVITY DISORDER (ADHD), UNSPECIFIED ADHD TYPE: Chronic | ICD-10-CM

## 2021-11-29 DIAGNOSIS — I10 ESSENTIAL HYPERTENSION: ICD-10-CM

## 2021-11-29 DIAGNOSIS — K75.81 NASH (NONALCOHOLIC STEATOHEPATITIS): ICD-10-CM

## 2021-11-29 DIAGNOSIS — K22.70 SHORT-SEGMENT BARRETT'S ESOPHAGUS: Chronic | ICD-10-CM

## 2021-11-29 DIAGNOSIS — D50.9 IRON DEFICIENCY ANEMIA, UNSPECIFIED IRON DEFICIENCY ANEMIA TYPE: ICD-10-CM

## 2021-11-29 DIAGNOSIS — Z23 NEEDS FLU SHOT: ICD-10-CM

## 2021-11-29 DIAGNOSIS — E11.69 TYPE 2 DIABETES MELLITUS WITH OTHER SPECIFIED COMPLICATION, WITH LONG-TERM CURRENT USE OF INSULIN (HCC): Primary | ICD-10-CM

## 2021-11-29 LAB — HBA1C MFR BLD: 6.1 % (ref 4.3–5.7)

## 2021-11-29 PROCEDURE — 1036F TOBACCO NON-USER: CPT | Performed by: FAMILY MEDICINE

## 2021-11-29 PROCEDURE — G8417 CALC BMI ABV UP PARAM F/U: HCPCS | Performed by: FAMILY MEDICINE

## 2021-11-29 PROCEDURE — G8427 DOCREV CUR MEDS BY ELIG CLIN: HCPCS | Performed by: FAMILY MEDICINE

## 2021-11-29 PROCEDURE — 3017F COLORECTAL CA SCREEN DOC REV: CPT | Performed by: FAMILY MEDICINE

## 2021-11-29 PROCEDURE — G8482 FLU IMMUNIZE ORDER/ADMIN: HCPCS | Performed by: FAMILY MEDICINE

## 2021-11-29 PROCEDURE — 90674 CCIIV4 VAC NO PRSV 0.5 ML IM: CPT | Performed by: FAMILY MEDICINE

## 2021-11-29 PROCEDURE — 2022F DILAT RTA XM EVC RTNOPTHY: CPT | Performed by: FAMILY MEDICINE

## 2021-11-29 PROCEDURE — 99214 OFFICE O/P EST MOD 30 MIN: CPT | Performed by: FAMILY MEDICINE

## 2021-11-29 PROCEDURE — 3044F HG A1C LEVEL LT 7.0%: CPT | Performed by: FAMILY MEDICINE

## 2021-11-29 NOTE — PROGRESS NOTES
Vaccine Information Sheet, \"Influenza - Inactivated\"  given to Manny Liu, or parent/legal guardian of  Bryant [place] and verbalized understanding. Patient responses:    Have you ever had a reaction to a flu vaccine? No  Do you have an allergy to eggs, neomycin or polymixin? No  Do you have an allergy to Thimerosal, contact lens solution, or Merthiolate? No  Have you ever had Guillian [place] Syndrome? No  Do you have any current illness? No  Do you have a temperature above 100 degrees? No  Are you pregnant? No  If pregnant, permission obtained from physician? No  Do you have an active neurological disorder? No      Flu vaccine given per order. Please see immunization tab.   Immunization History   Administered Date(s) Administered    COVID-19, Pfizer, PF, 30mcg/0.3mL 07/30/2021, 08/20/2021    Hepatitis B 11/11/2008    Influenza Vaccine, unspecified formulation 09/12/2016    Influenza Virus Vaccine 10/27/2014, 09/29/2015    Influenza, MDCK Quadv, IM, PF (Flucelvax 2 yrs and older) 11/29/2021    Influenza, Regina Arena, IM, (6 mo and older Fluzone, Flulaval, Fluarix and 3 yrs and older Afluria) 09/30/2016, 10/14/2020    Influenza, Quadv, IM, PF (6 mo and older Fluzone, Flulaval, Fluarix, and 3 yrs and older Afluria) 11/15/2017, 11/16/2018, 10/29/2019    Pneumococcal Conjugate 13-valent ([id number]) 09/03/2015    Pneumococcal Polysaccharide ([id number]) 06/16/2014    Tdap (Boostrix, Adacel) 06/16/2014    Zoster Recombinant (Shingrix) 10/14/2020, 05/26/2021

## 2021-12-06 ENCOUNTER — TELEPHONE (OUTPATIENT)
Dept: FAMILY MEDICINE CLINIC | Age: 51
End: 2021-12-06

## 2021-12-06 DIAGNOSIS — N32.81 OAB (OVERACTIVE BLADDER): ICD-10-CM

## 2021-12-06 DIAGNOSIS — I10 ESSENTIAL HYPERTENSION: ICD-10-CM

## 2021-12-06 DIAGNOSIS — N40.1 BENIGN PROSTATIC HYPERPLASIA WITH LOWER URINARY TRACT SYMPTOMS, SYMPTOM DETAILS UNSPECIFIED: ICD-10-CM

## 2021-12-06 DIAGNOSIS — F31.9 BIPOLAR 1 DISORDER (HCC): ICD-10-CM

## 2021-12-06 RX ORDER — TAMSULOSIN HYDROCHLORIDE 0.4 MG/1
CAPSULE ORAL
Qty: 90 CAPSULE | Refills: 0 | Status: SHIPPED | OUTPATIENT
Start: 2021-12-06 | End: 2022-06-01

## 2021-12-06 RX ORDER — LAMOTRIGINE 200 MG/1
TABLET ORAL
Qty: 30 TABLET | Refills: 3 | Status: SHIPPED | OUTPATIENT
Start: 2021-12-06 | End: 2022-08-22

## 2021-12-06 RX ORDER — AMLODIPINE BESYLATE 5 MG/1
TABLET ORAL
Qty: 90 TABLET | Refills: 3 | Status: SHIPPED | OUTPATIENT
Start: 2021-12-06 | End: 2022-12-01

## 2021-12-06 NOTE — TELEPHONE ENCOUNTER
Bryant [place] called requesting a refill on the following medications:  Requested Prescriptions     Pending Prescriptions Disp Refills    tamsulosin (FLOMAX) 0.4 MG capsule [Pharmacy Med Name: Tamsulosin HCl Oral Capsule 0.4 MG] 90 capsule 0     Sig: TAKE 1 CAPSULE BY MOUTH EVERY DAY TO [address] [place] [address] verified:  .pv      Date of last visit: 09/27/2021  Date of next visit (if applicable): Not scheduled

## 2021-12-08 RX ORDER — BUPROPION HYDROCHLORIDE 450 MG/1
450 TABLET, FILM COATED, EXTENDED RELEASE ORAL DAILY
Qty: 30 TABLET | Refills: 5 | Status: SHIPPED | OUTPATIENT
Start: 2021-12-08 | End: 2022-06-01

## 2021-12-08 NOTE — TELEPHONE ENCOUNTER
Pt is requesting a refill for bupropion  He states he found a phycologist in [facility] ,Oh , he doesn't remember her name and has no appt scheduled, he will have to do a VV with her

## 2021-12-15 DIAGNOSIS — F90.8 ADHD, ADULT RESIDUAL TYPE: ICD-10-CM

## 2021-12-15 DIAGNOSIS — E11.65 UNCONTROLLED TYPE 2 DIABETES MELLITUS WITH HYPERGLYCEMIA, WITHOUT LONG-TERM CURRENT USE OF INSULIN (HCC): ICD-10-CM

## 2021-12-15 RX ORDER — DEXTROAMPHETAMINE SACCHARATE, AMPHETAMINE ASPARTATE, DEXTROAMPHETAMINE SULFATE AND AMPHETAMINE SULFATE 7.5; 7.5; 7.5; 7.5 MG/1; MG/1; MG/1; MG/1
30 TABLET ORAL 2 TIMES DAILY
Qty: 60 TABLET | Refills: 0 | Status: SHIPPED | OUTPATIENT
Start: 2021-12-15 | End: 2022-01-20 | Stop reason: SDUPTHER

## 2022-01-05 DIAGNOSIS — F31.9 BIPOLAR 1 DISORDER (HCC): ICD-10-CM

## 2022-01-06 DIAGNOSIS — R80.9 TYPE 2 DIABETES MELLITUS WITH MICROALBUMINURIA, WITHOUT LONG-TERM CURRENT USE OF INSULIN (HCC): Chronic | ICD-10-CM

## 2022-01-06 DIAGNOSIS — E11.29 TYPE 2 DIABETES MELLITUS WITH MICROALBUMINURIA, WITHOUT LONG-TERM CURRENT USE OF INSULIN (HCC): Chronic | ICD-10-CM

## 2022-01-06 DIAGNOSIS — I25.10 ASCVD (ARTERIOSCLEROTIC CARDIOVASCULAR DISEASE): ICD-10-CM

## 2022-01-06 RX ORDER — ASPIRIN 81 MG/1
TABLET ORAL
Qty: 90 TABLET | Refills: 3 | Status: SHIPPED | OUTPATIENT
Start: 2022-01-06 | End: 2022-01-07

## 2022-01-07 DIAGNOSIS — R80.9 TYPE 2 DIABETES MELLITUS WITH MICROALBUMINURIA, WITHOUT LONG-TERM CURRENT USE OF INSULIN (HCC): Chronic | ICD-10-CM

## 2022-01-07 DIAGNOSIS — I25.10 ASCVD (ARTERIOSCLEROTIC CARDIOVASCULAR DISEASE): ICD-10-CM

## 2022-01-07 DIAGNOSIS — E11.29 TYPE 2 DIABETES MELLITUS WITH MICROALBUMINURIA, WITHOUT LONG-TERM CURRENT USE OF INSULIN (HCC): Chronic | ICD-10-CM

## 2022-01-07 RX ORDER — ASPIRIN 81 MG/1
TABLET ORAL
Qty: 90 TABLET | Refills: 3 | Status: SHIPPED | OUTPATIENT
Start: 2022-01-07

## 2022-01-20 DIAGNOSIS — E11.65 UNCONTROLLED TYPE 2 DIABETES MELLITUS WITH HYPERGLYCEMIA, WITHOUT LONG-TERM CURRENT USE OF INSULIN (HCC): ICD-10-CM

## 2022-01-20 DIAGNOSIS — F90.8 ADHD, ADULT RESIDUAL TYPE: ICD-10-CM

## 2022-01-20 RX ORDER — DEXTROAMPHETAMINE SACCHARATE, AMPHETAMINE ASPARTATE, DEXTROAMPHETAMINE SULFATE AND AMPHETAMINE SULFATE 7.5; 7.5; 7.5; 7.5 MG/1; MG/1; MG/1; MG/1
30 TABLET ORAL 2 TIMES DAILY
Qty: 60 TABLET | Refills: 0 | Status: SHIPPED | OUTPATIENT
Start: 2022-01-20 | End: 2022-02-17 | Stop reason: SDUPTHER

## 2022-01-20 NOTE — TELEPHONE ENCOUNTER
Pt seen 11/29  Needs seen 5/29 or after for f/u DM and chronic conditions    Please help schedule, thank you    ASSESSMENT & PLAN   Diagnosis Orders   1. ADHD, adult residual type  amphetamine-dextroamphetamine (ADDERALL, 30MG,) 30 MG tablet   2. Uncontrolled type 2 diabetes mellitus with hyperglycemia, without long-term current use of insulin (HCC)  SITagliptin (JANUVIA) 100 MG tablet       OAARS reviewed and appropriate. Controlled Substances Monitoring: Periodic Controlled Substance Monitoring: Possible medication side effects, risk of tolerance/dependence & alternative treatments discussed. ,No signs of potential drug abuse or diversion identified.  Marva Rod, DO)      Future Appointments   Date Time Provider [facility]   2/14/2022 10:30 AM MD Danya Forman [address] [facility]   2/22/2022  9:45 AM Cecil Pina

## 2022-01-24 NOTE — TELEPHONE ENCOUNTER
Future Appointments   Date Time Provider Mikey Garima   2/14/2022 10:30 AM Katherine Christianson., MD [facility] JASPAL MATHIAS OFFENERUY IISULTANA   2/22/2022  9:45 AM Cecil Cole   5/31/2022  8:00 AM Chapo Bernal DO [facility] [address]     Patient has been informed and voiced understanding

## 2022-01-31 DIAGNOSIS — I10 ESSENTIAL HYPERTENSION: ICD-10-CM

## 2022-01-31 RX ORDER — LISINOPRIL 40 MG/1
TABLET ORAL
Qty: 90 TABLET | Refills: 3 | Status: SHIPPED | OUTPATIENT
Start: 2022-01-31

## 2022-02-02 ENCOUNTER — NURSE ONLY (OUTPATIENT)
Dept: LAB | Age: 52
End: 2022-02-02

## 2022-02-02 DIAGNOSIS — D50.9 IRON DEFICIENCY ANEMIA, UNSPECIFIED IRON DEFICIENCY ANEMIA TYPE: ICD-10-CM

## 2022-02-02 DIAGNOSIS — Z79.4 TYPE 2 DIABETES MELLITUS WITH OTHER SPECIFIED COMPLICATION, WITH LONG-TERM CURRENT USE OF INSULIN (HCC): ICD-10-CM

## 2022-02-02 DIAGNOSIS — R74.8 ABNORMAL TRANSAMINASES: ICD-10-CM

## 2022-02-02 DIAGNOSIS — E11.69 TYPE 2 DIABETES MELLITUS WITH OTHER SPECIFIED COMPLICATION, WITH LONG-TERM CURRENT USE OF INSULIN (HCC): ICD-10-CM

## 2022-02-02 DIAGNOSIS — E53.8 B12 DEFICIENCY: ICD-10-CM

## 2022-02-02 DIAGNOSIS — K75.81 NASH (NONALCOHOLIC STEATOHEPATITIS): ICD-10-CM

## 2022-02-02 LAB
ALBUMIN SERPL-MCNC: 4.2 G/DL (ref 3.5–5.1)
ALP BLD-CCNC: 104 U/L (ref 38–126)
ALT SERPL-CCNC: 104 U/L (ref 11–66)
ANION GAP SERPL CALCULATED.3IONS-SCNC: 11 MEQ/L (ref 8–16)
AST SERPL-CCNC: 84 U/L (ref 5–40)
BASOPHILS # BLD: 0.8 %
BASOPHILS ABSOLUTE: 0 THOU/MM3 (ref 0–0.1)
BILIRUB SERPL-MCNC: 0.4 MG/DL (ref 0.3–1.2)
BUN BLDV-MCNC: 11 MG/DL (ref 7–22)
CALCIUM SERPL-MCNC: 8.9 MG/DL (ref 8.5–10.5)
CHLORIDE BLD-SCNC: 105 MEQ/L (ref 98–111)
CO2: 24 MEQ/L (ref 23–33)
CREAT SERPL-MCNC: 1 MG/DL (ref 0.4–1.2)
EOSINOPHIL # BLD: 3.6 %
EOSINOPHILS ABSOLUTE: 0.1 THOU/MM3 (ref 0–0.4)
ERYTHROCYTE [DISTWIDTH] IN BLOOD BY AUTOMATED COUNT: 13.3 % (ref 11.5–14.5)
ERYTHROCYTE [DISTWIDTH] IN BLOOD BY AUTOMATED COUNT: 45.1 FL (ref 35–45)
FERRITIN: 82 NG/ML (ref 22–322)
FOLATE: 9.3 NG/ML (ref 4.8–24.2)
GFR SERPL CREATININE-BSD FRML MDRD: 78 ML/MIN/1.73M2
GLUCOSE BLD-MCNC: 271 MG/DL (ref 70–108)
HCT VFR BLD CALC: 40.1 % (ref 42–52)
HEMOGLOBIN: 12.6 GM/DL (ref 14–18)
IMMATURE GRANS (ABS): 0.06 THOU/MM3 (ref 0–0.07)
IMMATURE GRANULOCYTES: 1.5 %
IRON: 77 UG/DL (ref 65–195)
LYMPHOCYTES # BLD: 22.9 %
LYMPHOCYTES ABSOLUTE: 0.9 THOU/MM3 (ref 1–4.8)
MCH RBC QN AUTO: 29 PG (ref 26–33)
MCHC RBC AUTO-ENTMCNC: 31.4 GM/DL (ref 32.2–35.5)
MCV RBC AUTO: 92.2 FL (ref 80–94)
MONOCYTES # BLD: 7.9 %
MONOCYTES ABSOLUTE: 0.3 THOU/MM3 (ref 0.4–1.3)
NUCLEATED RED BLOOD CELLS: 0 /100 WBC
PLATELET # BLD: 128 THOU/MM3 (ref 130–400)
PMV BLD AUTO: 11.2 FL (ref 9.4–12.4)
POTASSIUM SERPL-SCNC: 4.4 MEQ/L (ref 3.5–5.2)
RBC # BLD: 4.35 MILL/MM3 (ref 4.7–6.1)
SEG NEUTROPHILS: 63.3 %
SEGMENTED NEUTROPHILS ABSOLUTE COUNT: 2.5 THOU/MM3 (ref 1.8–7.7)
SODIUM BLD-SCNC: 140 MEQ/L (ref 135–145)
TOTAL IRON BINDING CAPACITY: 405 UG/DL (ref 171–450)
TOTAL PROTEIN: 6.3 G/DL (ref 6.1–8)
VITAMIN B-12: 472 PG/ML (ref 211–911)
WBC # BLD: 3.9 THOU/MM3 (ref 4.8–10.8)

## 2022-02-04 ENCOUNTER — TELEPHONE (OUTPATIENT)
Dept: FAMILY MEDICINE CLINIC | Age: 52
End: 2022-02-04

## 2022-02-04 NOTE — TELEPHONE ENCOUNTER
----- Message from Kemper Leader, DO sent at 2/3/2022 10:01 PM EST -----  Please let pt know that labs stable from prior  LFT still elevated, if has not f/u with Dr. Krystina Holcomb about these yet, I would encourage him to do so. Let me know if questions, thanks!

## 2022-02-10 DIAGNOSIS — E11.65 UNCONTROLLED TYPE 2 DIABETES MELLITUS WITH HYPERGLYCEMIA, WITHOUT LONG-TERM CURRENT USE OF INSULIN (HCC): ICD-10-CM

## 2022-02-10 DIAGNOSIS — R80.9 TYPE 2 DIABETES MELLITUS WITH MICROALBUMINURIA, WITHOUT LONG-TERM CURRENT USE OF INSULIN (HCC): Chronic | ICD-10-CM

## 2022-02-10 DIAGNOSIS — F90.8 ADHD, ADULT RESIDUAL TYPE: ICD-10-CM

## 2022-02-10 DIAGNOSIS — E11.29 TYPE 2 DIABETES MELLITUS WITH MICROALBUMINURIA, WITHOUT LONG-TERM CURRENT USE OF INSULIN (HCC): Chronic | ICD-10-CM

## 2022-02-10 RX ORDER — PEN NEEDLE, DIABETIC 31 GX5/16"
NEEDLE, DISPOSABLE MISCELLANEOUS
Qty: 100 EACH | Refills: 3 | Status: SHIPPED | OUTPATIENT
Start: 2022-02-10

## 2022-02-10 RX ORDER — DEXTROAMPHETAMINE SACCHARATE, AMPHETAMINE ASPARTATE, DEXTROAMPHETAMINE SULFATE AND AMPHETAMINE SULFATE 7.5; 7.5; 7.5; 7.5 MG/1; MG/1; MG/1; MG/1
30 TABLET ORAL 2 TIMES DAILY
Qty: 60 TABLET | Refills: 0 | OUTPATIENT
Start: 2022-02-10 | End: 2022-03-12

## 2022-02-10 RX ORDER — CALCIUM CITRATE/VITAMIN D3 200MG-6.25
TABLET ORAL
Qty: 100 EACH | Refills: 2 | Status: SHIPPED | OUTPATIENT
Start: 2022-02-10

## 2022-02-10 NOTE — TELEPHONE ENCOUNTER
Recent Visits  Date Type Provider Dept   11/29/21 Office Visit Alexus Alfred, DO [facility]   08/31/21 Office Visit Alexus Alfred, DO [facility]   05/26/21 Office Visit Alexus Alfred, DO [facility]   11/18/20 Office Visit Alexus Alfred, DO [facility]   10/14/20 Office Visit Alexus Alfred, DO [facility]   Showing recent visits within past 540 days with a meds authorizing provider and meeting all other requirements  Future Appointments  Date Type Provider Dept   05/24/22 Appointment Alexus Alfred, DO [facility]   Showing future appointments within next 150 days with a meds authorizing provider and meeting all other requirements    Future Appointments   Date Time Provider Mikey Painting   2/14/2022 10:30 AM MD Shannan Levy [facility] [place] [facility]   2/22/2022  9:45 [facility] [place] [facility]   5/24/2022  8:00 AM Alexus Alfred DO [facility] - [place]

## 2022-02-17 DIAGNOSIS — F90.8 ADHD, ADULT RESIDUAL TYPE: ICD-10-CM

## 2022-02-17 RX ORDER — DEXTROAMPHETAMINE SACCHARATE, AMPHETAMINE ASPARTATE, DEXTROAMPHETAMINE SULFATE AND AMPHETAMINE SULFATE 7.5; 7.5; 7.5; 7.5 MG/1; MG/1; MG/1; MG/1
30 TABLET ORAL 2 TIMES DAILY
Qty: 60 TABLET | Refills: 0 | Status: SHIPPED | OUTPATIENT
Start: 2022-02-17 | End: 2022-04-20 | Stop reason: SDUPTHER

## 2022-02-18 NOTE — TELEPHONE ENCOUNTER
ASSESSMENT & PLAN   Diagnosis Orders   1. ADHD, adult residual type  amphetamine-dextroamphetamine (ADDERALL, 30MG,) 30 MG tablet       OAARS reviewed and appropriate. Controlled Substances Monitoring: Periodic Controlled Substance Monitoring: Possible medication side effects, risk of tolerance/dependence & alternative treatments discussed. ,No signs of potential drug abuse or diversion identified.  Gina Rain, DO)      Future Appointments   Date Time Provider Mikey Drummondi   2/22/2022  9:45 AM Cecil Chambers   3/18/2022 10:15 AM Aaron Stanton MD [address] [place] [address]   5/24/2022  8:00 AM [address]

## 2022-02-23 DIAGNOSIS — E11.29 TYPE 2 DIABETES MELLITUS WITH MICROALBUMINURIA, WITHOUT LONG-TERM CURRENT USE OF INSULIN (HCC): Chronic | ICD-10-CM

## 2022-02-23 DIAGNOSIS — R80.9 TYPE 2 DIABETES MELLITUS WITH MICROALBUMINURIA, WITHOUT LONG-TERM CURRENT USE OF INSULIN (HCC): Chronic | ICD-10-CM

## 2022-02-23 RX ORDER — TITANIUM DIOXIDE, OCTINOXATE 1.94; 1.8 MG/G; MG/G
POWDER TOPICAL
Qty: 100 EACH | Refills: 3 | Status: SHIPPED | OUTPATIENT
Start: 2022-02-23

## 2022-03-06 DIAGNOSIS — N32.81 OAB (OVERACTIVE BLADDER): ICD-10-CM

## 2022-03-06 DIAGNOSIS — R80.9 TYPE 2 DIABETES MELLITUS WITH MICROALBUMINURIA, WITHOUT LONG-TERM CURRENT USE OF INSULIN (HCC): Chronic | ICD-10-CM

## 2022-03-06 DIAGNOSIS — N40.1 BENIGN PROSTATIC HYPERPLASIA WITH LOWER URINARY TRACT SYMPTOMS, SYMPTOM DETAILS UNSPECIFIED: ICD-10-CM

## 2022-03-06 DIAGNOSIS — E11.29 TYPE 2 DIABETES MELLITUS WITH MICROALBUMINURIA, WITHOUT LONG-TERM CURRENT USE OF INSULIN (HCC): Chronic | ICD-10-CM

## 2022-03-07 RX ORDER — TROSPIUM CHLORIDE ER 60 MG/1
CAPSULE ORAL
Qty: 30 CAPSULE | Refills: 0 | Status: SHIPPED | OUTPATIENT
Start: 2022-03-07 | End: 2022-04-05

## 2022-03-07 NOTE — TELEPHONE ENCOUNTER
Janiya Greenwood called requesting a refill on the following medications:  Requested Prescriptions     Pending Prescriptions Disp Refills    trospium (SANCTURA) 60 MG CP24 extended release capsule [Pharmacy Med Name: Trospium Chloride ER Oral Capsule Extended Release 24 Hour 60 MG] 30 capsule 0     Sig: TAKE 1 [address] verified:  .rosa      Date of last visit: 09/27/2021  Date of next visit (if applicable): 1/70/2651

## 2022-03-16 ENCOUNTER — OFFICE VISIT (OUTPATIENT)
Dept: PULMONOLOGY | Age: 52
End: 2022-03-16
Payer: COMMERCIAL

## 2022-03-16 VITALS
DIASTOLIC BLOOD PRESSURE: 84 MMHG | HEIGHT: 67 IN | OXYGEN SATURATION: 98 % | SYSTOLIC BLOOD PRESSURE: 134 MMHG | HEART RATE: 84 BPM | BODY MASS INDEX: 42.31 KG/M2 | TEMPERATURE: 98.1 F | WEIGHT: 269.6 LBS

## 2022-03-16 DIAGNOSIS — G47.33 OSA TREATED WITH BIPAP: Primary | ICD-10-CM

## 2022-03-16 DIAGNOSIS — F90.9 ATTENTION DEFICIT HYPERACTIVITY DISORDER (ADHD), UNSPECIFIED ADHD TYPE: ICD-10-CM

## 2022-03-16 DIAGNOSIS — F31.9 BIPOLAR 1 DISORDER (HCC): ICD-10-CM

## 2022-03-16 DIAGNOSIS — G47.10 HYPERSOMNIA: ICD-10-CM

## 2022-03-16 DIAGNOSIS — E66.01 MORBID OBESITY WITH BMI OF 40.0-44.9, ADULT (HCC): ICD-10-CM

## 2022-03-16 PROCEDURE — G8417 CALC BMI ABV UP PARAM F/U: HCPCS | Performed by: PHYSICIAN ASSISTANT

## 2022-03-16 PROCEDURE — 3017F COLORECTAL CA SCREEN DOC REV: CPT | Performed by: PHYSICIAN ASSISTANT

## 2022-03-16 PROCEDURE — 1036F TOBACCO NON-USER: CPT | Performed by: PHYSICIAN ASSISTANT

## 2022-03-16 PROCEDURE — 99214 OFFICE O/P EST MOD 30 MIN: CPT | Performed by: PHYSICIAN ASSISTANT

## 2022-03-16 PROCEDURE — G8427 DOCREV CUR MEDS BY ELIG CLIN: HCPCS | Performed by: PHYSICIAN ASSISTANT

## 2022-03-16 PROCEDURE — G8482 FLU IMMUNIZE ORDER/ADMIN: HCPCS | Performed by: PHYSICIAN ASSISTANT

## 2022-03-16 RX ORDER — DOXEPIN HYDROCHLORIDE 25 MG/1
25 CAPSULE ORAL NIGHTLY
Qty: 30 CAPSULE | Refills: 1 | Status: SHIPPED | OUTPATIENT
Start: 2022-03-16 | End: 2022-04-05 | Stop reason: SDUPTHER

## 2022-03-16 ASSESSMENT — ENCOUNTER SYMPTOMS
CHEST TIGHTNESS: 0
BACK PAIN: 1
ALLERGIC/IMMUNOLOGIC NEGATIVE: 1
NAUSEA: 0
COUGH: 0
EYES NEGATIVE: 1
SHORTNESS OF BREATH: 0
DIARRHEA: 0
WHEEZING: 0
STRIDOR: 0

## 2022-03-16 NOTE — PROGRESS NOTES
[facility]      Dionne Jamison         [id number]  3/16/2022   Chief Complaint   Patient presents with    Follow-up     IRISH 2-3 month with new machine, doxepin        Pt of Dr. Nancy Fontanez    PAP Download:   Original or initial AHI: 100.8     Date of initial study: 2003      Compliant  100%     Noncompliant 0 %     PAP Type Aircurve 10 Vauto Level  19/14   Avg Hrs/Day 11 hr 41 min  AHI: 0.2   Recorded compliance dates , 2/13/2022  to 3/13/2022   Machine/Mfg:   [x] ResMed    [] Respironics/Dreamstation   Interface:   [] Nasal    [] Nasal pillows   [x] FFM      Provider:      [x] [facility]     [][employer]     [] [employer]    [] Loye Furrow    [] [place]               [] [facility] [facility]      [] [employer]    [] [address]      [] Other    Neck Size: 17.5  Mallampati Mallampati 3  ESS:  10  SAQLI: 30    Here is a scan of the most recent download:              Presentation:   Guanaco Lester presents for sleep medicine follow up for obstructive sleep apnea  Since the last visit, Guanaco Lester is doing well with new PAP. He is still struggling with insomnia.epin was helping but no longer working. He is tired at times    Equipment issues: The pressure is  acceptable, the mask is acceptable     Sleep issues:  Do you feel better? Yes  More rested? Sometimes   Better concentration? yes    Progress History:   Since last visit any new medical issues? No  New ER or hospital visits? No  Any new or changes in medicines? No  Any new sleep medicines? No    Review of Systems -   Review of Systems   Constitutional: Negative for activity change, appetite change, chills and fever. HENT: Negative for congestion and postnasal drip. Eyes: Negative. Respiratory: Negative for cough, chest tightness, shortness of breath, wheezing and stridor. Cardiovascular: Negative for chest pain and leg swelling. Gastrointestinal: Negative for diarrhea and nausea. Endocrine: Negative. Genitourinary: Negative.     Musculoskeletal: Positive for back pain. Skin: Negative. Allergic/Immunologic: Negative. Neurological: Negative. Negative for dizziness and light-headedness. Psychiatric/Behavioral: Negative. All other systems reviewed and are negative. Physical Exam:    BMI:  Body mass index is 42.86 kg/m². Wt Readings from Last 3 Encounters:   03/16/22 269 lb 9.6 oz (122.3 kg)   11/29/21 282 lb (127.9 kg)   09/27/21 284 lb 14.4 oz (129.2 kg)     Weight lost 13 lbs over 4 months  Vitals: /84 (Site: Left Upper Arm, Position: Sitting, Cuff Size: Large Adult)   Pulse 84   Temp 98.1 °F (36.7 °C) (Temporal)   Ht 5' 6.5\" (1.689 m)   Wt 269 lb 9.6 oz (122.3 kg)   SpO2 98%   BMI 42.86 kg/m²       Physical Exam  Constitutional:       Appearance: He is well-developed. HENT:      Head: Normocephalic and atraumatic. Right Ear: External ear normal.      Left Ear: External ear normal.   Eyes:      Conjunctiva/sclera: Conjunctivae normal.      Pupils: Pupils are equal, round, and reactive to light. Cardiovascular:      Rate and Rhythm: Normal rate and regular rhythm. Heart sounds: Normal heart sounds. Pulmonary:      Effort: Pulmonary effort is normal.      Breath sounds: Normal breath sounds. Musculoskeletal:         General: Normal range of motion. Cervical back: Normal range of motion and neck supple. Skin:     General: Skin is warm and dry. Neurological:      Mental Status: He is alert and oriented to person, place, and time. Psychiatric:         Behavior: Behavior normal.         Thought Content: Thought content normal.         Judgment: Judgment normal.         ASSESSMENT/DIAGNOSIS     Diagnosis Orders   1. IRISH treated with BiPAP     2. Morbid obesity with BMI of 40.0-44.9, adult ([facility]     3. Attention deficit hyperactivity disorder (ADHD), unspecified ADHD type     4. Bipolar 1 disorder ([facility]     5. Hypersomnia            Plan   Do you need any equipment today?  No  - will increase Doxepin to improve insomnia  - Download reviewed and discussed with patient  - He  was advised to continue current positive airway pressure therapy with above described pressure. - He  advised to keep good compliance with current recommended pressure to get optimal results and clinical improvement  - Recommend 7-9 hours of sleep with PAP  - He was advised to call [employer] regarding supplies if needed.   -He call my office for earlier appointment if needed for worsening of sleep symptoms.   - He was instructed on weight loss  - Hai Ford was educated about my impression and plan. Patient verbalizesunderstanding.   We will see Sandra arcos in: 4 months with download    Information added by my medical assistant/LPN was reviewed today       Meseret Johns PA-C, MPAS  3/16/2022

## 2022-03-18 ENCOUNTER — OFFICE VISIT (OUTPATIENT)
Dept: UROLOGY | Age: 52
End: 2022-03-18
Payer: COMMERCIAL

## 2022-03-18 VITALS
WEIGHT: 268 LBS | HEIGHT: 67 IN | DIASTOLIC BLOOD PRESSURE: 72 MMHG | BODY MASS INDEX: 42.06 KG/M2 | SYSTOLIC BLOOD PRESSURE: 144 MMHG

## 2022-03-18 DIAGNOSIS — N32.81 OAB (OVERACTIVE BLADDER): ICD-10-CM

## 2022-03-18 DIAGNOSIS — N40.1 BENIGN PROSTATIC HYPERPLASIA WITH LOWER URINARY TRACT SYMPTOMS, SYMPTOM DETAILS UNSPECIFIED: Primary | ICD-10-CM

## 2022-03-18 PROCEDURE — G8417 CALC BMI ABV UP PARAM F/U: HCPCS | Performed by: UROLOGY

## 2022-03-18 PROCEDURE — 99214 OFFICE O/P EST MOD 30 MIN: CPT | Performed by: UROLOGY

## 2022-03-18 PROCEDURE — 3017F COLORECTAL CA SCREEN DOC REV: CPT | Performed by: UROLOGY

## 2022-03-18 PROCEDURE — G8482 FLU IMMUNIZE ORDER/ADMIN: HCPCS | Performed by: UROLOGY

## 2022-03-18 PROCEDURE — G8427 DOCREV CUR MEDS BY ELIG CLIN: HCPCS | Performed by: UROLOGY

## 2022-03-18 PROCEDURE — 1036F TOBACCO NON-USER: CPT | Performed by: UROLOGY

## 2022-03-18 NOTE — PROGRESS NOTES
[facility]ation / New Patient Visit    Patient:  Josef Thomas  YOB: 1970  Date: 3/18/2022  Consult requested from Sadiq Hernandez DO     HISTORY OF PRESENT ILLNESS:   The patient is a 46 y.o. male who presents today for follow-up for the following problem(s): BPH with LUTs  Overall the problem(s) : are worsening. Associated Symptoms: No dysuria, gross hematuria. Pain Severity:      Today visit:   3/18/22   Presents as a previous patient of Dr. Sony Desir. He has BPH with LUTs, and is being treated with Flomax 0.4 mg and Sanctura 20mg. He states his symptoms are still severe, and bothersome, AUASS: 17/3. We had a discussion regarding continuing medication vs Urolift. Summary of old records:   (Patient's old records, notes and chart reviewed and summarized above.)    Cystoscopy Operative Note  Surgeon: Devin Holt MD   Anesthesia: Urethral 2%  Indications: BPH   Position: supine  Findings:   The patient was prepped and draped in the usual sterile fashion. The flexible cystoscope was advanced through the urethra and into the bladder. The bladder was thoroughly inspected and the following was noted:  Residual Urine: Minimal   Urethra: No abnormalities of the urethra are noted. Prostate: Medium gland (<80 gm) Complete obstruction by lateral lobe of prostate. Bladder: No tumors or CIS noted. No bladder diverticulum. Moderate trabeculation noted. Ureters: Clear efflux from both ureters. Orifices with normal configuration and location. The cystoscope was removed. The patient tolerated the procedure well. Plan:   Good candidate for Urolift        38-year-old white male returns today for follow-up regarding his chronic voiding symptoms. He is currently on Flomax and Sanctura 20 mg p.o. twice daily. This combination has improved his voiding pattern significantly.   Urinary flow is much stronger, no hesitancy, and no urgency, frequency or nocturia. Has minimal minimal dryness of the mouth. Insurance will not cover sanctura any longer. Jessie Dana). PSA in May,2020 was 0.31 ng/ml. No FH of PCa. Imp: LUTS, especially severe nf. PVR is 60 mL today. PLAN: restart tamsulosin and try oxybutynin 5 mg #90 1 po tid. common side effects especially xerostomia and constipation reviewed. Return in 6 weeks. Last several PSA's:  Lab Results   Component Value Date    PSA 0.37 06/15/2021    PSA 0.31 05/19/2020    PSA 0.27 03/15/2019       Last total testosterone:  No results found for: TESTOSTERONE    Urinalysis today:  No results found for this visit on 03/18/22. Last BUN and creatinine:  Lab Results   Component Value Date    BUN 11 02/02/2022     Lab Results   Component Value Date    CREATININE 1.0 02/02/2022       Imaging Reviewed during this Office Visit:   (results were independently reviewed by physician and radiology report verified)    PAST MEDICAL, FAMILY AND SOCIAL HISTORY:  Past Medical History:   Diagnosis Date    ADHD (attention deficit hyperactivity disorder)     Allergic rhinitis     Benign esophageal stricture     s/p dilation may 2014    Benign prostatic hyperplasia     Bipolar 1 disorder ([address]     CAD (coronary artery disease)     Unclear history, cath 2013 with patent arteries. Records pending.  Chronic back pain     Depression     DM2 (diabetes mellitus, type 2) ([address]     Dysfunctional voiding of urine 6/20/2013    Medications he changes to follow,  Adding ditropan  to the present schedule     ED (erectile dysfunction) of organic origin 5/1/2013    Fibromyalgia     Former smoker     GERD (gastroesophageal reflux disease)     History of colon polyps     Pt unsure when last colo.      History of TIA (transient ischemic attack)     x2     Hyperlipidemia     Hypertension     Hypertriglyceridemia 10/27/2015    Migraine headache     Morbid obesity ([address]     Morbid obesity with BMI of 40.0-44.9, adult ([facility]     WESLEY (nonalcoholic steatohepatitis)     Obstructive sleep apnea on CPAP 11/3/2015    Peripheral neuropathy     Pulmonary nodule     stable >2 years, no further w/u required.  Rotator cuff injury     Left side    S/P cardiac catheterization: 9/30/2013: Normal coronaries 9/30/2013 9/30/2013: Normal coronaries. Radial approach. Dr. Karma Olsen Short-segment Vásquez's esophagus 6/17/2015    Tarsal tunnel syndrome     Bilateral     Past Surgical History:   Procedure Laterality Date    CARDIAC CATHETERIZATION  10/2013    9/30/2013: Normal coronaries. Radial approach. Dr. Karma Olsen COLONOSCOPY  2008, 2012   [facility] KNEE SURGERY Left 2012    ROTATOR CUFF REPAIR Left 2013   [facility] SHOULDER SURGERY Bilateral 2011   [facility] SINUS SURGERY  2004    [place] occ.      TONSILLECTOMY  2002    TYMPANOSTOMY TUBE PLACEMENT      UPPER GASTROINTESTINAL ENDOSCOPY  2008, 2008 with dilation, 2012    UPPER GASTROINTESTINAL ENDOSCOPY  2013    UPPER GASTROINTESTINAL ENDOSCOPY  MAY 2014, 2017    Dilation     Family History   Problem Relation Age of Onset    Cancer Father 77        lung     Rheum Arthritis Father     Colon Cancer Maternal Aunt 61    Cancer Maternal Aunt 76        \"throat\"    Colon Cancer Maternal Uncle 48    Cancer Maternal Uncle 61        pancreatic    Cancer Paternal Aunt 52        brain    Colon Cancer Paternal Uncle 72    Cancer Paternal Uncle 61        hepatic     Prostate Cancer Paternal Uncle 61    Cancer Maternal Uncle 79        pancreatic     Ovarian Cancer Paternal Aunt 48    Rheum Arthritis Mother     Pancreatic Cancer Mother     Brain Cancer Brother      No outpatient medications have been marked as taking for the 3/18/22 encounter (Office Visit) with Kandace Grayson MD.       Morphine  Social History     Tobacco Use   Smoking Status Former Smoker    Packs/day: 0.75    Years: 30.00    Pack years: 22.50    Types: Cigarettes    Start date: 2/1/1984   [facility] Quit date: 5/8/2018  Years since quitting: 3.8   Smokeless Tobacco Never Used       Social History     Substance and Sexual Activity   Alcohol Use No    Alcohol/week: 0.0 standard drinks       REVIEW OF SYSTEMS:  Constitutional: negative  Eyes: negative  Respiratory: negative  Cardiovascular: negative  Gastrointestinal: negative  Musculoskeletal: negative  Genitourinary: negative  Skin: negative   Neurological: negative  Hematological/Lymphatic: negative  Psychological: negative    Physical Exam:    This a 46 y.o. male   Vitals:    03/18/22 1012   BP: (!) 144/72     Constitutional: Patient in no acute distress   Neuro: alert and oriented to person place and time. Psych: Mood and affect normal.  Head: atraumatic normocephalic  Eyes: EOMi  HEENT: neck supple, trachea midline  Lungs: Respiratory effort normal  Cardiovascular:  Normal peripheral pulses  Abdomen: Soft, non-tender, non-distended, No CVA  Bladder: non-tender and not distended. FROMx4, no cyanosis clubbing edema  Skin: warm and dry      Assessment and Plan      1. Benign prostatic hyperplasia with lower urinary tract symptoms, symptom details unspecified    2. OAB (overactive bladder)           Plan:      No follow-ups on file. Continue Flomax for BPH with LUTs  - Continue sanctura for overactivity  PSA reviewed -wnl    Cysto - BPH  - would be a good candidate for Urolift. Literature given.

## 2022-03-31 ENCOUNTER — TELEPHONE (OUTPATIENT)
Dept: UROLOGY | Age: 52
End: 2022-03-31

## 2022-04-05 DIAGNOSIS — N32.81 OAB (OVERACTIVE BLADDER): ICD-10-CM

## 2022-04-05 DIAGNOSIS — R13.14 PHARYNGOESOPHAGEAL DYSPHAGIA: ICD-10-CM

## 2022-04-05 DIAGNOSIS — R10.13 EPIGASTRIC PAIN: ICD-10-CM

## 2022-04-05 DIAGNOSIS — R10.13 DYSPEPSIA: ICD-10-CM

## 2022-04-05 DIAGNOSIS — K21.9 GASTROESOPHAGEAL REFLUX DISEASE: ICD-10-CM

## 2022-04-05 DIAGNOSIS — N40.1 BENIGN PROSTATIC HYPERPLASIA WITH LOWER URINARY TRACT SYMPTOMS, SYMPTOM DETAILS UNSPECIFIED: ICD-10-CM

## 2022-04-05 RX ORDER — OMEPRAZOLE 20 MG/1
CAPSULE, DELAYED RELEASE ORAL
Qty: 180 CAPSULE | Refills: 3 | Status: SHIPPED | OUTPATIENT
Start: 2022-04-05

## 2022-04-05 RX ORDER — DOXEPIN HYDROCHLORIDE 25 MG/1
25 CAPSULE ORAL NIGHTLY
Qty: 30 CAPSULE | Refills: 1 | Status: SHIPPED | OUTPATIENT
Start: 2022-04-05 | End: 2022-07-13 | Stop reason: SDUPTHER

## 2022-04-05 RX ORDER — TROSPIUM CHLORIDE ER 60 MG/1
CAPSULE ORAL
Qty: 30 CAPSULE | Refills: 0 | Status: SHIPPED | OUTPATIENT
Start: 2022-04-05 | End: 2022-05-05

## 2022-04-05 RX ORDER — DOXEPIN HYDROCHLORIDE 10 MG/1
CAPSULE ORAL
Qty: 30 CAPSULE | Refills: 0 | OUTPATIENT
Start: 2022-04-05

## 2022-04-05 NOTE — TELEPHONE ENCOUNTER
Rachel Bell called requesting a refill on the following medications:  Requested Prescriptions     Pending Prescriptions Disp Refills    trospium (SANCTURA) 60 MG CP24 extended release capsule [Pharmacy Med Name: Trospium Chloride ER Oral Capsule Extended Release 24 Hour 60 MG] 30 capsule 0     Sig: TAKE 1 [address] verified:  .rosa      Date of last visit: 03/18/2022  Date of next visit (if applicable): Visit date not found

## 2022-04-05 NOTE — TELEPHONE ENCOUNTER
Received refill request for Doxepin. Medication was last ordered by you. Medication was last ordered on 3/16/22 with 1 refills. Patient was last seen in the office 3/16/22. Patient has a scheduled follow up 7/13/22. Medication needs to be sent to [facility]. Looks like patient was previously taking 10mg nightly and you switched him to 25 mg nightly.

## 2022-04-11 ENCOUNTER — TELEPHONE (OUTPATIENT)
Dept: UROLOGY | Age: 52
End: 2022-04-11

## 2022-04-11 NOTE — TELEPHONE ENCOUNTER
3/31/22 LEFT MESSAGE WITH PATIENT TO SCHEDULE PROCEDURE  4/11/22 LEFT MESSAGE WITH PATIENT TO SCHEDULE PROCEDURE    4/12/22 sent letter to patient regarding scheduling procedure. 4/21/2022- Patient called the office; states he would like to hold off on scheduling procedure at this time.

## 2022-04-20 DIAGNOSIS — F90.8 ADHD, ADULT RESIDUAL TYPE: ICD-10-CM

## 2022-04-20 RX ORDER — DEXTROAMPHETAMINE SACCHARATE, AMPHETAMINE ASPARTATE, DEXTROAMPHETAMINE SULFATE AND AMPHETAMINE SULFATE 7.5; 7.5; 7.5; 7.5 MG/1; MG/1; MG/1; MG/1
30 TABLET ORAL 2 TIMES DAILY
Qty: 60 TABLET | Refills: 0 | Status: SHIPPED | OUTPATIENT
Start: 2022-04-20 | End: 2022-07-29 | Stop reason: SDUPTHER

## 2022-04-20 NOTE — TELEPHONE ENCOUNTER
ASSESSMENT & PLAN   Diagnosis Orders   1. ADHD, adult residual type  amphetamine-dextroamphetamine (ADDERALL, 30MG,) 30 MG tablet       OAARS reviewed and appropriate. Controlled Substances Monitoring: Periodic Controlled Substance Monitoring: Possible medication side effects, risk of tolerance/dependence & alternative treatments discussed. ,No signs of potential drug abuse or diversion identified.  Amado Pratt DO)      Future Appointments   Date Time Provider Mikey Painting   5/24/2022  8:00 AM Amado Pratt DO [facility] - [place]   7/13/2022 10:15 AM Cecil Servin

## 2022-04-29 ENCOUNTER — TELEPHONE (OUTPATIENT)
Dept: FAMILY MEDICINE CLINIC | Age: 52
End: 2022-04-29

## 2022-04-29 DIAGNOSIS — R80.9 TYPE 2 DIABETES MELLITUS WITH MICROALBUMINURIA, WITHOUT LONG-TERM CURRENT USE OF INSULIN (HCC): Primary | ICD-10-CM

## 2022-04-29 DIAGNOSIS — D64.9 NORMOCYTIC ANEMIA: ICD-10-CM

## 2022-04-29 DIAGNOSIS — E11.29 TYPE 2 DIABETES MELLITUS WITH MICROALBUMINURIA, WITHOUT LONG-TERM CURRENT USE OF INSULIN (HCC): Primary | ICD-10-CM

## 2022-04-29 NOTE — TELEPHONE ENCOUNTER
Pt due for fasting labs prior to next apt on 5/24/2022. Please call to have pt complete this. Thanks! ASSESSMENT & PLAN   Diagnosis Orders   1. Type 2 diabetes mellitus with microalbuminuria, without long-term current use of insulin (HCC)  CBC with Auto Differential    Comprehensive Metabolic Panel    Lipid Panel    Microalbumin / Creatinine Urine Ratio    TSH with Reflex    Ferritin    Folate    Iron    Iron Binding Capacity    Vitamin B12   2.  Normocytic anemia  CBC with Auto Differential    Comprehensive Metabolic Panel    Lipid Panel    Microalbumin / Creatinine Urine Ratio    TSH with Reflex    Ferritin    Folate    Iron    Iron Binding Capacity    Vitamin B12     Future Appointments   Date Time Provider Mikey Painting   5/24/2022  8:00 AM Lady Freedom DO [facility] - [address] [facility]   7/13/2022 10:15 AM Cecil Nolan

## 2022-05-05 DIAGNOSIS — N40.1 BENIGN PROSTATIC HYPERPLASIA WITH LOWER URINARY TRACT SYMPTOMS, SYMPTOM DETAILS UNSPECIFIED: ICD-10-CM

## 2022-05-05 DIAGNOSIS — N32.81 OAB (OVERACTIVE BLADDER): ICD-10-CM

## 2022-05-05 RX ORDER — TAMSULOSIN HYDROCHLORIDE 0.4 MG/1
CAPSULE ORAL
Qty: 90 CAPSULE | Refills: 0 | OUTPATIENT
Start: 2022-05-05

## 2022-05-05 RX ORDER — TROSPIUM CHLORIDE ER 60 MG/1
CAPSULE ORAL
Qty: 30 CAPSULE | Refills: 0 | Status: SHIPPED | OUTPATIENT
Start: 2022-05-05 | End: 2022-06-01

## 2022-05-05 NOTE — TELEPHONE ENCOUNTER
Anya Mayorga called requesting a refill on the following medications:  Requested Prescriptions     Pending Prescriptions Disp Refills    tamsulosin (FLOMAX) 0.4 MG capsule [Pharmacy Med Name: Tamsulosin HCl Oral Capsule 0.4 MG] 90 capsule 0     Sig: TAKE 1 CAPSULE BY MOUTH EVERY DAY TO [address] [place] [address] verified:  .pv      Date of last visit: 03/18/2022  Date of next visit (if applicable):not scheduled

## 2022-05-05 NOTE — TELEPHONE ENCOUNTER
Coby Montemayor called requesting a refill on the following medications:  Requested Prescriptions     Pending Prescriptions Disp Refills    trospium (SANCTURA) 60 MG CP24 extended release capsule [Pharmacy Med Name: Trospium Chloride ER Oral Capsule Extended Release 24 Hour 60 MG] 30 capsule 0     Sig: TAKE 1 [address] verified:  .rosa      Date of last visit: 03/18/2022  Date of next visit (if applicable): Visit date not found

## 2022-05-21 ENCOUNTER — NURSE ONLY (OUTPATIENT)
Dept: LAB | Age: 52
End: 2022-05-21

## 2022-05-21 DIAGNOSIS — E11.29 TYPE 2 DIABETES MELLITUS WITH MICROALBUMINURIA, WITHOUT LONG-TERM CURRENT USE OF INSULIN (HCC): ICD-10-CM

## 2022-05-21 DIAGNOSIS — R80.9 TYPE 2 DIABETES MELLITUS WITH MICROALBUMINURIA, WITHOUT LONG-TERM CURRENT USE OF INSULIN (HCC): ICD-10-CM

## 2022-05-21 DIAGNOSIS — D64.9 NORMOCYTIC ANEMIA: ICD-10-CM

## 2022-05-21 LAB
ALBUMIN SERPL-MCNC: 4.8 G/DL (ref 3.5–5.1)
ALP BLD-CCNC: 98 U/L (ref 38–126)
ALT SERPL-CCNC: 122 U/L (ref 11–66)
ANION GAP SERPL CALCULATED.3IONS-SCNC: 14 MEQ/L (ref 8–16)
AST SERPL-CCNC: 111 U/L (ref 5–40)
BASOPHILS # BLD: 0.9 %
BASOPHILS ABSOLUTE: 0 THOU/MM3 (ref 0–0.1)
BILIRUB SERPL-MCNC: 0.5 MG/DL (ref 0.3–1.2)
BUN BLDV-MCNC: 9 MG/DL (ref 7–22)
CALCIUM SERPL-MCNC: 9.1 MG/DL (ref 8.5–10.5)
CHLORIDE BLD-SCNC: 103 MEQ/L (ref 98–111)
CHOLESTEROL, TOTAL: 190 MG/DL (ref 100–199)
CO2: 22 MEQ/L (ref 23–33)
CREAT SERPL-MCNC: 1.1 MG/DL (ref 0.4–1.2)
EOSINOPHIL # BLD: 3.7 %
EOSINOPHILS ABSOLUTE: 0.2 THOU/MM3 (ref 0–0.4)
ERYTHROCYTE [DISTWIDTH] IN BLOOD BY AUTOMATED COUNT: 13.6 % (ref 11.5–14.5)
ERYTHROCYTE [DISTWIDTH] IN BLOOD BY AUTOMATED COUNT: 45.2 FL (ref 35–45)
FERRITIN: 64 NG/ML (ref 22–322)
FOLATE: 9.1 NG/ML (ref 4.8–24.2)
GFR SERPL CREATININE-BSD FRML MDRD: 70 ML/MIN/1.73M2
GLUCOSE BLD-MCNC: 201 MG/DL (ref 70–108)
HCT VFR BLD CALC: 40.5 % (ref 42–52)
HDLC SERPL-MCNC: 27 MG/DL
HEMOGLOBIN: 13.4 GM/DL (ref 14–18)
IMMATURE GRANS (ABS): 0.03 THOU/MM3 (ref 0–0.07)
IMMATURE GRANULOCYTES: 0.6 %
IRON: 125 UG/DL (ref 65–195)
LDL CHOLESTEROL CALCULATED: 90 MG/DL
LYMPHOCYTES # BLD: 25.1 %
LYMPHOCYTES ABSOLUTE: 1.4 THOU/MM3 (ref 1–4.8)
MCH RBC QN AUTO: 30 PG (ref 26–33)
MCHC RBC AUTO-ENTMCNC: 33.1 GM/DL (ref 32.2–35.5)
MCV RBC AUTO: 90.8 FL (ref 80–94)
MONOCYTES # BLD: 6.9 %
MONOCYTES ABSOLUTE: 0.4 THOU/MM3 (ref 0.4–1.3)
NUCLEATED RED BLOOD CELLS: 0 /100 WBC
PLATELET # BLD: 119 THOU/MM3 (ref 130–400)
PMV BLD AUTO: 11.7 FL (ref 9.4–12.4)
POTASSIUM SERPL-SCNC: 4.6 MEQ/L (ref 3.5–5.2)
RBC # BLD: 4.46 MILL/MM3 (ref 4.7–6.1)
SEG NEUTROPHILS: 62.8 %
SEGMENTED NEUTROPHILS ABSOLUTE COUNT: 3.4 THOU/MM3 (ref 1.8–7.7)
SODIUM BLD-SCNC: 139 MEQ/L (ref 135–145)
TOTAL IRON BINDING CAPACITY: 452 UG/DL (ref 171–450)
TOTAL PROTEIN: 6.7 G/DL (ref 6.1–8)
TRIGL SERPL-MCNC: 365 MG/DL (ref 0–199)
TSH SERPL DL<=0.05 MIU/L-ACNC: 1.43 UIU/ML (ref 0.4–4.2)
VITAMIN B-12: 442 PG/ML (ref 211–911)
WBC # BLD: 5.4 THOU/MM3 (ref 4.8–10.8)

## 2022-05-23 ENCOUNTER — TELEPHONE (OUTPATIENT)
Dept: FAMILY MEDICINE CLINIC | Age: 52
End: 2022-05-23

## 2022-05-23 NOTE — TELEPHONE ENCOUNTER
----- Message from Theresa Adjutant, DO sent at 5/22/2022 12:53 PM EDT -----  Please let pt know that labs are overall stable and appropriate other than liver enzymes remain a bit elevated. Has he f/u with Dr. Elly Cuenca regarding these yet? Let me know, thanks!

## 2022-05-23 NOTE — TELEPHONE ENCOUNTER
Great  Can we get those records? Thanks!     Future Appointments   Date Time Provider Mikey Painting   5/24/2022  8:00 AM Viji Doa DO [facility] - [facility] [email or web address]   7/13/2022 10:15 AM Cecil Buck

## 2022-05-23 NOTE — PROGRESS NOTES
Chief Complaint   Patient presents with    Follow-up     DM2 & chronic issues noted below       History obtained from the patient. SUBJECTIVE:  Yanet Herrera is a 46 y.o. male that presents today for:     -DM2: On 65 units basalgar bid still  On metformin and januvia  FBs inc to 160-230  No lows. a1c worse at 7.7, up from  6.1  Struggling to lose wt despite diet changes and exercise as much as able      Lab Results   Component Value Date    LABA1C 7.7 05/24/2022    LABA1C 6.1 11/29/2021    LABA1C 6.8 05/26/2021    LABA1C 8.0 11/18/2020    LABA1C 7.0 05/20/2020    LABA1C 7.2 02/20/2020    LABA1C 11.2 11/21/2019    LABA1C 7.8 08/20/2019    LABA1C 10.7 05/20/2019    LABA1C 11.2 02/18/2019    LABA1C 12.2 11/16/2018    LABA1C 7.1 08/16/2018       Wt Readings from Last 3 Encounters:   05/24/22 266 lb (120.7 kg)   03/18/22 268 lb (121.6 kg)   03/16/22 269 lb 9.6 oz (122.3 kg)        -Elevated LFT PRIOR VISIT:  Noted on labs  Mild at this point, but still elevated  Denies ETOH use  Denies RUQ pain or jaundice. GI did US a few months ago, showed fatty liver    UPDATE PRIOR VISIT:   Labs table with persistent elevated, though mild  W/u for cause neg  Suspected WESLEY  Was to f/u with Jessee SALAZAR Seven, has not yet   No RUQ pain or jaundice    UPDATE LAST VISIT:   Had f/u labs ordered, never done  Has not f/u with Maggie Boards yet either   No RUQ pain or jaundice    UPDATE TODAY:   LFT remain elevated   Seeing Donte SALAZAR, records pending  Has f/u labs ordered       -Anemia PRIOR VISIT:  Anemia still present, mild  b12 and iron studies WNL in last SEPT and early OCT  Plan is to repeat labs in [facility]  Denies bleeding, melena or hematochezia  No fevers, chills or night sweats      UPDATE PRIOR VISIT:   Found to have low iron on last lab check for anemia  Denies bleeding, melena or hematochezia  Placed on iron  F/u labs ordered for 6 wks  Pt is to call Donte SALAZAR's office for an apt, he is EST there.    He has not done so yet, but plans to soon    UPDATE PRIOR VISIT:   On iron  Saw Delisa Shaw, GI  EGD scheduled  Denies bleeding, melena or hematochezia. UPDATE PRIOR VISIT:   Had EGD, neg  UTD on colo  Labs done yesterday and normal  Taking iron yet  Denies bleeding, melena or hematochezia     UPDATE PRIOR VISIT:   Overeue for f/u labs to monitor, ordered 6 months ago, never done  Off iron tabs, and b12 tabs  Denies bleeding, melena or hematochezia    UPDATE LAST VISIT:   Due for f/u labs  Off iron and b12, no bleeding, melena or hematochezia     UPDATE TODAY:   Labs stable  Off iron/b12  Very mild, stable and chronic anemia   Denies bleeding, melena or hematochezia      -HTN:    HPI:    Taking meds as prescribed ?: yes  Tolerating well ?: yes  Side Effects ?: denies  BP at home ?: <140/90  Working on TLCS ?: yes  Chest Pain/SOB/Palpitations? denies    BP Readings from Last 3 Encounters:   05/24/22 104/62   03/18/22 (!) 144/72   03/16/22 134/84       -HLD:    HPI:    Taking meds as prescribed ?: yes  Tolerating well ?: yes  Side Effects ?: denies  Muscle Pain?: denies  Working on TLCS ?: yes      -Bipolar disorder/ADHD:  Was seeing psych at [facility], but his provider left, so was let go from practice  I've been filling his meds and he was to be working on f/u   Did see new psychiatrist last month for a VV  Has f/u again soon  Inc depressives sxs of late  No SI/HI.   Plans to address with psych      -GERD/Barrets:    HPI:  utd with EGD with GI    Taking meds as prescribed ?: yes  Tolerating well ?: yes  Side Effects ?: denies  Dysphagia ?: denies  Odynophagia ?: denies  Melena ?: denies  Working on TLCS ?: yes      Age/Gender Health Maintenance    Lipid -     No components found for: CHLPL  Lab Results   Component Value Date    TRIG 365 (H) 05/21/2022    TRIG 296 (H) 06/15/2021    TRIG 195 01/21/2020     Lab Results   Component Value Date    HDL 27 05/21/2022    HDL 35 06/15/2021    HDL 34 01/21/2020     Lab Results   Component Value Date    LDLCALC 90 05/21/2022    LDLCALC 116 06/15/2021    LDLCALC 108 01/21/2020     Lab Results   Component Value Date    LDLCALC 90 05/21/2022    LDLDIRECT 116.17 01/21/2020       TSH - 3.260 (AUG 2018)  Lab Results   Component Value Date    TSH 1.430 05/21/2022       Colon Cancer Screening - NEG CANCER FEB 2015    Tetanus - UTD June 2014  Influenza Vaccine - UTD FALL 2021  Pneumonia Vaccine - UTD June 2014 PPV 23  Zoster - UTD x 2    PSA testing discussion - follows with urology  Lab Results   Component Value Date    PSA 0.37 06/15/2021    PSA 0.31 05/19/2020    PSA 0.27 03/15/2019       AAA Screening - Age 70      Diabetes Health Maintenance    A1C -   Lab Results   Component Value Date    LABA1C 7.7 05/24/2022    LABA1C 6.1 11/29/2021    LABA1C 6.8 05/26/2021    LABA1C 8.0 11/18/2020    LABA1C 7.0 05/20/2020    LABA1C 7.2 02/20/2020    LABA1C 11.2 11/21/2019    LABA1C 7.8 08/20/2019    LABA1C 10.7 05/20/2019    LABA1C 11.2 02/18/2019    LABA1C 12.2 11/16/2018    LABA1C 7.1 08/16/2018       ACE/ARB - yes, lisinopril 2.5mg daily  Eye - records pending  Foot - UTD MAY 2022  ASA - yes, 81mg  Microal/Cr -   Lab Results   Component Value Date    LABMICR 2.21 06/17/2021    LABCREA 145.7 06/17/2021    MACRR 15 06/17/2021     eGFR -   Lab Results   Component Value Date    LABGLOM 70 05/21/2022       Statin - yes, lipitor 80mg qhs      Specialist Lists:    Dr Eleazar Calix Dr [facility]  Dr Chuck Freed:  Urology  Dr Annemarie Gannon Pulmonology/Sleep  Dr Olayinka Cohen  [facility]  Dr Cheral Holter: pending new provider  Dr. Elizabet Purdy: Neurology      Current Outpatient Medications   Medication Sig Dispense Refill    trospium (SANCTURA) 60 MG CP24 extended release capsule TAKE 1 CAPSULE BY MOUTH EVERY DAY 30 capsule 0    omeprazole (PRILOSEC) 20 MG delayed release capsule TAKE 2 CAPSULES BY MOUTH EVERY  capsule 3    doxepin (SINEQUAN) 25 MG capsule Take 1 capsule by mouth nightly 30 capsule 1    metFORMIN (GLUCOPHAGE) 1000 MG tablet TAKE 1 TABLET BY MOUTH TWO TIMES A DAY WITH MEALS 180 tablet 3    [employer] Lancets Universal 33G MISC use to test blood sugar once a day 100 each 3    Insulin Pen Needle (UNIFINE PENTIPS) 31G X 8 MM MISC Use DAILY as directed WITH INSULIN PENS-- DO NOT RE-USE needles 100 each 3    blood glucose test strips (TRUE METRIX BLOOD GLUCOSE TEST) strip check blood sugar one time daily 100 each 2    lisinopril (PRINIVIL;ZESTRIL) 40 MG tablet TAKE 1 TABLET BY MOUTH EVERY DAY 90 tablet 3    SITagliptin (JANUVIA) 100 MG tablet TAKE 1 TABLET BY MOUTH ONE TIME A DAY 90 tablet 3    aspirin (SM ASPIRIN ADULT LOW STRENGTH) 81 MG EC tablet TAKE 1 TABLET BY MOUTH EVERY DAY 90 tablet 3    VORTIoxetine HBr (TRINTELLIX) 20 MG TABS tablet TAKE 1 TABLET BY MOUTH EVERY DAY 30 tablet 3    buPROPion HCl ER, XL, 450 MG TB24 Take 450 mg by mouth daily 30 tablet 5    amLODIPine (NORVASC) 5 MG tablet TAKE 1 TABLET BY MOUTH EVERY DAY 90 tablet 3    lamoTRIgine (LAMICTAL) 200 MG tablet TAKE 1 TABLET BY MOUTH EVERY DAY 30 tablet 3    tamsulosin (FLOMAX) 0.4 MG capsule TAKE 1 CAPSULE BY MOUTH EVERY DAY TO FACILITATE PASSAGE OF UTERAL STONE 90 capsule 0    Handicap Placard [facility] Ex[place] 29 NOV 2026 1 each 0    piroxicam (FELDENE) 20 MG capsule TAKE 1 CAPSULE BY MOUTH ONE TIME A DAY AS NEEDED FOR PAIN 90 capsule 3    lurasidone (LATUDA) 120 MG tablet TAKE 1 TABLET BY MOUTH ONE TIME A DAY 30 tablet 5    atorvastatin (LIPITOR) 80 MG tablet TAKE 1 TABLET BY MOUTH ONE TIME A DAY 90 tablet 3    fenofibrate (TRIGLIDE) 160 MG tablet TAKE 1 TABLET BY MOUTH ONE TIME A DAY 90 tablet 3    insulin glargine (LANTUS SOLOSTAR) 100 UNIT/ML injection pen Inject 65 Units into the skin 2 times daily 117 mL 3    oxybutynin (DITROPAN) 5 MG tablet Take 1 tablet by mouth 3 times daily 90 tablet 5    butalbital-acetaminophen-caffeine (FIORICET, ESGIC) -40 MG per tablet       sildenafil (VIAGRA) 50 MG tablet Take 1 tablet by mouth daily as needed for Erectile Dysfunction Take one tablet one hour before sexual activity. 6 tablet 5    azelastine (ASTELIN) 0.1 % nasal spray Inhale 2 sprays in each nostril twice a day as needed      ARTIFICIAL TEARS 1.4 % ophthalmic solution Place 1 drop into both eyes 4 times daily  0    clotrimazole-betamethasone (LOTRISONE) 1-0.05 % cream Apply topically 2 times daily. Not to exceed 2 weeks consecutively. 1 Tube 1    rOPINIRole (REQUIP) 3 MG tablet Take 3 mg by mouth nightly      cetirizine (ZYRTEC) 10 MG tablet daily       EYE ITCH RELIEF 0.025 % ophthalmic solution Place 2 drops into both eyes as needed       propranolol (INDERAL LA) 160 MG ER capsule Take 160 mg by mouth daily       fluticasone (FLONASE) 50 MCG/ACT nasal spray 1 spray by Nasal route daily as needed.  gabapentin (NEURONTIN) 800 MG tablet   Take 800 mg by mouth 4 times daily       amphetamine-dextroamphetamine (ADDERALL, 30MG,) 30 MG tablet Take 1 tablet by mouth 2 times daily for 30 days. 60 tablet 0     No current facility-administered medications for this visit. No orders of the defined types were placed in this encounter. All medications reviewed and reconciled, including OTC and herbal medications. Updated list given to patient. Patient Active Problem List    Diagnosis Date Noted    Obstructive sleep apnea on CPAP 11/03/2015     Priority: High    WESLEY (nonalcoholic steatohepatitis)     Closed fracture of right tibial plateau 45/23/2883    Normocytic anemia 02/19/2019    B12 deficiency     DM2 (diabetes mellitus, type 2) ([address]     Benign prostatic hyperplasia     [facility]     Hypertriglyceridemia 10/27/2015    BPPV (benign paroxysmal positional vertigo) 09/29/2015    Short-segment Vásquez's esophagus 06/17/2015     Following with Indu Cole for this.  Pulmonary nodule      stable >2 years, no further w/u required.        Bipolar 1 disorder [facility]     CAD (coronary artery disease)      Unclear history, cath 2013 with patent arteries. Records pending.  History of TIA (transient ischemic attack)      x2      Hypertension     Fibromyalgia     Depression     History of colon polyps     Chronic back pain     ADHD (attention deficit hyperactivity disorder)     Former smoker     Peripheral neuropathy     Tarsal tunnel syndrome     Benign esophageal stricture     Hyperlipidemia     Allergic rhinitis     Migraine headache     Dysphagia s/p dilation. 04/01/2014    GERD (gastroesophageal reflux disease) 10/15/2013    S/P cardiac catheterization: 9/30/2013: Normal coronaries 09/30/2013 9/30/2013: Normal coronaries. Radial approach. Dr. Swetha Koenig ED (erectile dysfunction) of organic origin 05/01/2013       Past Medical History:   Diagnosis Date    ADHD (attention deficit hyperactivity disorder)     Allergic rhinitis     Benign esophageal stricture     s/p dilation may 2014    Benign prostatic hyperplasia     Bipolar 1 disorder ([facility]     CAD (coronary artery disease)     Unclear history, cath 2013 with patent arteries. Records pending.  Chronic back pain     Depression     DM2 (diabetes mellitus, type 2) ([facility]     Dysfunctional voiding of urine 6/20/2013    Medications he changes to follow,  Adding ditropan  to the present schedule     ED (erectile dysfunction) of organic origin 5/1/2013    Fibromyalgia     Former smoker     GERD (gastroesophageal reflux disease)     History of colon polyps     Pt unsure when last colo.      History of TIA (transient ischemic attack)     x2     Hyperlipidemia     Hypertension     Hypertriglyceridemia 10/27/2015    Migraine headache     Morbid obesity ([facility]     Morbid obesity with BMI of 40.0-44.9, adult ([facility]     WESLEY (nonalcoholic steatohepatitis)     Obstructive sleep apnea on CPAP 11/3/2015    Peripheral neuropathy     Pulmonary nodule     stable >2 years, no further w/u required.  Rotator cuff injury     Left side    S/P cardiac catheterization: 2013: Normal coronaries 2013: Normal coronaries. Radial approach. Dr. Herlinda Sims Short-segment Vásquez's esophagus 2015    Tarsal tunnel syndrome     Bilateral       Past Surgical History:   Procedure Laterality Date    CARDIAC CATHETERIZATION  10/2013    2013: Normal coronaries. Radial approach. Dr. Herlinda Sims COLONOSCOPY  ,    [place] KNEE SURGERY Left 2012    ROTATOR CUFF REPAIR Left 2013   [place] SHOULDER SURGERY Bilateral    [place] SINUS SURGERY  2004    [place] occ.      TONSILLECTOMY  2002    TYMPANOSTOMY TUBE PLACEMENT      UPPER GASTROINTESTINAL ENDOSCOPY  ,  with dilation,     UPPER GASTROINTESTINAL ENDOSCOPY      UPPER GASTROINTESTINAL ENDOSCOPY  MAY 2014, 2017    Dilation       Allergies   Allergen Reactions    Morphine Nausea And Vomiting and Rash         Social History     Tobacco Use    Smoking status: Former Smoker     Packs/day: 0.75     Years: 30.00     Pack years: 22.50     Types: Cigarettes     Start date: 1984     Quit date: 2018     Years since quittin.0    Smokeless tobacco: Never Used   Substance Use Topics    Alcohol use: No     Alcohol/week: 0.0 standard drinks       Family History   Problem Relation Age of Onset    Cancer Father 77        lung     Rheum Arthritis Father     Colon Cancer Maternal Aunt 61    Cancer Maternal Aunt 76        \"throat\"    Colon Cancer Maternal Uncle 48    Cancer Maternal Uncle 60        pancreatic    Cancer Paternal Aunt 52        brain    Colon Cancer Paternal Uncle 72    Cancer Paternal Uncle 61        hepatic     Prostate Cancer Paternal Uncle 61    Cancer Maternal Uncle 79        pancreatic     Ovarian Cancer Paternal Aunt 48    Rheum Arthritis Mother     Pancreatic Cancer Mother     Brain Cancer Brother          I have reviewed the patient's past medical history, past surgical history, allergies, medications, social and family history and I have made updates where appropriate. Review of Systems  Positive responses are highlighted in bold    Constitutional:  Fever, Chills, Night Sweats, Fatigue, Unexpected changes in weight  HENT:  Ear pain, Tinnitus, Nosebleeds, Trouble swallowing, Hearing loss, Sore throat  Cardiovascular:  Chest Pain, Palpitations, Orthopnea, Paroxysmal Nocturnal Dyspnea  Respiratory:  Cough, Wheezing, Shortness of breath, Chest tightness, Apnea  Gastrointestinal:  Nausea, Vomiting, Diarrhea, Constipation, Heartburn, Blood in stool  Genitourinary:  Difficulty or painful urination, Flank pain, Change in frequency, Urgency  Skin:  Color change, Rash, Itching, Wound  Musculoskeletal:  Joint pain, Back pain, Gait problems, Joint swelling, Myalgias  Neurological:  Dizziness, Headaches, Presyncope, Numbness, Seizures, Tremors  Endocrine:  Heat Intolerance, Cold Intolerance, Polydipsia, Polyphagia, Polyuria      PHYSICAL EXAM:  Vitals:    05/24/22 0807   BP: 104/62   Pulse: 80   Resp: 12   Temp: 98.1 °F (36.7 °C)   TempSrc: Oral   SpO2: 98%   Weight: 266 lb (120.7 kg)   Height: 5' 5\" (1.651 m)     Body mass index is 44.26 kg/m².        Wt Readings from Last 3 Encounters:   05/24/22 266 lb (120.7 kg)   03/18/22 268 lb (121.6 kg)   03/16/22 269 lb 9.6 oz (122.3 kg)     VS Reviewed  General Appearance: A&O x 3, No acute distress,well developed and well- nourished  Eyes: pupils equal, round, and reactive to light, extraocular eye movements intact, conjunctivae and eye lids without erythema  ENT: external ear and ear canal clear bilaterally, TMs intact and regular, nose without deformity, nasal mucosa and turbinates normal without polyps, oropharynx normal, dentition is normal for age  Neck: supple and non-tender without mass, no thyromegaly or thyroid nodules, no cervical lymphadenopathy  Pulmonary/Chest: clear to auscultation bilaterally- no wheezes, rales or rhonchi, normal air movement, no respiratory distress or retractions  Cardiovascular: S1 and S2 auscultated w/ RRR. No murmurs, rubs, clicks, or gallops, distal pulses intact. Abdomen: soft, non-tender, non-distended, bowl sounds physiologic,  no rebound or guarding, no masses or hernias noted. Liver and spleen without enlargement. Extremities: no cyanosis, clubbing or edema of the lower extremities. +2 PT/DP bilaterally. Skin: warm and dry, no rash or erythema  Psych: Affect appropriate. Mood mildly depressed. Thought process is normal without evidence of psychosis. Good insight and appropriate interaction. Cognition and memory appear to be intact. Foot: Bilat 2+DP/PT bilaterally. Skin warm, dry and intact. Sensation normal throughout to touch and with monofilament. Results for POC orders placed in visit on 05/24/22   POCT glycosylated hemoglobin (Hb A1C)   Result Value Ref Range    Hemoglobin A1C 7.7 (H) 4.3 - 5.7 %       ASSESSMENT & PLAN  1. Type 2 diabetes mellitus with other specified complication, with long-term current use of insulin (HCC)    Above goal. Inc basaglar 3 units every 3 days until FBS   con't metformin and Januvia    - Microalbumin / Creatinine Urine Ratio; Future  - POCT glycosylated hemoglobin (Hb A1C)  -  DIABETES FOOT EXAM    2. Morbid obesity with BMI of 40.0-44.9, adult ([facility] [address]    Discussed wt loss strategies    3. Abnormal transaminases    con't wt loss, DM control, statin  Get records from Buster SALAZAR  F.u him as planned    4. WESLEY (nonalcoholic steatohepatitis)      5. Iron deficiency anemia, unspecified iron deficiency anemia type    In remission  Neg w/u  Monitor with labs  Stable     6. B12 deficiency    As per # 5    7. Essential hypertension    Stable  At goal inderal and lisinopril    8. Mixed hyperlipidemia    Stable  con't Lipitor    9.  Bipolar 1 disorder ([facility]    Moods some worse  con't current regimen of Lamictal, Latuda, Wellbutrin, Triintellix and adderall  F/u psych to discuss med adjustment    10. Attention deficit hyperactivity disorder (ADHD), unspecified ADHD type    As per # 9    11. Gastroesophageal reflux disease, unspecified whether esophagitis present    Stable  con't PPI  F/u GI, Donte    12. Short-segment Vásquez's esophagus    As above    13. Special screening for malignant neoplasm of prostate    Discussed prostate screening guidelines and with shared decision making, he elects to proceed with psa    - PSA Screening; Future    14. Need for hepatitis B vaccination    - Hep B, ENGERIX-B, (age 21 yrs+), IM, 1mL, 3-dose      DISPOSITION    Return in about 3 months (around 8/24/2022) for Follow-up Diabetes, sooner as needed. Az released without restrictions. Future Appointments   Date Time Provider Mikey Painting   7/13/2022 10:15 AM Cecil Charles   8/23/2022  7:40 AM Orlando Delarosa DO [facility] [address] received counseling on the following healthy behaviors: nutrition, exercise and medication adherence    Barriers to learning and self management: none    Discussed use, benefit, and side effects of prescribed medications. Barriers to medication compliance addressed. All patient questions answered. Pt voiced understanding.        Electronically signed by Orlando Delarosa DO on 5/24/2022 at 8:46 AM

## 2022-05-24 ENCOUNTER — OFFICE VISIT (OUTPATIENT)
Dept: FAMILY MEDICINE CLINIC | Age: 52
End: 2022-05-24
Payer: COMMERCIAL

## 2022-05-24 VITALS
BODY MASS INDEX: 44.32 KG/M2 | HEIGHT: 65 IN | OXYGEN SATURATION: 98 % | HEART RATE: 80 BPM | RESPIRATION RATE: 12 BRPM | SYSTOLIC BLOOD PRESSURE: 104 MMHG | TEMPERATURE: 98.1 F | WEIGHT: 266 LBS | DIASTOLIC BLOOD PRESSURE: 62 MMHG

## 2022-05-24 DIAGNOSIS — R74.8 ABNORMAL TRANSAMINASES: ICD-10-CM

## 2022-05-24 DIAGNOSIS — K22.70 SHORT-SEGMENT BARRETT'S ESOPHAGUS: ICD-10-CM

## 2022-05-24 DIAGNOSIS — F90.9 ATTENTION DEFICIT HYPERACTIVITY DISORDER (ADHD), UNSPECIFIED ADHD TYPE: ICD-10-CM

## 2022-05-24 DIAGNOSIS — I10 ESSENTIAL HYPERTENSION: ICD-10-CM

## 2022-05-24 DIAGNOSIS — Z79.4 TYPE 2 DIABETES MELLITUS WITH OTHER SPECIFIED COMPLICATION, WITH LONG-TERM CURRENT USE OF INSULIN (HCC): Primary | ICD-10-CM

## 2022-05-24 DIAGNOSIS — E78.2 MIXED HYPERLIPIDEMIA: ICD-10-CM

## 2022-05-24 DIAGNOSIS — Z12.5 SPECIAL SCREENING FOR MALIGNANT NEOPLASM OF PROSTATE: ICD-10-CM

## 2022-05-24 DIAGNOSIS — D50.9 IRON DEFICIENCY ANEMIA, UNSPECIFIED IRON DEFICIENCY ANEMIA TYPE: ICD-10-CM

## 2022-05-24 DIAGNOSIS — K75.81 NASH (NONALCOHOLIC STEATOHEPATITIS): ICD-10-CM

## 2022-05-24 DIAGNOSIS — E53.8 B12 DEFICIENCY: ICD-10-CM

## 2022-05-24 DIAGNOSIS — F31.9 BIPOLAR 1 DISORDER (HCC): ICD-10-CM

## 2022-05-24 DIAGNOSIS — K21.9 GASTROESOPHAGEAL REFLUX DISEASE, UNSPECIFIED WHETHER ESOPHAGITIS PRESENT: ICD-10-CM

## 2022-05-24 DIAGNOSIS — Z23 NEED FOR HEPATITIS B VACCINATION: ICD-10-CM

## 2022-05-24 DIAGNOSIS — E11.69 TYPE 2 DIABETES MELLITUS WITH OTHER SPECIFIED COMPLICATION, WITH LONG-TERM CURRENT USE OF INSULIN (HCC): Primary | ICD-10-CM

## 2022-05-24 DIAGNOSIS — E66.01 MORBID OBESITY WITH BMI OF 40.0-44.9, ADULT (HCC): ICD-10-CM

## 2022-05-24 LAB — HBA1C MFR BLD: 7.7 % (ref 4.3–5.7)

## 2022-05-24 PROCEDURE — 3051F HG A1C>EQUAL 7.0%<8.0%: CPT | Performed by: FAMILY MEDICINE

## 2022-05-24 PROCEDURE — 90746 HEPB VACCINE 3 DOSE ADULT IM: CPT | Performed by: FAMILY MEDICINE

## 2022-05-24 PROCEDURE — 3017F COLORECTAL CA SCREEN DOC REV: CPT | Performed by: FAMILY MEDICINE

## 2022-05-24 PROCEDURE — G8427 DOCREV CUR MEDS BY ELIG CLIN: HCPCS | Performed by: FAMILY MEDICINE

## 2022-05-24 PROCEDURE — 99214 OFFICE O/P EST MOD 30 MIN: CPT | Performed by: FAMILY MEDICINE

## 2022-05-24 PROCEDURE — 1036F TOBACCO NON-USER: CPT | Performed by: FAMILY MEDICINE

## 2022-05-24 PROCEDURE — 2022F DILAT RTA XM EVC RTNOPTHY: CPT | Performed by: FAMILY MEDICINE

## 2022-05-24 PROCEDURE — G8417 CALC BMI ABV UP PARAM F/U: HCPCS | Performed by: FAMILY MEDICINE

## 2022-05-24 ASSESSMENT — PATIENT HEALTH QUESTIONNAIRE - PHQ9
SUM OF ALL RESPONSES TO PHQ QUESTIONS 1-9: 15
10. IF YOU CHECKED OFF ANY PROBLEMS, HOW DIFFICULT HAVE THESE PROBLEMS MADE IT FOR YOU TO DO YOUR WORK, TAKE CARE OF THINGS AT HOME, OR GET ALONG WITH OTHER PEOPLE: 0
4. FEELING TIRED OR HAVING LITTLE ENERGY: 3
7. TROUBLE CONCENTRATING ON THINGS, SUCH AS READING THE NEWSPAPER OR WATCHING TELEVISION: 3
SUM OF ALL RESPONSES TO PHQ9 QUESTIONS 1 & 2: 3
2. FEELING DOWN, DEPRESSED OR HOPELESS: 3
8. MOVING OR SPEAKING SO SLOWLY THAT OTHER PEOPLE COULD HAVE NOTICED. OR THE OPPOSITE, BEING SO FIGETY OR RESTLESS THAT YOU HAVE BEEN MOVING AROUND A LOT MORE THAN USUAL: 0
1. LITTLE INTEREST OR PLEASURE IN DOING THINGS: 0
SUM OF ALL RESPONSES TO PHQ QUESTIONS 1-9: 15
9. THOUGHTS THAT YOU WOULD BE BETTER OFF DEAD, OR OF HURTING YOURSELF: 0
6. FEELING BAD ABOUT YOURSELF - OR THAT YOU ARE A FAILURE OR HAVE LET YOURSELF OR YOUR FAMILY DOWN: 3
3. TROUBLE FALLING OR STAYING ASLEEP: 3
SUM OF ALL RESPONSES TO PHQ QUESTIONS 1-9: 15
SUM OF ALL RESPONSES TO PHQ QUESTIONS 1-9: 15
5. POOR APPETITE OR OVEREATING: 0

## 2022-05-24 NOTE — PROGRESS NOTES
Immunization(s) given during visit:    Immunizations Administered     Name Date Dose Route    Hepatitis B Adult (Engerix-B) 5/24/2022 1 mL Intramuscular    Site: Deltoid- Right    Lot: [id number]    NDC: 98929-689-37          Most recent Vaccine Information Sheet dated 08/15/19 given to pt

## 2022-05-24 NOTE — PATIENT INSTRUCTIONS
Basaglar titration:    Goal Fasting (Morning) Blood sugar 90 to 130    Start at 68 units. If not at goal after 3 days, increase Basaglar dose by 3 units every 3 days until you reach your goal consistently, without going low on your blood sugar. If persistently having low blood sugar, decrease by 3 units every 2 days until hypoglycemia resolves. LAB INSTRUCTIONS:    Please complete labs in 4 week(s). Non-fasting is fine    The clinic will call you within 1 week of collection. If you have not heard from us within that amount of time, please call us at [phone].

## 2022-06-01 DIAGNOSIS — N40.1 BENIGN PROSTATIC HYPERPLASIA WITH LOWER URINARY TRACT SYMPTOMS, SYMPTOM DETAILS UNSPECIFIED: ICD-10-CM

## 2022-06-01 DIAGNOSIS — N32.81 OAB (OVERACTIVE BLADDER): ICD-10-CM

## 2022-06-01 RX ORDER — TROSPIUM CHLORIDE ER 60 MG/1
CAPSULE ORAL
Qty: 30 CAPSULE | Refills: 0 | Status: SHIPPED | OUTPATIENT
Start: 2022-06-01 | End: 2022-07-05

## 2022-06-01 RX ORDER — BUPROPION HYDROCHLORIDE 450 MG/1
TABLET, FILM COATED, EXTENDED RELEASE ORAL
Qty: 30 TABLET | Refills: 0 | Status: SHIPPED | OUTPATIENT
Start: 2022-06-01 | End: 2022-06-06

## 2022-06-01 RX ORDER — TAMSULOSIN HYDROCHLORIDE 0.4 MG/1
CAPSULE ORAL
Qty: 90 CAPSULE | Refills: 0 | Status: SHIPPED | OUTPATIENT
Start: 2022-06-01 | End: 2022-08-08

## 2022-06-01 NOTE — TELEPHONE ENCOUNTER
Lesley Frederickson called requesting a refill on the following medications:  Requested Prescriptions     Pending Prescriptions Disp Refills    tamsulosin (FLOMAX) 0.4 mg capsule [Pharmacy Med Name: Tamsulosin HCl Oral Capsule 0.4 MG] 90 capsule 0     Sig: TAKE 1 CAPSULE BY MOUTH EVERY DAY TO [address] [place] [address] verified:  .pv      Date of last visit: 03/18/2022  Date of next visit (if applicable): Voicemail left to return the call to the office to schedule the appointment

## 2022-06-01 NOTE — TELEPHONE ENCOUNTER
Recent Visits  Date Type Provider Dept   05/24/22 Office Visit Camila Valverde, DO [facility]   11/29/21 Office Visit Camila Valverde, DO [facility]   08/31/21 Office Visit Camila Valverde, DO [facility]   05/26/21 Office Visit Camila Valverde, DO [facility]   Showing recent visits within past 540 days with a meds authorizing provider and meeting all other requirements  Future Appointments  Date Type Provider Dept   08/23/22 Appointment Camila Valverde,  [facility]   Showing future appointments within next 150 days with a meds authorizing provider and meeting all other requirements      Future Appointments   Date Time Provider Mikey Painting   7/13/2022 10:15 AM Cecil Benson   8/23/2022  7:40 AM Camila Valverde, [address] [place] [address]

## 2022-06-01 NOTE — TELEPHONE ENCOUNTER
Vern Vargas called requesting a refill on the following medications:  Requested Prescriptions     Pending Prescriptions Disp Refills    trospium (SANCTURA) 60 MG CP24 extended release capsule [Pharmacy Med Name: Trospium Chloride ER Oral Capsule Extended Release 24 Hour 60 MG] 30 capsule 0     Sig: TAKE 1 [address] verified:  .pv      Date of last visit: 03/18/2022  Date of next visit (if applicable): Voicemail left to call the office to schedule a follow up.

## 2022-06-04 DIAGNOSIS — N32.81 OAB (OVERACTIVE BLADDER): ICD-10-CM

## 2022-06-04 DIAGNOSIS — N40.1 BENIGN PROSTATIC HYPERPLASIA WITH LOWER URINARY TRACT SYMPTOMS, SYMPTOM DETAILS UNSPECIFIED: ICD-10-CM

## 2022-06-06 RX ORDER — BUPROPION HYDROCHLORIDE 450 MG/1
TABLET, FILM COATED, EXTENDED RELEASE ORAL
Qty: 90 TABLET | Refills: 1 | Status: SHIPPED | OUTPATIENT
Start: 2022-06-06 | End: 2022-10-06

## 2022-06-06 RX ORDER — TAMSULOSIN HYDROCHLORIDE 0.4 MG/1
CAPSULE ORAL
Qty: 90 CAPSULE | Refills: 0 | OUTPATIENT
Start: 2022-06-06

## 2022-07-03 DIAGNOSIS — N32.81 OAB (OVERACTIVE BLADDER): ICD-10-CM

## 2022-07-03 DIAGNOSIS — N40.1 BENIGN PROSTATIC HYPERPLASIA WITH LOWER URINARY TRACT SYMPTOMS, SYMPTOM DETAILS UNSPECIFIED: ICD-10-CM

## 2022-07-05 RX ORDER — TROSPIUM CHLORIDE ER 60 MG/1
CAPSULE ORAL
Qty: 30 CAPSULE | Refills: 0 | Status: SHIPPED | OUTPATIENT
Start: 2022-07-05 | End: 2022-08-01

## 2022-07-05 NOTE — TELEPHONE ENCOUNTER
Arin Nowak called requesting a refill on the following medications:  Requested Prescriptions     Pending Prescriptions Disp Refills    trospium (SANCTURA) 60 MG CP24 extended release capsule [Pharmacy Med Name: Trospium Chloride ER Oral Capsule Extended Release 24 Hour 60 MG] 30 capsule 0     Sig: TAKE 1 [address] verified:  .rosa      Date of last visit: 03/18/2022  Date of next visit (if applicable): 2/46/3503

## 2022-07-13 ENCOUNTER — OFFICE VISIT (OUTPATIENT)
Dept: PULMONOLOGY | Age: 52
End: 2022-07-13
Payer: COMMERCIAL

## 2022-07-13 VITALS
HEART RATE: 90 BPM | HEIGHT: 65 IN | DIASTOLIC BLOOD PRESSURE: 70 MMHG | OXYGEN SATURATION: 98 % | WEIGHT: 265 LBS | TEMPERATURE: 98 F | SYSTOLIC BLOOD PRESSURE: 128 MMHG | BODY MASS INDEX: 44.15 KG/M2

## 2022-07-13 DIAGNOSIS — E66.01 MORBID OBESITY WITH BMI OF 40.0-44.9, ADULT (HCC): ICD-10-CM

## 2022-07-13 DIAGNOSIS — F32.A DEPRESSION, UNSPECIFIED DEPRESSION TYPE: ICD-10-CM

## 2022-07-13 DIAGNOSIS — G47.10 HYPERSOMNIA: ICD-10-CM

## 2022-07-13 DIAGNOSIS — G47.09 OTHER INSOMNIA: ICD-10-CM

## 2022-07-13 DIAGNOSIS — F90.9 ATTENTION DEFICIT HYPERACTIVITY DISORDER (ADHD), UNSPECIFIED ADHD TYPE: ICD-10-CM

## 2022-07-13 DIAGNOSIS — G47.33 OSA TREATED WITH BIPAP: Primary | ICD-10-CM

## 2022-07-13 PROCEDURE — 3017F COLORECTAL CA SCREEN DOC REV: CPT | Performed by: PHYSICIAN ASSISTANT

## 2022-07-13 PROCEDURE — 1036F TOBACCO NON-USER: CPT | Performed by: PHYSICIAN ASSISTANT

## 2022-07-13 PROCEDURE — G8417 CALC BMI ABV UP PARAM F/U: HCPCS | Performed by: PHYSICIAN ASSISTANT

## 2022-07-13 PROCEDURE — 99214 OFFICE O/P EST MOD 30 MIN: CPT | Performed by: PHYSICIAN ASSISTANT

## 2022-07-13 PROCEDURE — G8427 DOCREV CUR MEDS BY ELIG CLIN: HCPCS | Performed by: PHYSICIAN ASSISTANT

## 2022-07-13 RX ORDER — DOXEPIN HYDROCHLORIDE 25 MG/1
25 CAPSULE ORAL NIGHTLY
Qty: 30 CAPSULE | Refills: 5 | Status: SHIPPED | OUTPATIENT
Start: 2022-07-13

## 2022-07-13 ASSESSMENT — ENCOUNTER SYMPTOMS
BACK PAIN: 0
NAUSEA: 0
DIARRHEA: 0
WHEEZING: 0
SHORTNESS OF BREATH: 0
ALLERGIC/IMMUNOLOGIC NEGATIVE: 1
COUGH: 0
STRIDOR: 0
EYES NEGATIVE: 1
CHEST TIGHTNESS: 0

## 2022-07-13 NOTE — PROGRESS NOTES
Negative. Musculoskeletal: Negative. Negative for arthralgias and back pain. Skin: Negative. Allergic/Immunologic: Negative. Neurological: Negative. Negative for dizziness and light-headedness. Psychiatric/Behavioral: Negative. All other systems reviewed and are negative. Physical Exam:    BMI:  Body mass index is 44.1 kg/m². Wt Readings from Last 3 Encounters:   07/13/22 265 lb (120.2 kg)   05/24/22 266 lb (120.7 kg)   03/18/22 268 lb (121.6 kg)     Weight stable / unchanged  Vitals: /70   Pulse 90   Temp 98 °F (36.7 °C)   Ht 5' 5\" (1.651 m)   Wt 265 lb (120.2 kg)   SpO2 98% Comment: r/a  BMI 44.10 kg/m²       Physical Exam  Constitutional:       Appearance: Normal appearance. He is normal weight. HENT:      Head: Normocephalic and atraumatic. Right Ear: External ear normal.      Left Ear: External ear normal.      Nose: Nose normal.   Eyes:      Extraocular Movements: Extraocular movements intact. Conjunctiva/sclera: Conjunctivae normal.      Pupils: Pupils are equal, round, and reactive to light. Pulmonary:      Effort: Pulmonary effort is normal.   Musculoskeletal:      Cervical back: Normal range of motion and neck supple. Neurological:      General: No focal deficit present. Mental Status: He is alert and oriented to person, place, and time. Psychiatric:         Attention and Perception: Attention and perception normal.         Mood and Affect: Mood and affect normal.         Speech: Speech normal.         Behavior: Behavior normal. Behavior is cooperative. Thought Content: Thought content normal.         Cognition and Memory: Cognition normal.         Judgment: Judgment normal.         ASSESSMENT/DIAGNOSIS     Diagnosis Orders   1. IRISH treated with BiPAP     2. Morbid obesity with BMI of 40.0-44.9, adult ([facility] [place] [address]     3. Attention deficit hyperactivity disorder (ADHD), unspecified ADHD type     4. Hypersomnia     5.  Other insomnia Plan   Do you need any equipment today? No  - need to follow up with psych for increasing depression  - Doxepin has been helpful. - Download reviewed and discussed with patient  - He  was advised to continue current positive airway pressure therapy with above described pressure. - He  advised to keep good compliance with current recommended pressure to get optimal results and clinical improvement  - Recommend 7-9 hours of sleep with PAP  - He was advised to call [employer] company regarding supplies if needed.   -He call my office for earlier appointment if needed for worsening of sleep symptoms.   - He was instructed on weight loss  - Lilliana Malik was educated about my impression and plan. Patient verbalizesunderstanding.   We will see Bryant Kern back in: 6 months with download    Information added by my medical assistant/LPN was reviewed today       Ally Jones PA-C, MPAS  7/13/2022

## 2022-07-29 ENCOUNTER — TELEPHONE (OUTPATIENT)
Dept: FAMILY MEDICINE CLINIC | Age: 52
End: 2022-07-29

## 2022-07-29 DIAGNOSIS — F90.8 ADHD, ADULT RESIDUAL TYPE: Primary | ICD-10-CM

## 2022-07-29 DIAGNOSIS — K04.7 DENTAL INFECTION: ICD-10-CM

## 2022-07-29 RX ORDER — AMOXICILLIN AND CLAVULANATE POTASSIUM 875; 125 MG/1; MG/1
1 TABLET, FILM COATED ORAL 2 TIMES DAILY
Qty: 20 TABLET | Refills: 0 | Status: SHIPPED | OUTPATIENT
Start: 2022-07-29 | End: 2022-08-08

## 2022-07-29 RX ORDER — DEXTROAMPHETAMINE SACCHARATE, AMPHETAMINE ASPARTATE, DEXTROAMPHETAMINE SULFATE AND AMPHETAMINE SULFATE 7.5; 7.5; 7.5; 7.5 MG/1; MG/1; MG/1; MG/1
30 TABLET ORAL 2 TIMES DAILY
Qty: 60 TABLET | Refills: 0 | Status: SHIPPED | OUTPATIENT
Start: 2022-07-29 | End: 2022-10-06 | Stop reason: DRUGHIGH

## 2022-07-29 NOTE — TELEPHONE ENCOUNTER
Patient requesting the following to go to [facility] rx Please approve or refuse Rx request:  Requested Prescriptions     Pending Prescriptions Disp Refills    amphetamine-dextroamphetamine (ADDERALL, 30MG,) 30 MG tablet 60 tablet 0     Sig: Take 1 tablet by mouth in the morning and 1 tablet before bedtime. Do all this for 30 days.        Next appointment:  8/23/2022     Patient also states that he has an infected tooth and can not get in to see the dentist for 2 months ad is asking for something to be sent in to the pharmacy as well    Please advise

## 2022-07-30 DIAGNOSIS — N32.81 OAB (OVERACTIVE BLADDER): ICD-10-CM

## 2022-07-30 DIAGNOSIS — N40.1 BENIGN PROSTATIC HYPERPLASIA WITH LOWER URINARY TRACT SYMPTOMS, SYMPTOM DETAILS UNSPECIFIED: ICD-10-CM

## 2022-08-01 RX ORDER — TROSPIUM CHLORIDE ER 60 MG/1
CAPSULE ORAL
Qty: 30 CAPSULE | Refills: 0 | Status: SHIPPED | OUTPATIENT
Start: 2022-08-01 | End: 2022-08-08

## 2022-08-01 NOTE — TELEPHONE ENCOUNTER
Nury Rubalcava called requesting a refill on the following medications:  Requested Prescriptions     Pending Prescriptions Disp Refills    trospium (SANCTURA) 60 MG CP24 extended release capsule [Pharmacy Med Name: Trospium Chloride ER Oral Capsule Extended Release 24 Hour 60 MG] 30 capsule 0     Sig: TAKE 1 [address] verified:  .rosa      Date of last visit: 03/18/2022  Date of next visit (if applicable): Visit date not found

## 2022-08-05 DIAGNOSIS — N32.81 OAB (OVERACTIVE BLADDER): ICD-10-CM

## 2022-08-05 DIAGNOSIS — N40.1 BENIGN PROSTATIC HYPERPLASIA WITH LOWER URINARY TRACT SYMPTOMS, SYMPTOM DETAILS UNSPECIFIED: ICD-10-CM

## 2022-08-08 RX ORDER — TROSPIUM CHLORIDE ER 60 MG/1
CAPSULE ORAL
Qty: 30 CAPSULE | Refills: 0 | Status: SHIPPED | OUTPATIENT
Start: 2022-08-08 | End: 2022-09-01

## 2022-08-08 RX ORDER — TAMSULOSIN HYDROCHLORIDE 0.4 MG/1
CAPSULE ORAL
Qty: 90 CAPSULE | Refills: 0 | Status: SHIPPED | OUTPATIENT
Start: 2022-08-08

## 2022-08-08 NOTE — TELEPHONE ENCOUNTER
Alana Gold called requesting a refill on the following medications:  Requested Prescriptions     Pending Prescriptions Disp Refills    tamsulosin (FLOMAX) 0.4 MG capsule [Pharmacy Med Name: Tamsulosin HCl Oral Capsule 0.4 MG] 90 capsule 0     Sig: TAKE 1 CAPSULE BY MOUTH EVERY DAY TO FACILITATE PASSAGE OF UTERAL STONE    trospium (SANCTURA) 60 MG CP24 extended release capsule [Pharmacy Med Name: Trospium Chloride ER Oral Capsule Extended Release 24 Hour 60 MG] 30 capsule 0     Sig: TAKE 1 CAPSULE BY [id number] [facility] verified:  .pv      Date of last visit: 03/18/2022  Date of next visit (if applicable): Attempted to call the patient to schedule an appointment. Voicemail left to return the call to the office.

## 2022-08-22 RX ORDER — LAMOTRIGINE 150 MG/1
TABLET ORAL
COMMUNITY
Start: 2022-07-22 | End: 2022-10-06 | Stop reason: DRUGHIGH

## 2022-08-22 NOTE — PROGRESS NOTES
Chief Complaint   Patient presents with    Diabetes     3 mo f/u for diabetes ETC       History obtained from the patient. SUBJECTIVE:  Lilliana Johnson is a 45 y.o. male that presents today for:     -DM2: On 65 units basalgar bid still. A1c creeping up. Was to inc his basaglar, but did not. On metformin and januvia  FBs inc to 120-180  No lows. Struggling to lose wt despite diet changes and exercise as much as able    Lab Results   Component Value Date/Time    LABA1C 8.1 08/23/2022 08:02 AM    LABA1C 7.7 05/24/2022 07:20 AM    LABA1C 6.1 11/29/2021 08:27 AM    LABA1C 6.8 05/26/2021 07:44 AM    LABA1C 8.0 11/18/2020 10:27 AM    LABA1C 7.0 05/20/2020 07:31 AM    LABA1C 7.2 02/20/2020 07:39 AM    LABA1C 11.2 11/21/2019 08:00 AM    LABA1C 7.8 08/20/2019 07:54 AM    LABA1C 10.7 05/20/2019 10:54 AM    LABA1C 11.2 02/18/2019 07:56 AM    LABA1C 12.2 11/16/2018 07:50 AM       Wt Readings from Last 3 Encounters:   08/23/22 265 lb (120.2 kg)   07/13/22 265 lb (120.2 kg)   05/24/22 266 lb (120.7 kg)        -Elevated LFT PRIOR VISIT:  Noted on labs  Mild at this point, but still elevated  Denies ETOH use  Denies RUQ pain or jaundice.   GI did US a few months ago, showed fatty liver    UPDATE PRIOR VISIT:   Labs table with persistent elevated, though mild  W/u for cause neg  Suspected WESLEY  Was to f/u with GI, Angela Sunflower, has not yet   No RUQ pain or jaundice    UPDATE PRIOR VISIT:   Had f/u labs ordered, never done  Has not f/u with Doyal Neigh yet either   No RUQ pain or jaundice    UPDATE TODAY:   LFT remain elevated   Seeing Donte SALAZAR, records pending  Has f/u labs ordered from them and US  Needs to schedule.       -Anemia PRIOR VISIT:  Anemia still present, mild  b12 and iron studies WNL in last SEPT and early OCT  Plan is to repeat labs in [facility]  Denies bleeding, melena or hematochezia  No fevers, chills or night sweats      UPDATE PRIOR VISIT:   Found to have low iron on last lab check for anemia  Denies bleeding, melena or hematochezia  Placed on iron  F/u labs ordered for 6 wks  Pt is to call Donte SALAZAR's office for an apt, he is EST there. He has not done so yet, but plans to soon    UPDATE PRIOR VISIT:   On iron  Saw Elva Cantu, GI  EGD scheduled  Denies bleeding, melena or hematochezia.       UPDATE PRIOR VISIT:   Had EGD, neg  UTD on colo  Labs done yesterday and normal  Taking iron yet  Denies bleeding, melena or hematochezia     UPDATE PRIOR VISIT:   Overeue for f/u labs to monitor, ordered 6 months ago, never done  Off iron tabs, and b12 tabs  Denies bleeding, melena or hematochezia    UPDATE LAST VISIT:   Due for f/u labs  Off iron and b12, no bleeding, melena or hematochezia     UPDATE TODAY:   Labs stable  Off iron/b12  Very mild, stable and chronic anemia   Denies bleeding, melena or hematochezia      -HTN:    HPI:    Taking meds as prescribed ?: yes  Tolerating well ?: yes  Side Effects ?: denies  BP at home ?: <140/90  Working on TLCS ?: yes  Chest Pain/SOB/Palpitations? denies    BP Readings from Last 3 Encounters:   08/23/22 132/72   07/13/22 128/70   05/24/22 104/62       -HLD:    HPI:    Taking meds as prescribed ?: yes  Tolerating well ?: yes  Side Effects ?: denies  Muscle Pain?: denies  Working on TLCS ?: yes      -Bipolar disorder/ADHD:  Was seeing psych at [facility], but his provider left, so was let go from practice  Has EST with new psych  Moods stable  No SI/HI      -GERD/Barrets:    HPI:  utd with EGD with GI    Taking meds as prescribed ?: yes  Tolerating well ?: yes  Side Effects ?: denies  Dysphagia ?: denies  Odynophagia ?: denies  Melena ?: denies  Working on TLCS ?: yes      Age/Gender Health Maintenance    Lipid -     No components found for: CHLPL  Lab Results   Component Value Date    TRIG 365 (H) 05/21/2022    TRIG 296 (H) 06/15/2021    TRIG 195 01/21/2020     Lab Results   Component Value Date    HDL 27 05/21/2022    HDL 35 06/15/2021    HDL 34 01/21/2020     Lab Results   Component Value Date    LDLCALC 90 05/21/2022    LDLCALC 116 06/15/2021    LDLCALC 108 01/21/2020     Lab Results   Component Value Date    LDLCALC 90 05/21/2022    LDLDIRECT 116.17 01/21/2020       TSH - 3.260 (AUG 2018)  Lab Results   Component Value Date    TSH 1.430 05/21/2022       Colon Cancer Screening - NEG CANCER FEB 2015  LDCT: NEG SEPT 2021    Tetanus - UTD June 2014  Influenza Vaccine - UTD FALL 2021  Pneumonia Vaccine - UTD June 2014 PPV 23  Zoster - UTD x 2    PSA testing discussion - follows with urology  Lab Results   Component Value Date    PSA 0.37 06/15/2021    PSA 0.31 05/19/2020    PSA 0.27 03/15/2019       AAA Screening - Age 70      Diabetes Health Maintenance    A1C -   Lab Results   Component Value Date/Time    LABA1C 8.1 08/23/2022 08:02 AM    LABA1C 7.7 05/24/2022 07:20 AM    LABA1C 6.1 11/29/2021 08:27 AM    LABA1C 6.8 05/26/2021 07:44 AM    LABA1C 8.0 11/18/2020 10:27 AM    LABA1C 7.0 05/20/2020 07:31 AM    LABA1C 7.2 02/20/2020 07:39 AM    LABA1C 11.2 11/21/2019 08:00 AM    LABA1C 7.8 08/20/2019 07:54 AM    LABA1C 10.7 05/20/2019 10:54 AM    LABA1C 11.2 02/18/2019 07:56 AM    LABA1C 12.2 11/16/2018 07:50 AM       ACE/ARB - yes, lisinopril 2.5mg daily  Eye - records pending  Foot - UTD MAY 2022  ASA - yes, 81mg  Microal/Cr -   Lab Results   Component Value Date    LABMICR 2.21 06/17/2021    LABCREA 145.7 06/17/2021    MACRR 15 06/17/2021     eGFR -   Lab Results   Component Value Date/Time    LABGLOM 70 05/21/2022 08:36 AM       Statin - yes, lipitor 80mg qhs      Specialist Lists:    Dr Antoinette Caban Dr [facility] Cardiology  Dr Toussaint Letters:  Urology  Dr Osman Samuel Pulmonology/Sleep  Dr Aurora Durbin  [facility]  Dr Leonila Johnson: pending new provider  Dr. Elfredia Larch Dr. Loel Goodpasture: Neurology      Current Outpatient Medications   Medication Sig Dispense Refill    empagliflozin (JARDIANCE) 10 MG tablet Take 1 tablet by mouth daily 90 tablet 3    lamoTRIgine (LAMICTAL) 150 MG tablet TAKE 1 TABLET BY MOUTH TWO TIMES A DAY      tamsulosin (FLOMAX) 0.4 MG capsule TAKE 1 CAPSULE BY MOUTH EVERY DAY TO FACILITATE PASSAGE OF UTERAL STONE 90 capsule 0    trospium (SANCTURA) 60 MG CP24 extended release capsule TAKE 1 CAPSULE BY MOUTH EVERY DAY 30 capsule 0    amphetamine-dextroamphetamine (ADDERALL, 30MG,) 30 MG tablet Take 1 tablet by mouth in the morning and 1 tablet before bedtime. Do all this for 30 days.  60 tablet 0    doxepin (SINEQUAN) 25 MG capsule Take 1 capsule by mouth nightly 30 capsule 5    buPROPion HCl ER, XL, 450 MG TB24 TAKE 1 TABLET BY MOUTH EVERY DAY 90 tablet 1    omeprazole (PRILOSEC) 20 MG delayed release capsule TAKE 2 CAPSULES BY MOUTH EVERY  capsule 3    metFORMIN (GLUCOPHAGE) 1000 MG tablet TAKE 1 TABLET BY MOUTH TWO TIMES A DAY WITH MEALS 180 tablet 3    [employer] Lancets Universal 33G MISC use to test blood sugar once a day 100 each 3    Insulin Pen Needle (UNIFINE PENTIPS) 31G X 8 MM MISC Use DAILY as directed WITH INSULIN PENS-- DO NOT RE-USE needles 100 each 3    blood glucose test strips (TRUE METRIX BLOOD GLUCOSE TEST) strip check blood sugar one time daily 100 each 2    lisinopril (PRINIVIL;ZESTRIL) 40 MG tablet TAKE 1 TABLET BY MOUTH EVERY DAY 90 tablet 3    SITagliptin (JANUVIA) 100 MG tablet TAKE 1 TABLET BY MOUTH ONE TIME A DAY 90 tablet 3    aspirin (SM ASPIRIN ADULT LOW STRENGTH) 81 MG EC tablet TAKE 1 TABLET BY MOUTH EVERY DAY 90 tablet 3    VORTIoxetine HBr (TRINTELLIX) 20 MG TABS tablet TAKE 1 TABLET BY MOUTH EVERY DAY 30 tablet 3    amLODIPine (NORVASC) 5 MG tablet TAKE 1 TABLET BY MOUTH EVERY DAY 90 tablet 3    Handicap Placard MIS Expires 29 NOV 2026 1 each 0    piroxicam (FELDENE) 20 MG capsule TAKE 1 CAPSULE BY MOUTH ONE TIME A DAY AS NEEDED FOR PAIN 90 capsule 3    lurasidone (LATUDA) 120 MG tablet TAKE 1 TABLET BY MOUTH ONE TIME A DAY 30 tablet 5    atorvastatin (LIPITOR) 80 MG tablet TAKE 1 TABLET BY MOUTH ONE TIME A DAY 90 tablet 3    fenofibrate Benign prostatic hyperplasia     Morbid obesity ([address]     Hypertriglyceridemia 10/27/2015    BPPV (benign paroxysmal positional vertigo) 09/29/2015    Short-segment Vásquez's esophagus 06/17/2015     Following with Ludy Steele for this. Pulmonary nodule      stable >2 years, no further w/u required. Bipolar 1 disorder ([address]     CAD (coronary artery disease)      Unclear history, cath 2013 with patent arteries. Records pending. History of TIA (transient ischemic attack)      x2      Hypertension     Fibromyalgia     Depression     History of colon polyps     Chronic back pain     ADHD (attention deficit hyperactivity disorder)     Former smoker     Peripheral neuropathy     Tarsal tunnel syndrome     Benign esophageal stricture     Hyperlipidemia     Allergic rhinitis     Migraine headache     Dysphagia s/p dilation. 04/01/2014    GERD (gastroesophageal reflux disease) 10/15/2013    S/P cardiac catheterization: 9/30/2013: Normal coronaries 09/30/2013 9/30/2013: Normal coronaries. Radial approach. Dr. Andres Betancur      ED (erectile dysfunction) of organic origin 05/01/2013       Past Medical History:   Diagnosis Date    ADHD (attention deficit hyperactivity disorder)     Allergic rhinitis     Benign esophageal stricture     s/p dilation may 2014    Benign prostatic hyperplasia     Bipolar 1 disorder ([address]     CAD (coronary artery disease)     Unclear history, cath 2013 with patent arteries. Records pending. Chronic back pain     Depression     DM2 (diabetes mellitus, type 2) ([address]     Dysfunctional voiding of urine 6/20/2013    Medications he changes to follow,  Adding ditropan  to the present schedule     ED (erectile dysfunction) of organic origin 5/1/2013    Fibromyalgia     Former smoker     GERD (gastroesophageal reflux disease)     History of colon polyps     Pt unsure when last colo.      History of TIA (transient ischemic attack)     x2     Hyperlipidemia     Hypertension Hypertriglyceridemia 10/27/2015    Migraine headache     Morbid obesity ([facility] [address]     Morbid obesity with BMI of 40.0-44.9, adult (HCC)     WESLEY (nonalcoholic steatohepatitis)     Obstructive sleep apnea on CPAP 11/3/2015    Peripheral neuropathy     Pulmonary nodule     stable >2 years, no further w/u required. Rotator cuff injury     Left side    S/P cardiac catheterization: 2013: Normal coronaries 2013: Normal coronaries. Radial approach. Dr. Andres Betancur    Short-segment Vásquez's esophagus 2015    Tarsal tunnel syndrome     Bilateral       Past Surgical History:   Procedure Laterality Date    CARDIAC CATHETERIZATION  10/2013    2013: Normal coronaries. Radial approach. Dr. Andres Betancur    COLONOSCOPY  ,     KNEE SURGERY Left 2012    ROTATOR CUFF REPAIR Left 2013    SHOULDER SURGERY Bilateral 2011    SINUS SURGERY  2004    [place] occ.      TONSILLECTOMY  2002    TYMPANOSTOMY TUBE PLACEMENT      UPPER GASTROINTESTINAL ENDOSCOPY  ,  with dilation,     UPPER GASTROINTESTINAL ENDOSCOPY      UPPER GASTROINTESTINAL ENDOSCOPY  MAY 2014, 2017    Dilation       Allergies   Allergen Reactions    Morphine Nausea And Vomiting and Rash         Social History     Tobacco Use    Smoking status: Former     Packs/day: 0.75     Years: 30.00     Pack years: 22.50     Types: Cigarettes     Start date: 1984     Quit date: 2018     Years since quittin.2    Smokeless tobacco: Never   Substance Use Topics    Alcohol use: No     Alcohol/week: 0.0 standard drinks       Family History   Problem Relation Age of Onset    Cancer Father 77        lung     Rheum Arthritis Father     Colon Cancer Maternal Aunt 61    Cancer Maternal Aunt 76        \"throat\"    Colon Cancer Maternal Uncle 48    Cancer Maternal Uncle 60        pancreatic    Cancer Paternal Aunt 52        brain    Colon Cancer Paternal Uncle 72    Cancer Paternal Uncle 61        hepatic     Prostate Cancer Paternal Uncle 61    Cancer Maternal Uncle 70        pancreatic     Ovarian Cancer Paternal Aunt 48    Rheum Arthritis Mother     Pancreatic Cancer Mother     Brain Cancer Brother          I have reviewed the patient's past medical history, past surgical history, allergies, medications, social and family history and I have made updates where appropriate. Review of Systems  Positive responses are highlighted in bold    Constitutional:  Fever, Chills, Night Sweats, Fatigue, Unexpected changes in weight  HENT:  Ear pain, Tinnitus, Nosebleeds, Trouble swallowing, Hearing loss, Sore throat  Cardiovascular:  Chest Pain, Palpitations, Orthopnea, Paroxysmal Nocturnal Dyspnea  Respiratory:  Cough, Wheezing, Shortness of breath, Chest tightness, Apnea  Gastrointestinal:  Nausea, Vomiting, Diarrhea, Constipation, Heartburn, Blood in stool  Genitourinary:  Difficulty or painful urination, Flank pain, Change in frequency, Urgency  Skin:  Color change, Rash, Itching, Wound  Musculoskeletal:  Joint pain, Back pain, Gait problems, Joint swelling, Myalgias  Neurological:  Dizziness, Headaches, Presyncope, Numbness, Seizures, Tremors  Endocrine:  Heat Intolerance, Cold Intolerance, Polydipsia, Polyphagia, Polyuria      PHYSICAL EXAM:  Vitals:    08/23/22 0745   BP: 132/72   Pulse: 68   Resp: 16   Temp: 98.1 °F (36.7 °C)   SpO2: 97%   Weight: 265 lb (120.2 kg)   Height: 5' 6.5\" (1.689 m)     Body mass index is 42.13 kg/m².        Wt Readings from Last 3 Encounters:   08/23/22 265 lb (120.2 kg)   07/13/22 265 lb (120.2 kg)   05/24/22 266 lb (120.7 kg)     VS Reviewed  General Appearance: A&O x 3, No acute distress,well developed and well- nourished  Eyes: pupils equal, round, and reactive to light, extraocular eye movements intact, conjunctivae and eye lids without erythema  ENT: external ear and ear canal clear bilaterally, TMs intact and regular, nose without deformity, nasal mucosa and turbinates normal without polyps, oropharynx normal, dentition is normal for age  Neck: supple and non-tender without mass, no thyromegaly or thyroid nodules, no cervical lymphadenopathy  Pulmonary/Chest: clear to auscultation bilaterally- no wheezes, rales or rhonchi, normal air movement, no respiratory distress or retractions  Cardiovascular: S1 and S2 auscultated w/ RRR. No murmurs, rubs, clicks, or gallops, distal pulses intact. Abdomen: soft, non-tender, non-distended, bowl sounds physiologic,  no rebound or guarding, no masses or hernias noted. Liver and spleen without enlargement. Extremities: no cyanosis, clubbing or edema of the lower extremities. +2 PT/DP bilaterally. Skin: warm and dry, no rash or erythema  Psych: Affect appropriate. Mood mildly depressed. Thought process is normal without evidence of psychosis. Good insight and appropriate interaction. Cognition and memory appear to be intact. Foot: Bilat 2+DP/PT bilaterally. Skin warm, dry and intact. Sensation normal throughout to touch and with monofilament. Results for POC orders placed in visit on 08/23/22   POCT glycosylated hemoglobin (Hb A1C)   Result Value Ref Range    Hemoglobin A1C 8.1 %       ASSESSMENT & PLAN  1. Type 2 diabetes mellitus with other specified complication, with long-term current use of insulin (HCC)    Above goal yet  Con't Basaglar 65 units bid  Con't metformin and Januvia  Add Jardiance 10  F/u 3 months    - empagliflozin (JARDIANCE) 10 MG tablet; Take 1 tablet by mouth daily  Dispense: 90 tablet; Refill: 3  - HM DIABETES FOOT EXAM    2. Morbid obesity with BMI of 40.0-44.9, adult ([address]    Discussed wt loss strategies    3. Abnormal transaminases    con't wt loss, DM control, statin  Get records from [employer], [employer]  F/u him as planned    4. WESLEY (nonalcoholic steatohepatitis)      5. Iron deficiency anemia, unspecified iron deficiency anemia type    In remission  Neg w/u  Monitor with labs  Stable     6. B12 deficiency    As per # 5    7.  Essential hypertension    Stable  At goal inderal and lisinopril    8. Mixed hyperlipidemia    Stable  Con't lipitor    9. Bipolar 1 disorder ([address]    Stable  Con't meds and new psych f/u  con't current regimen of Lamictal, Latuda, Wellbutrin, Triintellix and adderall    10. Attention deficit hyperactivity disorder (ADHD), unspecified ADHD type    As above    11. Gastroesophageal reflux disease, unspecified whether esophagitis present    Stable  con't PPI  F/u GI, [place]    12. Short-segment Vásquez's esophagus      13. Personal history of tobacco use    LDCT ordered after Shared decision making  Due after 21 SEPT 2022    - MI VISIT TO DISCUSS LUNG CA SCREEN W LDCT  - CT Lung Screen (Annual); Future      DISPOSITION    Return in about 3 months (around 11/23/2022) for Follow-up Diabetes, follow-up on chronic medical conditions, sooner as needed. Az released without restrictions. Future Appointments   Date Time Provider [employer]   9/12/2022  9:45 AM Beau Villanueva MD [address] [place] [address]   11/23/2022  7:40 AM Abbe Garza DO [facility] - [facility]RE[facility]AY AM [place]   1/16/2023  7:45 AM [facility]     PATIENT COUNSELING    Az received counseling on the following healthy behaviors: nutrition, exercise and medication adherence    Barriers to learning and self management: none    Discussed use, benefit, and side effects of prescribed medications. Barriers to medication compliance addressed. All patient questions answered. Pt voiced understanding. Low Dose CT (LDCT) Lung Screening criteria met:     Age 50-77(Medicare) or 50-80 (USPSTF)   Pack year smoking >20   Still smoking or less than 15 year since quit   No sign or symptoms of lung cancer   > 11 months since last LDCT     Risks and benefits of lung cancer screening with LDCT scans discussed:    Significance of positive screen - False-positive LDCT results often occur.  95% of all positive results do not lead to a diagnosis of cancer. Usually further imaging can resolve most false-positive results; however, some patients may require invasive procedures. Over diagnosis risk - 10% to 12% of screen-detected lung cancer cases are over diagnosed--that is, the cancer would not have been detected in the patient's lifetime without the screening. Need for follow up screens annually to continue lung cancer screening effectiveness     Risks associated with radiation from annual LDCT- Radiation exposure is about the same as for a mammogram, which is about 1/3 of the annual background radiation exposure from everyday life. Starting screening at age 54 is not likely to increase cancer risk from radiation exposure. Patients with comorbidities resulting in life expectancy of < 10 years, or that would preclude treatment of an abnormality identified on CT, should not be screened due to lack of benefit.     To obtain maximal benefit from this screening, smoking cessation and long-term abstinence from smoking is critical      Electronically signed by Clare Aquino DO on 8/23/2022 at 8:03 AM

## 2022-08-23 ENCOUNTER — OFFICE VISIT (OUTPATIENT)
Dept: FAMILY MEDICINE CLINIC | Age: 52
End: 2022-08-23
Payer: COMMERCIAL

## 2022-08-23 VITALS
HEIGHT: 67 IN | HEART RATE: 68 BPM | DIASTOLIC BLOOD PRESSURE: 72 MMHG | BODY MASS INDEX: 41.59 KG/M2 | SYSTOLIC BLOOD PRESSURE: 132 MMHG | RESPIRATION RATE: 16 BRPM | OXYGEN SATURATION: 97 % | TEMPERATURE: 98.1 F | WEIGHT: 265 LBS

## 2022-08-23 DIAGNOSIS — E11.69 TYPE 2 DIABETES MELLITUS WITH OTHER SPECIFIED COMPLICATION, WITH LONG-TERM CURRENT USE OF INSULIN (HCC): Primary | ICD-10-CM

## 2022-08-23 DIAGNOSIS — D50.9 IRON DEFICIENCY ANEMIA, UNSPECIFIED IRON DEFICIENCY ANEMIA TYPE: ICD-10-CM

## 2022-08-23 DIAGNOSIS — E53.8 B12 DEFICIENCY: ICD-10-CM

## 2022-08-23 DIAGNOSIS — F31.9 BIPOLAR 1 DISORDER (HCC): ICD-10-CM

## 2022-08-23 DIAGNOSIS — K22.70 SHORT-SEGMENT BARRETT'S ESOPHAGUS: ICD-10-CM

## 2022-08-23 DIAGNOSIS — I10 ESSENTIAL HYPERTENSION: ICD-10-CM

## 2022-08-23 DIAGNOSIS — E66.01 MORBID OBESITY WITH BMI OF 40.0-44.9, ADULT (HCC): ICD-10-CM

## 2022-08-23 DIAGNOSIS — F90.9 ATTENTION DEFICIT HYPERACTIVITY DISORDER (ADHD), UNSPECIFIED ADHD TYPE: ICD-10-CM

## 2022-08-23 DIAGNOSIS — Z87.891 PERSONAL HISTORY OF TOBACCO USE: ICD-10-CM

## 2022-08-23 DIAGNOSIS — K75.81 NASH (NONALCOHOLIC STEATOHEPATITIS): ICD-10-CM

## 2022-08-23 DIAGNOSIS — R74.8 ABNORMAL TRANSAMINASES: ICD-10-CM

## 2022-08-23 DIAGNOSIS — E78.2 MIXED HYPERLIPIDEMIA: ICD-10-CM

## 2022-08-23 DIAGNOSIS — Z79.4 TYPE 2 DIABETES MELLITUS WITH OTHER SPECIFIED COMPLICATION, WITH LONG-TERM CURRENT USE OF INSULIN (HCC): Primary | ICD-10-CM

## 2022-08-23 DIAGNOSIS — K21.9 GASTROESOPHAGEAL REFLUX DISEASE, UNSPECIFIED WHETHER ESOPHAGITIS PRESENT: ICD-10-CM

## 2022-08-23 LAB — HBA1C MFR BLD: 8.1 %

## 2022-08-23 PROCEDURE — 1036F TOBACCO NON-USER: CPT | Performed by: FAMILY MEDICINE

## 2022-08-23 PROCEDURE — 83036 HEMOGLOBIN GLYCOSYLATED A1C: CPT | Performed by: FAMILY MEDICINE

## 2022-08-23 PROCEDURE — 99214 OFFICE O/P EST MOD 30 MIN: CPT | Performed by: FAMILY MEDICINE

## 2022-08-23 PROCEDURE — G0296 VISIT TO DETERM LDCT ELIG: HCPCS | Performed by: FAMILY MEDICINE

## 2022-08-23 PROCEDURE — 3017F COLORECTAL CA SCREEN DOC REV: CPT | Performed by: FAMILY MEDICINE

## 2022-08-23 PROCEDURE — G8417 CALC BMI ABV UP PARAM F/U: HCPCS | Performed by: FAMILY MEDICINE

## 2022-08-23 PROCEDURE — G8427 DOCREV CUR MEDS BY ELIG CLIN: HCPCS | Performed by: FAMILY MEDICINE

## 2022-08-23 PROCEDURE — 2022F DILAT RTA XM EVC RTNOPTHY: CPT | Performed by: FAMILY MEDICINE

## 2022-08-23 PROCEDURE — 3052F HG A1C>EQUAL 8.0%<EQUAL 9.0%: CPT | Performed by: FAMILY MEDICINE

## 2022-08-23 SDOH — ECONOMIC STABILITY: FOOD INSECURITY: WITHIN THE PAST 12 MONTHS, YOU WORRIED THAT YOUR FOOD WOULD RUN OUT BEFORE YOU GOT MONEY TO BUY MORE.: NEVER TRUE

## 2022-08-23 SDOH — ECONOMIC STABILITY: FOOD INSECURITY: WITHIN THE PAST 12 MONTHS, THE FOOD YOU BOUGHT JUST DIDN'T LAST AND YOU DIDN'T HAVE MONEY TO GET MORE.: NEVER TRUE

## 2022-08-23 ASSESSMENT — SOCIAL DETERMINANTS OF HEALTH (SDOH): HOW HARD IS IT FOR YOU TO PAY FOR THE VERY BASICS LIKE FOOD, HOUSING, MEDICAL CARE, AND HEATING?: NOT HARD AT ALL

## 2022-08-24 ENCOUNTER — TELEPHONE (OUTPATIENT)
Dept: FAMILY MEDICINE CLINIC | Age: 52
End: 2022-08-24

## 2022-08-24 NOTE — TELEPHONE ENCOUNTER
CT Lung  Friday 9/23/22  Arrive 8:50 AM   [facility] testing   No prep     Pt informed and understanding with no further questions

## 2022-09-01 DIAGNOSIS — N32.81 OAB (OVERACTIVE BLADDER): ICD-10-CM

## 2022-09-01 DIAGNOSIS — N40.1 BENIGN PROSTATIC HYPERPLASIA WITH LOWER URINARY TRACT SYMPTOMS, SYMPTOM DETAILS UNSPECIFIED: ICD-10-CM

## 2022-09-01 RX ORDER — TROSPIUM CHLORIDE ER 60 MG/1
CAPSULE ORAL
Qty: 30 CAPSULE | Refills: 0 | Status: SHIPPED | OUTPATIENT
Start: 2022-09-01

## 2022-09-01 NOTE — TELEPHONE ENCOUNTER
[facility] a refill on the following medications:  Requested Prescriptions     Pending Prescriptions Disp Refills    trospium (SANCTURA) 60 MG CP24 extended release capsule [Pharmacy Med Name: Trospium Chloride ER Oral Capsule Extended Release 24 Hour 60 MG] 30 capsule 0     Sig: TAKE [id number] Yanique Yanez verified:  .rosa      Date of last visit: 03/18/2022  Date of next visit (if applicable): 4/41/8798

## 2022-09-12 ENCOUNTER — OFFICE VISIT (OUTPATIENT)
Dept: UROLOGY | Age: 52
End: 2022-09-12
Payer: COMMERCIAL

## 2022-09-12 VITALS
WEIGHT: 260 LBS | SYSTOLIC BLOOD PRESSURE: 134 MMHG | DIASTOLIC BLOOD PRESSURE: 70 MMHG | HEIGHT: 67 IN | BODY MASS INDEX: 40.81 KG/M2

## 2022-09-12 DIAGNOSIS — N32.81 OAB (OVERACTIVE BLADDER): ICD-10-CM

## 2022-09-12 DIAGNOSIS — N40.1 BENIGN PROSTATIC HYPERPLASIA WITH LOWER URINARY TRACT SYMPTOMS, SYMPTOM DETAILS UNSPECIFIED: Primary | ICD-10-CM

## 2022-09-12 PROCEDURE — G8427 DOCREV CUR MEDS BY ELIG CLIN: HCPCS | Performed by: UROLOGY

## 2022-09-12 PROCEDURE — 3017F COLORECTAL CA SCREEN DOC REV: CPT | Performed by: UROLOGY

## 2022-09-12 PROCEDURE — 99214 OFFICE O/P EST MOD 30 MIN: CPT | Performed by: UROLOGY

## 2022-09-12 PROCEDURE — G8417 CALC BMI ABV UP PARAM F/U: HCPCS | Performed by: UROLOGY

## 2022-09-12 PROCEDURE — 4004F PT TOBACCO SCREEN RCVD TLK: CPT | Performed by: UROLOGY

## 2022-09-12 NOTE — PROGRESS NOTES
MD MD [facility] Consultation / New Patient Visit    Patient:  Emeli Enrique  YOB: 1970  Date: 9/12/2022  Consult requested from Jennyfer Jackman DO     HISTORY OF PRESENT ILLNESS:   The patient is a 45 y.o. male who presents today for follow-up for the following problem(s): BPH with LUTs  Overall the problem(s) : are worsening. Associated Symptoms: No dysuria, gross hematuria. Pain Severity:      Today visit:   9/12/22   Presents as a previous patient of Dr. Jacky Mendieta. He has BPH with LUTs, and is being treated with Flomax 0.4 mg and Sanctura 20mg. Bladder irritants: 3 cups coffee, 3 cokes. He states his symptoms are still severe, and bothersome, AUASS: 17/3. We had a discussion regarding continuing medication vs Urolift, and he would like to schedule the procedure. Summary of old records:   (Patient's old records, notes and chart reviewed and summarized above.)    Cystoscopy Operative Note  Surgeon: Ronal Guerra MD   Anesthesia: Urethral 2%  Indications: BPH   Position: supine  Findings:   The patient was prepped and draped in the usual sterile fashion. The flexible cystoscope was advanced through the urethra and into the bladder. The bladder was thoroughly inspected and the following was noted:  Residual Urine: Minimal   Urethra: No abnormalities of the urethra are noted. Prostate: Medium gland (<80 gm) Complete obstruction by lateral lobe of prostate. Bladder: No tumors or CIS noted. No bladder diverticulum. Moderate trabeculation noted. Ureters: Clear efflux from both ureters. Orifices with normal configuration and location. The cystoscope was removed. The patient tolerated the procedure well. Plan:   Good candidate for Urolift        57-year-old white male returns today for follow-up regarding his chronic voiding symptoms. He is currently on Flomax and Sanctura 20 mg p.o. twice daily.   This combination has improved Morbid obesity ([facility] [address]     Morbid obesity with BMI of 40.0-44.9, adult ([facility] [address]     WESLEY (nonalcoholic steatohepatitis)     Obstructive sleep apnea on CPAP 11/3/2015    Peripheral neuropathy     Pulmonary nodule     stable >2 years, no further w/u required. Rotator cuff injury     Left side    S/P cardiac catheterization: 9/30/2013: Normal coronaries 9/30/2013 9/30/2013: Normal coronaries. Radial approach. Dr. Andres Betancur    Short-segment Vásquez's esophagus 6/17/2015    Tarsal tunnel syndrome     Bilateral     Past Surgical History:   Procedure Laterality Date    CARDIAC CATHETERIZATION  10/2013    9/30/2013: Normal coronaries. Radial approach. Dr. nAdres Betancur    COLONOSCOPY  2008, 2012    KNEE SURGERY Left 2012    ROTATOR CUFF REPAIR Left 2013    SHOULDER SURGERY Bilateral 2011    SINUS SURGERY  2004    [place] occ.      TONSILLECTOMY  2002    TYMPANOSTOMY TUBE PLACEMENT      UPPER GASTROINTESTINAL ENDOSCOPY  2008, 2008 with dilation, 2012    UPPER GASTROINTESTINAL ENDOSCOPY  2013    UPPER GASTROINTESTINAL ENDOSCOPY  MAY 2014, 2017    Dilation     Family History   Problem Relation Age of Onset    Cancer Father 77        lung     Rheum Arthritis Father     Colon Cancer Maternal Aunt 61    Cancer Maternal Aunt 76        \"throat\"    Colon Cancer Maternal Uncle 48    Cancer Maternal Uncle 61        pancreatic    Cancer Paternal Aunt 52        brain    Colon Cancer Paternal Uncle 72    Cancer Paternal Uncle 61        hepatic     Prostate Cancer Paternal Uncle 61    Cancer Maternal Uncle 79        pancreatic     Ovarian Cancer Paternal Aunt 48    Rheum Arthritis Mother     Pancreatic Cancer Mother     Brain Cancer Brother      Outpatient Medications Marked as Taking for the 9/12/22 encounter (Office Visit) with Kary Mckay MD   Medication Sig Dispense Refill    trospium (SANCTURA) 60 MG CP24 extended release capsule TAKE 1 CAPSULE BY MOUTH EVERY DAY 30 capsule 0    empagliflozin (JARDIANCE) 10 MG tablet Take 1 tablet by mouth daily 90 tablet 3    lamoTRIgine (LAMICTAL) 150 MG tablet TAKE 1 TABLET BY MOUTH TWO TIMES A DAY      tamsulosin (FLOMAX) 0.4 MG capsule TAKE 1 CAPSULE BY MOUTH EVERY DAY TO FACILITATE PASSAGE OF UTERAL STONE 90 capsule 0    doxepin (SINEQUAN) 25 MG capsule Take 1 capsule by mouth nightly 30 capsule 5    buPROPion HCl ER, XL, 450 MG TB24 TAKE 1 TABLET BY MOUTH EVERY DAY 90 tablet 1    omeprazole (PRILOSEC) 20 MG delayed release capsule TAKE 2 CAPSULES BY MOUTH EVERY  capsule 3    metFORMIN (GLUCOPHAGE) 1000 MG tablet TAKE 1 TABLET BY MOUTH TWO TIMES A DAY WITH MEALS 180 tablet 3    [employer] Lancets Universal 33G MISC use to test blood sugar once a day 100 each 3    Insulin Pen Needle (UNIFINE PENTIPS) 31G X 8 MM MISC Use DAILY as directed WITH INSULIN PENS-- DO NOT RE-USE needles 100 each 3    blood glucose test strips (TRUE METRIX BLOOD GLUCOSE TEST) strip check blood sugar one time daily 100 each 2    lisinopril (PRINIVIL;ZESTRIL) 40 MG tablet TAKE 1 TABLET BY MOUTH EVERY DAY 90 tablet 3    SITagliptin (JANUVIA) 100 MG tablet TAKE 1 TABLET BY MOUTH ONE TIME A DAY 90 tablet 3    aspirin (SM ASPIRIN ADULT LOW STRENGTH) 81 MG EC tablet TAKE 1 TABLET BY MOUTH EVERY DAY 90 tablet 3    VORTIoxetine HBr (TRINTELLIX) 20 MG TABS tablet TAKE 1 TABLET BY MOUTH EVERY DAY 30 tablet 3    amLODIPine (NORVASC) 5 MG tablet TAKE 1 TABLET BY MOUTH EVERY DAY 90 tablet 3    Handicap Placard [facility] Expires 29 NOV 2026 1 each 0    piroxicam (FELDENE) 20 MG capsule TAKE 1 CAPSULE BY MOUTH ONE TIME A DAY AS NEEDED FOR PAIN 90 capsule 3    lurasidone (LATUDA) 120 MG tablet TAKE 1 TABLET BY MOUTH ONE TIME A DAY 30 tablet 5    atorvastatin (LIPITOR) 80 MG tablet TAKE 1 TABLET BY MOUTH ONE TIME A DAY 90 tablet 3    fenofibrate (TRIGLIDE) 160 MG tablet TAKE 1 TABLET BY MOUTH ONE TIME A DAY 90 tablet 3    insulin glargine (LANTUS SOLOSTAR) 100 UNIT/ML injection pen Inject 65 Units into the skin 2 times daily 117 mL 3 Psych: Mood and affect normal.  Head: atraumatic normocephalic  Eyes: EOMi  HEENT: neck supple, trachea midline  Lungs: Respiratory effort normal  Cardiovascular:  Normal peripheral pulses  Abdomen: Soft, non-tender, non-distended, No CVA  Bladder: non-tender and not distended. FROMx4, no cyanosis clubbing edema  Skin: warm and dry      Assessment and Plan      1. Benign prostatic hyperplasia with lower urinary tract symptoms, symptom details unspecified    2. OAB (overactive bladder)             Plan:      No follow-ups on file. Continue Flomax for BPH with LUTs  - Continue sanctura for overactivity  PSA reviewed -wnl    Cysto - BPH  - would be a good candidate for Urolift. Literature given. Risks benefits and alternative procedures are explained, informed consent is obtained, and the patient elects to proceed.

## 2022-09-22 ENCOUNTER — TELEPHONE (OUTPATIENT)
Dept: FAMILY MEDICINE CLINIC | Age: 52
End: 2022-09-22

## 2022-09-22 DIAGNOSIS — F31.9 BIPOLAR 1 DISORDER (HCC): Primary | Chronic | ICD-10-CM

## 2022-09-22 DIAGNOSIS — F90.9 ATTENTION DEFICIT HYPERACTIVITY DISORDER (ADHD), UNSPECIFIED ADHD TYPE: ICD-10-CM

## 2022-09-22 NOTE — TELEPHONE ENCOUNTER
I'll place the referral  Hopefully they will accept     Diagnosis Orders   1. Bipolar 1 disorder [facility])  [address], Gretta Leon DO, Psychiatry, [facility] [place]      2.  Attention deficit hyperactivity disorder (ADHD), unspecified ADHD type  [address], Gretta Leon DO, Psychiatry, [facility] [place]

## 2022-09-29 ENCOUNTER — TELEPHONE (OUTPATIENT)
Dept: UROLOGY | Age: 52
End: 2022-09-29

## 2022-09-29 DIAGNOSIS — Z01.818 PRE-OP TESTING: ICD-10-CM

## 2022-09-29 DIAGNOSIS — N40.1 BENIGN PROSTATIC HYPERPLASIA WITH LOWER URINARY TRACT SYMPTOMS, SYMPTOM DETAILS UNSPECIFIED: Primary | ICD-10-CM

## 2022-09-29 NOTE — TELEPHONE ENCOUNTER
SURGERY [address]  [address] [place] [address] [address] [facility] [address] [employer] [place] [address], [address]      Phone *[phone]   Surgical Scheduling Direct Line Phone *[phone] Fax *[phone]      Antoinette Robison 1970 male    [address]  [employer] [facility] [facility] [address]   Marital Status:          Home Phone: [phone]      Cell Phone:    Telephone Information:   Mobile [phone]   Mobile [phone]          Surgeon: Dr. Cony Galvan Surgery Date: 10/31/22   Time: 8:30 AM    Procedure: Cystoscopy, Urolift    Diagnosis: BPH with LUTs    Important Medical History:  In [facility]    Special Inst/Equip: LUZ Hurtandra Peeling with Urolift notified    CPT Codes:    72378  Latex Allergy: No     Cardiac Device:  No    Anesthesia:  MAC          Admission Type:  Same Day                        Admit Prior to Day of Surgery: No    Case Location:  Main OR            Preadmission Testing:  Phone Call          PAT Date and Time:______________________________________________________    PAT Confirmation #: ______________________________________________________    Post Op Visit: ___________________________________________________________    Need Preop Cardiac Clearance: No    Does Patient have Cardiologist/physician?      None    Surgery Confirmation #: __________________________________________________    : ________________________   Date: __________________________     [employer] Name: Noris Rollins

## 2022-09-29 NOTE — TELEPHONE ENCOUNTER
Patient is scheduled for surgery with Dr Landrum Romberg on 10/31/22. Surgery consent on arrival. Surgery instructions gone over verbally and mailed to patient/ Patient will have an adult over the age of 25 with them at discharge and 24 hours after procedure. Patient to do pre op fasting labs, urine culture, chest x-ray and EKG on 10/17/22. Katy Lomeli with Urolift notified.

## 2022-10-05 ENCOUNTER — HOSPITAL ENCOUNTER (OUTPATIENT)
Dept: CT IMAGING | Age: 52
Discharge: HOME | End: 2022-10-05
Payer: COMMERCIAL

## 2022-10-05 ENCOUNTER — TELEPHONE (OUTPATIENT)
Dept: FAMILY MEDICINE CLINIC | Age: 52
End: 2022-10-05

## 2022-10-05 DIAGNOSIS — Z87.891 PERSONAL HISTORY OF TOBACCO USE: ICD-10-CM

## 2022-10-05 PROCEDURE — 71271 CT THORAX LUNG CANCER SCR C-: CPT

## 2022-10-05 NOTE — TELEPHONE ENCOUNTER
----- Message from Venus Apgar, DO sent at 10/5/2022  9:32 AM EDT -----  Please let pt know that LDCT of chest is negative for cancer. Repeat 1 year per current guidelines. Let me know if questions, thanks!

## 2022-10-06 ENCOUNTER — TELEMEDICINE (OUTPATIENT)
Dept: PSYCHIATRY | Age: 52
End: 2022-10-06
Payer: COMMERCIAL

## 2022-10-06 DIAGNOSIS — F90.8 ADHD, ADULT RESIDUAL TYPE: ICD-10-CM

## 2022-10-06 DIAGNOSIS — F25.0 SCHIZOAFFECTIVE DISORDER, BIPOLAR TYPE (HCC): Primary | ICD-10-CM

## 2022-10-06 DIAGNOSIS — F17.210 TOBACCO DEPENDENCE DUE TO CIGARETTES: ICD-10-CM

## 2022-10-06 PROCEDURE — 90792 PSYCH DIAG EVAL W/MED SRVCS: CPT | Performed by: STUDENT IN AN ORGANIZED HEALTH CARE EDUCATION/TRAINING PROGRAM

## 2022-10-06 PROCEDURE — 4004F PT TOBACCO SCREEN RCVD TLK: CPT | Performed by: STUDENT IN AN ORGANIZED HEALTH CARE EDUCATION/TRAINING PROGRAM

## 2022-10-06 RX ORDER — LAMOTRIGINE 25 MG/1
25 TABLET ORAL DAILY
Qty: 30 TABLET | Refills: 0 | Status: SHIPPED | OUTPATIENT
Start: 2022-10-06 | End: 2022-10-20 | Stop reason: DRUGHIGH

## 2022-10-06 RX ORDER — DEXTROAMPHETAMINE SACCHARATE, AMPHETAMINE ASPARTATE MONOHYDRATE, DEXTROAMPHETAMINE SULFATE AND AMPHETAMINE SULFATE 7.5; 7.5; 7.5; 7.5 MG/1; MG/1; MG/1; MG/1
30 CAPSULE, EXTENDED RELEASE ORAL EVERY MORNING
Qty: 30 CAPSULE | Refills: 0 | Status: SHIPPED | OUTPATIENT
Start: 2022-10-06 | End: 2022-11-03 | Stop reason: SDUPTHER

## 2022-10-06 NOTE — PROGRESS NOTES
[facility] PSYCHIATRY  [address]  [facility] [address]  Dept: [phone]  Loc: [phone]     Kimberly Benitez   1970     TELEHEALTH EVALUATION -- Audio/Visual (During UKGIJ-16 public health emergency)    Patient location: home  Physician location: [place], [facility]  This virtual visit was conducted via interactive, real-time video. Presenting Problem:  Chief Complaint:  Chief Complaint   Patient presents with    Depression    Anxiety    ADHD    Psychiatric Evaluation    New Patient        HPI:   Tray Mancera is a 45year old male who presents today to establish care. He reports mood as \"depressed\" which has been persistent for \"a couple months\". He states his son is selling the home he is currently living in, which has triggered depression. He adds \"I've struggled with depression off and on my entire life. \" Denies active and passive SI. Denies active HI. He does report thoughts of wanting to hurt his son because he is selling the home, but denies true plan or intent to harm him. He reports visual hallucinations in the form of \"mostly people\". He states \"I'll come up to a stoplight and think I see someone on the crosswalk and nobody is there. \" This has been ongoing for years. He reports auditory hallucinations which he states is \"mostly my wife's voice\". His wife is . However, he states sometimes he hears other voices as well. They typically just ask questions. Denies command hallucinations. The AH occur even when his mood as stable. He reports \"sleeping better\". He reports 6-8 hours of sleep with his CPAP machine. Appetite is \"off and on\". Energy and motivation are low. Reports anhedonia. He reports concentration deficit which exists even when mood is stable. He was diagnosed with ADHD 6-7 years ago and has trialed adderall in the past with efficacy. He reports he rarely completes a task.  Instead, he starts a task and moves on to something else. Grades were \"okay\" and patient reports he had severe difficulties in school. Patient reports \"I've always had anxiety. \" He reports anxiety has been chronically heightened for years even before his current situation with his home being sold. He reports infrequent panic attacks in the past, but none recently. He reports his PCP prescribed his psychotropic medications until one month ago, at which time patient ran out of medications. He would like to continue his medication regimen with the exception of wellbutrin which he states did not help. He reports occasional episodes lasting 4-5 days during which he feels \"hyper\", sleeps minimally, and does housework he normally wouldn't do. He states \"I spend wildly during these episodes. \" He spends more time in the depressed phase. Family Psychiatric Hx:  Mother-bipolar disorder, anxiety     Past Psych Hx:  Previous contact: last saw psychiatrist last year  Past Psychiatric Dx: bipolar disorder, anxiety, adhd  Current Psychotropic Meds: latuda 120 mg, wellbutrin 450 mg, doxepin 25 mg qhs, trintellix 20 mg, lamotrigine   Past Psychiatric Meds:  adderall  Inpt Hospitalizations: 1 (2008)  Suicide Attempts: 0     Additional Hx:  [place]  Family dynamics: Raised by biological mother and father  Siblings: 5  Trauma: denies  Education: GED  Employment: disabled  Marital status:   Children: 2  Current living situation: lives with 2 daughters   Hx: denies  Legal Hx: denies     Substance Hx:   Caffeine: 2 cups of coffee per day and 3 coke bottles per day  Alcohol: does not drink; denies hx of abuse or dependence  Tobacco: 1/2 ppd for 35 years  Drugs: denies current or past use       Health Hx:  Med hx:  Patient Active Problem List   Diagnosis    ED (erectile dysfunction) of organic origin    S/P cardiac catheterization: 9/30/2013: Normal coronaries    GERD (gastroesophageal reflux disease)    Dysphagia s/p dilation.     CAD (coronary artery disease)    History of TIA (transient ischemic attack)    Hypertension    Fibromyalgia    Depression    History of colon polyps    Chronic back pain    ADHD (attention deficit hyperactivity disorder)    Former smoker    Peripheral neuropathy    Tarsal tunnel syndrome    Benign esophageal stricture    Hyperlipidemia    Allergic rhinitis    Migraine headache    Bipolar 1 disorder (HCC)    Pulmonary nodule    Short-segment Vásquez's esophagus    BPPV (benign paroxysmal positional vertigo)    Hypertriglyceridemia    Obstructive sleep apnea on CPAP    Morbid obesity (HCC)    Benign prostatic hyperplasia    DM2 (diabetes mellitus, type 2) (HCC)    B12 deficiency    Normocytic anemia    Closed fracture of right tibial plateau    WESLEY (nonalcoholic steatohepatitis)      Current Meds:   Current Outpatient Medications   Medication Sig Dispense Refill    VORTIoxetine HBr (TRINTELLIX) 20 MG TABS tablet TAKE 1 TABLET BY MOUTH EVERY DAY 30 tablet 3    lurasidone (LATUDA) 120 MG tablet TAKE 1 TABLET BY MOUTH ONE TIME A DAY (take with at least 350 calories) 30 tablet 5    amphetamine-dextroamphetamine (ADDERALL XR) 30 MG extended release capsule Take 1 capsule by mouth every morning for 30 days.  For concentration 30 capsule 0    lamoTRIgine (LAMICTAL) 25 MG tablet Take 1 tablet by mouth daily For mood 30 tablet 0    trospium (SANCTURA) 60 MG CP24 extended release capsule TAKE 1 CAPSULE BY MOUTH EVERY DAY 30 capsule 0    empagliflozin (JARDIANCE) 10 MG tablet Take 1 tablet by mouth daily 90 tablet 3    tamsulosin (FLOMAX) 0.4 MG capsule TAKE 1 CAPSULE BY MOUTH EVERY DAY TO FACILITATE PASSAGE OF UTERAL STONE 90 capsule 0    doxepin (SINEQUAN) 25 MG capsule Take 1 capsule by mouth nightly 30 capsule 5    omeprazole (PRILOSEC) 20 MG delayed release capsule TAKE 2 CAPSULES BY MOUTH EVERY  capsule 3    metFORMIN (GLUCOPHAGE) 1000 MG tablet TAKE 1 TABLET BY MOUTH TWO TIMES A DAY WITH MEALS 180 tablet 3    [facility] Lancets Universal 33G MISC use to test blood sugar once a day 100 each 3    Insulin Pen Needle (UNIFINE PENTIPS) 31G X 8 MM MISC Use DAILY as directed WITH INSULIN PENS-- DO NOT RE-USE needles 100 each 3    blood glucose test strips (TRUE METRIX BLOOD GLUCOSE TEST) strip check blood sugar one time daily 100 each 2    lisinopril (PRINIVIL;ZESTRIL) 40 MG tablet TAKE 1 TABLET BY MOUTH EVERY DAY 90 tablet 3    SITagliptin (JANUVIA) 100 MG tablet TAKE 1 TABLET BY MOUTH ONE TIME A DAY 90 tablet 3    aspirin (SM ASPIRIN ADULT LOW STRENGTH) 81 MG EC tablet TAKE 1 TABLET BY MOUTH EVERY DAY 90 tablet 3    amLODIPine (NORVASC) 5 MG tablet TAKE 1 TABLET BY MOUTH EVERY DAY 90 tablet 3    Handicap Placard [facility] Expires 29 NOV 2026 1 each 0    piroxicam (FELDENE) 20 MG capsule TAKE 1 CAPSULE BY MOUTH ONE TIME A DAY AS NEEDED FOR PAIN 90 capsule 3    atorvastatin (LIPITOR) 80 MG tablet TAKE 1 TABLET BY MOUTH ONE TIME A DAY 90 tablet 3    fenofibrate (TRIGLIDE) 160 MG tablet TAKE 1 TABLET BY MOUTH ONE TIME A DAY 90 tablet 3    insulin glargine (LANTUS SOLOSTAR) 100 UNIT/ML injection pen Inject 65 Units into the skin 2 times daily 117 mL 3    oxybutynin (DITROPAN) 5 MG tablet Take 1 tablet by mouth 3 times daily 90 tablet 5    butalbital-acetaminophen-caffeine (FIORICET, ESGIC) -40 MG per tablet       sildenafil (VIAGRA) 50 MG tablet Take 1 tablet by mouth daily as needed for Erectile Dysfunction Take one tablet one hour before sexual activity. 6 tablet 5    azelastine (ASTELIN) 0.1 % nasal spray Inhale 2 sprays in each nostril twice a day as needed      ARTIFICIAL TEARS 1.4 % ophthalmic solution Place 1 drop into both eyes 4 times daily  0    clotrimazole-betamethasone (LOTRISONE) 1-0.05 % cream Apply topically 2 times daily. Not to exceed 2 weeks consecutively.  1 Tube 1    rOPINIRole (REQUIP) 3 MG tablet Take 3 mg by mouth nightly      cetirizine (ZYRTEC) 10 MG tablet daily       EYE ITCH RELIEF 0.025 % ophthalmic solution Place 2 drops into both eyes as needed       propranolol (INDERAL LA) 160 MG ER capsule Take 160 mg by mouth daily       fluticasone (FLONASE) 50 MCG/ACT nasal spray 1 spray by Nasal route daily as needed. gabapentin (NEURONTIN) 800 MG tablet   Take 800 mg by mouth 4 times daily        No current facility-administered medications for this visit. Surgeries: bilateral knee surgery; \"surgery on both shoulders\", tonsillectomy  Head injury: Denies TBI/ Denies LOC   Seizures: Denies  Allergies   Allergen Reactions    Morphine Nausea And Vomiting and Rash          ROS:   Gen: Denies F/C/N/V  HEENT: Denies Vision/hearing changes/sore throat  CV: Denies CP/Palp  Pulm: Denies SOB/Cough/Wheeze  GI: Denies Abd pain  Skin: Denies Rashes/Lumps/Bruises  Neurologic: Denies changes in memory/speech/mental status. Denies h/o seizures/DTs   Psychiatric: +depression +anxiety    MSE:  Cognition - Alert and Oriented x 4  Appearance - dressed casually and appropriately for the weather  Behavior - pleasant and cooperative  Motor - no tardive dyskinesia or other EPS observed; no psychomotor agitation/retardation observed  Gait and Station - WNL, ambulates without Assistance  Speech - normal rate, rhythm, and volume  Eye Contact - maintains  Mood - \"depressed\"  Affect - restricted  Thought Content/Perceptions:   Psychosis - AH and VH as per HPI   SI/HI -  denies  Thought Process/Associations -  logical and linear  Memory - grossly intact  Insight - good  Judgment - fair (med noncompliant for past month)  Assessment:     Diagnosis Orders   1. Schizoaffective disorder, bipolar type (HCC)  VORTIoxetine HBr (TRINTELLIX) 20 MG TABS tablet    lurasidone (LATUDA) 120 MG tablet    lamoTRIgine (LAMICTAL) 25 MG tablet      2. ADHD, adult residual type  amphetamine-dextroamphetamine (ADDERALL XR) 30 MG extended release capsule      3.  Tobacco dependence due to cigarettes             Plan:  -Continue latuda 120 mg daily with at least 350 kcals for mood and psychotic symptoms. Risks including but not limited to EPS, TD, and metabolic side effects discussed. AIMS will be conducted at next in person interview.  -Discontinue wellbutrin 450 mg qam per patient request.  -Start lamotrigine 25 mg daily with plan to titrate to original dose of 150 mg bid. Patient has been without this medication for 1 month so titration must start over. Risk of Zamudio Bull Syndrome was discussed.  -Continue trintellix 20 mg daily for anxiety.  -Decrease adderall from 30 mg bid to adderall XR 30 mg qam. Decreasing the dose may improve hallucinations as well as anxiety. Patient is agreeable.  -Another provider is prescribing doxepin 25 mg qhs. Risk of serotonin syndome was discussed.    -Discussed medications with the patient and changes are as noted above with patient agreement.  -Discussed diagnosis and treatment plan with the patient  -Provided brief supportive therapy through active listening    -Patient Education: Discussed medications including indications, potential side effects, contraindications, and risks/benefits; also discussed alternative medications/treatments. Discussed the potential need for follow up laboratory studies related to medications and patient demonstrated understanding and compliance. Patient participated in medications decision making was given the opportunity to ask questions/express preferences and concerns. Patient demonstrates good understanding of medications and is compliant/in agreement with current plan. PDMP Monitoring:   Reviewed 10/6/22    Additional Comments and Follow up Plan:  -RTC in (2) weeks, sooner if needed  -Labs: n/a  -Discussed abstaining from illicit drugs/alcohol  -Patient informed to call crisis line/911 or go to ER if experiencing crisis, SI, HI, or psychosis. Farnaz Reina, was evaluated through a synchronous (real-time) audio-video encounter.  The patient (or guardian if applicable) is aware that this is a billable service, which includes applicable copays. This virtual visit was conducted with patient's (and/or legal guardian's) consent. The visit was conducted pursuant to the emergency declaration under the [address] [place] [address] authority and the [employer] General Act. Patient identification was verified, and a caregiver was present when appropriate. The patient was located in a state where the provider was licensed to provide care.       Total time spent for this encounter: 60 minutes    Electronically signed by Bella Villegas DO on 10/6/2022 at 8:57 AM     Bella Villegas DO  10/6/2022

## 2022-10-18 ENCOUNTER — PREP FOR PROCEDURE (OUTPATIENT)
Dept: UROLOGY | Age: 52
End: 2022-10-18

## 2022-10-20 ENCOUNTER — TELEMEDICINE (OUTPATIENT)
Dept: PSYCHIATRY | Age: 52
End: 2022-10-20
Payer: COMMERCIAL

## 2022-10-20 DIAGNOSIS — F17.210 TOBACCO DEPENDENCE DUE TO CIGARETTES: ICD-10-CM

## 2022-10-20 DIAGNOSIS — F90.8 ADHD, ADULT RESIDUAL TYPE: ICD-10-CM

## 2022-10-20 DIAGNOSIS — F25.0 SCHIZOAFFECTIVE DISORDER, BIPOLAR TYPE (HCC): Primary | ICD-10-CM

## 2022-10-20 PROCEDURE — G8428 CUR MEDS NOT DOCUMENT: HCPCS | Performed by: STUDENT IN AN ORGANIZED HEALTH CARE EDUCATION/TRAINING PROGRAM

## 2022-10-20 PROCEDURE — 3017F COLORECTAL CA SCREEN DOC REV: CPT | Performed by: STUDENT IN AN ORGANIZED HEALTH CARE EDUCATION/TRAINING PROGRAM

## 2022-10-20 PROCEDURE — G8417 CALC BMI ABV UP PARAM F/U: HCPCS | Performed by: STUDENT IN AN ORGANIZED HEALTH CARE EDUCATION/TRAINING PROGRAM

## 2022-10-20 PROCEDURE — 4004F PT TOBACCO SCREEN RCVD TLK: CPT | Performed by: STUDENT IN AN ORGANIZED HEALTH CARE EDUCATION/TRAINING PROGRAM

## 2022-10-20 PROCEDURE — G8484 FLU IMMUNIZE NO ADMIN: HCPCS | Performed by: STUDENT IN AN ORGANIZED HEALTH CARE EDUCATION/TRAINING PROGRAM

## 2022-10-20 PROCEDURE — 99214 OFFICE O/P EST MOD 30 MIN: CPT | Performed by: STUDENT IN AN ORGANIZED HEALTH CARE EDUCATION/TRAINING PROGRAM

## 2022-10-20 RX ORDER — LAMOTRIGINE 25 MG/1
25 TABLET ORAL 2 TIMES DAILY
Qty: 60 TABLET | Refills: 0 | Status: SHIPPED | OUTPATIENT
Start: 2022-10-20 | End: 2022-11-03 | Stop reason: DRUGHIGH

## 2022-10-20 NOTE — PROGRESS NOTES
[address]  [facility] [address]  Dept: [phone]  Loc: [phone]          Buzzy Rubinstein  1970  10/20/2022     F/U appt    TELEHEALTH EVALUATION -- Audio/Visual (During VDVPZ-50 public health emergency)    Patient location: home  Physician location: home, [facility]  This virtual visit was conducted via interactive, real-time video. DSM:  Dx:    ICD-10-CM    1. Schizoaffective disorder, bipolar type (HCC)  F25.0       2. ADHD, adult residual type  F90.8       3. Tobacco dependence due to cigarettes  F17.210            Interim Hx:  Chief Complaint:   Chief Complaint   Patient presents with    Follow-up    Depression    Anxiety      HPI: Patient is a 45year old male who presents for follow up. Mood is reported as \"better\". He rates depression today as 5/10, 10 being the highest, which he attributes to \"life things\". Denies active and passive SI and HI. He reports AH in the form of his wife's voice telling him \"everything is going to be okay and not to worry\". He denies command hallucinations and states \"I actually find it comforting. \" He reports VH in the form of \"people\". These occur during the day. He reports sleeping 6-8 hours per night. Appetite is regular. He is eating a full meal with his latuda. Reports medication compliance and denies side effects. He reports \"I'm anxious all the time. \" He reports the anxiety is \"a little\" better. He is agreeable to increasing lamotrigine to 25 mg bid. He does not wish to make additional medication changes today. ROS:   Gen: Denies F/C/N/V  HEENT: Denies Vision/hearing changes/sore throat  CV: Denies CP/Palp  Pulm: Denies SOB/Cough/Wheeze  GI: Denies Abd pain  Skin: Denies Rashes/Lumps/Bruises  Neurologic: Denies changes in memory/speech/mental status.  Denies h/o seizures/DTs   Psychiatric: +depression +anxiety     MSE:  Cognition - Alert and Oriented x 4  Appearance - dressed casually and appropriately for the weather  Behavior - pleasant and cooperative  Motor - no tardive dyskinesia or other EPS observed; no psychomotor agitation/retardation observed  Gait and Station - WNL, ambulates without Assistance  Speech - normal rate, rhythm, and volume  Eye Contact - maintains  Mood - \"better\"  Affect - restricted  Thought Content/Perceptions:   Psychosis - AH and VH as per HPI   SI/HI -  denies  Thought Process/Associations -  logical and linear  Memory - grossly intact  Insight - good  Judgment - fair (med noncompliant for past month)       Diagnosis Orders   1. Schizoaffective disorder, bipolar type ([facility] [address]        2. ADHD, adult residual type        3. Tobacco dependence due to cigarettes              Plan:  -Continue latuda 120 mg daily with at least 350 kcals for mood and psychotic symptoms. Risks including but not limited to EPS, TD, and metabolic side effects discussed. AIMS will be conducted at next in person interview.  -Discontinued wellbutrin 450 mg qam per patient request.  -Increase lamotrigine 25 mg daily to 25 mg bid with plan to titrate to original dose of 150 mg bid. Patient has been without this medication for 1 month so titration must start over. Risk of Alvah Barrera Syndrome was discussed.  -Continue trintellix 20 mg daily for anxiety.  -Continue adderall XR 30 mg qam. Decreasing the dose may improve hallucinations as well as anxiety. Patient is agreeable.  -Another provider is prescribing doxepin 25 mg qhs. Risk of serotonin syndome was discussed.     -Discussed medications with the patient and changes are as noted above with patient agreement.  -Discussed diagnosis and treatment plan with the patient  -Provided brief supportive therapy through active listening     -Patient Education: Discussed medications including indications, potential side effects, contraindications, and risks/benefits; also discussed alternative medications/treatments.   Discussed the potential need for follow up laboratory studies related to medications and patient demonstrated understanding and compliance. Patient participated in medications decision making was given the opportunity to ask questions/express preferences and concerns. Patient demonstrates good understanding of medications and is compliant/in agreement with current plan. PDMP Monitoring:   Reviewed 10/6/22     Additional Comments and Follow up Plan:  -RTC in (2) weeks, sooner if needed  -Labs: n/a  -Discussed abstaining from illicit drugs/alcohol  -Patient informed to call crisis line/911 or go to ER if experiencing crisis, SI, HI, or psychosis. Farnaz Latosha, was evaluated through a synchronous (real-time) audio-video encounter. The patient (or guardian if applicable) is aware that this is a billable service, which includes applicable copays. This virtual visit was conducted with patient's (and/or legal guardian's) consent. The visit was conducted pursuant to the emergency declaration under the [address] [place] [address], [address] [place] [address] authority and the [employer] General Act. Patient identification was verified, and a caregiver was present when appropriate. The patient was located in a state where the provider was licensed to provide care.       Total time spent for this encounter: 30 minutes    Electronically signed by Aurora Gandara DO on 10/20/2022 at 9:31 AM       Aurora Gandara DO  10/20/2022

## 2022-10-21 RX ORDER — SODIUM CHLORIDE 9 MG/ML
INJECTION, SOLUTION INTRAVENOUS CONTINUOUS
Status: CANCELLED | OUTPATIENT
Start: 2022-10-21

## 2022-10-24 ENCOUNTER — TELEPHONE (OUTPATIENT)
Dept: UROLOGY | Age: 52
End: 2022-10-24

## 2022-10-24 NOTE — TELEPHONE ENCOUNTER
Per [facility] website no prior authorization is required for CPT code 25995 if the provider is a participating provider.

## 2022-10-25 ENCOUNTER — NURSE ONLY (OUTPATIENT)
Dept: LAB | Age: 52
End: 2022-10-25

## 2022-10-25 ENCOUNTER — HOSPITAL ENCOUNTER (OUTPATIENT)
Age: 52
Discharge: HOME | End: 2022-10-25
Payer: COMMERCIAL

## 2022-10-25 DIAGNOSIS — N40.1 BENIGN PROSTATIC HYPERPLASIA WITH LOWER URINARY TRACT SYMPTOMS, SYMPTOM DETAILS UNSPECIFIED: ICD-10-CM

## 2022-10-25 DIAGNOSIS — Z12.5 SPECIAL SCREENING FOR MALIGNANT NEOPLASM OF PROSTATE: ICD-10-CM

## 2022-10-25 DIAGNOSIS — Z01.818 PRE-OP TESTING: ICD-10-CM

## 2022-10-25 DIAGNOSIS — E11.69 TYPE 2 DIABETES MELLITUS WITH OTHER SPECIFIED COMPLICATION, WITH LONG-TERM CURRENT USE OF INSULIN (HCC): ICD-10-CM

## 2022-10-25 DIAGNOSIS — Z79.4 TYPE 2 DIABETES MELLITUS WITH OTHER SPECIFIED COMPLICATION, WITH LONG-TERM CURRENT USE OF INSULIN (HCC): ICD-10-CM

## 2022-10-25 LAB
ANION GAP SERPL CALCULATED.3IONS-SCNC: 13 MEQ/L (ref 8–16)
BASOPHILS # BLD: 0.8 %
BASOPHILS ABSOLUTE: 0 THOU/MM3 (ref 0–0.1)
BILIRUBIN URINE: NEGATIVE
BLOOD, URINE: NEGATIVE
BUN BLDV-MCNC: 10 MG/DL (ref 7–22)
CALCIUM SERPL-MCNC: 9.3 MG/DL (ref 8.5–10.5)
CHARACTER, URINE: CLEAR
CHLORIDE BLD-SCNC: 98 MEQ/L (ref 98–111)
CO2: 23 MEQ/L (ref 23–33)
COLOR: YELLOW
CREAT SERPL-MCNC: 1.1 MG/DL (ref 0.4–1.2)
CREATININE, URINE: 71.8 MG/DL
EKG ATRIAL RATE: 86 BPM
EKG P AXIS: 49 DEGREES
EKG P-R INTERVAL: 152 MS
EKG Q-T INTERVAL: 368 MS
EKG QRS DURATION: 102 MS
EKG QTC CALCULATION (BAZETT): 440 MS
EKG R AXIS: -5 DEGREES
EKG T AXIS: 36 DEGREES
EKG VENTRICULAR RATE: 86 BPM
EOSINOPHIL # BLD: 2.3 %
EOSINOPHILS ABSOLUTE: 0.1 THOU/MM3 (ref 0–0.4)
ERYTHROCYTE [DISTWIDTH] IN BLOOD BY AUTOMATED COUNT: 13.8 % (ref 11.5–14.5)
ERYTHROCYTE [DISTWIDTH] IN BLOOD BY AUTOMATED COUNT: 47.4 FL (ref 35–45)
GFR SERPL CREATININE-BSD FRML MDRD: > 60 ML/MIN/1.73M2
GLUCOSE BLD-MCNC: 316 MG/DL (ref 70–108)
GLUCOSE URINE: >= 1000 MG/DL
HCT VFR BLD CALC: 41.7 % (ref 42–52)
HEMOGLOBIN: 13.1 GM/DL (ref 14–18)
IMMATURE GRANS (ABS): 0.06 THOU/MM3 (ref 0–0.07)
IMMATURE GRANULOCYTES: 1.3 %
KETONES, URINE: NEGATIVE
LEUKOCYTE ESTERASE, URINE: NEGATIVE
LYMPHOCYTES # BLD: 25.8 %
LYMPHOCYTES ABSOLUTE: 1.2 THOU/MM3 (ref 1–4.8)
MCH RBC QN AUTO: 29.8 PG (ref 26–33)
MCHC RBC AUTO-ENTMCNC: 31.4 GM/DL (ref 32.2–35.5)
MCV RBC AUTO: 94.8 FL (ref 80–94)
MICROALBUMIN UR-MCNC: < 1.2 MG/DL
MICROALBUMIN/CREAT UR-RTO: 17 MG/G (ref 0–30)
MONOCYTES # BLD: 6.7 %
MONOCYTES ABSOLUTE: 0.3 THOU/MM3 (ref 0.4–1.3)
NITRITE, URINE: NEGATIVE
NUCLEATED RED BLOOD CELLS: 0 /100 WBC
PH UA: 5.5 (ref 5–9)
PLATELET # BLD: 115 THOU/MM3 (ref 130–400)
PMV BLD AUTO: 12.2 FL (ref 9.4–12.4)
POTASSIUM SERPL-SCNC: 4.3 MEQ/L (ref 3.5–5.2)
PROSTATE SPECIFIC ANTIGEN: 0.22 NG/ML (ref 0–1)
PROTEIN UA: NEGATIVE
RBC # BLD: 4.4 MILL/MM3 (ref 4.7–6.1)
SEG NEUTROPHILS: 63.1 %
SEGMENTED NEUTROPHILS ABSOLUTE COUNT: 3 THOU/MM3 (ref 1.8–7.7)
SODIUM BLD-SCNC: 134 MEQ/L (ref 135–145)
SPECIFIC GRAVITY, URINE: > 1.03 (ref 1–1.03)
UROBILINOGEN, URINE: 0.2 EU/DL (ref 0–1)
WBC # BLD: 4.8 THOU/MM3 (ref 4.8–10.8)

## 2022-10-25 PROCEDURE — 93005 ELECTROCARDIOGRAM TRACING: CPT

## 2022-10-25 PROCEDURE — 93010 ELECTROCARDIOGRAM REPORT: CPT | Performed by: INTERNAL MEDICINE

## 2022-10-26 ENCOUNTER — TELEPHONE (OUTPATIENT)
Dept: FAMILY MEDICINE CLINIC | Age: 52
End: 2022-10-26

## 2022-10-26 NOTE — TELEPHONE ENCOUNTER
----- Message from Jordan Hastings, DO sent at 10/25/2022  6:41 PM EDT -----  Please let pt know that PSA and urine test are normal  Let me know if questions, thanks!

## 2022-10-26 NOTE — PROGRESS NOTES
PAT call attempted, patient unavailable, left message to please call us back at your earliest convenience; [phone]

## 2022-10-26 NOTE — PROGRESS NOTES
PAT Call Date:  10/26   Surgery Date: 10/31    Surgeon: Milly Campbell   Surgery: cysto urolift    Is patient from a nursing home? Yes   Any Isolation Precautions? Yes   Any Pacemaker or ICD? Yes If YES, has it been checked recently and where? Has the rep been notified? Yes     On Snapboard?  Yes  IRISH/CPAP   Hard Copy on Chart  In EPIC Pending/Notes   Consent -   Within 30 days; signed, dated & timed by patient and physician     [x] On Arrival     [] Blood    Additional Consent Needs:     H&P - Within 30 days    [] Physician To Do     [] H&P Update - If H&P is older then 24 hours    Clearance -  Medical, Cardiac, Pulmonary, etc.       Orders - Signed and Dated    Copy Sent to Pharm []  10/18  [] Physician To Do    Labs - Within 3 months   10/25  [x] CBC -OK   [x] BMP GLUCOSE 316   [x] GFR-OK   [] INR    [] PTT    [x] Urine -OK   [] Liver Enzymes    [] Kidney Function    [] MRSA Nasal   [] MSSA      Others:    Radiology Studies-   Within 1 year      10/5  [] Chest X-Ray   [] MRI    [x] CT CHEST-OK   EKG -   Within 1 year, unless hx of HTN  10/25 NSR   Cardiac Workup -   Stress Test, Echo, Cath within 18 months    [] Cath                                [] Stress Test                      [] Echo    [] Holter Monitor    [] AIMEE

## 2022-10-27 NOTE — PROGRESS NOTES
Follow all instructions given by your physician  NPO after midnight   Sips of water am of surgery with allowed medications  Bring insurance info and 's license  Wear comfortable clean, loose fitting clothing  No jewelry or contact lenses to be worn day of surgery   Case for glasses. Shower night before and morning of surgery with a liquid antibacterial soap, dry with fresh clean towel; no lotions, creams or powder. Clean sheets and pillow case on bed night before surgery  Bring medications in original bottles  Bring CPAP/BIPAP machine ( you may be charged if one is needed in recovery room )   needed at discharge and someone over 18 to stay with you for 24 hours overnight (surgery may be cancelled if you don't have this)  [facility] to [facility] on 2nd floor  If you would become ill prior to surgery, please call the surgeon  May have a visitor with you, we request that you limit to 2 visitors in pre-op area  Please bring and wear mask  Call [phone] for any questions  Covid questionnaire Complete; Patient negative for symptoms or exposure. See documentation.

## 2022-10-31 ENCOUNTER — ANESTHESIA (OUTPATIENT)
Dept: OPERATING ROOM | Age: 52
End: 2022-10-31
Payer: COMMERCIAL

## 2022-10-31 ENCOUNTER — HOSPITAL ENCOUNTER (OUTPATIENT)
Age: 52
Setting detail: OUTPATIENT SURGERY
Discharge: HOME | End: 2022-10-31
Attending: UROLOGY | Admitting: UROLOGY
Payer: COMMERCIAL

## 2022-10-31 ENCOUNTER — ANESTHESIA EVENT (OUTPATIENT)
Dept: OPERATING ROOM | Age: 52
End: 2022-10-31
Payer: COMMERCIAL

## 2022-10-31 VITALS
WEIGHT: 259 LBS | RESPIRATION RATE: 20 BRPM | OXYGEN SATURATION: 95 % | HEIGHT: 67 IN | SYSTOLIC BLOOD PRESSURE: 120 MMHG | BODY MASS INDEX: 40.65 KG/M2 | TEMPERATURE: 97.2 F | DIASTOLIC BLOOD PRESSURE: 64 MMHG | HEART RATE: 82 BPM

## 2022-10-31 LAB — GLUCOSE BLD-MCNC: 304 MG/DL (ref 70–108)

## 2022-10-31 PROCEDURE — 2709999900 HC NON-CHARGEABLE SUPPLY: Performed by: UROLOGY

## 2022-10-31 PROCEDURE — 2500000003 HC RX 250 WO HCPCS

## 2022-10-31 PROCEDURE — 3600000002 HC SURGERY LEVEL 2 BASE: Performed by: UROLOGY

## 2022-10-31 PROCEDURE — 6360000002 HC RX W HCPCS

## 2022-10-31 PROCEDURE — 3600000012 HC SURGERY LEVEL 2 ADDTL 15MIN: Performed by: UROLOGY

## 2022-10-31 PROCEDURE — 3700000001 HC ADD 15 MINUTES (ANESTHESIA): Performed by: UROLOGY

## 2022-10-31 PROCEDURE — 6370000000 HC RX 637 (ALT 250 FOR IP)

## 2022-10-31 PROCEDURE — 7100000010 HC PHASE II RECOVERY - FIRST 15 MIN: Performed by: UROLOGY

## 2022-10-31 PROCEDURE — 6370000000 HC RX 637 (ALT 250 FOR IP): Performed by: UROLOGY

## 2022-10-31 PROCEDURE — 3700000000 HC ANESTHESIA ATTENDED CARE: Performed by: UROLOGY

## 2022-10-31 PROCEDURE — 7100000011 HC PHASE II RECOVERY - ADDTL 15 MIN: Performed by: UROLOGY

## 2022-10-31 PROCEDURE — 2580000003 HC RX 258: Performed by: UROLOGY

## 2022-10-31 PROCEDURE — 6360000002 HC RX W HCPCS: Performed by: UROLOGY

## 2022-10-31 PROCEDURE — 82948 REAGENT STRIP/BLOOD GLUCOSE: CPT

## 2022-10-31 PROCEDURE — C1889 IMPLANT/INSERT DEVICE, NOC: HCPCS | Performed by: UROLOGY

## 2022-10-31 DEVICE — SYSTEM UROLIFT2 W/ IMPL DEL DEV FOR TREAT OF URIN OUTFLO: Type: IMPLANTABLE DEVICE | Status: FUNCTIONAL

## 2022-10-31 DEVICE — DEVICE IMPLANT UROLIFT2 FOR TREAT OF URIN OUTFLO: Type: IMPLANTABLE DEVICE | Status: FUNCTIONAL

## 2022-10-31 RX ORDER — MIDAZOLAM HYDROCHLORIDE 1 MG/ML
INJECTION INTRAMUSCULAR; INTRAVENOUS PRN
Status: DISCONTINUED | OUTPATIENT
Start: 2022-10-31 | End: 2022-10-31 | Stop reason: SDUPTHER

## 2022-10-31 RX ORDER — CIPROFLOXACIN 500 MG/1
500 TABLET, FILM COATED ORAL 2 TIMES DAILY
Qty: 10 TABLET | Refills: 0 | Status: SHIPPED | OUTPATIENT
Start: 2022-10-31 | End: 2022-11-05

## 2022-10-31 RX ORDER — PHENAZOPYRIDINE HYDROCHLORIDE 200 MG/1
200 TABLET, FILM COATED ORAL ONCE
Status: COMPLETED | OUTPATIENT
Start: 2022-10-31 | End: 2022-10-31

## 2022-10-31 RX ORDER — PROPOFOL 10 MG/ML
INJECTION, EMULSION INTRAVENOUS PRN
Status: DISCONTINUED | OUTPATIENT
Start: 2022-10-31 | End: 2022-10-31 | Stop reason: SDUPTHER

## 2022-10-31 RX ORDER — PHENAZOPYRIDINE HYDROCHLORIDE 100 MG/1
100 TABLET, FILM COATED ORAL 3 TIMES DAILY PRN
Qty: 21 TABLET | Refills: 0 | Status: SHIPPED | OUTPATIENT
Start: 2022-10-31 | End: 2022-11-07

## 2022-10-31 RX ORDER — LIDOCAINE HYDROCHLORIDE 20 MG/ML
INJECTION, SOLUTION EPIDURAL; INFILTRATION; INTRACAUDAL; PERINEURAL PRN
Status: DISCONTINUED | OUTPATIENT
Start: 2022-10-31 | End: 2022-10-31 | Stop reason: SDUPTHER

## 2022-10-31 RX ORDER — SODIUM CHLORIDE 9 MG/ML
INJECTION, SOLUTION INTRAVENOUS CONTINUOUS
Status: DISCONTINUED | OUTPATIENT
Start: 2022-10-31 | End: 2022-10-31 | Stop reason: HOSPADM

## 2022-10-31 RX ORDER — ONDANSETRON 2 MG/ML
INJECTION INTRAMUSCULAR; INTRAVENOUS PRN
Status: DISCONTINUED | OUTPATIENT
Start: 2022-10-31 | End: 2022-10-31 | Stop reason: SDUPTHER

## 2022-10-31 RX ORDER — KETAMINE HCL IN NACL, ISO-OSM 100MG/10ML
SYRINGE (ML) INJECTION PRN
Status: DISCONTINUED | OUTPATIENT
Start: 2022-10-31 | End: 2022-10-31 | Stop reason: SDUPTHER

## 2022-10-31 RX ADMIN — Medication 10 MG: at 07:56

## 2022-10-31 RX ADMIN — ONDANSETRON 4 MG: 2 INJECTION INTRAMUSCULAR; INTRAVENOUS at 07:56

## 2022-10-31 RX ADMIN — MIDAZOLAM 2 MG: 1 INJECTION INTRAMUSCULAR; INTRAVENOUS at 07:37

## 2022-10-31 RX ADMIN — CEFAZOLIN 2000 MG: 10 INJECTION, POWDER, FOR SOLUTION INTRAVENOUS at 07:40

## 2022-10-31 RX ADMIN — PHENAZOPYRIDINE HYDROCHLORIDE 200 MG: 200 TABLET ORAL at 09:30

## 2022-10-31 RX ADMIN — Medication 20 MG: at 07:37

## 2022-10-31 RX ADMIN — INSULIN HUMAN 8 UNITS: 100 INJECTION, SOLUTION PARENTERAL at 07:28

## 2022-10-31 RX ADMIN — Medication 10 MG: at 07:53

## 2022-10-31 RX ADMIN — PROPOFOL 30 MG: 10 INJECTION, EMULSION INTRAVENOUS at 07:45

## 2022-10-31 RX ADMIN — Medication 10 MG: at 07:43

## 2022-10-31 RX ADMIN — PROPOFOL 80 MCG/KG/MIN: 10 INJECTION, EMULSION INTRAVENOUS at 07:37

## 2022-10-31 RX ADMIN — PROPOFOL 40 MG: 10 INJECTION, EMULSION INTRAVENOUS at 07:50

## 2022-10-31 RX ADMIN — LIDOCAINE HYDROCHLORIDE 100 MG: 20 INJECTION, SOLUTION EPIDURAL; INFILTRATION; INTRACAUDAL; PERINEURAL at 07:37

## 2022-10-31 RX ADMIN — SODIUM CHLORIDE: 9 INJECTION, SOLUTION INTRAVENOUS at 07:21

## 2022-10-31 RX ADMIN — PROPOFOL 20 MG: 10 INJECTION, EMULSION INTRAVENOUS at 07:57

## 2022-10-31 RX ADMIN — PROPOFOL 30 MG: 10 INJECTION, EMULSION INTRAVENOUS at 07:37

## 2022-10-31 ASSESSMENT — PAIN DESCRIPTION - DESCRIPTORS: DESCRIPTORS: ACHING

## 2022-10-31 ASSESSMENT — PAIN - FUNCTIONAL ASSESSMENT: PAIN_FUNCTIONAL_ASSESSMENT: 0-10

## 2022-10-31 ASSESSMENT — PAIN SCALES - GENERAL
PAINLEVEL_OUTOF10: 5
PAINLEVEL_OUTOF10: 6
PAINLEVEL_OUTOF10: 0

## 2022-10-31 NOTE — ANESTHESIA POSTPROCEDURE EVALUATION
Department of Anesthesiology  Postprocedure Note    Patient: Dara Jett  MRN: [id number]  YOB: 1970  Date of evaluation: 10/31/2022      Procedure Summary     Date: 10/31/22 Room / Location: [facility] / [address]    Anesthesia Start: 9741 Anesthesia Stop: Romilda Bergamo    Procedure: Cystoscopy Urolift Diagnosis:       Benign prostatic hyperplasia with lower urinary tract symptoms, symptom details unspecified      (Benign prostatic hyperplasia with lower urinary tract symptoms, symptom details unspecified [N40.1])    Surgeons: Amberly Estrada MD Responsible Provider: Heike Whelan DO    Anesthesia Type: MAC ASA Status: 3          Anesthesia Type: No value filed.     Maira Phase I: Maira Score: 10    Maira Phase II: Maira Score: 10      Anesthesia Post Evaluation    Patient location during evaluation: bedside  Patient participation: complete - patient participated  Level of consciousness: awake  Airway patency: patent  Nausea & Vomiting: no vomiting and no nausea  Cardiovascular status: hemodynamically stable  Respiratory status: acceptable  Hydration status: stable

## 2022-10-31 NOTE — PROGRESS NOTES
Patient return to [facility] room 8. VS stable. Pain level assessed. Updated patient and family on discharge planning. Patient has snack and drink. No further questions or concerns voiced at this time. Side rails up times 2. Call light in reach.

## 2022-10-31 NOTE — PROGRESS NOTES
Patient and his family given discharge instructions. Verbalized understanding. Patient left to get dress and family instructed to go down to get car. While this nurse was getting vitals in another room, he walked out of his room to walk outside. LORNA Vargas told him we needed to take him down in a wheelchair and offered. He said he wanted to walk. Bubba Zuniga,  also offered to take him down but he refused again. Patient was steady while walking with his family.

## 2022-10-31 NOTE — PROGRESS NOTES
Patient is very sleepy still patient need to go to the bathroom but when I tried to get him up he stumbled and almost fell. I assisted patient with a urinal and helped back in bed. Both side rails are up and call light in reach.

## 2022-10-31 NOTE — H&P
[facility]  Urology H&P Note     Patient:  Ike Dewitt  MRN: [id number]  YOB: 1970    ATTENDING: Astrid Connor MD     CHIEF COMPLAINT:  BPH with LUTs    HISTORY OF PRESENT ILLNESS:   The patient is a 45 y.o. male who presents with BPH    Patient's old records, notes and chart reviewed and summarized above. Past Medical History:    Past Medical History:   Diagnosis Date    ADHD (attention deficit hyperactivity disorder)     Allergic rhinitis     Benign esophageal stricture     s/p dilation may 2014    Benign prostatic hyperplasia     Bipolar 1 disorder (HCC)     CAD (coronary artery disease)     Unclear history, cath 2013 with patent arteries. Records pending. Chronic back pain     Depression     DM2 (diabetes mellitus, type 2) ([facility] [address]     Dysfunctional voiding of urine 6/20/2013    Medications he changes to follow,  Adding ditropan  to the present schedule     ED (erectile dysfunction) of organic origin 5/1/2013    Fibromyalgia     Former smoker     GERD (gastroesophageal reflux disease)     History of colon polyps     Pt unsure when last colo. History of TIA (transient ischemic attack)     x2     Hyperlipidemia     Hypertension     Hypertriglyceridemia 10/27/2015    Migraine headache     Morbid obesity ([facility] [address]     Morbid obesity with BMI of 40.0-44.9, adult (HCC)     WESLEY (nonalcoholic steatohepatitis)     Obstructive sleep apnea on CPAP 11/3/2015    Peripheral neuropathy     Pulmonary nodule     stable >2 years, no further w/u required. Rotator cuff injury     Left side    S/P cardiac catheterization: 9/30/2013: Normal coronaries 9/30/2013 9/30/2013: Normal coronaries. Radial approach.  Dr. Curt Sullivan    Short-segment Vásquez's esophagus 6/17/2015    Tarsal tunnel syndrome     Bilateral       Past Surgical History:    Past Surgical History:   Procedure Laterality Date    CARDIAC CATHETERIZATION  10/2013    9/30/2013: Normal coronaries. Radial approach. Dr. Leeroy Castleman    COLONOSCOPY  2008, 2012    KNEE SURGERY Left 2012    ROTATOR CUFF REPAIR Left 2013    SHOULDER SURGERY Bilateral 2011    SINUS SURGERY  2004    [place] occ. TONSILLECTOMY  2002    TYMPANOSTOMY TUBE PLACEMENT      UPPER GASTROINTESTINAL ENDOSCOPY  2008, 2008 with dilation, 2012    UPPER GASTROINTESTINAL ENDOSCOPY  2013    UPPER GASTROINTESTINAL ENDOSCOPY  MAY 2014, 2017    Dilation       Medications Prior to Admission:   Prior to Admission medications    Medication Sig Start Date End Date Taking? Authorizing Provider   lamoTRIgine (LAMICTAL) 25 MG tablet Take 1 tablet by mouth 2 times daily  Patient taking differently: Take 50 mg by mouth 2 times daily 10/20/22   Leticia Bazan DO   VORTIoxetine HBr (TRINTELLIX) 20 MG TABS tablet TAKE 1 TABLET BY MOUTH EVERY DAY  Patient taking differently: nightly TAKE 1 TABLET BY MOUTH EVERY DAY 10/6/22   Leticia Bazan DO   lurasidone (LATUDA) 120 MG tablet TAKE 1 TABLET BY MOUTH ONE TIME A DAY (take with at least 350 calories) 10/6/22   Leticia Bazan DO   amphetamine-dextroamphetamine (ADDERALL XR) 30 MG extended release capsule Take 1 capsule by mouth every morning for 30 days.  For concentration 10/6/22 11/5/22  Leticia Bazan DO   trospium (SANCTURA) 60 MG CP24 extended release capsule TAKE 1 CAPSULE BY MOUTH EVERY DAY  Patient taking differently: 60 mg nightly 9/1/22   Sully Culp PA-C   empagliflozin (JARDIANCE) 10 MG tablet Take 1 tablet by mouth daily 8/23/22   Shirlene Lanes, DO   tamsulosin (FLOMAX) 0.4 MG capsule TAKE 1 CAPSULE BY MOUTH EVERY DAY TO FACILITATE PASSAGE OF UTERAL STONE  Patient taking differently: Take 0.4 mg by mouth nightly 8/8/22   MIGNON Ponce CNP   doxepin (SINEQUAN) 25 MG capsule Take 1 capsule by mouth nightly 7/13/22   Ambar Baker PA-C   omeprazole (PRILOSEC) 20 MG delayed release capsule TAKE 2 CAPSULES BY MOUTH EVERY DAY  Patient taking differently: Take 40 mg by mouth Daily 4/5/22   [facility], DO   metFORMIN (GLUCOPHAGE) 1000 MG tablet TAKE 1 TABLET BY MOUTH TWO TIMES A DAY WITH MEALS 3/6/22   [facility], DO   [employer] Lancets Universal 33G MISC use to test blood sugar once a day 2/23/22   [facility], DO   Insulin Pen Needle (UNIFINE PENTIPS) 31G X 8 MM MISC Use DAILY as directed WITH INSULIN PENS-- DO NOT RE-USE needles 2/10/22   [facility], DO   blood glucose test strips (TRUE METRIX BLOOD GLUCOSE TEST) strip check blood sugar one time daily 2/10/22   [facility], DO   lisinopril (PRINIVIL;ZESTRIL) 40 MG tablet TAKE 1 TABLET BY MOUTH EVERY DAY 1/31/22   [facility], DO   SITagliptin (JANUVIA) 100 MG tablet TAKE 1 TABLET BY MOUTH ONE TIME A DAY  Patient taking differently: Take 100 mg by mouth nightly TAKE 1 TABLET BY MOUTH ONE TIME A DAY 1/20/22   [facility], DO   aspirin (SM ASPIRIN ADULT LOW STRENGTH) 81 MG EC tablet TAKE 1 TABLET BY MOUTH EVERY DAY 1/7/22   [facility], DO   amLODIPine (NORVASC) 5 MG tablet TAKE 1 TABLET BY MOUTH EVERY DAY 12/6/21   [facility], DO   Handicap Placard [facility] Expires 29 NOV 2026 11/29/21   [facility], DO   piroxicam (FELDENE) 20 MG capsule TAKE 1 CAPSULE BY MOUTH ONE TIME A DAY AS NEEDED FOR PAIN 11/10/21   [facility], DO   atorvastatin (LIPITOR) 80 MG tablet TAKE 1 TABLET BY MOUTH ONE TIME A DAY  Patient taking differently: nightly TAKE 1 TABLET BY MOUTH ONE TIME A DAY 11/8/21   [facility], DO   fenofibrate (TRIGLIDE) 160 MG tablet TAKE 1 TABLET BY MOUTH ONE TIME A DAY 11/8/21   [facility], DO   insulin glargine (LANTUS SOLOSTAR) 100 UNIT/ML injection pen Inject 65 Units into the skin 2 times daily 10/22/21   [facility], DO   butalbital-acetaminophen-caffeine (FIORICET, ESGIC) -86 MG per tablet Take 1 tablet by mouth every 6 hours as needed 5/25/21   Shmuel Irene MD   sildenafil (VIAGRA) 50 MG tablet Take 1 tablet by mouth daily as needed for Erectile Dysfunction Take one tablet one hour before sexual activity. 20   Mari Montez MD   azelastine (ASTELIN) 0.1 % nasal spray Inhale 2 sprays in each nostril twice a day as needed 20   Stepan Caballero   ARTIFICIAL TEARS 1.4 % ophthalmic solution Place 1 drop into both eyes 4 times daily 3/29/19   Historical Provider, MD   clotrimazole-betamethasone (LOTRISONE) 1-0.05 % cream Apply topically 2 times daily. Not to exceed 2 weeks consecutively. 3/26/19   Mari Montez MD   rOPINIRole (REQUIP) 3 MG tablet Take 3 mg by mouth nightly    Historical Provider, MD   cetirizine (ZYRTEC) 10 MG tablet Take 10 mg by mouth daily 9/25/15   Historical Provider, MD   EYE [facility] 0.025 % ophthalmic solution Place 2 drops into both eyes as needed  9/25/15   Historical Provider, MD   propranolol (INDERAL LA) 160 MG ER capsule Take 160 mg by mouth daily  9/12/15   Historical Provider, MD   fluticasone (FLONASE) 50 MCG/ACT nasal spray 1 spray by Nasal route daily as needed. Historical Provider, MD   gabapentin (NEURONTIN) 800 MG tablet   Take 800 mg by mouth 4 times daily     Historical Provider, MD       Allergies:  Morphine    Social History:    Social History     Socioeconomic History    Marital status:       Spouse name: Not on file    Number of children: Not on file    Years of education: Not on file    Highest education level: Not on file   Occupational History    Not on file   Tobacco Use    Smoking status: Some Days     Packs/day: 0.25     Years: 30.00     Pack years: 7.50     Types: Cigarettes     Start date: 1984     Last attempt to quit: 2018     Years since quittin.4    Smokeless tobacco: Never   Vaping Use    Vaping Use: Never used   Substance and Sexual Activity    Alcohol use: No     Alcohol/week: 0.0 standard drinks    Drug use: No    Sexual activity: Never   Other Topics Concern    Not on file   Social History Narrative    Not on file     Social Determinants of Health     Financial Resource Strain: Low Risk     Difficulty of Paying Living Expenses: Not hard at all   Food Insecurity: No Food Insecurity    Worried About Running Out of Food in the Last Year: Never true    Ran Out of Food in the Last Year: Never true   Transportation Needs: Not on file   Physical Activity: Not on file   Stress: Not on file   Social Connections: Not on file   Intimate Partner Violence: Not on file   Housing Stability: Not on file       Family History:    Family History   Problem Relation Age of Onset    Cancer Father 77        lung     Rheum Arthritis Father     Colon Cancer Maternal Aunt 61    Cancer Maternal Aunt 76        \"throat\"    Colon Cancer Maternal Uncle 48    Cancer Maternal Uncle 60        pancreatic    Cancer Paternal Aunt 52        brain    Colon Cancer Paternal Uncle 72    Cancer Paternal Uncle 61        hepatic     Prostate Cancer Paternal Uncle 61    Cancer Maternal Uncle 79        pancreatic     Ovarian Cancer Paternal Aunt 48    Rheum Arthritis Mother     Pancreatic Cancer Mother     Brain Cancer Brother        REVIEW OF SYSTEMS:  All systems reviewed and negative except for that already noted in the HPI. Physical Exam:      Patient Vitals for the past 24 hrs:   BP Temp Temp src Pulse Resp SpO2 Height Weight   10/31/22 0655 -- -- -- -- -- -- 5' 7\" (1.702 m) 259 lb (117.5 kg)   10/31/22 0649 (!) 143/70 97.2 °F (36.2 °C) Temporal 83 16 96 % -- --     Constitutional: Patient in no acute distress; Neuro: alert and oriented to person place and time. Psych: Mood and affect normal.  Skin: Normal  Lungs: Respiratory effort normal  Cardiovascular:  Normal peripheral pulses  Abdomen: Soft, non-tender, non-distended with no CVA, flank pain, hepatosplenomegaly or hernia. Kidneys normal.  Bladder non-tender and not distended.   Lymphatics: no palpable lymphadenopathy        Assessment and Plan   Impression:    Patient Active Problem List   Diagnosis    ED (erectile dysfunction) of organic origin    S/P cardiac catheterization: 9/30/2013: Normal coronaries    GERD (gastroesophageal reflux disease)    Dysphagia s/p dilation. CAD (coronary artery disease)    History of TIA (transient ischemic attack)    Hypertension    Fibromyalgia    Depression    History of colon polyps    Chronic back pain    ADHD (attention deficit hyperactivity disorder)    Former smoker    Peripheral neuropathy    Tarsal tunnel syndrome    Benign esophageal stricture    Hyperlipidemia    Allergic rhinitis    Migraine headache    Bipolar 1 disorder (HCC)    Pulmonary nodule    Short-segment Vásquez's esophagus    BPPV (benign paroxysmal positional vertigo)    Hypertriglyceridemia    Obstructive sleep apnea on CPAP    Morbid obesity (HCC)    Benign prostatic hyperplasia    DM2 (diabetes mellitus, type 2) (HCC)    B12 deficiency    Normocytic anemia    Closed fracture of right tibial plateau    WESLEY (nonalcoholic steatohepatitis)       Plan: Urolift  Risks benefits and alternative procedures are explained, informed consent is obtained, and the patient elects to proceed.

## 2022-10-31 NOTE — OP NOTE
[facility]  1970  [id number]    DATE: 10/31/22   SURGEON: Dr. Ana Jarrett MD , M.D.  Saranya Lara: none  PREOPERATIVE DIAGNOSIS:  BPH with obstruction   POSTOPERATIVE DIAGNOSIS: same  OPERATION:  1. Cystoscopy Urolift  ANESTHESIA:  MAC.   COMPLICATIONS:  None. ESTIMATED BLOOD LOSS:  Minimal.  FLUIDS:  Crystalloid. DRAINS:  none  Implants: 6 x urolift clips. SPECIMENS:  None. INDICATIONS FOR THE PROCEDURE:  Gale Kemp is a 45 y.o. male  with a history of BPH with obstruction. After treatment options were discussed including risks, benefits, alternatives, goals and possible complications,  the patient elected to proceed with today's procedure. NARRATIVE SUMMARY OF THE PROCEDURE:  The patient was moved back to the OR and placed in lithotomy position. He was induced under MAC anesthesia after a time out was taken per protocol with everyone in agreement. The patient had a urolift scope passed into the urethra and into the bladder with ease. The prostate was evaluated and noted the bladder neck and veru montanum, which were our landmarks through out the case. We started at the bladder neck and moved 1.5 cm distal to ensure proper placement of the proximal clips bilaterally. We then placed clips in the distal gland, ensuring not to travel distal to the veru. We ensured the anterior channel was widely patent and saw the bulk of the prostate obstruction be resolved. We placed a total of:  6 urolift clips in proper position. There was no capsular pull through. There was no clip migration. We visualized the bladder neck and did not note any rogue clips in the bladder. There were no clips placed distally to the veru that would affect the external sphincter. The lateral lobe obstruction was relieved and there was an adequate anterior channel. At this time hemostasis was achieved. We filled the bladder and terminated the case.   The patient tolerated well and was moved to PACU in good position.     Plan  Urinate in PACU  Follow up as outpatient: 4-6 weeks with PVR

## 2022-10-31 NOTE — ANESTHESIA PRE PROCEDURE
Department of Anesthesiology  Preprocedure Note       Name:  Kwadwo Trejo   Age:  45 y.o.  :  1970                                          MRN:  [id number]         Date:  10/31/2022      Surgeon: Little Salas):  Corie Whiteside MD    Procedure: Procedure(s):  Cystoscopy Urolift    Medications prior to admission:   Prior to Admission medications    Medication Sig Start Date End Date Taking? Authorizing Provider   lamoTRIgine (LAMICTAL) 25 MG tablet Take 1 tablet by mouth 2 times daily  Patient taking differently: Take 50 mg by mouth 2 times daily 10/20/22   Mark Gave, DO   VORTIoxetine HBr (TRINTELLIX) 20 MG TABS tablet TAKE 1 TABLET BY MOUTH EVERY DAY  Patient taking differently: nightly TAKE 1 TABLET BY MOUTH EVERY DAY 10/6/22   Mark Gave, DO   lurasidone (LATUDA) 120 MG tablet TAKE 1 TABLET BY MOUTH ONE TIME A DAY (take with at least 350 calories) 10/6/22   Mark Gave, DO   amphetamine-dextroamphetamine (ADDERALL XR) 30 MG extended release capsule Take 1 capsule by mouth every morning for 30 days.  For concentration 10/6/22 11/5/22  Mark Gave, DO   trospium (SANCTURA) 60 MG CP24 extended release capsule TAKE 1 CAPSULE BY MOUTH EVERY DAY  Patient taking differently: 60 mg nightly 22   Amber Kevin PA-C   empagliflozin (JARDIANCE) 10 MG tablet Take 1 tablet by mouth daily 22   Minus Gearing, DO   tamsulosin (FLOMAX) 0.4 MG capsule TAKE 1 CAPSULE BY MOUTH EVERY DAY TO FACILITATE PASSAGE OF UTERAL STONE  Patient taking differently: Take 0.4 mg by mouth nightly 22   MIGNON Randle CNP   doxepin (SINEQUAN) 25 MG capsule Take 1 capsule by mouth nightly 22   Milad Blackwell PA-C   omeprazole (PRILOSEC) 20 MG delayed release capsule TAKE 2 CAPSULES BY MOUTH EVERY DAY  Patient taking differently: Take 40 mg by mouth Daily 22   Minus Gearing, DO   metFORMIN (GLUCOPHAGE) 1000 MG tablet TAKE 1 TABLET BY MOUTH TWO TIMES A DAY WITH MEALS 3/6/22   Franklin [place] Fam, DO   [employer] Lancets Universal 33G MISC use to test blood sugar once a day 2/23/22   [facility], DO   Insulin Pen Needle (UNIFINE PENTIPS) 31G X 8 MM MISC Use DAILY as directed WITH INSULIN PENS-- DO NOT RE-USE needles 2/10/22   [facility], DO   blood glucose test strips (TRUE METRIX BLOOD GLUCOSE TEST) strip check blood sugar one time daily 2/10/22   [facility], DO   lisinopril (PRINIVIL;ZESTRIL) 40 MG tablet TAKE 1 TABLET BY MOUTH EVERY DAY 1/31/22   [facility], DO   SITagliptin (JANUVIA) 100 MG tablet TAKE 1 TABLET BY MOUTH ONE TIME A DAY  Patient taking differently: Take 100 mg by mouth nightly TAKE 1 TABLET BY MOUTH ONE TIME A DAY 1/20/22   [facility], DO   aspirin (SM ASPIRIN ADULT LOW STRENGTH) 81 MG EC tablet TAKE 1 TABLET BY MOUTH EVERY DAY 1/7/22   [facility], DO   amLODIPine (NORVASC) 5 MG tablet TAKE 1 TABLET BY MOUTH EVERY DAY 12/6/21   [facility], DO   Handicap [facility] Expires 29 NOV 2026 11/29/21   [facility], DO   piroxicam (FELDENE) 20 MG capsule TAKE 1 CAPSULE BY MOUTH ONE TIME A DAY AS NEEDED FOR PAIN 11/10/21   [facility], DO   atorvastatin (LIPITOR) 80 MG tablet TAKE 1 TABLET BY MOUTH ONE TIME A DAY  Patient taking differently: nightly TAKE 1 TABLET BY MOUTH ONE TIME A DAY 11/8/21   [facility], DO   fenofibrate (TRIGLIDE) 160 MG tablet TAKE 1 TABLET BY MOUTH ONE TIME A DAY 11/8/21   [facility], DO   insulin glargine (LANTUS SOLOSTAR) 100 UNIT/ML injection pen Inject 65 Units into the skin 2 times daily 10/22/21   [facility], DO   butalbital-acetaminophen-caffeine (FIORICET, ESGIC) -49 MG per tablet Take 1 tablet by mouth every 6 hours as needed 5/25/21   Shmuel Irene MD   sildenafil (VIAGRA) 50 MG tablet Take 1 tablet by mouth daily as needed for Erectile Dysfunction Take one tablet one hour before sexual activity.  5/8/20   Roverto Castillo MD   azelastine (ASTELIN) 0.1 % nasal spray Inhale 2 sprays in each nostril twice a day as needed 1/16/20   Adin Caballero   ARTIFICIAL TEARS 1.4 % ophthalmic solution Place 1 drop into both eyes 4 times daily 3/29/19   Historical Provider, MD   clotrimazole-betamethasone (LOTRISONE) 1-0.05 % cream Apply topically 2 times daily. Not to exceed 2 weeks consecutively. 3/26/19   Gwendolyn Grant MD   rOPINIRole (REQUIP) 3 MG tablet Take 3 mg by mouth nightly    Historical Provider, MD   cetirizine (ZYRTEC) 10 MG tablet Take 10 mg by mouth daily 9/25/15   Historical Provider, MD   EYE [facility] AT [facility] 0.025 % ophthalmic solution Place 2 drops into both eyes as needed  9/25/15   Historical Provider, MD   propranolol (INDERAL LA) 160 MG ER capsule Take 160 mg by mouth daily  9/12/15   Historical Provider, MD   fluticasone (FLONASE) 50 MCG/ACT nasal spray 1 spray by Nasal route daily as needed. Historical Provider, MD   gabapentin (NEURONTIN) 800 MG tablet   Take 800 mg by mouth 4 times daily     Historical Provider, MD       Current medications:    Current Facility-Administered Medications   Medication Dose Route Frequency Provider Last Rate Last Admin    0.9 % sodium chloride infusion   IntraVENous Continuous Marlon Carrillo MD        ceFAZolin (ANCEF) 2000 mg in dextrose 5 % 50 mL IVPB  2,000 mg IntraVENous 30 Min Pre-Op Marlon Carrillo MD           Allergies: Allergies   Allergen Reactions    Morphine Nausea And Vomiting and Rash       Problem List:    Patient Active Problem List   Diagnosis Code    ED (erectile dysfunction) of organic origin N52.9    S/P cardiac catheterization: 9/30/2013: Normal coronaries Z98.890    GERD (gastroesophageal reflux disease) K21.9    Dysphagia s/p dilation. R13.10    CAD (coronary artery disease) I25.10    History of TIA (transient ischemic attack) Z86.73    Hypertension I10    Fibromyalgia M79.7    Depression F32. A    History of colon polyps Z86.010    Chronic back pain M54.9, G89.29    ADHD (attention deficit hyperactivity disorder) F90.9    Former smoker Z87.891    Peripheral neuropathy G62.9    Tarsal tunnel syndrome G57.50    Benign esophageal stricture K22.2    Hyperlipidemia E78.5    Allergic rhinitis J30.9    Migraine headache G43.909    Bipolar 1 disorder (HCC) F31.9    Pulmonary nodule R91.1    Short-segment Vásquez's esophagus K22.70    BPPV (benign paroxysmal positional vertigo) H81.10    Hypertriglyceridemia E78.1    Obstructive sleep apnea on CPAP G47.33, Z99.89    Morbid obesity (HCC) E66.01    Benign prostatic hyperplasia N40.0    DM2 [facility]diabetes mellitus, type 2) ([facility]) E11.9    B12 deficiency E53.8    Normocytic anemia D64.9    Closed fracture of right tibial plateau U04.733B    WESLEY (nonalcoholic steatohepatitis) K75.81       Past Medical History:        Diagnosis Date    ADHD (attention deficit hyperactivity disorder)     Allergic rhinitis     Benign esophageal stricture     s/p dilation may 2014    Benign prostatic hyperplasia     Bipolar 1 disorder ([address]     CAD (coronary artery disease)     Unclear history, cath 2013 with patent arteries. Records pending.  Chronic back pain     Depression     DM2 (diabetes mellitus, type 2) ([address]     Dysfunctional voiding of urine 6/20/2013    Medications he changes to follow,  Adding ditropan  to the present schedule     ED (erectile dysfunction) of organic origin 5/1/2013    Fibromyalgia     Former smoker     GERD (gastroesophageal reflux disease)     History of colon polyps     Pt unsure when last colo.      History of TIA (transient ischemic attack)     x2     Hyperlipidemia     Hypertension     Hypertriglyceridemia 10/27/2015    Migraine headache     Morbid obesity ([address]     Morbid obesity with BMI of 40.0-44.9, adult ([address]     WESLEY (nonalcoholic steatohepatitis)     Obstructive sleep apnea on CPAP 11/3/2015    Peripheral neuropathy     Pulmonary nodule     stable >2 years, no further w/u required.  Rotator cuff injury     Left side    S/P cardiac catheterization: 2013: Normal coronaries 2013: Normal coronaries. Radial approach. Dr. Piper Castrejon Short-segment Vásquez's esophagus 2015    Tarsal tunnel syndrome     Bilateral       Past Surgical History:        Procedure Laterality Date    CARDIAC CATHETERIZATION  10/2013    2013: Normal coronaries. Radial approach. Dr. Piper Castrejon COLONOSCOPY  ,    [facility] KNEE SURGERY Left 2012    ROTATOR CUFF REPAIR Left    [facility] SHOULDER SURGERY Bilateral    [facility] SINUS SURGERY  2004    [place] occ.      TONSILLECTOMY  2002    TYMPANOSTOMY TUBE PLACEMENT      UPPER GASTROINTESTINAL ENDOSCOPY  ,  with dilation,     UPPER GASTROINTESTINAL ENDOSCOPY      UPPER GASTROINTESTINAL ENDOSCOPY  MAY 2014, 2017    Dilation       Social History:    Social History     Tobacco Use    Smoking status: Some Days     Packs/day: 0.25     Years: 30.00     Pack years: 7.50     Types: Cigarettes     Start date: 1984     Last attempt to quit: 2018     Years since quittin.4    Smokeless tobacco: Never   Substance Use Topics    Alcohol use: No     Alcohol/week: 0.0 standard drinks                                Ready to quit: Not Answered  Counseling given: Not Answered      Vital Signs (Current):   Vitals:    10/27/22 1427 10/31/22 0649 10/31/22 0655   BP:  (!) 143/70    Pulse:  83    Resp:  16    Temp:  97.2 °F (36.2 °C)    TempSrc:  Temporal    SpO2:  96%    Weight: 255 lb (115.7 kg)  259 lb (117.5 kg)   Height: 5' 7\" (1.702 m)  5' 7\" (1.702 m)                                              BP Readings from Last 3 Encounters:   10/31/22 (!) 143/70   22 134/70   22 132/72       NPO Status: Time of last liquid consumption: 2300                        Time of last solid consumption: 2100                        Date of last liquid consumption: 10/30/22                        Date of last solid food consumption: 10/30/22    BMI:   Wt Readings from Last 3 Encounters:   10/31/22 259 lb (117.5 kg)   09/12/22 260 lb (117.9 kg)   08/23/22 265 lb (120.2 kg)     Body mass index is 40.57 kg/m². CBC:   Lab Results   Component Value Date/Time    WBC 4.8 10/25/2022 06:35 AM    RBC 4.40 10/25/2022 06:35 AM    HGB 13.1 10/25/2022 06:35 AM    HCT 41.7 10/25/2022 06:35 AM    MCV 94.8 10/25/2022 06:35 AM    RDW 13.7 12/15/2017 12:00 AM     10/25/2022 06:35 AM       CMP:   Lab Results   Component Value Date/Time     10/25/2022 06:35 AM    K 4.3 10/25/2022 06:35 AM    CL 98 10/25/2022 06:35 AM    CO2 23 10/25/2022 06:35 AM    BUN 10 10/25/2022 06:35 AM    CREATININE 1.1 10/25/2022 06:35 AM    LABGLOM >60 10/25/2022 06:34 AM    GLUCOSE 316 10/25/2022 06:35 AM    PROT 6.7 05/21/2022 08:36 AM    CALCIUM 9.3 10/25/2022 06:35 AM    BILITOT 0.5 05/21/2022 08:36 AM    ALKPHOS 98 05/21/2022 08:36 AM     05/21/2022 08:36 AM     05/21/2022 08:36 AM       POC Tests: No results for input(s): POCGLU, POCNA, POCK, POCCL, POCBUN, POCHEMO, POCHCT in the last 72 hours.     Coags:   Lab Results   Component Value Date/Time    INR 0.93 09/24/2013 04:54 PM       HCG (If Applicable): No results found for: PREGTESTUR, PREGSERUM, HCG, HCGQUANT     ABGs: No results found for: PHART, PO2ART, BKP7BWU, LRQ8TDR, BEART, F3GHYHUW     Type & Screen (If Applicable):  Lab Results   Component Value Date    LABRH NEG 09/30/2013       Drug/Infectious Status (If Applicable):  Lab Results   Component Value Date/Time    HEPCAB Negative 09/07/2020 07:45 AM       COVID-19 Screening (If Applicable): No results found for: COVID19        Anesthesia Evaluation  Patient summary reviewed  Airway: Mallampati: III  TM distance: >3 FB   Neck ROM: full  Mouth opening: > = 3 FB   Dental:          Pulmonary:   (+) sleep apnea:                             Cardiovascular:    (+) hypertension:,       ECG reviewed                        Neuro/Psych:   (+) neuromuscular disease:, TIA, headaches:, psychiatric history:            GI/Hepatic/Renal:   (+) GERD:, liver disease:, morbid obesity          Endo/Other:    (+) Diabetes, . Abdominal:             Vascular: Other Findings:           Anesthesia Plan      MAC     ASA 3       Induction: intravenous. Anesthetic plan and risks discussed with patient and child/children. Plan discussed with CRNA. Krissy Salinas.  [address] [facility]   10/31/2022

## 2022-11-01 DIAGNOSIS — M25.512 CHRONIC LEFT SHOULDER PAIN: ICD-10-CM

## 2022-11-01 DIAGNOSIS — E11.29 TYPE 2 DIABETES MELLITUS WITH MICROALBUMINURIA, WITHOUT LONG-TERM CURRENT USE OF INSULIN (HCC): Chronic | ICD-10-CM

## 2022-11-01 DIAGNOSIS — E78.1 HYPERTRIGLYCERIDEMIA: Chronic | ICD-10-CM

## 2022-11-01 DIAGNOSIS — R80.9 TYPE 2 DIABETES MELLITUS WITH MICROALBUMINURIA, WITHOUT LONG-TERM CURRENT USE OF INSULIN (HCC): Chronic | ICD-10-CM

## 2022-11-01 DIAGNOSIS — G89.29 CHRONIC LEFT SHOULDER PAIN: ICD-10-CM

## 2022-11-01 RX ORDER — ATORVASTATIN CALCIUM 80 MG/1
TABLET, FILM COATED ORAL
Qty: 90 TABLET | Refills: 3 | Status: SHIPPED | OUTPATIENT
Start: 2022-11-01

## 2022-11-01 RX ORDER — FENOFIBRATE 160 MG/1
TABLET ORAL
Qty: 90 TABLET | Refills: 3 | Status: SHIPPED | OUTPATIENT
Start: 2022-11-01

## 2022-11-01 NOTE — TELEPHONE ENCOUNTER
Future Appointments   Date Time Provider Mikey Painting   11/3/2022  8:30 AM DO Jennifer Scanlon [facility] - [address]   11/23/2022  7:40 [facility] [address]   1/16/2023  7:45 AM Treva Barreto PA-C [facility] [address]

## 2022-11-03 ENCOUNTER — TELEMEDICINE (OUTPATIENT)
Dept: PSYCHIATRY | Age: 52
End: 2022-11-03
Payer: COMMERCIAL

## 2022-11-03 DIAGNOSIS — F90.8 ADHD, ADULT RESIDUAL TYPE: ICD-10-CM

## 2022-11-03 DIAGNOSIS — F17.210 TOBACCO DEPENDENCE DUE TO CIGARETTES: ICD-10-CM

## 2022-11-03 DIAGNOSIS — F25.0 SCHIZOAFFECTIVE DISORDER, BIPOLAR TYPE (HCC): Primary | ICD-10-CM

## 2022-11-03 PROCEDURE — 4004F PT TOBACCO SCREEN RCVD TLK: CPT | Performed by: STUDENT IN AN ORGANIZED HEALTH CARE EDUCATION/TRAINING PROGRAM

## 2022-11-03 PROCEDURE — G8417 CALC BMI ABV UP PARAM F/U: HCPCS | Performed by: STUDENT IN AN ORGANIZED HEALTH CARE EDUCATION/TRAINING PROGRAM

## 2022-11-03 PROCEDURE — G8428 CUR MEDS NOT DOCUMENT: HCPCS | Performed by: STUDENT IN AN ORGANIZED HEALTH CARE EDUCATION/TRAINING PROGRAM

## 2022-11-03 PROCEDURE — 99214 OFFICE O/P EST MOD 30 MIN: CPT | Performed by: STUDENT IN AN ORGANIZED HEALTH CARE EDUCATION/TRAINING PROGRAM

## 2022-11-03 PROCEDURE — G8484 FLU IMMUNIZE NO ADMIN: HCPCS | Performed by: STUDENT IN AN ORGANIZED HEALTH CARE EDUCATION/TRAINING PROGRAM

## 2022-11-03 PROCEDURE — 3017F COLORECTAL CA SCREEN DOC REV: CPT | Performed by: STUDENT IN AN ORGANIZED HEALTH CARE EDUCATION/TRAINING PROGRAM

## 2022-11-03 RX ORDER — LAMOTRIGINE 25 MG/1
50 TABLET ORAL 2 TIMES DAILY
Qty: 120 TABLET | Refills: 1 | Status: SHIPPED | OUTPATIENT
Start: 2022-11-03 | End: 2022-11-03 | Stop reason: DRUGHIGH

## 2022-11-03 RX ORDER — LAMOTRIGINE 25 MG/1
50 TABLET ORAL 2 TIMES DAILY
Qty: 120 TABLET | Refills: 1 | Status: SHIPPED | OUTPATIENT
Start: 2022-11-03 | End: 2022-11-17 | Stop reason: DRUGHIGH

## 2022-11-03 RX ORDER — DEXTROAMPHETAMINE SACCHARATE, AMPHETAMINE ASPARTATE MONOHYDRATE, DEXTROAMPHETAMINE SULFATE AND AMPHETAMINE SULFATE 7.5; 7.5; 7.5; 7.5 MG/1; MG/1; MG/1; MG/1
30 CAPSULE, EXTENDED RELEASE ORAL EVERY MORNING
Qty: 30 CAPSULE | Refills: 0 | Status: SHIPPED | OUTPATIENT
Start: 2022-11-03 | End: 2022-11-17 | Stop reason: SDUPTHER

## 2022-11-03 NOTE — PROGRESS NOTES
[address] [facility] [address]  [facility] [address]  Dept: [phone]  Loc: [phone]          Sudheer Quiros  1970  11/3/2022     F/U appt    TELEHEALTH EVALUATION -- Audio/Visual (During MUJAL-64 public health emergency)    Patient location: home  Physician location: home, [facility]  This virtual visit was conducted via interactive, real-time video. DSM:  Dx:    ICD-10-CM    1. Schizoaffective disorder, bipolar type (HCC)  F25.0       2. ADHD, adult residual type  F90.8       3. Tobacco dependence due to cigarettes  F17.210            Interim Hx:  Chief Complaint:   Chief Complaint   Patient presents with    Depression    Anxiety    Follow-up      HPI: Patient is a 45year old male who presents for follow up. Mood is reported as \"alright\". Reports intermittent depression but reports improvement with medication adjustments. Denies active and passive SI and HI. He reports AH in the form of his wife's voice telling him \"everything is going to be okay and not to worry\". He denies command hallucinations and states that he finds the voice comforting. He reports VH in the form of \"people\". These occur during the day. Most recently, he saw his  father. These hallucinations do not cause any distress, but will be continually monitored. He reports sleeping 6-8 hours per night. Appetite is regular. He is eating a full meal with his latuda. Reports medication compliance and denies side effects. He reports anxiety is elevated but is \"getting better\". He is agreeable to increasing lamotrigine to 25 mg bid to 50 mg bid. He does not wish to make additional medication changes today. ROS:   Gen: Denies F/C/N/V  HEENT: Denies Vision/hearing changes/sore throat  CV: Denies CP/Palp  Pulm: Denies SOB/Cough/Wheeze  GI: Denies Abd pain  Skin: Denies Rashes/Lumps/Bruises  Neurologic: Denies changes in memory/speech/mental status.  Denies h/o seizures/DTs   Psychiatric: +depression +anxiety     MSE:  Cognition - Alert and Oriented x 4  Appearance - dressed casually and appropriately for the weather  Behavior - pleasant and cooperative  Motor - no tardive dyskinesia or other EPS observed; no psychomotor agitation/retardation observed  Gait and Station - WNL, ambulates without Assistance  Speech - normal rate, rhythm, and volume  Eye Contact - maintains  Mood - \"better\"  Affect - restricted  Thought Content/Perceptions:   Psychosis - AH and VH as per HPI   SI/HI -  denies  Thought Process/Associations -  logical and linear  Memory - grossly intact  Insight - good  Judgment - fair (med noncompliant for past month)            Diagnosis Orders   1. Schizoaffective disorder, bipolar type ([facility]        2. ADHD, adult residual type        3. Tobacco dependence due to cigarettes             Plan:  -Continue latuda 120 mg daily with at least 350 kcals for mood and psychotic symptoms. Risks including but not limited to EPS, TD, and metabolic side effects discussed. AIMS will be conducted at next in person interview.  -Discontinued wellbutrin 450 mg qam per patient request.  -Increase lamotrigine 25 mg bid to 50 mg bid with plan to titrate to original dose of 150 mg bid. Patient was without this medication for 1 month so titration must start over. Risk of Alroy Coke Syndrome was discussed.  -Continue trintellix 20 mg daily for anxiety.  -Continue adderall XR 30 mg qam. Risks including but not limited to hypertension, increased risk of adverse cardiovascular events (e.g. stroke and MI), and addictive potential have been discussed.  -Another provider is prescribing doxepin 25 mg qhs.  Risk of serotonin syndome was discussed.     -Discussed medications with the patient and changes are as noted above with patient agreement.  -Discussed diagnosis and treatment plan with the patient  -Provided brief supportive therapy through active listening     -Patient Education: Discussed medications including indications, potential side effects, contraindications, and risks/benefits; also discussed alternative medications/treatments. Discussed the potential need for follow up laboratory studies related to medications and patient demonstrated understanding and compliance. Patient participated in medications decision making was given the opportunity to ask questions/express preferences and concerns. Patient demonstrates good understanding of medications and is compliant/in agreement with current plan. Additional Comments and Follow up Plan:  -RTC in (2) weeks, sooner if needed  -Labs: n/a  -Discussed abstaining from illicit drugs/alcohol  -Patient informed to call crisis line/911 or go to ER if experiencing crisis, SI, HI, or psychosis. Kwadwo Trejo, was evaluated through a synchronous (real-time) audio-video encounter. The patient (or guardian if applicable) is aware that this is a billable service, which includes applicable copays. This virtual visit was conducted with patient's (and/or legal guardian's) consent. The visit was conducted pursuant to the emergency declaration under the [address] [place] [address] waiver authority and the [employer] General Act. Patient identification was verified, and a caregiver was present when appropriate. The patient was located in a state where the provider was licensed to provide care.       Total time spent for this encounter: 30 minutes    Electronically signed by Mark Salcedo DO on 11/3/2022 at 8:56 AM     Mark Salcedo DO    11/3/2022

## 2022-11-17 ENCOUNTER — TELEMEDICINE (OUTPATIENT)
Dept: PSYCHIATRY | Age: 52
End: 2022-11-17
Payer: COMMERCIAL

## 2022-11-17 DIAGNOSIS — F25.0 SCHIZOAFFECTIVE DISORDER, BIPOLAR TYPE (HCC): Primary | ICD-10-CM

## 2022-11-17 DIAGNOSIS — F90.8 ADHD, ADULT RESIDUAL TYPE: ICD-10-CM

## 2022-11-17 DIAGNOSIS — F17.210 TOBACCO DEPENDENCE DUE TO CIGARETTES: ICD-10-CM

## 2022-11-17 PROCEDURE — G8417 CALC BMI ABV UP PARAM F/U: HCPCS | Performed by: STUDENT IN AN ORGANIZED HEALTH CARE EDUCATION/TRAINING PROGRAM

## 2022-11-17 PROCEDURE — G8484 FLU IMMUNIZE NO ADMIN: HCPCS | Performed by: STUDENT IN AN ORGANIZED HEALTH CARE EDUCATION/TRAINING PROGRAM

## 2022-11-17 PROCEDURE — 99214 OFFICE O/P EST MOD 30 MIN: CPT | Performed by: STUDENT IN AN ORGANIZED HEALTH CARE EDUCATION/TRAINING PROGRAM

## 2022-11-17 PROCEDURE — 3017F COLORECTAL CA SCREEN DOC REV: CPT | Performed by: STUDENT IN AN ORGANIZED HEALTH CARE EDUCATION/TRAINING PROGRAM

## 2022-11-17 PROCEDURE — G8428 CUR MEDS NOT DOCUMENT: HCPCS | Performed by: STUDENT IN AN ORGANIZED HEALTH CARE EDUCATION/TRAINING PROGRAM

## 2022-11-17 PROCEDURE — 4004F PT TOBACCO SCREEN RCVD TLK: CPT | Performed by: STUDENT IN AN ORGANIZED HEALTH CARE EDUCATION/TRAINING PROGRAM

## 2022-11-17 RX ORDER — DEXTROAMPHETAMINE SACCHARATE, AMPHETAMINE ASPARTATE MONOHYDRATE, DEXTROAMPHETAMINE SULFATE AND AMPHETAMINE SULFATE 7.5; 7.5; 7.5; 7.5 MG/1; MG/1; MG/1; MG/1
30 CAPSULE, EXTENDED RELEASE ORAL EVERY MORNING
Qty: 30 CAPSULE | Refills: 0 | Status: SHIPPED | OUTPATIENT
Start: 2022-11-17 | End: 2022-12-17

## 2022-11-17 RX ORDER — LAMOTRIGINE 100 MG/1
100 TABLET ORAL 2 TIMES DAILY
Qty: 60 TABLET | Refills: 1 | Status: SHIPPED | OUTPATIENT
Start: 2022-11-17

## 2022-11-17 NOTE — PROGRESS NOTES
[address] [facility] [address]  [facility] [address]  Dept: [phone]  Loc: [phone]          Ilya Rowe  1970 11/17/2022     F/U appt    TELEHEALTH EVALUATION -- Audio/Visual (During ECPVS-76 public health emergency)    Patient location: home  Physician location: home, [facility]  This virtual visit was conducted via interactive, real-time video. DSM:  Dx:    ICD-10-CM    1. Schizoaffective disorder, bipolar type (HCC)  F25.0       2. ADHD, adult residual type  F90.8       3. Tobacco dependence due to cigarettes  F17.210            Interim Hx:  Chief Complaint:   Chief Complaint   Patient presents with    Follow-up    Medication Check    Depression      HPI: Patient is a 45year old male who presents for follow up. Mood is reported as \"alright I guess\". Anxiety and depression are heightened because his son recently kicked him out of his home. Now the patient is living in a motel until he can afford a down payment on his new home. Denies active and passive SI and HI. He reports AH in the form of his wife's voice telling him \"everything is going to be okay and not to worry\". However, he has not endorses any AH for the past week. He denies command hallucinations and states that he finds the voice comforting. He reports VH in the form of \"people\", which only transpires on rare occasions. These hallucinations do not cause any distress, but will be continually monitored. He reports sleeping well. Appetite is below baseline. He is eating a full meal with his latuda. He admits to missing his latuda dose yesterday morning as well as this morning. He was encouraged to take the latuda today with dinner. Importance of compliance was discussed. Denies side effects. He is agreeable to increasing lamotrigine from 50 mg bid to 100 mg bid.      ROS:   Gen: Denies F/C/N/V  HEENT: Denies Vision/hearing changes/sore throat  CV: Denies CP/Palp  Pulm: Denies SOB/Cough/Wheeze  GI: Denies Abd pain  Skin: Denies Rashes/Lumps/Bruises  Neurologic: Denies changes in memory/speech/mental status. Denies h/o seizures/DTs   Psychiatric: +depression +anxiety     MSE:  Cognition - Alert and Oriented x 4  Appearance - dressed casually and appropriately for the weather  Behavior - pleasant and cooperative  Motor - no tardive dyskinesia or other EPS observed; no psychomotor agitation/retardation observed  Gait and Station - unable to assess via tele-psychiatry  Speech - normal rate, rhythm, and volume  Eye Contact - maintains  Mood - \"alright I guess\"  Affect - restricted  Thought Content/Perceptions:   Psychosis - AH and VH as per HPI   SI/HI -  denies  Thought Process/Associations -  logical and linear  Memory - grossly intact  Insight - good  Judgment - fair (med noncompliant for past month)       Diagnosis Orders   1. Schizoaffective disorder, bipolar type ([facility]        2. ADHD, adult residual type        3. Tobacco dependence due to cigarettes               Follow up Plan  -Continue latuda 120 mg daily with at least 350 kcals for mood and psychotic symptoms. Risks including but not limited to EPS, TD, and metabolic side effects discussed. AIMS will be conducted at next in person interview.  -Increase lamotrigine 50 mg bid to 100 mg bid with plan to titrate to original dose of 150 mg bid. Patient was without this medication for 1 month so titration must start over. Risk of Florestine Dines Syndrome was discussed.  -Continue trintellix 20 mg daily for anxiety.  -Continue adderall XR 30 mg qam. Risks including but not limited to hypertension, increased risk of adverse cardiovascular events (e.g. stroke and MI), and addictive potential have been discussed.  -Another provider is prescribing doxepin 25 mg qhs.  Risk of serotonin syndome was discussed.     -Discussed medications with the patient and changes are as noted above with patient agreement.  -Discussed diagnosis and treatment plan with the patient  -Provided brief supportive therapy through active listening     -Patient Education: Discussed medications including indications, potential side effects, contraindications, and risks/benefits; also discussed alternative medications/treatments. Discussed the potential need for follow up laboratory studies related to medications and patient demonstrated understanding and compliance. Patient participated in medications decision making was given the opportunity to ask questions/express preferences and concerns. Patient demonstrates good understanding of medications and is compliant/in agreement with current plan. Additional Comments and Follow up Plan:  -RTC in (4) weeks, sooner if needed  -Labs: n/a  -Discussed abstaining from illicit drugs/alcohol  -Patient informed to call crisis line/911 or go to ER if experiencing crisis, SI, HI, or psychosis. Kimberly Emmanuel, was evaluated through a synchronous (real-time) audio-video encounter. The patient (or guardian if applicable) is aware that this is a billable service, which includes applicable copays. This virtual visit was conducted with patient's (and/or legal guardian's) consent. The visit was conducted pursuant to the emergency declaration under the [address] [place] [address], [address] [place] [address] waiver authority and the [employer] General Act. Patient identification was verified, and a caregiver was present when appropriate. The patient was located in a state where the provider was licensed to provide care.       Total time spent for this encounter: 30 minutes    Electronically signed by Alexsandra Blackmon DO on 11/17/2022 at 3:47 PM       Alexsandra Blackmon DO  11/17/2022

## 2022-11-22 NOTE — PROGRESS NOTES
Chief Complaint   Patient presents with    Follow-up     DM2 and chronic issues as noted below       History obtained from the patient. SUBJECTIVE:  Cecilia Suarez is a 45 y.o. male that presents today for:     -DM2:   A1c up to 8.9  On 65 units basalgar bid still. Jardiance added last visit  On metformin and januvia  FBs inc to 120-130 on his glucometer  On ours was 169 today  No lows. Struggling to lose wt despite diet changes and exercise as much as able    Lab Results   Component Value Date/Time    LABA1C 8.9 11/23/2022 06:58 AM    LABA1C 8.1 08/23/2022 08:02 AM    LABA1C 7.7 05/24/2022 07:20 AM    LABA1C 6.1 11/29/2021 08:27 AM    LABA1C 6.8 05/26/2021 07:44 AM    LABA1C 8.0 11/18/2020 10:27 AM    LABA1C 7.0 05/20/2020 07:31 AM    LABA1C 11.2 11/21/2019 08:00 AM    LABA1C 7.8 08/20/2019 07:54 AM    LABA1C 10.7 05/20/2019 10:54 AM    LABA1C 11.2 02/18/2019 07:56 AM    LABA1C 12.2 11/16/2018 07:50 AM       Wt Readings from Last 3 Encounters:   11/23/22 260 lb 9.6 oz (118.2 kg)   10/31/22 259 lb (117.5 kg)   09/12/22 260 lb (117.9 kg)        -Elevated LFT PRIOR VISIT:  Noted on labs  Mild at this point, but still elevated  Denies ETOH use  Denies RUQ pain or jaundice.   GI did US a few months ago, showed fatty liver    UPDATE PRIOR VISIT:   Labs table with persistent elevated, though mild  W/u for cause neg  Suspected WESLEY  Was to f/u with Erika SALAZAR, has not yet   No RUQ pain or jaundice    UPDATE PRIOR VISIT:   Had f/u labs ordered, never done  Has not f/u with [facility] yet either   No RUQ pain or jaundice    UPDATE TODAY:   LFT remain elevated   Seeing Donte SALAZAR, records pending  Has f/u labs ordered from them and US  Needs to schedule.         -Anemia PRIOR VISIT:  Anemia still present, mild  b12 and iron studies WNL in last SEPT and early OCT  Plan is to repeat labs in [place]  Denies bleeding, melena or hematochezia  No fevers, chills or night sweats      UPDATE PRIOR VISIT:   Found to have low Component Value Date    LDLCALC 90 05/21/2022    LDLDIRECT 116.17 01/21/2020       TSH - 3.260 (AUG 2018)  Lab Results   Component Value Date    TSH 1.430 05/21/2022       Colon Cancer Screening - NEG CANCER FEB 2015  LDCT: NEG OCT 2022    Tetanus - UTD June 2014  Influenza Vaccine - UTD FALL 2022  Pneumonia Vaccine - UTD June 2014 PPV 23  Zoster - UTD x 2    PSA testing discussion - follows with urology  Lab Results   Component Value Date    PSA 0.22 10/25/2022    PSA 0.37 06/15/2021    PSA 0.31 05/19/2020       AAA Screening - Age 70      Diabetes Health Maintenance    A1C -   Lab Results   Component Value Date/Time    LABA1C 8.9 11/23/2022 06:58 AM    LABA1C 8.1 08/23/2022 08:02 AM    LABA1C 7.7 05/24/2022 07:20 AM    LABA1C 6.1 11/29/2021 08:27 AM    LABA1C 6.8 05/26/2021 07:44 AM    LABA1C 8.0 11/18/2020 10:27 AM    LABA1C 7.0 05/20/2020 07:31 AM    LABA1C 11.2 11/21/2019 08:00 AM    LABA1C 7.8 08/20/2019 07:54 AM    LABA1C 10.7 05/20/2019 10:54 AM    LABA1C 11.2 02/18/2019 07:56 AM    LABA1C 12.2 11/16/2018 07:50 AM       ACE/ARB - yes, lisinopril 2.5mg daily  Eye - records pending  Foot - UTD MAY 2022  ASA - yes, 81mg  Microal/Cr -   Lab Results   Component Value Date    LABMICR < 1.20 10/25/2022    LABCREA 71.8 10/25/2022    MACRR 17 10/25/2022     eGFR -   Lab Results   Component Value Date/Time    LABGLOM >60 10/25/2022 06:34 AM       Statin - yes, lipitor 80mg qhs      Specialist Lists:    Dr Turner Neumann Dr [facility]  Dr Johanna Koroma:  Urology  Dr Tommie Hampton Pulmonology/Sleep  Dr Elba Witt  [facility] ENT  Dr Mirna Doran: pending new provider  Dr. Lavonne Salmeron: Neurology      Current Outpatient Medications   Medication Sig Dispense Refill    Blood Glucose Monitoring Suppl KIT Use to check sugars once daily 1 kit 0    blood glucose monitor strips Use to check sugars once daily 100 strip 3    empagliflozin (JARDIANCE) 25 MG tablet Take 1 tablet by mouth daily 90 tablet 3    lamoTRIgine (LAMICTAL) 100 MG tablet Take 1 tablet by mouth in the morning and at bedtime For mood 60 tablet 1    amphetamine-dextroamphetamine (ADDERALL XR) 30 MG extended release capsule Take 1 capsule by mouth every morning for 30 days.  For concentration 30 capsule 0    atorvastatin (LIPITOR) 80 MG tablet TAKE 1 TABLET BY MOUTH EVERY DAY 90 tablet 3    fenofibrate (TRIGLIDE) 160 MG tablet TAKE 1 TABLET BY MOUTH EVERY DAY 90 tablet 3    piroxicam (FELDENE) 20 MG capsule TAKE 1 CAPSULE BY MOUTH EVERY DAY AS NEEDED FOR PAIN 90 capsule 3    VORTIoxetine HBr (TRINTELLIX) 20 MG TABS tablet TAKE 1 TABLET BY MOUTH EVERY DAY (Patient taking differently: nightly TAKE 1 TABLET BY MOUTH EVERY DAY) 30 tablet 3    lurasidone (LATUDA) 120 MG tablet TAKE 1 TABLET BY MOUTH ONE TIME A DAY (take with at least 350 calories) 30 tablet 5    trospium (SANCTURA) 60 MG CP24 extended release capsule TAKE 1 CAPSULE BY MOUTH EVERY DAY (Patient taking differently: 60 mg nightly) 30 capsule 0    tamsulosin (FLOMAX) 0.4 MG capsule TAKE 1 CAPSULE BY MOUTH EVERY DAY TO FACILITATE PASSAGE OF UTERAL STONE (Patient taking differently: Take 0.4 mg by mouth nightly) 90 capsule 0    doxepin (SINEQUAN) 25 MG capsule Take 1 capsule by mouth nightly 30 capsule 5    omeprazole (PRILOSEC) 20 MG delayed release capsule TAKE 2 CAPSULES BY MOUTH EVERY DAY (Patient taking differently: Take 40 mg by mouth Daily) 180 capsule 3    metFORMIN (GLUCOPHAGE) 1000 MG tablet TAKE 1 TABLET BY MOUTH TWO TIMES A DAY WITH MEALS 180 tablet 3    [employer] Lancets Universal 33G MISC use to test blood sugar once a day 100 each 3    Insulin Pen Needle (UNIFINE PENTIPS) 31G X 8 MM MISC Use DAILY as directed WITH INSULIN PENS-- DO NOT RE-USE needles 100 each 3    lisinopril (PRINIVIL;ZESTRIL) 40 MG tablet TAKE 1 TABLET BY MOUTH EVERY DAY 90 tablet 3    SITagliptin (JANUVIA) 100 MG tablet TAKE 1 TABLET BY MOUTH ONE TIME A DAY (Patient taking differently: Take 100 mg by mouth nightly TAKE 1 TABLET BY MOUTH ONE TIME A DAY) 90 tablet 3    aspirin (SM ASPIRIN ADULT LOW STRENGTH) 81 MG EC tablet TAKE 1 TABLET BY MOUTH EVERY DAY 90 tablet 3    amLODIPine (NORVASC) 5 MG tablet TAKE 1 TABLET BY MOUTH EVERY DAY 90 tablet 3    insulin glargine (LANTUS SOLOSTAR) 100 UNIT/ML injection pen Inject 65 Units into the skin 2 times daily 117 mL 3    sildenafil (VIAGRA) 50 MG tablet Take 1 tablet by mouth daily as needed for Erectile Dysfunction Take one tablet one hour before sexual activity. 6 tablet 5    azelastine (ASTELIN) 0.1 % nasal spray Inhale 2 sprays in each nostril twice a day as needed      ARTIFICIAL TEARS 1.4 % ophthalmic solution Place 1 drop into both eyes 4 times daily  0    clotrimazole-betamethasone (LOTRISONE) 1-0.05 % cream Apply topically 2 times daily. Not to exceed 2 weeks consecutively. 1 Tube 1    rOPINIRole (REQUIP) 3 MG tablet Take 3 mg by mouth nightly      cetirizine (ZYRTEC) 10 MG tablet Take 10 mg by mouth daily      propranolol (INDERAL LA) 160 MG ER capsule Take 160 mg by mouth daily       fluticasone (FLONASE) 50 MCG/ACT nasal spray 1 spray by Nasal route daily as needed. gabapentin (NEURONTIN) 800 MG tablet   Take 800 mg by mouth 4 times daily        No current facility-administered medications for this visit. Orders Placed This Encounter   Medications    Blood Glucose Monitoring Suppl KIT     Sig: Use to check sugars once daily     Dispense:  1 kit     Refill:  0    blood glucose monitor strips     Sig: Use to check sugars once daily     Dispense:  100 strip     Refill:  3    empagliflozin (JARDIANCE) 25 MG tablet     Sig: Take 1 tablet by mouth daily     Dispense:  90 tablet     Refill:  3         All medications reviewed and reconciled, including OTC and herbal medications. Updated list given to patient.        Patient Active Problem List    Diagnosis Date Noted    Obstructive sleep apnea on CPAP 11/03/2015     Priority: High    WESLEY (nonalcoholic steatohepatitis)     Closed fracture of right tibial plateau 08/59/4856    Normocytic anemia 02/19/2019    DM2 (diabetes mellitus, type 2) ([address]     Benign prostatic hyperplasia     Morbid obesity ([address]     Hypertriglyceridemia 10/27/2015    BPPV (benign paroxysmal positional vertigo) 09/29/2015    Short-segment Vásquez's esophagus 06/17/2015     Following with Zacarias Maite for this. Pulmonary nodule      stable >2 years, no further w/u required. Bipolar 1 disorder ([address]     CAD (coronary artery disease)      Unclear history, cath 2013 with patent arteries. Records pending. History of TIA (transient ischemic attack)      x2      Hypertension     Fibromyalgia     Depression     History of colon polyps     Chronic back pain     ADHD (attention deficit hyperactivity disorder)     Former smoker     Peripheral neuropathy     Tarsal tunnel syndrome     Benign esophageal stricture     Hyperlipidemia     Allergic rhinitis     Migraine headache     Dysphagia s/p dilation. 04/01/2014    GERD (gastroesophageal reflux disease) 10/15/2013    S/P cardiac catheterization: 9/30/2013: Normal coronaries 09/30/2013 9/30/2013: Normal coronaries. Radial approach. Dr. Hal Gottron      ED (erectile dysfunction) of organic origin 05/01/2013       Past Medical History:   Diagnosis Date    ADHD (attention deficit hyperactivity disorder)     Allergic rhinitis     Benign esophageal stricture     s/p dilation may 2014    Benign prostatic hyperplasia     Bipolar 1 disorder ([address]     CAD (coronary artery disease)     Unclear history, cath 2013 with patent arteries. Records pending.      Chronic back pain     Depression     DM2 (diabetes mellitus, type 2) ([address]     Dysfunctional voiding of urine 06/20/2013    Medications he changes to follow,  Adding ditropan  to the present schedule     ED (erectile dysfunction) of organic origin 05/01/2013    Fibromyalgia     Former smoker     GERD (gastroesophageal reflux disease) History of colon polyps     Pt unsure when last colo. History of TIA (transient ischemic attack)     x2     Hyperlipidemia     Hypertension     Hypertriglyceridemia 10/27/2015    Migraine headache     Morbid obesity (HCC)     WESLEY (nonalcoholic steatohepatitis)     Obstructive sleep apnea on CPAP 2015    Peripheral neuropathy     Pulmonary nodule     stable >2 years, no further w/u required. Rotator cuff injury     Left side    S/P cardiac catheterization: 2013: Normal coronaries 2013: Normal coronaries. Radial approach. Dr. Crista Osuna    Short-segment Vásquez's esophagus 2015    Tarsal tunnel syndrome     Bilateral       Past Surgical History:   Procedure Laterality Date    CARDIAC CATHETERIZATION  10/2013    2013: Normal coronaries. Radial approach. Dr. Lynn Segovia  ,     CYSTOSCOPY N/A 10/31/2022    Cystoscopy Urolift performed by Grace Marquez MD at [address] [place] [address] Left 2012    ROTATOR CUFF REPAIR Left 2013    SHOULDER SURGERY Bilateral     SINUS SURGERY  2004    [place] occ.      TONSILLECTOMY  2002    TYMPANOSTOMY TUBE PLACEMENT      UPPER GASTROINTESTINAL ENDOSCOPY  ,  with dilation,     UPPER GASTROINTESTINAL ENDOSCOPY      UPPER GASTROINTESTINAL ENDOSCOPY  MAY 2014, 2017    Dilation       Allergies   Allergen Reactions    Morphine Nausea And Vomiting and Rash         Social History     Tobacco Use    Smoking status: Some Days     Packs/day: 0.25     Years: 30.00     Pack years: 7.50     Types: Cigarettes     Start date: 1984     Last attempt to quit: 2018     Years since quittin.5    Smokeless tobacco: Never   Substance Use Topics    Alcohol use: No     Alcohol/week: 0.0 standard drinks       Family History   Problem Relation Age of Onset    Cancer Father 77        lung     Rheum Arthritis Father     Colon Cancer Maternal Aunt 61    Cancer Maternal Aunt 76        \"throat\"    Colon Cancer Maternal Uncle 48    Cancer Maternal Uncle 60        pancreatic    Cancer Paternal Aunt 52        brain    Colon Cancer Paternal Uncle 72    Cancer Paternal Uncle 61        hepatic     Prostate Cancer Paternal Uncle 61    Cancer Maternal Uncle 79        pancreatic     Ovarian Cancer Paternal Aunt 48    Rheum Arthritis Mother     Pancreatic Cancer Mother     Brain Cancer Brother          I have reviewed the patient's past medical history, past surgical history, allergies, medications, social and family history and I have made updates where appropriate. Review of Systems  Positive responses are highlighted in bold    Constitutional:  Fever, Chills, Night Sweats, Fatigue, Unexpected changes in weight  HENT:  Ear pain, Tinnitus, Nosebleeds, Trouble swallowing, Hearing loss, Sore throat  Cardiovascular:  Chest Pain, Palpitations, Orthopnea, Paroxysmal Nocturnal Dyspnea  Respiratory:  Cough, Wheezing, Shortness of breath, Chest tightness, Apnea  Gastrointestinal:  Nausea, Vomiting, Diarrhea, Constipation, Heartburn, Blood in stool  Genitourinary:  Difficulty or painful urination, Flank pain, Change in frequency, Urgency  Skin:  Color change, Rash, Itching, Wound  Musculoskeletal:  Joint pain, Back pain, Gait problems, Joint swelling, Myalgias  Neurological:  Dizziness, Headaches, Presyncope, Numbness, Seizures, Tremors  Endocrine:  Heat Intolerance, Cold Intolerance, Polydipsia, Polyphagia, Polyuria      PHYSICAL EXAM:  Vitals:    11/23/22 0739   BP: 130/78   Site: Right Upper Arm   Position: Sitting   Cuff Size: Large Adult   Pulse: 86   Resp: 16   Temp: 98.3 °F (36.8 °C)   SpO2: 95%   Weight: 260 lb 9.6 oz (118.2 kg)   Height: 5' 7\" (1.702 m)     Body mass index is 40.82 kg/m².        Wt Readings from Last 3 Encounters:   11/23/22 260 lb 9.6 oz (118.2 kg)   10/31/22 259 lb (117.5 kg)   09/12/22 260 lb (117.9 kg)     VS Reviewed  General Appearance: A&O x 3, No acute distress,well developed and well- nourished  Eyes: pupils equal, round, and reactive to light, extraocular eye movements intact, conjunctivae and eye lids without erythema  ENT: external ear and ear canal clear bilaterally, TMs intact and regular, nose without deformity, nasal mucosa and turbinates normal without polyps, oropharynx normal, dentition is normal for age  Neck: supple and non-tender without mass, no thyromegaly or thyroid nodules, no cervical lymphadenopathy  Pulmonary/Chest: clear to auscultation bilaterally- no wheezes, rales or rhonchi, normal air movement, no respiratory distress or retractions  Cardiovascular: S1 and S2 auscultated w/ RRR. No murmurs, rubs, clicks, or gallops, distal pulses intact. Abdomen: soft, non-tender, non-distended, bowl sounds physiologic,  no rebound or guarding, no masses or hernias noted. Liver and spleen without enlargement. Extremities: no cyanosis, clubbing or edema of the lower extremities. +2 PT/DP bilaterally. Skin: warm and dry, no rash or erythema  Psych: Affect appropriate. Mood mildly depressed. Thought process is normal without evidence of psychosis. Good insight and appropriate interaction. Cognition and memory appear to be intact. Results for POC orders placed in visit on 11/23/22   POCT glycosylated hemoglobin (Hb A1C)   Result Value Ref Range    Hemoglobin A1C 8.9 (H) 4.3 - 5.7 %   POCT Glucose   Result Value Ref Range    Glucose 169 mg/dL    QC OK? ASSESSMENT & PLAN  1. Type 2 diabetes mellitus with other specified complication, with long-term current use of insulin ([address]    Uncontrolled  Suspect glucometer not accurate  Will get new glucometer  Con't Basaglar at 65 units bid for now  Con't Metformin and [address] Jardiance to 25mg from 10  Will call him in 2 wks with updated glucose readings  Will adjust meds based on that    - POCT glycosylated hemoglobin (Hb A1C)  - Blood Glucose Monitoring Suppl KIT; Use to check sugars once daily  Dispense: 1 kit;  Refill: 0  - blood glucose monitor strips; Use to check sugars once daily  Dispense: 100 strip; Refill: 3  - empagliflozin (JARDIANCE) 25 MG tablet; Take 1 tablet by mouth daily  Dispense: 90 tablet; Refill: 3  - POCT Glucose    2. Morbid obesity with BMI of 40.0-44.9, adult ([address]    Discussed wt loss strategies    3. Abnormal transaminases    con't wt loss, DM control, statin  Get records from GI, Erik Paul  F/u him as planned    4. WESLEY (nonalcoholic steatohepatitis)      5. Iron deficiency anemia, unspecified iron deficiency anemia type    In remission  Neg w/u  Monitor with labs  Stable     6. B12 deficiency    As above    7. Essential hypertension    Stable  At goal inderal and lisinopril    8. Mixed hyperlipidemia    Stable  Con't lipitor    9. Gastroesophageal reflux disease, unspecified whether esophagitis present    Stable  con't PPI  F/u Donte SALAZAR    10. Short-segment Vásquez's esophagus      DISPOSITION    Return in about 3 months (around 2/23/2023) for Follow-up Diabetes, follow-up on chronic medical conditions, sooner as needed. Az released without restrictions. Future Appointments   Date Time Provider [employer]   12/8/2022  9:30 AM Brandon Acuña DO [facility] [place] [email or web address]   12/12/2022 10:15 AM Juju Paz MD [address] [place] [address]   1/16[facility]2023  7:[facility] [place] [email or web address]   2/23/2023  7:40 AM Shayla Devi DO [facility]     PATIENT COUNSELING    Az received counseling on the following healthy behaviors: nutrition, exercise and medication adherence    Barriers to learning and self management: none    Discussed use, benefit, and side effects of prescribed medications. Barriers to medication compliance addressed. All patient questions answered. Pt voiced understanding.        Electronically signed by Shayla Devi DO on 11/23/2022 at 8:06 AM

## 2022-11-23 ENCOUNTER — OFFICE VISIT (OUTPATIENT)
Dept: FAMILY MEDICINE CLINIC | Age: 52
End: 2022-11-23
Payer: COMMERCIAL

## 2022-11-23 VITALS
WEIGHT: 260.6 LBS | OXYGEN SATURATION: 95 % | HEIGHT: 67 IN | DIASTOLIC BLOOD PRESSURE: 78 MMHG | BODY MASS INDEX: 40.9 KG/M2 | SYSTOLIC BLOOD PRESSURE: 130 MMHG | RESPIRATION RATE: 16 BRPM | TEMPERATURE: 98.3 F | HEART RATE: 86 BPM

## 2022-11-23 DIAGNOSIS — E78.2 MIXED HYPERLIPIDEMIA: ICD-10-CM

## 2022-11-23 DIAGNOSIS — D50.9 IRON DEFICIENCY ANEMIA, UNSPECIFIED IRON DEFICIENCY ANEMIA TYPE: ICD-10-CM

## 2022-11-23 DIAGNOSIS — E53.8 B12 DEFICIENCY: ICD-10-CM

## 2022-11-23 DIAGNOSIS — K75.81 NASH (NONALCOHOLIC STEATOHEPATITIS): ICD-10-CM

## 2022-11-23 DIAGNOSIS — K22.70 SHORT-SEGMENT BARRETT'S ESOPHAGUS: ICD-10-CM

## 2022-11-23 DIAGNOSIS — E11.69 TYPE 2 DIABETES MELLITUS WITH OTHER SPECIFIED COMPLICATION, WITH LONG-TERM CURRENT USE OF INSULIN (HCC): Primary | ICD-10-CM

## 2022-11-23 DIAGNOSIS — E66.01 MORBID OBESITY WITH BMI OF 40.0-44.9, ADULT (HCC): ICD-10-CM

## 2022-11-23 DIAGNOSIS — K21.9 GASTROESOPHAGEAL REFLUX DISEASE, UNSPECIFIED WHETHER ESOPHAGITIS PRESENT: ICD-10-CM

## 2022-11-23 DIAGNOSIS — Z79.4 TYPE 2 DIABETES MELLITUS WITH OTHER SPECIFIED COMPLICATION, WITH LONG-TERM CURRENT USE OF INSULIN (HCC): Primary | ICD-10-CM

## 2022-11-23 DIAGNOSIS — R74.8 ABNORMAL TRANSAMINASES: ICD-10-CM

## 2022-11-23 DIAGNOSIS — I10 ESSENTIAL HYPERTENSION: ICD-10-CM

## 2022-11-23 LAB
CHP ED QC CHECK: ABNORMAL
GLUCOSE BLD-MCNC: 169 MG/DL
HBA1C MFR BLD: 8.9 % (ref 4.3–5.7)

## 2022-11-23 PROCEDURE — 2022F DILAT RTA XM EVC RTNOPTHY: CPT | Performed by: FAMILY MEDICINE

## 2022-11-23 PROCEDURE — 3052F HG A1C>EQUAL 8.0%<EQUAL 9.0%: CPT | Performed by: FAMILY MEDICINE

## 2022-11-23 PROCEDURE — 90674 CCIIV4 VAC NO PRSV 0.5 ML IM: CPT | Performed by: FAMILY MEDICINE

## 2022-11-23 PROCEDURE — G8482 FLU IMMUNIZE ORDER/ADMIN: HCPCS | Performed by: FAMILY MEDICINE

## 2022-11-23 PROCEDURE — 4004F PT TOBACCO SCREEN RCVD TLK: CPT | Performed by: FAMILY MEDICINE

## 2022-11-23 PROCEDURE — 82962 GLUCOSE BLOOD TEST: CPT | Performed by: FAMILY MEDICINE

## 2022-11-23 PROCEDURE — 3078F DIAST BP <80 MM HG: CPT | Performed by: FAMILY MEDICINE

## 2022-11-23 PROCEDURE — 3017F COLORECTAL CA SCREEN DOC REV: CPT | Performed by: FAMILY MEDICINE

## 2022-11-23 PROCEDURE — G8427 DOCREV CUR MEDS BY ELIG CLIN: HCPCS | Performed by: FAMILY MEDICINE

## 2022-11-23 PROCEDURE — G8417 CALC BMI ABV UP PARAM F/U: HCPCS | Performed by: FAMILY MEDICINE

## 2022-11-23 PROCEDURE — 3074F SYST BP LT 130 MM HG: CPT | Performed by: FAMILY MEDICINE

## 2022-11-23 PROCEDURE — 99214 OFFICE O/P EST MOD 30 MIN: CPT | Performed by: FAMILY MEDICINE

## 2022-11-23 RX ORDER — GLUCOSAMINE HCL/CHONDROITIN SU 500-400 MG
CAPSULE ORAL
Qty: 100 STRIP | Refills: 3 | Status: SHIPPED | OUTPATIENT
Start: 2022-11-23

## 2022-11-23 NOTE — PROGRESS NOTES
Vaccine Information Sheet, \"Influenza - Inactivated\"  given to Dustin Ray, or parent/legal guardian of  Dustin Ray and verbalized understanding. Patient responses:    Have you ever had a reaction to a flu vaccine? No  Do you have an allergy to eggs, neomycin or polymixin? No  Do you have an allergy to Thimerosal, contact lens solution, or Merthiolate? No  Have you ever had Guillian Mansfield Syndrome? No  Do you have any current illness? No  Do you have a temperature above 100 degrees? No  Are you pregnant? No  If pregnant, permission obtained from physician? No  Do you have an active neurological disorder? No      Flu vaccine given per order. Please see immunization tab.

## 2022-12-01 DIAGNOSIS — I10 ESSENTIAL HYPERTENSION: ICD-10-CM

## 2022-12-01 RX ORDER — AMLODIPINE BESYLATE 5 MG/1
TABLET ORAL
Qty: 90 TABLET | Refills: 3 | Status: SHIPPED | OUTPATIENT
Start: 2022-12-01

## 2022-12-01 NOTE — TELEPHONE ENCOUNTER
Recent Visits  Date Type Provider Dept   11/23/22 Office Visit Michael Morales, DO [facility]   08/23/22 Office Visit Michael Morales, DO [facility]   05/24/22 Office Visit Micheal Morales, DO [facility]   11/29/21 Office Visit Michael Morales, DO [facility]   08/31/21 Office Visit Michael Morales, DO [facility]   Showing recent visits within past 540 days with a meds authorizing provider and meeting all other requirements  Future Appointments  Date Type Provider Dept   02/23/23 Appointment Michael Morales, DO [facility]   Showing future appointments within next 150 days with a meds authorizing provider and meeting all other requirements     Future Appointments   Date Time Provider Mikey Painting   12/8/2022  9:30 AM [facility] [place] [address]   12/12/2022 10:15 AM MD F[facility] [email or web address]   1/16/2023  7:45 AM RAQUEL Garcia [facility] [address]   2/23/2023  7:40 AM Michael Morales DO [facility] [address] [place]

## 2022-12-07 ENCOUNTER — TELEPHONE (OUTPATIENT)
Dept: FAMILY MEDICINE CLINIC | Age: 52
End: 2022-12-07

## 2022-12-07 NOTE — TELEPHONE ENCOUNTER
----- Message from Larry Tubbs DO sent at 12/7/2022  6:58 AM EST -----  Please call pt and see how his sugars have been doing with the new glucometer that was ordered  Let me know, Heri Chapa

## 2022-12-08 ENCOUNTER — TELEMEDICINE (OUTPATIENT)
Dept: PSYCHIATRY | Age: 52
End: 2022-12-08
Payer: COMMERCIAL

## 2022-12-08 DIAGNOSIS — F25.0 SCHIZOAFFECTIVE DISORDER, BIPOLAR TYPE (HCC): Primary | ICD-10-CM

## 2022-12-08 DIAGNOSIS — F17.210 TOBACCO DEPENDENCE DUE TO CIGARETTES: ICD-10-CM

## 2022-12-08 DIAGNOSIS — F90.8 ADHD, ADULT RESIDUAL TYPE: ICD-10-CM

## 2022-12-08 PROCEDURE — G8428 CUR MEDS NOT DOCUMENT: HCPCS | Performed by: STUDENT IN AN ORGANIZED HEALTH CARE EDUCATION/TRAINING PROGRAM

## 2022-12-08 PROCEDURE — 3017F COLORECTAL CA SCREEN DOC REV: CPT | Performed by: STUDENT IN AN ORGANIZED HEALTH CARE EDUCATION/TRAINING PROGRAM

## 2022-12-08 PROCEDURE — G8482 FLU IMMUNIZE ORDER/ADMIN: HCPCS | Performed by: STUDENT IN AN ORGANIZED HEALTH CARE EDUCATION/TRAINING PROGRAM

## 2022-12-08 PROCEDURE — 99214 OFFICE O/P EST MOD 30 MIN: CPT | Performed by: STUDENT IN AN ORGANIZED HEALTH CARE EDUCATION/TRAINING PROGRAM

## 2022-12-08 PROCEDURE — 4004F PT TOBACCO SCREEN RCVD TLK: CPT | Performed by: STUDENT IN AN ORGANIZED HEALTH CARE EDUCATION/TRAINING PROGRAM

## 2022-12-08 PROCEDURE — G8417 CALC BMI ABV UP PARAM F/U: HCPCS | Performed by: STUDENT IN AN ORGANIZED HEALTH CARE EDUCATION/TRAINING PROGRAM

## 2022-12-08 RX ORDER — LAMOTRIGINE 150 MG/1
150 TABLET ORAL 2 TIMES DAILY
Qty: 60 TABLET | Refills: 2 | Status: SHIPPED | OUTPATIENT
Start: 2022-12-08

## 2022-12-08 NOTE — TELEPHONE ENCOUNTER
Pt states his sugars have been good at 150-170 fasting  Pt will continue to monitor and will call with elevated readings

## 2022-12-08 NOTE — PROGRESS NOTES
[address] [facility] [address]  [facility] [address]  Dept: [phone]  Loc: [phone]          Carol Meth  1970 12/8/2022     F/U appt      TELEHEALTH EVALUATION -- Audio/Visual (During XMHGK-70 public health emergency)    Patient location: home  Physician location: home, [facility]  This virtual visit was conducted via interactive, real-time video. DSM:  Dx:    ICD-10-CM    1. Schizoaffective disorder, bipolar type (HCC)  F25.0       2. ADHD, adult residual type  F90.8       3. Tobacco dependence due to cigarettes  F17.210            Interim Hx:  Chief Complaint:   Chief Complaint   Patient presents with    Anxiety    Depression    Follow-up      HPI: Patient is a 45year old male who presents for follow up. Mood is reported as \"alright I guess\". Anxiety and depression have lessened. Hisson recently kicked him out of his home. Now the patient is living with friends until his new home is ready. He states his new home will be ready for move in by [employer], which he endorses excitement about. Denies active and passive SI and HI. He reports AH in the form of his wife's voice comforting him. He denies command hallucinations. He reports VH in the form of \"people\", which only transpires on rare occasions. These hallucinations do not cause any distress, but will be continually monitored. He reports sleeping \"okay\" and states it is hard to sleep well in somebody else's home. Denies issues with appetite today. He is eating a full meal with his latuda. Denies side effects. Reports compliance. He is agreeable to increasing lamotrigine from 100 mg bid to 150 mg bid, which was the original dose.      ROS:   Gen: Denies F/C/N/V  HEENT: Denies Vision/hearing changes/sore throat  CV: Denies CP/Palp  Pulm: Denies SOB/Cough/Wheeze  GI: Denies Abd pain  Skin: Denies Rashes/Lumps/Bruises  Neurologic: Denies changes in memory/speech/mental status. Denies h/o seizures/DTs   Psychiatric: +depression +anxiety     MSE:  Cognition - Alert and Oriented x 4  Appearance - dressed casually and appropriately for the weather  Behavior - pleasant and cooperative  Motor - no tardive dyskinesia or other EPS observed; no psychomotor agitation/retardation observed  Gait and Station - unable to assess via tele-psychiatry  Speech - normal rate, rhythm, and volume  Eye Contact - maintains  Mood - \"better\"  Affect - restricted  Thought Content/Perceptions:   Psychosis - AH and VH as per HPI   SI/HI -  denies  Thought Process/Associations -  logical and linear  Memory - grossly intact  Insight - good  Judgment - fair (med noncompliant for past month)       Diagnosis Orders   1. Schizoaffective disorder, bipolar type ([facility] [address]        2. ADHD, adult residual type        3. Tobacco dependence due to cigarettes             Follow up Plan  -Continue latuda 120 mg daily with at least 350 kcals for mood and psychotic symptoms. Risks including but not limited to EPS, TD, and metabolic side effects discussed. AIMS will be conducted at next in person interview.  -Increase lamotrigine 100 mg bid to 150 mg bid (original dose). Patient was without this medication for 1 month so titration was started over. Risk of Dicky Nettle Syndrome was discussed.  -Continue trintellix 20 mg daily for anxiety.  -Continue adderall XR 30 mg qam. Risks including but not limited to hypertension, increased risk of adverse cardiovascular events (e.g. stroke and MI), and addictive potential have been discussed.  -Another provider is prescribing doxepin 25 mg qhs.  Risk of serotonin syndome was discussed.     -Discussed medications with the patient and changes are as noted above with patient agreement.  -Discussed diagnosis and treatment plan with the patient  -Provided brief supportive therapy through active listening     -Patient Education: Discussed medications including indications, potential side effects, contraindications, and risks/benefits; also discussed alternative medications/treatments. Discussed the potential need for follow up laboratory studies related to medications and patient demonstrated understanding and compliance. Patient participated in medications decision making was given the opportunity to ask questions/express preferences and concerns. Patient demonstrates good understanding of medications and is compliant/in agreement with current plan. Additional Comments and Follow up Plan:  -RTC in (6) weeks, sooner if needed  -Labs: n/a  -Discussed abstaining from illicit drugs/alcohol  -Patient informed to call crisis line/911 or go to ER if experiencing crisis, SI, HI, or psychosis. Ilsa Meade, was evaluated through a synchronous (real-time) audio-video encounter. The patient (or guardian if applicable) is aware that this is a billable service, which includes applicable copays. This virtual visit was conducted with patient's (and/or legal guardian's) consent. The visit was conducted pursuant to the emergency declaration under the [address] [place] [address] authority and the [employer] General Act. Patient identification was verified, and a caregiver was present when appropriate. The patient was located in a state where the provider was licensed to provide care.       Total time spent for this encounter: 30 minutes    Electronically signed by Debi Marcus DO on 12/8/2022 at 9:50 AM      Debi Marcus DO  12/8/2022

## 2023-01-02 DIAGNOSIS — E11.29 TYPE 2 DIABETES MELLITUS WITH MICROALBUMINURIA, WITHOUT LONG-TERM CURRENT USE OF INSULIN (HCC): Chronic | ICD-10-CM

## 2023-01-02 DIAGNOSIS — I25.10 ASCVD (ARTERIOSCLEROTIC CARDIOVASCULAR DISEASE): ICD-10-CM

## 2023-01-02 DIAGNOSIS — R80.9 TYPE 2 DIABETES MELLITUS WITH MICROALBUMINURIA, WITHOUT LONG-TERM CURRENT USE OF INSULIN (HCC): Chronic | ICD-10-CM

## 2023-01-03 RX ORDER — BUPROPION HYDROCHLORIDE 450 MG/1
TABLET, FILM COATED, EXTENDED RELEASE ORAL
Qty: 90 TABLET | Refills: 0 | Status: SHIPPED | OUTPATIENT
Start: 2023-01-03

## 2023-01-03 RX ORDER — DOXEPIN HYDROCHLORIDE 25 MG/1
CAPSULE ORAL
Qty: 30 CAPSULE | Refills: 5 | Status: SHIPPED | OUTPATIENT
Start: 2023-01-03

## 2023-01-03 RX ORDER — ASPIRIN 81 MG/1
TABLET ORAL
Qty: 90 TABLET | Refills: 3 | Status: SHIPPED | OUTPATIENT
Start: 2023-01-03

## 2023-01-03 NOTE — TELEPHONE ENCOUNTER
Received refill request for Doxepin. Medication was last ordered by Anthony Holloway. Medication was last ordered on 7/13/22 with 5 refills. Patient was last seen in the office 7/13/22. Does patient have a scheduled follow up?: yes - 1/16/23    Medication needs to be sent to [address] [place] [address] [address] - [place], [address] [facility] [phone]. Thank you, please advise! Patient's Allergies:   Allergies   Allergen Reactions    Morphine Nausea And Vomiting and Rash

## 2023-01-03 NOTE — TELEPHONE ENCOUNTER
Recent Visits  Date Type Provider Dept   11/23/22 Office Visit Colton Curtis, DO [facility]   08/23/22 Office Visit Colton Curtis, DO [facility]   05/24/22 Office Visit Colton Curtis, DO [facility]   11/29/21 Office Visit Colton Curtis, DO [facility]   08/31/21 Office Visit Colton Curtis, DO [facility]   Showing recent visits within past 540 days with a meds authorizing provider and meeting all other requirements  Future Appointments  Date Type Provider Dept   02/23/23 Appointment Devandar Curtis,  [facility]   Showing future appointments within next 150 days with a meds authorizing provider and meeting all other requirements     Future Appointments   Date Time Provider Mikey Painting   1/16/2023  7:45 AM RA[facility]EL Baez [facility]   1/23/2023  8:15 AM Allison Leung  [facility]   2/23/2023  7:40 AM Colton Curtis DO [address] [place] [address]

## 2023-01-17 DIAGNOSIS — F90.8 ADHD, ADULT RESIDUAL TYPE: ICD-10-CM

## 2023-01-17 RX ORDER — DEXTROAMPHETAMINE SACCHARATE, AMPHETAMINE ASPARTATE MONOHYDRATE, DEXTROAMPHETAMINE SULFATE AND AMPHETAMINE SULFATE 7.5; 7.5; 7.5; 7.5 MG/1; MG/1; MG/1; MG/1
30 CAPSULE, EXTENDED RELEASE ORAL EVERY MORNING
Qty: 30 CAPSULE | Refills: 0 | Status: SHIPPED | OUTPATIENT
Start: 2023-01-17 | End: 2023-01-23 | Stop reason: DRUGHIGH

## 2023-01-17 NOTE — TELEPHONE ENCOUNTER
Az contacted office requesting a rx on adderall xr 30mg, disp 30, he is on his last capsule, Delta Jayla is scheduled for a future appt on 1/23.  Loaded pending review and approval.

## 2023-01-23 ENCOUNTER — TELEMEDICINE (OUTPATIENT)
Dept: PSYCHIATRY | Age: 53
End: 2023-01-23
Payer: COMMERCIAL

## 2023-01-23 DIAGNOSIS — F31.9 BIPOLAR 1 DISORDER (HCC): ICD-10-CM

## 2023-01-23 DIAGNOSIS — F17.210 TOBACCO DEPENDENCE DUE TO CIGARETTES: ICD-10-CM

## 2023-01-23 DIAGNOSIS — F90.8 ADHD, ADULT RESIDUAL TYPE: ICD-10-CM

## 2023-01-23 DIAGNOSIS — F25.0 SCHIZOAFFECTIVE DISORDER, BIPOLAR TYPE (HCC): ICD-10-CM

## 2023-01-23 DIAGNOSIS — F90.8 ADHD, ADULT RESIDUAL TYPE: Primary | ICD-10-CM

## 2023-01-23 PROCEDURE — G8417 CALC BMI ABV UP PARAM F/U: HCPCS | Performed by: STUDENT IN AN ORGANIZED HEALTH CARE EDUCATION/TRAINING PROGRAM

## 2023-01-23 PROCEDURE — G8428 CUR MEDS NOT DOCUMENT: HCPCS | Performed by: STUDENT IN AN ORGANIZED HEALTH CARE EDUCATION/TRAINING PROGRAM

## 2023-01-23 PROCEDURE — G8482 FLU IMMUNIZE ORDER/ADMIN: HCPCS | Performed by: STUDENT IN AN ORGANIZED HEALTH CARE EDUCATION/TRAINING PROGRAM

## 2023-01-23 PROCEDURE — 4004F PT TOBACCO SCREEN RCVD TLK: CPT | Performed by: STUDENT IN AN ORGANIZED HEALTH CARE EDUCATION/TRAINING PROGRAM

## 2023-01-23 PROCEDURE — 99214 OFFICE O/P EST MOD 30 MIN: CPT | Performed by: STUDENT IN AN ORGANIZED HEALTH CARE EDUCATION/TRAINING PROGRAM

## 2023-01-23 PROCEDURE — 3017F COLORECTAL CA SCREEN DOC REV: CPT | Performed by: STUDENT IN AN ORGANIZED HEALTH CARE EDUCATION/TRAINING PROGRAM

## 2023-01-23 RX ORDER — HYDROXYZINE PAMOATE 50 MG/1
50-100 CAPSULE ORAL
Qty: 60 CAPSULE | Refills: 1 | Status: SHIPPED | OUTPATIENT
Start: 2023-01-23 | End: 2023-02-22

## 2023-01-23 RX ORDER — DEXTROAMPHETAMINE SACCHARATE, AMPHETAMINE ASPARTATE MONOHYDRATE, DEXTROAMPHETAMINE SULFATE AND AMPHETAMINE SULFATE 5; 5; 5; 5 MG/1; MG/1; MG/1; MG/1
20 CAPSULE, EXTENDED RELEASE ORAL EVERY MORNING
Qty: 30 CAPSULE | Refills: 0 | Status: SHIPPED | OUTPATIENT
Start: 2023-01-23 | End: 2023-01-25 | Stop reason: SDUPTHER

## 2023-01-23 RX ORDER — DEXTROAMPHETAMINE SACCHARATE, AMPHETAMINE ASPARTATE, DEXTROAMPHETAMINE SULFATE AND AMPHETAMINE SULFATE 2.5; 2.5; 2.5; 2.5 MG/1; MG/1; MG/1; MG/1
10 TABLET ORAL EVERY MORNING
Qty: 30 TABLET | Refills: 0 | Status: SHIPPED | OUTPATIENT
Start: 2023-01-23 | End: 2023-02-22

## 2023-01-23 NOTE — TELEPHONE ENCOUNTER
Az called into the office stating Sara Badillo is out of Adderall XR. He states [facility] advised that they could not give information over the phone about whether or not they have Adderall in stock. He requests to have Adderall XR 20 mg sent to [facility]. Requested Prescriptions     Pending Prescriptions Disp Refills    amphetamine-dextroamphetamine (ADDERALL XR) 20 MG extended release capsule 30 capsule 0     Sig: Take 1 capsule by mouth every morning for 30 days.  Max Daily Amount: 20 mg       Date of last visit: 1/23/2023  Pharmacy Name: [place], [place]

## 2023-01-23 NOTE — PROGRESS NOTES
[address] [facility] [address]  [facility] [address]  Dept: [phone]  Loc: [phone]          Starheidy Spore  1970 1/23/2023     F/U appt    TELEHEALTH EVALUATION -- Audio/Visual (During OERTG-71 public health emergency)    Patient location: home  Physician location: home, [facility]  This virtual visit was conducted via interactive, real-time video. DSM:  Dx:    ICD-10-CM    1. ADHD, adult residual type  F90.8 amphetamine-dextroamphetamine (ADDERALL XR) 20 MG extended release capsule     amphetamine-dextroamphetamine (ADDERALL, 10MG,) 10 MG tablet      2. Schizoaffective disorder, bipolar type ([facility] [address]  F25.0       3. Tobacco dependence due to cigarettes  F17.210       4. Bipolar 1 disorder (HCC)  F31.9 hydrOXYzine pamoate (VISTARIL) 50 MG capsule           Interim Hx:  Chief Complaint:   Chief Complaint   Patient presents with    Follow-up    Medication Check        HPI: Patient is a 45year old male who presents for follow up. Mood is reported as \"not bad\". Anxiety and depression are stable. His \"financing fell through\" for the home he was going to buy and now he plans on renting. He reports AH in the form of \"women mumbling\". He denies command hallucinations. Denies active and passive SI and HI. He reports VH in the form of \"people\", which only transpires on rare occasions. He reports impaired sleep initiation and maintenance. Appetite is at baseline. He is eating a full meal with his latuda. Denies side effects. Reports compliance. Patient states he takes his adderall in the morning and it takes several hours to achieve efficacy.  Accordingly, he is interested in combining Adderall IR 10 mg qam with adderall XR 20 mg qam versus taking the adderall XR 30 mg qam.     ROS:   Gen: Denies F/C/N/V  HEENT: Denies Vision/hearing changes/sore throat  CV: Denies CP/Palp  Pulm: Denies SOB/Cough/Wheeze  GI: Denies Abd pain  Skin: Denies Rashes/Lumps/Bruises  Neurologic: Denies changes in memory/speech/mental status. Denies h/o seizures/DTs   Psychiatric: denies depression and anxiety     MSE:  Cognition - Alert and Oriented x 4  Appearance - dressed casually and appropriately for the weather  Behavior - pleasant and cooperative  Motor - no tardive dyskinesia or other EPS observed; no psychomotor agitation/retardation observed  Gait and Station - unable to assess via tele-psychiatry  Speech - normal rate, rhythm, and volume  Eye Contact - maintains  Mood - \"better\"  Affect - restricted  Thought Content/Perceptions:   Psychosis - AH and VH as per HPI   SI/HI -  denies  Thought Process/Associations -  logical and linear  Memory - grossly intact  Insight - good  Judgment - fair (med noncompliant for past month)     Diagnosis Orders   1. ADHD, adult residual type  amphetamine-dextroamphetamine (ADDERALL XR) 20 MG extended release capsule    amphetamine-dextroamphetamine (ADDERALL, 10MG,) 10 MG tablet      2. Schizoaffective disorder, bipolar type ([facility] [address]        3. Tobacco dependence due to cigarettes        4. Bipolar 1 disorder (HCC)  hydrOXYzine pamoate (VISTARIL) 50 MG capsule             Follow up Plan  -Continue latuda 120 mg daily with at least 350 kcals for mood and psychotic symptoms. Risks including but not limited to EPS, TD, and metabolic side effects discussed. AIMS will be conducted at next in person interview.  -Continue lamotrigine 150 mg bid. Risk of Katharyn Peasant Syndrome was discussed.  -Continue trintellix 20 mg daily for anxiety.  -Change adderall XR 30 mg qam to adderall XR 20 mg qam and IR 10 mg qam. Risks including but not limited to hypertension, increased risk of adverse cardiovascular events (e.g. stroke and MI), and addictive potential have been discussed.  -Another provider is prescribing doxepin 25 mg qhs. Risk of serotonin syndome was discussed.  He discontinued this medication due to lack of efficacy.  -Start hydroxyzine  mg qhs prn for sleep.     -Discussed medications with the patient and changes are as noted above with patient agreement.  -Discussed diagnosis and treatment plan with the patient  -Provided brief supportive therapy through active listening     -Patient Education: Discussed medications including indications, potential side effects, contraindications, and risks/benefits; also discussed alternative medications/treatments. Discussed the potential need for follow up laboratory studies related to medications and patient demonstrated understanding and compliance. Patient participated in medications decision making was given the opportunity to ask questions/express preferences and concerns. Patient demonstrates good understanding of medications and is compliant/in agreement with current plan. Additional Comments and Follow up Plan:  -RTC in (6) weeks, sooner if needed  -Labs: n/a  -Discussed abstaining from illicit drugs/alcohol  -Patient informed to call crisis line/911 or go to ER if experiencing crisis, SI, HI, or psychosis. Arin Nowak, was evaluated through a synchronous (real-time) audio-video encounter. The patient (or guardian if applicable) is aware that this is a billable service, which includes applicable copays. This virtual visit was conducted with patient's (and/or legal guardian's) consent. The visit was conducted pursuant to the emergency declaration under the [address] [place] [address] [facility] authority and the [employer] General Act. Patient identification was verified, and a caregiver was present when appropriate. The patient was located in a state where the provider was licensed to provide care.       Total time spent for this encounter: 30 minutes    Electronically signed by Smith Mcintosh DO on 1/23/2023 at 2:44 PM     Smith Mcintosh DO  1/23/2023

## 2023-01-25 RX ORDER — DEXTROAMPHETAMINE SACCHARATE, AMPHETAMINE ASPARTATE MONOHYDRATE, DEXTROAMPHETAMINE SULFATE AND AMPHETAMINE SULFATE 5; 5; 5; 5 MG/1; MG/1; MG/1; MG/1
20 CAPSULE, EXTENDED RELEASE ORAL EVERY MORNING
Qty: 30 CAPSULE | Refills: 0 | Status: SHIPPED | OUTPATIENT
Start: 2023-01-25 | End: 2023-02-24

## 2023-02-01 DIAGNOSIS — E11.29 TYPE 2 DIABETES MELLITUS WITH MICROALBUMINURIA, WITHOUT LONG-TERM CURRENT USE OF INSULIN (HCC): Chronic | ICD-10-CM

## 2023-02-01 DIAGNOSIS — R80.9 TYPE 2 DIABETES MELLITUS WITH MICROALBUMINURIA, WITHOUT LONG-TERM CURRENT USE OF INSULIN (HCC): Chronic | ICD-10-CM

## 2023-02-01 DIAGNOSIS — I10 ESSENTIAL HYPERTENSION: ICD-10-CM

## 2023-02-01 DIAGNOSIS — E11.65 UNCONTROLLED TYPE 2 DIABETES MELLITUS WITH HYPERGLYCEMIA, WITHOUT LONG-TERM CURRENT USE OF INSULIN (HCC): ICD-10-CM

## 2023-02-01 DIAGNOSIS — F25.0 SCHIZOAFFECTIVE DISORDER, BIPOLAR TYPE (HCC): ICD-10-CM

## 2023-02-02 RX ORDER — LISINOPRIL 40 MG/1
TABLET ORAL
Qty: 90 TABLET | Refills: 3 | Status: SHIPPED | OUTPATIENT
Start: 2023-02-02

## 2023-02-02 RX ORDER — BUPROPION HYDROCHLORIDE 450 MG/1
TABLET, FILM COATED, EXTENDED RELEASE ORAL
Qty: 90 TABLET | Refills: 0 | OUTPATIENT
Start: 2023-02-02

## 2023-02-02 NOTE — TELEPHONE ENCOUNTER
Recent Visits  Date Type Provider Dept   11/23/22 Office Visit Laury Last, DO [facility]   08/23/22 Office Visit Laury Last, DO [facility]   05/24/22 Office Visit Laury Last, DO [facility]   11/29/21 Office Visit Laury Last, DO [facility]   08/31/21 Office Visit Laury Last, DO [facility]   Showing recent visits within past 540 days with a meds authorizing provider and meeting all other requirements  Future Appointments  Date Type Provider Dept   02/23/23 Appointment Laury Last, [address] [facility]   Showing future appointments within next 150 days with a meds authorizing provider and meeting all other requirements      Future Appointments   Date Time Provider Mikey Painting   2/13/2023  3:00 PM MD Moon Khan [facility] - [address] [facility]   2/23/2023  7:40 AM Laury Last, DO [facility] - [address] [facility]   4/10/2023  7:45 AM Madonna Cunningham PA-C [facility] [facility]

## 2023-02-07 DIAGNOSIS — F25.0 SCHIZOAFFECTIVE DISORDER, BIPOLAR TYPE (HCC): ICD-10-CM

## 2023-02-07 NOTE — TELEPHONE ENCOUNTER
Sowmya Bustos called requesting a refill on the following medications:  Requested Prescriptions     Pending Prescriptions Disp Refills    VORTIoxetine HBr (TRINTELLIX) 20 MG TABS tablet 30 tablet 1     Sig: TAKE 1 TABLET BY MOUTH EVERY DAY       [facility] also mentions that he would like Rx for Adderall XR 30 mg. Reports he has not been able to find 20 mg in stock. Please advise.      Date of last visit: 1/23/2023  Date of next visit (if applicable):3/9/2023  Pharmacy Name: [facility] [place]

## 2023-02-13 ENCOUNTER — TELEPHONE (OUTPATIENT)
Dept: UROLOGY | Age: 53
End: 2023-02-13

## 2023-02-13 NOTE — TELEPHONE ENCOUNTER
Patient had an emergency come up with his pet and missed his appt today and is needing to reschedule.  Office is already closed for the day, please call pt back to r/s

## 2023-02-20 ENCOUNTER — TELEPHONE (OUTPATIENT)
Dept: PSYCHIATRY | Age: 53
End: 2023-02-20

## 2023-02-20 DIAGNOSIS — F90.8 ADHD, ADULT RESIDUAL TYPE: Primary | ICD-10-CM

## 2023-02-20 RX ORDER — DEXTROAMPHETAMINE SACCHARATE, AMPHETAMINE ASPARTATE MONOHYDRATE, DEXTROAMPHETAMINE SULFATE AND AMPHETAMINE SULFATE 7.5; 7.5; 7.5; 7.5 MG/1; MG/1; MG/1; MG/1
30 CAPSULE, EXTENDED RELEASE ORAL EVERY MORNING
Qty: 30 CAPSULE | Refills: 0 | Status: SHIPPED | OUTPATIENT
Start: 2023-02-20 | End: 2023-03-22

## 2023-02-20 NOTE — TELEPHONE ENCOUNTER
Patient called stating he was unable to  his 20 mg XR last month. Patient asks if he can switch to 30 mg XR and d/c 10 mg IR. Please advise. If ok to proceed with 30 mg XR Adderall, patient would like this sent to [facility].

## 2023-02-28 NOTE — PATIENT INSTRUCTIONS
-Stop Januvia once you start the Trulicity injections      LAB INSTRUCTIONS:    Please complete labs within 4 week(s).    Please fast for 8 hours prior to lab collection.    The clinic will call you within 1 week of collection. If you have not heard from us within that amount of time, please call us at [phone].

## 2023-02-28 NOTE — PROGRESS NOTES
Chief Complaint   Patient presents with    Follow-up     DM2 and chronic issues    Knee Pain     R, last wk. FEll       History obtained from the patient. SUBJECTIVE:  Geo Ortega is a 45 y.o. male that presents today for:     -DM2: A1c 8.9 last visit, 8.8 today  On 65 units basalgar bid still. On max dose Jardiance  On metformin and januvia  -170  No lows. Struggling to lose wt despite diet changes and exercise as much as able    Lab Results   Component Value Date/Time    LABA1C 8.8 03/01/2023 06:55 AM    LABA1C 8.9 11/23/2022 06:58 AM    LABA1C 8.1 08/23/2022 08:02 AM    LABA1C 7.7 05/24/2022 07:20 AM    LABA1C 6.1 11/29/2021 08:27 AM    LABA1C 6.8 05/26/2021 07:44 AM    LABA1C 8.0 11/18/2020 10:27 AM    LABA1C 11.2 11/21/2019 08:00 AM    LABA1C 7.8 08/20/2019 07:54 AM    LABA1C 10.7 05/20/2019 10:54 AM    LABA1C 11.2 02/18/2019 07:56 AM    LABA1C 12.2 11/16/2018 07:50 AM     Wt Readings from Last 3 Encounters:   03/01/23 252 lb 3.2 oz (114.4 kg)   11/23/22 260 lb 9.6 oz (118.2 kg)   10/31/22 259 lb (117.5 kg)        -Elevated LFT PRIOR VISIT:  Noted on labs  Mild at this point, but still elevated  Denies ETOH use  Denies RUQ pain or jaundice.   GI did US a few months ago, showed fatty liver    UPDATE PRIOR VISIT:   Labs table with persistent elevated, though mild  W/u for cause neg  Suspected WESLEY  Was to f/u with Katie SALAZAR, has not yet   No RUQ pain or jaundice    UPDATE PRIOR VISIT:   Had f/u labs ordered, never done  Has not f/u with Shery Mortimer yet either   No RUQ pain or jaundice    UPDATE TODAY:   LFT remain elevated   Seeing Donte SALAZAR, records pending  Has f/u labs ordered from them and US  Needs to schedule still      -Anemia PRIOR VISIT:  Anemia still present, mild  b12 and iron studies WNL in last SEPT and early OCT  Plan is to repeat labs in [place]  Denies bleeding, melena or hematochezia  No fevers, chills or night sweats      UPDATE PRIOR VISIT:   Found to have low iron on last lab check for anemia  Denies bleeding, melena or hematochezia  Placed on iron  F/u labs ordered for 6 wks  Pt is to call Donte SALAZAR's office for an apt, he is EST there. He has not done so yet, but plans to soon    UPDATE PRIOR VISIT:   On iron  Saw Robert Delaney, GI  EGD scheduled  Denies bleeding, melena or hematochezia. UPDATE PRIOR VISIT:   Had EGD, neg  UTD on colo  Labs done yesterday and normal  Taking iron yet  Denies bleeding, melena or hematochezia     UPDATE PRIOR VISIT:   Overeue for f/u labs to monitor, ordered 6 months ago, never done  Off iron tabs, and b12 tabs  Denies bleeding, melena or hematochezia    UPDATE LAST VISIT:   Due for f/u labs  Off iron and b12, no bleeding, melena or hematochezia     UPDATE TODAY:   Labs stable  Off iron/b12  Very mild, stable and chronic anemia   Denies bleeding, melena or hematochezia      -HTN:    HPI:    Taking meds as prescribed ?: yes  Tolerating well ?: yes  Side Effects ?: denies  BP at home ?: <140/90  Working on TLCS ?: yes  Chest Pain/SOB/Palpitations? denies    BP Readings from Last 3 Encounters:   03/01/23 130/72   11/23/22 130/78   10/31/22 120/64       -HLD:    HPI:    Taking meds as prescribed ?: yes  Tolerating well ?: yes  Side Effects ?: denies  Muscle Pain?: denies  Working on TLCS ?: yes      -GERD/Barrets:    HPI:  utd with EGD with GI    Taking meds as prescribed ?: yes  Tolerating well ?: yes  Side Effects ?: denies  Dysphagia ?: denies  Odynophagia ?: denies  Melena ?: denies  Working on TLCS ?: yes      -R Knee Pain:    HPI:   John Irvin Friday  Twisted knee  Pain since then  Some swelling  Feels like it will lock at times    Inciting injury or history of trauma? Yes  Pain is relieved by - rest  Pain is aggravated by - walking, bending and twisting  Locking or catching of the knee? Yes  Radiation of the pain? No  Paresthesias of the extremities? No  Decreased ROM? Yes  Edema? Yes  Difficulty bearing weight? No  Worse with stairs? No  Treatments tried - tylenol, not helpful      Age/Gender Health Maintenance    Lipid -     No components found for: CHLPL  Lab Results   Component Value Date    TRIG 365 (H) 05/21/2022    TRIG 296 (H) 06/15/2021    TRIG 195 01/21/2020     Lab Results   Component Value Date    HDL 27 05/21/2022    HDL 35 06/15/2021    HDL 34 01/21/2020     Lab Results   Component Value Date    LDLCALC 90 05/21/2022    LDLCALC 116 06/15/2021    LDLCALC 108 01/21/2020     Lab Results   Component Value Date    LDLCALC 90 05/21/2022    LDLDIRECT 116.17 01/21/2020       TSH - 3.260 (AUG 2018)  Lab Results   Component Value Date    TSH 1.430 05/21/2022       Colon Cancer Screening - NEG CANCER FEB 2015  LDCT: NEG OCT 2022    Tetanus - UTD June 2014  Influenza Vaccine - UTD FALL 2022  Pneumonia Vaccine - UTD June 2014 PPV 23  Zoster - UTD x 2    PSA testing discussion - follows with urology  Lab Results   Component Value Date    PSA 0.22 10/25/2022    PSA 0.37 06/15/2021    PSA 0.31 05/19/2020       AAA Screening - Age 70      Diabetes Health Maintenance    A1C -   Lab Results   Component Value Date/Time    LABA1C 8.8 03/01/2023 06:55 AM    LABA1C 8.9 11/23/2022 06:58 AM    LABA1C 8.1 08/23/2022 08:02 AM    LABA1C 7.7 05/24/2022 07:20 AM    LABA1C 6.1 11/29/2021 08:27 AM    LABA1C 6.8 05/26/2021 07:44 AM    LABA1C 8.0 11/18/2020 10:27 AM    LABA1C 11.2 11/21/2019 08:00 AM    LABA1C 7.8 08/20/2019 07:54 AM    LABA1C 10.7 05/20/2019 10:54 AM    LABA1C 11.2 02/18/2019 07:56 AM    LABA1C 12.2 11/16/2018 07:50 AM       ACE/ARB - yes, lisinopril 2.5mg daily  Eye - records pending  Foot - UTD MAY 2022  ASA - yes, 81mg  Microal/Cr -   Lab Results   Component Value Date    LABMICR < 1.20 10/25/2022    LABCREA 71.8 10/25/2022    MACRR 17 10/25/2022     eGFR -   Lab Results   Component Value Date/Time    LABGLOM >60 10/25/2022 06:34 AM       Statin - yes, lipitor 80mg qhs      Specialist Lists:  Dr Jen Sood Dr [facility] Cardiology   [facility]  [facility]:  Urology  Dr Rakel Ortiz Pulmonology/Sleep  Dr Della Sarmiento surgeon  [facility]  Dr Cleveland Cummings: [facility]  Dr. Marleen Chapa: Neurology      Current Outpatient Medications   Medication Sig Dispense Refill    hydrOXYzine pamoate (VISTARIL) 50 MG capsule Take 50 mg by mouth in the morning and at bedtime At bedtime      Dulaglutide (TRULICITY) 8.16 LK/9.3EQ SOPN Inject 0.75 mg into the skin once a week for 28 days 4 Adjustable Dose Pre-filled Pen Syringe 0    [START ON 3/29/2023] dulaglutide (TRULICITY) 1.5 RI/1.5PP SC injection Inject 0.5 mLs into the skin once a week for 28 days 4 Adjustable Dose Pre-filled Pen Syringe 0    [START ON 4/26/2023] Dulaglutide (TRULICITY) 3 HC/7.6LK SOPN Inject 3 mg into the skin once a week 4 Adjustable Dose Pre-filled Pen Syringe 2    traMADol (ULTRAM) 50 MG tablet Take 1 tablet by mouth every 6 hours as needed for Pain for up to 7 days. Intended supply: 7 days. Take lowest dose possible to manage pain Max Daily Amount: 200 mg 28 tablet 0    amphetamine-dextroamphetamine (ADDERALL XR) 30 MG extended release capsule Take 1 capsule by mouth every morning for 30 days. Max Daily Amount: 30 mg 30 capsule 0    VORTIoxetine HBr (TRINTELLIX) 20 MG TABS tablet TAKE 1 TABLET BY MOUTH EVERY DAY 30 tablet 1    metFORMIN (GLUCOPHAGE) 1000 MG tablet TAKE 1 TABLET BY MOUTH TWO TIMES A DAY WITH MEALS 180 tablet 3    lisinopril (PRINIVIL;ZESTRIL) 40 MG tablet TAKE 1 TABLET BY MOUTH EVERY DAY 90 tablet 3    buPROPion HCl ER, XL, 450 MG TB24 TAKE 1 TABLET BY MOUTH EVERY DAY 90 tablet 0    doxepin (SINEQUAN) 25 MG capsule TAKE 1 CAPSULE BY MOUTH EVERY DAY AT NIGHT 30 capsule 5    aspirin (ASPIRIN LOW DOSE) 81 MG EC tablet TAKE 1 TABLET BY MOUTH EVERY DAY 90 tablet 3    lamoTRIgine (LAMICTAL) 150 MG tablet Take 1 tablet by mouth in the morning and 1 tablet in the evening.  60 tablet 2    amLODIPine (NORVASC) 5 MG tablet TAKE 1 TABLET BY MOUTH EVERY DAY 90 tablet 3    Blood Glucose Monitoring Suppl KIT Use to check sugars once daily 1 kit 0    blood glucose monitor strips Use to check sugars once daily 100 strip 3    empagliflozin (JARDIANCE) 25 MG tablet Take 1 tablet by mouth daily 90 tablet 3    atorvastatin (LIPITOR) 80 MG tablet TAKE 1 TABLET BY MOUTH EVERY DAY 90 tablet 3    fenofibrate (TRIGLIDE) 160 MG tablet TAKE 1 TABLET BY MOUTH EVERY DAY 90 tablet 3    piroxicam (FELDENE) 20 MG capsule TAKE 1 CAPSULE BY MOUTH EVERY DAY AS NEEDED FOR PAIN 90 capsule 3    lurasidone (LATUDA) 120 MG tablet TAKE 1 TABLET BY MOUTH ONE TIME A DAY (take with at least 350 calories) 30 tablet 5    trospium (SANCTURA) 60 MG CP24 extended release capsule TAKE 1 CAPSULE BY MOUTH EVERY DAY (Patient taking differently: 60 mg nightly) 30 capsule 0    tamsulosin (FLOMAX) 0.4 MG capsule TAKE 1 CAPSULE BY MOUTH EVERY DAY TO FACILITATE PASSAGE OF UTERAL STONE (Patient taking differently: Take 0.4 mg by mouth nightly) 90 capsule 0    omeprazole (PRILOSEC) 20 MG delayed release capsule TAKE 2 CAPSULES BY MOUTH EVERY DAY (Patient taking differently: Take 40 mg by mouth Daily) 180 capsule 3    [employer] Lancets Universal 33G MISC use to test blood sugar once a day 100 each 3    Insulin Pen Needle (UNIFINE PENTIPS) 31G X 8 MM MISC Use DAILY as directed WITH INSULIN PENS-- DO NOT RE-USE needles 100 each 3    insulin glargine (LANTUS SOLOSTAR) 100 UNIT/ML injection pen Inject 65 Units into the skin 2 times daily 117 mL 3    sildenafil (VIAGRA) 50 MG tablet Take 1 tablet by mouth daily as needed for Erectile Dysfunction Take one tablet one hour before sexual activity.  6 tablet 5    azelastine (ASTELIN) 0.1 % nasal spray Inhale 2 sprays in each nostril twice a day as needed      ARTIFICIAL TEARS 1.4 % ophthalmic solution Place 1 drop into both eyes 4 times daily  0    rOPINIRole (REQUIP) 3 MG tablet Take 3 mg by mouth nightly      propranolol (INDERAL LA) 160 MG ER capsule Take 160 mg by mouth daily       fluticasone (FLONASE) 50 MCG/ACT nasal spray 1 spray by Nasal route daily as needed. gabapentin (NEURONTIN) 800 MG tablet   Take 800 mg by mouth 4 times daily        No current facility-administered medications for this visit. Orders Placed This Encounter   Medications    Dulaglutide (TRULICITY) 2.73 TN/9.2NT SOPN     Sig: Inject 0.75 mg into the skin once a week for 28 days     Dispense:  4 Adjustable Dose Pre-filled Pen Syringe     Refill:  0    dulaglutide (TRULICITY) 1.5 RR/0.0OS SC injection     Sig: Inject 0.5 mLs into the skin once a week for 28 days     Dispense:  4 Adjustable Dose Pre-filled Pen Syringe     Refill:  0    Dulaglutide (TRULICITY) 3 LA/5.9SH SOPN     Sig: Inject 3 mg into the skin once a week     Dispense:  4 Adjustable Dose Pre-filled Pen Syringe     Refill:  2    traMADol (ULTRAM) 50 MG tablet     Sig: Take 1 tablet by mouth every 6 hours as needed for Pain for up to 7 days. Intended supply: 7 days. Take lowest dose possible to manage pain Max Daily Amount: 200 mg     Dispense:  28 tablet     Refill:  0     Reduce doses taken as pain becomes manageable         All medications reviewed and reconciled, including OTC and herbal medications. Updated list given to patient. Patient Active Problem List    Diagnosis Date Noted    Obstructive sleep apnea on CPAP 11/03/2015     Priority: High    WESLEY (nonalcoholic steatohepatitis)     Closed fracture of right tibial plateau 31/66/9616    Normocytic anemia 02/19/2019    DM2 (diabetes mellitus, type 2) ([facility]     Benign prostatic hyperplasia     Morbid obesity ([facility]     Hypertriglyceridemia 10/27/2015    Short-segment Vásquez's esophagus 06/17/2015     [facility] with [facility] for [facility]. Pulmonary nodule      stable >2 years, no further w/u required. Bipolar 1 disorder ([address]     CAD (coronary artery disease)      Unclear history, cath 2013 with patent arteries. Records pending.        History of TIA (transient ischemic attack)      x2 Hypertension     Fibromyalgia     Depression     History of colon polyps     Chronic back pain     ADHD (attention deficit hyperactivity disorder)     Former smoker     Peripheral neuropathy     Tarsal tunnel syndrome     Benign esophageal stricture     Hyperlipidemia     Allergic rhinitis     Migraine headache     Dysphagia s/p dilation. 04/01/2014    GERD (gastroesophageal reflux disease) 10/15/2013    S/P cardiac catheterization: 9/30/2013: Normal coronaries 09/30/2013 9/30/2013: Normal coronaries. Radial approach. Dr. Tex Barkley      ED (erectile dysfunction) of organic origin 05/01/2013       Past Medical History:   Diagnosis Date    ADHD (attention deficit hyperactivity disorder)     Allergic rhinitis     Benign esophageal stricture     s/p dilation may 2014    Benign prostatic hyperplasia     Bipolar 1 disorder ([facility]     CAD (coronary artery disease)     Unclear history, cath 2013 with patent arteries. Records pending. Chronic back pain     Depression     DM2 (diabetes mellitus, type 2) ([facility]     Dysfunctional voiding of urine 06/20/2013    Medications he changes to follow,  Adding ditropan  to the present schedule     ED (erectile dysfunction) of organic origin 05/01/2013    Fibromyalgia     Former smoker     GERD (gastroesophageal reflux disease)     History of colon polyps     Pt unsure when last colo. History of TIA (transient ischemic attack)     x2     Hyperlipidemia     Hypertension     Hypertriglyceridemia 10/27/2015    Migraine headache     Morbid obesity (HCC)     WESLEY (nonalcoholic steatohepatitis)     Obstructive sleep apnea on CPAP 11/03/2015    Peripheral neuropathy     Pulmonary nodule     stable >2 years, no further w/u required. Rotator cuff injury     Left side    S/P cardiac catheterization: 9/30/2013: Normal coronaries 09/30/2013 9/30/2013: Normal coronaries. Radial approach.  Dr. Tex Barkley    Short-segment Vásquez's esophagus 06/17/2015    Tarsal tunnel syndrome     Bilateral Past Surgical History:   Procedure Laterality Date    CARDIAC CATHETERIZATION  10/2013    2013: Normal coronaries. Radial approach. Dr. Crayton Hashimoto  ,     CYSTOSCOPY N/A 10/31/2022    Cystoscopy Urolift performed by Silvia Roth MD at [address] Left 2012    ROTATOR CUFF REPAIR Left 2013    SHOULDER SURGERY Bilateral 2011    SINUS SURGERY  2004    [place] occ. TONSILLECTOMY  2002    TYMPANOSTOMY TUBE PLACEMENT      UPPER GASTROINTESTINAL ENDOSCOPY  ,  with dilation,     UPPER GASTROINTESTINAL ENDOSCOPY      UPPER GASTROINTESTINAL ENDOSCOPY  MAY 2014, 2017    Dilation       Allergies   Allergen Reactions    Morphine Nausea And Vomiting and Rash         Social History     Tobacco Use    Smoking status: Some Days     Packs/day: 0.25     Years: 30.00     Pack years: 7.50     Types: Cigarettes     Start date: 1984     Last attempt to quit: 2018     Years since quittin.8    Smokeless tobacco: Never   Substance Use Topics    Alcohol use: No     Alcohol/week: 0.0 standard drinks       Family History   Problem Relation Age of Onset    Cancer Father 77        lung     Rheum Arthritis Father     Colon Cancer Maternal Aunt 61    Cancer Maternal Aunt 76        \"throat\"    Colon Cancer Maternal Uncle 48    Cancer Maternal Uncle 61        pancreatic    Cancer Paternal Aunt 52        brain    Colon Cancer Paternal Uncle 72    Cancer Paternal Uncle 61        hepatic     Prostate Cancer Paternal Uncle 61    Cancer Maternal Uncle 79        pancreatic     Ovarian Cancer Paternal Aunt 48    Rheum Arthritis Mother     Pancreatic Cancer Mother     Brain Cancer Brother          I have reviewed the patient's past medical history, past surgical history, allergies, medications, social and family history and I have made updates where appropriate.       Review of Systems  Positive responses are highlighted in bold    Constitutional:  Fever, Chills, Night Sweats, Fatigue, Unexpected changes in weight  HENT:  Ear pain, Tinnitus, Nosebleeds, Trouble swallowing, Hearing loss, Sore throat  Cardiovascular:  Chest Pain, Palpitations, Orthopnea, Paroxysmal Nocturnal Dyspnea  Respiratory:  Cough, Wheezing, Shortness of breath, Chest tightness, Apnea  Gastrointestinal:  Nausea, Vomiting, Diarrhea, Constipation, Heartburn, Blood in stool  Genitourinary:  Difficulty or painful urination, Flank pain, Change in frequency, Urgency  Skin:  Color change, Rash, Itching, Wound  Musculoskeletal:  Joint pain, Back pain, Gait problems, Joint swelling, Myalgias  Neurological:  Dizziness, Headaches, Presyncope, Numbness, Seizures, Tremors  Endocrine:  Heat Intolerance, Cold Intolerance, Polydipsia, Polyphagia, Polyuria      PHYSICAL EXAM:  Vitals:    03/01/23 0742   BP: 130/72   Pulse: 84   Resp: 16   Temp: 98 °F (36.7 °C)   SpO2: 94%   Weight: 252 lb 3.2 oz (114.4 kg)   Height: 5' 7\" (1.702 m)     Body mass index is 39.5 kg/m². Wt Readings from Last 3 Encounters:   03/01/23 252 lb 3.2 oz (114.4 kg)   11/23/22 260 lb 9.6 oz (118.2 kg)   10/31/22 259 lb (117.5 kg)     VS Reviewed  General Appearance: A&O x 3, No acute distress,well developed and well- nourished  Eyes: pupils equal, round, and reactive to light, extraocular eye movements intact, conjunctivae and eye lids without erythema  ENT: external ear and ear canal clear bilaterally, TMs intact and regular, nose without deformity, nasal mucosa and turbinates normal without polyps, oropharynx normal, dentition is normal for age  Neck: supple and non-tender without mass, no thyromegaly or thyroid nodules, no cervical lymphadenopathy  Pulmonary/Chest: clear to auscultation bilaterally- no wheezes, rales or rhonchi, normal air movement, no respiratory distress or retractions  Cardiovascular: S1 and S2 auscultated w/ RRR. No murmurs, rubs, clicks, or gallops, distal pulses intact.   Abdomen: soft, non-tender, non-distended, bowl sounds physiologic,  no rebound or guarding, no masses or hernias noted. Liver and spleen without enlargement. Extremities: no cyanosis, clubbing or edema of the lower extremities. +2 PT/DP bilaterally. Skin: warm and dry, no rash or erythema  Psych: Affect appropriate. Mood mildly depressed. Thought process is normal without evidence of psychosis. Good insight and appropriate interaction. Cognition and memory appear to be intact. R KNEE EXAM:  Examination of the right  knee shows: The alignment of the knee is neutral.   There is not erythema. There moderate soft tissue swelling. There is mild effusion. ROM-  Extension 5           -   Flexion  110   There mild pain associated with ROM testing. Medial joint line moderate tender to palpation. Lateral joint line moderate tender to palpation. Retro patellar crepitus is not present. There is none crepitus along the joint line with ROM testing. Varus Stress testing does not produce pain,                                     does not show laxity. Valgus Stress testing does not produce pain,                                       does not show laxity. Lachman's test is negative. Anterior Drawer test is Negative. Posterior Drawer test is Negative  Tate's Test is Positive. Patellar Compression testing does not produce pain. Extensor Mechanism is  intact. Results for POC orders placed in visit on 03/01/23   POCT glycosylated hemoglobin (Hb A1C)   Result Value Ref Range    Hemoglobin A1C 8.8 (H) 4.3 - 5.7 %       ASSESSMENT & PLAN  1. Type 2 diabetes mellitus with other specified complication, with long-term current use of insulin (HCC)    Uncontrolled  Con't Glargine at 65 units bid  Con't metformin and Jardiance  Add Trulicity  Stop [place] one starts that  F/u 4 wks to reassess    - CBC with Auto Differential; Future  - Comprehensive Metabolic Panel; Future  - Lipid Panel; Future  - TSH with Reflex;  Future  - Microalbumin / Creatinine Urine Ratio; Future  - POCT glycosylated hemoglobin (Hb A1C)  - Dulaglutide (TRULICITY) 0.75 MG/0.5ML SOPN; Inject 0.75 mg into the skin once a week for 28 days  Dispense: 4 Adjustable Dose Pre-filled Pen Syringe; Refill: 0  - dulaglutide (TRULICITY) 1.5 MG/0.5ML SC injection; Inject 0.5 mLs into the skin once a week for 28 days  Dispense: 4 Adjustable Dose Pre-filled Pen Syringe; Refill: 0  - Dulaglutide (TRULICITY) 3 MG/0.5ML SOPN; Inject 3 mg into the skin once a week  Dispense: 4 Adjustable Dose Pre-filled Pen Syringe; Refill: 2    2. Morbid obesity with BMI of 40.0-44.9, adult (HCC)    Discussed wt loss strateiges.     3. Abnormal transaminases    con't wt loss, DM control, statin  Get records from GI, [place]  F/u him as planned    - Ferritin; Future  - Iron Binding Capacity; Future  - Iron; Future    4. WESLEY (nonalcoholic steatohepatitis)    - Ferritin; Future  - Iron Binding Capacity; Future  - Iron; Future    5. Iron deficiency anemia, unspecified iron deficiency anemia type    In remission  Neg w/u  Monitor with labs  Stable   Due for labs    - Ferritin; Future  - Iron Binding Capacity; Future  - Iron; Future    6. B12 deficiency    As above    - Folate; Future  - Vitamin B12; Future    7. Essential hypertension    Stable  At goal inderal and lisinopril    8. Mixed hyperlipidemia    Stable  Con't lipitor    9. Gastroesophageal reflux disease, unspecified whether esophagitis present    Stable  con't PPI  F/u GI, [place]    10. Short-segment Vásquez's esophagus      11. Acute pain of right knee    Concern for acute meniscal tear  Get xray and MRI  Tramadol x 7 days  F/u based on xray/ MRI    OAARS reviewed and appropriate.     Controlled Substances Monitoring:     Periodic Controlled Substance Monitoring: Possible medication side effects, risk of tolerance/dependence & alternative treatments discussed., No signs of potential drug abuse or diversion identified. (Javon Aaron, DO)    - XR KNEE RIGHT (MIN 4  VIEWS); Future  - MRI KNEE RIGHT WO CONTRAST; Future  - traMADol (ULTRAM) 50 MG tablet; Take 1 tablet by mouth every 6 hours as needed for Pain for up to 7 days. Intended supply: 7 days. Take lowest dose possible to manage pain Max Daily Amount: 200 mg  Dispense: 28 tablet; Refill: 0    12. Positive Tate test of right knee, initial encounter    - XR KNEE RIGHT (MIN 4 VIEWS); Future  - MRI KNEE RIGHT WO CONTRAST; Future  - traMADol (ULTRAM) 50 MG tablet; Take 1 tablet by mouth every 6 hours as needed for Pain for up to 7 days. Intended supply: 7 days. Take lowest dose possible to manage pain Max Daily Amount: 200 mg  Dispense: 28 tablet; Refill: 0      DISPOSITION    Return in about 4 weeks (around 3/29/2023) for Follow-up Diabetes, sooner as needed. Az released without restrictions. Future Appointments   Date Time Provider [employer]   3/9/2023  9:00 AM Mellissa Cornejo DO [facility] [address] [place] [address]   3/13/2023 11:00 AM Jordy Johnson MD [address] [place] [address]   3/29/2023  7:40 AM Deja You DO [address]   4/10/2023  7:45 AM RAQUEL Aguilar [facility]     PATIENT COUNSELING    Az received counseling on the following healthy behaviors: nutrition, exercise and medication adherence    Barriers to learning and self management: none    Discussed use, benefit, and side effects of prescribed medications. Barriers to medication compliance addressed. All patient questions answered. Pt voiced understanding.        Electronically signed by Deja You DO on 3/1/2023 at 8:10 AM

## 2023-03-01 ENCOUNTER — OFFICE VISIT (OUTPATIENT)
Dept: FAMILY MEDICINE CLINIC | Age: 53
End: 2023-03-01
Payer: COMMERCIAL

## 2023-03-01 VITALS
OXYGEN SATURATION: 94 % | DIASTOLIC BLOOD PRESSURE: 72 MMHG | TEMPERATURE: 98 F | HEIGHT: 67 IN | SYSTOLIC BLOOD PRESSURE: 130 MMHG | HEART RATE: 84 BPM | BODY MASS INDEX: 39.58 KG/M2 | WEIGHT: 252.2 LBS | RESPIRATION RATE: 16 BRPM

## 2023-03-01 DIAGNOSIS — S83.206A POSITIVE MCMURRAY TEST OF RIGHT KNEE, INITIAL ENCOUNTER: ICD-10-CM

## 2023-03-01 DIAGNOSIS — D50.9 IRON DEFICIENCY ANEMIA, UNSPECIFIED IRON DEFICIENCY ANEMIA TYPE: ICD-10-CM

## 2023-03-01 DIAGNOSIS — R74.8 ABNORMAL TRANSAMINASES: ICD-10-CM

## 2023-03-01 DIAGNOSIS — E66.01 MORBID OBESITY WITH BMI OF 40.0-44.9, ADULT (HCC): ICD-10-CM

## 2023-03-01 DIAGNOSIS — K75.81 NASH (NONALCOHOLIC STEATOHEPATITIS): ICD-10-CM

## 2023-03-01 DIAGNOSIS — E78.2 MIXED HYPERLIPIDEMIA: ICD-10-CM

## 2023-03-01 DIAGNOSIS — K22.70 SHORT-SEGMENT BARRETT'S ESOPHAGUS: ICD-10-CM

## 2023-03-01 DIAGNOSIS — E53.8 B12 DEFICIENCY: ICD-10-CM

## 2023-03-01 DIAGNOSIS — M25.561 ACUTE PAIN OF RIGHT KNEE: ICD-10-CM

## 2023-03-01 DIAGNOSIS — K21.9 GASTROESOPHAGEAL REFLUX DISEASE, UNSPECIFIED WHETHER ESOPHAGITIS PRESENT: ICD-10-CM

## 2023-03-01 DIAGNOSIS — I10 ESSENTIAL HYPERTENSION: ICD-10-CM

## 2023-03-01 DIAGNOSIS — E11.69 TYPE 2 DIABETES MELLITUS WITH OTHER SPECIFIED COMPLICATION, WITH LONG-TERM CURRENT USE OF INSULIN (HCC): Primary | ICD-10-CM

## 2023-03-01 DIAGNOSIS — Z79.4 TYPE 2 DIABETES MELLITUS WITH OTHER SPECIFIED COMPLICATION, WITH LONG-TERM CURRENT USE OF INSULIN (HCC): Primary | ICD-10-CM

## 2023-03-01 LAB — HBA1C MFR BLD: 8.8 % (ref 4.3–5.7)

## 2023-03-01 PROCEDURE — G8417 CALC BMI ABV UP PARAM F/U: HCPCS | Performed by: FAMILY MEDICINE

## 2023-03-01 PROCEDURE — 4004F PT TOBACCO SCREEN RCVD TLK: CPT | Performed by: FAMILY MEDICINE

## 2023-03-01 PROCEDURE — 3052F HG A1C>EQUAL 8.0%<EQUAL 9.0%: CPT | Performed by: FAMILY MEDICINE

## 2023-03-01 PROCEDURE — 3075F SYST BP GE 130 - 139MM HG: CPT | Performed by: FAMILY MEDICINE

## 2023-03-01 PROCEDURE — 3017F COLORECTAL CA SCREEN DOC REV: CPT | Performed by: FAMILY MEDICINE

## 2023-03-01 PROCEDURE — 99214 OFFICE O/P EST MOD 30 MIN: CPT | Performed by: FAMILY MEDICINE

## 2023-03-01 PROCEDURE — G8427 DOCREV CUR MEDS BY ELIG CLIN: HCPCS | Performed by: FAMILY MEDICINE

## 2023-03-01 PROCEDURE — G8482 FLU IMMUNIZE ORDER/ADMIN: HCPCS | Performed by: FAMILY MEDICINE

## 2023-03-01 PROCEDURE — 2022F DILAT RTA XM EVC RTNOPTHY: CPT | Performed by: FAMILY MEDICINE

## 2023-03-01 PROCEDURE — 3078F DIAST BP <80 MM HG: CPT | Performed by: FAMILY MEDICINE

## 2023-03-01 RX ORDER — DULAGLUTIDE 1.5 MG/.5ML
1.5 INJECTION, SOLUTION SUBCUTANEOUS WEEKLY
Qty: 4 ADJUSTABLE DOSE PRE-FILLED PEN SYRINGE | Refills: 0 | Status: SHIPPED | OUTPATIENT
Start: 2023-03-29 | End: 2023-04-26

## 2023-03-01 RX ORDER — DULAGLUTIDE 0.75 MG/.5ML
0.75 INJECTION, SOLUTION SUBCUTANEOUS WEEKLY
Qty: 4 ADJUSTABLE DOSE PRE-FILLED PEN SYRINGE | Refills: 0 | Status: SHIPPED | OUTPATIENT
Start: 2023-03-01 | End: 2023-03-29

## 2023-03-01 RX ORDER — TRAMADOL HYDROCHLORIDE 50 MG/1
50 TABLET ORAL EVERY 6 HOURS PRN
Qty: 28 TABLET | Refills: 0 | Status: SHIPPED | OUTPATIENT
Start: 2023-03-01 | End: 2023-03-08

## 2023-03-01 RX ORDER — HYDROXYZINE PAMOATE 50 MG/1
50 CAPSULE ORAL 2 TIMES DAILY
COMMUNITY

## 2023-03-01 RX ORDER — DULAGLUTIDE 3 MG/.5ML
3 INJECTION, SOLUTION SUBCUTANEOUS WEEKLY
Qty: 4 ADJUSTABLE DOSE PRE-FILLED PEN SYRINGE | Refills: 2 | Status: SHIPPED | OUTPATIENT
Start: 2023-04-26

## 2023-03-01 SDOH — ECONOMIC STABILITY: INCOME INSECURITY: HOW HARD IS IT FOR YOU TO PAY FOR THE VERY BASICS LIKE FOOD, HOUSING, MEDICAL CARE, AND HEATING?: NOT HARD AT ALL

## 2023-03-01 SDOH — ECONOMIC STABILITY: FOOD INSECURITY: WITHIN THE PAST 12 MONTHS, YOU WORRIED THAT YOUR FOOD WOULD RUN OUT BEFORE YOU GOT MONEY TO BUY MORE.: NEVER TRUE

## 2023-03-01 SDOH — ECONOMIC STABILITY: FOOD INSECURITY: WITHIN THE PAST 12 MONTHS, THE FOOD YOU BOUGHT JUST DIDN'T LAST AND YOU DIDN'T HAVE MONEY TO GET MORE.: NEVER TRUE

## 2023-03-01 SDOH — ECONOMIC STABILITY: HOUSING INSECURITY
IN THE LAST 12 MONTHS, WAS THERE A TIME WHEN YOU DID NOT HAVE A STEADY PLACE TO SLEEP OR SLEPT IN A SHELTER (INCLUDING NOW)?: NO

## 2023-03-01 ASSESSMENT — PATIENT HEALTH QUESTIONNAIRE - PHQ9
8. MOVING OR SPEAKING SO SLOWLY THAT OTHER PEOPLE COULD HAVE NOTICED. OR THE OPPOSITE, BEING SO FIGETY OR RESTLESS THAT YOU HAVE BEEN MOVING AROUND A LOT MORE THAN USUAL: 0
SUM OF ALL RESPONSES TO PHQ QUESTIONS 1-9: 3
7. TROUBLE CONCENTRATING ON THINGS, SUCH AS READING THE NEWSPAPER OR WATCHING TELEVISION: 1
SUM OF ALL RESPONSES TO PHQ QUESTIONS 1-9: 3
3. TROUBLE FALLING OR STAYING ASLEEP: 0
SUM OF ALL RESPONSES TO PHQ QUESTIONS 1-9: 3
SUM OF ALL RESPONSES TO PHQ QUESTIONS 1-9: 3
SUM OF ALL RESPONSES TO PHQ9 QUESTIONS 1 & 2: 0
10. IF YOU CHECKED OFF ANY PROBLEMS, HOW DIFFICULT HAVE THESE PROBLEMS MADE IT FOR YOU TO DO YOUR WORK, TAKE CARE OF THINGS AT HOME, OR GET ALONG WITH OTHER PEOPLE: 0
5. POOR APPETITE OR OVEREATING: 1
1. LITTLE INTEREST OR PLEASURE IN DOING THINGS: 0
6. FEELING BAD ABOUT YOURSELF - OR THAT YOU ARE A FAILURE OR HAVE LET YOURSELF OR YOUR FAMILY DOWN: 0
4. FEELING TIRED OR HAVING LITTLE ENERGY: 1
9. THOUGHTS THAT YOU WOULD BE BETTER OFF DEAD, OR OF HURTING YOURSELF: 0
2. FEELING DOWN, DEPRESSED OR HOPELESS: 0

## 2023-03-02 ENCOUNTER — HOSPITAL ENCOUNTER (OUTPATIENT)
Dept: GENERAL RADIOLOGY | Age: 53
Discharge: HOME | End: 2023-03-02
Payer: COMMERCIAL

## 2023-03-02 ENCOUNTER — HOSPITAL ENCOUNTER (OUTPATIENT)
Age: 53
Discharge: HOME | End: 2023-03-02
Payer: COMMERCIAL

## 2023-03-02 ENCOUNTER — TELEPHONE (OUTPATIENT)
Dept: FAMILY MEDICINE CLINIC | Age: 53
End: 2023-03-02

## 2023-03-02 DIAGNOSIS — S83.206A POSITIVE MCMURRAY TEST OF RIGHT KNEE, INITIAL ENCOUNTER: ICD-10-CM

## 2023-03-02 DIAGNOSIS — M25.561 ACUTE PAIN OF RIGHT KNEE: ICD-10-CM

## 2023-03-02 PROCEDURE — 73564 X-RAY EXAM KNEE 4 OR MORE: CPT

## 2023-03-02 NOTE — TELEPHONE ENCOUNTER
----- Message from Tamera Velasquez, DO sent at 3/2/2023  2:45 PM EST -----  Please let pt know that xray is negative for fxr  Con't with plan from office  Let me know if questions, thanks!

## 2023-03-07 ENCOUNTER — TELEPHONE (OUTPATIENT)
Dept: FAMILY MEDICINE CLINIC | Age: 53
End: 2023-03-07

## 2023-03-07 DIAGNOSIS — S83.206A POSITIVE MCMURRAY TEST OF RIGHT KNEE, INITIAL ENCOUNTER: ICD-10-CM

## 2023-03-07 DIAGNOSIS — M25.561 ACUTE PAIN OF RIGHT KNEE: Primary | ICD-10-CM

## 2023-03-07 NOTE — TELEPHONE ENCOUNTER
Patient reports that he was coming down the stairs and his right knee popped   Patient is asking for an order for a cane to be faxed to [facility] home medical   FAX: [phone]

## 2023-03-07 NOTE — TELEPHONE ENCOUNTER
In tray by printer     Diagnosis Orders   1. Acute pain of right knee  Misc. Devices MISC      2. Positive Tate test of right knee, initial encounter  Misc.  Devices MISC

## 2023-03-09 ENCOUNTER — TELEMEDICINE (OUTPATIENT)
Dept: PSYCHIATRY | Age: 53
End: 2023-03-09
Payer: COMMERCIAL

## 2023-03-09 DIAGNOSIS — F90.8 ADHD, ADULT RESIDUAL TYPE: Primary | ICD-10-CM

## 2023-03-09 DIAGNOSIS — F17.210 TOBACCO DEPENDENCE DUE TO CIGARETTES: ICD-10-CM

## 2023-03-09 DIAGNOSIS — F25.0 SCHIZOAFFECTIVE DISORDER, BIPOLAR TYPE (HCC): ICD-10-CM

## 2023-03-09 PROCEDURE — 3017F COLORECTAL CA SCREEN DOC REV: CPT | Performed by: STUDENT IN AN ORGANIZED HEALTH CARE EDUCATION/TRAINING PROGRAM

## 2023-03-09 PROCEDURE — G8417 CALC BMI ABV UP PARAM F/U: HCPCS | Performed by: STUDENT IN AN ORGANIZED HEALTH CARE EDUCATION/TRAINING PROGRAM

## 2023-03-09 PROCEDURE — G8428 CUR MEDS NOT DOCUMENT: HCPCS | Performed by: STUDENT IN AN ORGANIZED HEALTH CARE EDUCATION/TRAINING PROGRAM

## 2023-03-09 PROCEDURE — 4004F PT TOBACCO SCREEN RCVD TLK: CPT | Performed by: STUDENT IN AN ORGANIZED HEALTH CARE EDUCATION/TRAINING PROGRAM

## 2023-03-09 PROCEDURE — G8482 FLU IMMUNIZE ORDER/ADMIN: HCPCS | Performed by: STUDENT IN AN ORGANIZED HEALTH CARE EDUCATION/TRAINING PROGRAM

## 2023-03-09 PROCEDURE — 99214 OFFICE O/P EST MOD 30 MIN: CPT | Performed by: STUDENT IN AN ORGANIZED HEALTH CARE EDUCATION/TRAINING PROGRAM

## 2023-03-09 RX ORDER — DEXTROAMPHETAMINE SACCHARATE, AMPHETAMINE ASPARTATE MONOHYDRATE, DEXTROAMPHETAMINE SULFATE AND AMPHETAMINE SULFATE 7.5; 7.5; 7.5; 7.5 MG/1; MG/1; MG/1; MG/1
30 CAPSULE, EXTENDED RELEASE ORAL EVERY MORNING
Qty: 30 CAPSULE | Refills: 0 | Status: SHIPPED | OUTPATIENT
Start: 2023-03-09 | End: 2023-04-08

## 2023-03-09 NOTE — PROGRESS NOTES
[address] [facility] [address]  [facility] [address]  Dept: [phone]  Loc: [phone]          Wesley Reardon  1970  3/9/2023     F/U appt    TELEHEALTH EVALUATION -- Audio/Visual (During LXSQE-09 public health emergency)    Patient location: home  Physician location: home, [facility]  This virtual visit was conducted via interactive, real-time video. DSM:  Dx:    ICD-10-CM    1. ADHD, adult residual type  F90.8 amphetamine-dextroamphetamine (ADDERALL XR) 30 MG extended release capsule      2. Schizoaffective disorder, bipolar type ([facility]  F25.0       3. Tobacco dependence due to cigarettes  F17.210            Interim Hx:  Chief Complaint:   Chief Complaint   Patient presents with    Depression    Anxiety    Follow-up        HPI: Patient is a 45year old male who presents for follow up. Mood is reported as \"depressed\". Anxiety and depression are elevated, which he attribute to \"everything\". He does not name any specific stressors. He reports very occasional AH in the form of whispers which he attributes to the house he's residing in being haunted. He denies command hallucinations and states the whispers are indecipherable. He reports VH in the form of \"I see people sometimes who aren't there\". This occurs rarely. Denies active and passive SI and HI. He reports impaired sleep initiation and maintenance despite compliance with his CPAP. Appetite is at baseline. He is eating a full meal with his latuda. Denies side effects. Reports compliance. He reports that he does not wish to adjust his medications at all today. ROS:   Gen: Denies F/C/N/V  HEENT: Denies Vision/hearing changes/sore throat  CV: Denies CP/Palp  Pulm: Denies SOB/Cough/Wheeze  GI: Denies Abd pain  Skin: Denies Rashes/Lumps/Bruises  Neurologic: Denies changes in memory/speech/mental status.  Denies h/o seizures/DTs   Psychiatric: + depression and anxiety MSE:  Cognition - Alert and Oriented x 4  Appearance - dressed casually and appropriately for the weather  Behavior - pleasant and cooperative  Motor - no tardive dyskinesia or other EPS observed; no psychomotor agitation/retardation observed  Gait and Station - unable to assess via tele-psychiatry  Speech - normal rate, rhythm, and volume  Eye Contact - maintains  Mood - \"depressed\"  Affect - restricted  Thought Content/Perceptions:   Psychosis - AH and VH as per HPI   SI/HI -  denies  Thought Process/Associations -  logical and linear  Memory - grossly intact  Insight - good  Judgment - fair (med noncompliant for past month)    Assessment:   Diagnosis Orders   1. ADHD, adult residual type  amphetamine-dextroamphetamine (ADDERALL XR) 30 MG extended release capsule      2. Schizoaffective disorder, bipolar type ([facility]        3. Tobacco dependence due to cigarettes             Follow up Plan  -Continue latuda 120 mg daily with at least 350 kcals for mood and psychotic symptoms. Risks including but not limited to EPS, TD, and metabolic side effects discussed. AIMS will be conducted at next in person interview.  -Continue lamotrigine 150 mg bid. Risk of Ángel Ng Syndrome was discussed.  -Continue trintellix 20 mg daily for anxiety.  -Continue adderall XR 30 mg qam. Risks including but not limited to hypertension, increased risk of adverse cardiovascular events (e.g. stroke and MI), and addictive potential have been discussed. Risk of stimulant induced sindhu/psychosis has been discussed.  -Another provider was prescribing doxepin 25 mg qhs. Risk of serotonin syndome was discussed.  He discontinued this medication due to lack of efficacy.  -Continue hydroxyzine  mg qhs prn for sleep.     -Discussed medications with the patient and changes are as noted above with patient agreement.  -Discussed diagnosis and treatment plan with the patient  -Provided brief supportive therapy through active listening -Patient Education: Discussed medications including indications, potential side effects, contraindications, and risks/benefits; also discussed alternative medications/treatments. Discussed the potential need for follow up laboratory studies related to medications and patient demonstrated understanding and compliance. Patient participated in medications decision making was given the opportunity to ask questions/express preferences and concerns. Patient demonstrates good understanding of medications and is compliant/in agreement with current plan. Additional Comments and Follow up Plan:  -RTC in (6) weeks, sooner if needed  -Labs: n/a  -Discussed abstaining from illicit drugs/alcohol  -Patient informed to call crisis line/911 or go to ER if experiencing crisis, SI, HI, or psychosis. Priscilladara Su, was evaluated through a synchronous (real-time) audio-video encounter. The patient (or guardian if applicable) is aware that this is a billable service, which includes applicable copays. This virtual visit was conducted with patient's (and/or legal guardian's) consent. The visit was conducted pursuant to the emergency declaration under the [address] [place] [address] waiver authority and the [employer] General Act. Patient identification was verified, and a caregiver was present when appropriate. The patient was located in a state where the provider was licensed to provide care.       Total time spent for this encounter: 30 minutes    Electronically signed by Semaj Glynn DO on 3/9/2023 at 10:07 AM       Semaj Glynn DO  3/9/2023

## 2023-03-13 ENCOUNTER — OFFICE VISIT (OUTPATIENT)
Dept: UROLOGY | Age: 53
End: 2023-03-13
Payer: COMMERCIAL

## 2023-03-13 ENCOUNTER — NURSE ONLY (OUTPATIENT)
Dept: LAB | Age: 53
End: 2023-03-13

## 2023-03-13 VITALS — BODY MASS INDEX: 39.71 KG/M2 | WEIGHT: 253 LBS | HEIGHT: 67 IN | RESPIRATION RATE: 22 BRPM

## 2023-03-13 DIAGNOSIS — N40.1 BENIGN PROSTATIC HYPERPLASIA WITH LOWER URINARY TRACT SYMPTOMS, SYMPTOM DETAILS UNSPECIFIED: ICD-10-CM

## 2023-03-13 DIAGNOSIS — Z79.4 TYPE 2 DIABETES MELLITUS WITH OTHER SPECIFIED COMPLICATION, WITH LONG-TERM CURRENT USE OF INSULIN (HCC): ICD-10-CM

## 2023-03-13 DIAGNOSIS — E53.8 B12 DEFICIENCY: ICD-10-CM

## 2023-03-13 DIAGNOSIS — N32.81 OAB (OVERACTIVE BLADDER): ICD-10-CM

## 2023-03-13 DIAGNOSIS — E11.69 TYPE 2 DIABETES MELLITUS WITH OTHER SPECIFIED COMPLICATION, WITH LONG-TERM CURRENT USE OF INSULIN (HCC): ICD-10-CM

## 2023-03-13 DIAGNOSIS — K75.81 NASH (NONALCOHOLIC STEATOHEPATITIS): ICD-10-CM

## 2023-03-13 DIAGNOSIS — R74.8 ABNORMAL TRANSAMINASES: ICD-10-CM

## 2023-03-13 DIAGNOSIS — N40.1 BENIGN PROSTATIC HYPERPLASIA WITH LOWER URINARY TRACT SYMPTOMS, SYMPTOM DETAILS UNSPECIFIED: Primary | ICD-10-CM

## 2023-03-13 DIAGNOSIS — D50.9 IRON DEFICIENCY ANEMIA, UNSPECIFIED IRON DEFICIENCY ANEMIA TYPE: ICD-10-CM

## 2023-03-13 LAB
ALBUMIN SERPL BCG-MCNC: 4.7 G/DL (ref 3.5–5.1)
ALP SERPL-CCNC: 95 U/L (ref 38–126)
ALT SERPL W/O P-5'-P-CCNC: 82 U/L (ref 11–66)
ANION GAP SERPL CALC-SCNC: 12 MEQ/L (ref 8–16)
AST SERPL-CCNC: 67 U/L (ref 5–40)
BASOPHILS ABSOLUTE: 0.1 THOU/MM3 (ref 0–0.1)
BASOPHILS NFR BLD AUTO: 0.8 %
BILIRUB SERPL-MCNC: 0.4 MG/DL (ref 0.3–1.2)
BUN SERPL-MCNC: 8 MG/DL (ref 7–22)
CALCIUM SERPL-MCNC: 9.6 MG/DL (ref 8.5–10.5)
CHLORIDE SERPL-SCNC: 106 MEQ/L (ref 98–111)
CHOLEST SERPL-MCNC: 203 MG/DL (ref 100–199)
CO2 SERPL-SCNC: 25 MEQ/L (ref 23–33)
CREAT SERPL-MCNC: 1.1 MG/DL (ref 0.4–1.2)
CREAT UR-MCNC: 121.7 MG/DL
DEPRECATED RDW RBC AUTO: 46.3 FL (ref 35–45)
EOSINOPHIL NFR BLD AUTO: 2.9 %
EOSINOPHILS ABSOLUTE: 0.2 THOU/MM3 (ref 0–0.4)
ERYTHROCYTE [DISTWIDTH] IN BLOOD BY AUTOMATED COUNT: 14.5 % (ref 11.5–14.5)
GFR SERPL CREATININE-BSD FRML MDRD: > 60 ML/MIN/1.73M2
GLUCOSE SERPL-MCNC: 138 MG/DL (ref 70–108)
HCT VFR BLD AUTO: 44.6 % (ref 42–52)
HDLC SERPL-MCNC: 29 MG/DL
HGB BLD-MCNC: 14.1 GM/DL (ref 14–18)
IMM GRANULOCYTES # BLD AUTO: 0.09 THOU/MM3 (ref 0–0.07)
IMM GRANULOCYTES NFR BLD AUTO: 1.2 %
LDLC SERPL CALC-MCNC: 103 MG/DL
LYMPHOCYTES ABSOLUTE: 2.1 THOU/MM3 (ref 1–4.8)
LYMPHOCYTES NFR BLD AUTO: 27 %
MCH RBC QN AUTO: 28.2 PG (ref 26–33)
MCHC RBC AUTO-ENTMCNC: 31.6 GM/DL (ref 32.2–35.5)
MCV RBC AUTO: 89.2 FL (ref 80–94)
MICROALBUMIN UR-MCNC: < 1.2 MG/DL
MICROALBUMIN/CREAT RATIO PNL UR: 10 MG/G (ref 0–30)
MONOCYTES ABSOLUTE: 0.5 THOU/MM3 (ref 0.4–1.3)
MONOCYTES NFR BLD AUTO: 6.3 %
NEUTROPHILS NFR BLD AUTO: 61.8 %
NRBC BLD AUTO-RTO: 0 /100 WBC
PLATELET # BLD AUTO: 172 THOU/MM3 (ref 130–400)
PMV BLD AUTO: 11.1 FL (ref 9.4–12.4)
POST VOID RESIDUAL (PVR): 269 ML
POTASSIUM SERPL-SCNC: 4.5 MEQ/L (ref 3.5–5.2)
PROT SERPL-MCNC: 7.4 G/DL (ref 6.1–8)
RBC # BLD AUTO: 5 MILL/MM3 (ref 4.7–6.1)
SEGMENTED NEUTROPHILS ABSOLUTE COUNT: 4.7 THOU/MM3 (ref 1.8–7.7)
SODIUM SERPL-SCNC: 143 MEQ/L (ref 135–145)
TRIGL SERPL-MCNC: 353 MG/DL (ref 0–199)
TSH SERPL DL<=0.005 MIU/L-ACNC: 1.47 UIU/ML (ref 0.4–4.2)
WBC # BLD AUTO: 7.6 THOU/MM3 (ref 4.8–10.8)

## 2023-03-13 PROCEDURE — 51798 US URINE CAPACITY MEASURE: CPT | Performed by: UROLOGY

## 2023-03-13 PROCEDURE — 99213 OFFICE O/P EST LOW 20 MIN: CPT | Performed by: UROLOGY

## 2023-03-13 PROCEDURE — 3017F COLORECTAL CA SCREEN DOC REV: CPT | Performed by: UROLOGY

## 2023-03-13 PROCEDURE — G8417 CALC BMI ABV UP PARAM F/U: HCPCS | Performed by: UROLOGY

## 2023-03-13 PROCEDURE — 4004F PT TOBACCO SCREEN RCVD TLK: CPT | Performed by: UROLOGY

## 2023-03-13 PROCEDURE — G8427 DOCREV CUR MEDS BY ELIG CLIN: HCPCS | Performed by: UROLOGY

## 2023-03-13 PROCEDURE — G8482 FLU IMMUNIZE ORDER/ADMIN: HCPCS | Performed by: UROLOGY

## 2023-03-13 RX ORDER — TROSPIUM CHLORIDE ER 60 MG/1
60 CAPSULE ORAL NIGHTLY
Qty: 90 CAPSULE | Refills: 3 | Status: SHIPPED | OUTPATIENT
Start: 2023-03-13 | End: 2023-06-11

## 2023-03-13 NOTE — PROGRESS NOTES
Dr. Amberly Swift MD MD  [facility] Consultation / New Patient Visit    Patient:  Dara Jett  YOB: 1970  Date: 3/13/2023  Consult requested from Sandy Snow DO     HISTORY OF PRESENT ILLNESS:   The patient is a 45 y.o. male who presents today for follow-up for the following problem(s): BPH with LUTs  Overall the problem(s) : are worsening. Associated Symptoms: No dysuria, gross hematuria. Pain Severity:      Today visit:   3/13/23   Presents as a previous patient of Dr. Mckenzie Duggan. He has BPH with LUTs, and is being treated with Flomax 0.4 mg and Sanctura 20mg. Bladder irritants: 3 cups coffee, 3 cokes. [place] 30 LUTs - s/p urolift states symptoms improved, AUASS: 19/3 (urolift), 17/3. Summary of old records:   (Patient's old records, notes and chart reviewed and summarized above.)    Cystoscopy Operative Note  Surgeon: Amberly Swift MD   Anesthesia: Urethral 2%  Indications: BPH   Position: supine  Findings:   The patient was prepped and draped in the usual sterile fashion. The flexible cystoscope was advanced through the urethra and into the bladder. The bladder was thoroughly inspected and the following was noted:  Residual Urine: Minimal   Urethra: No abnormalities of the urethra are noted. Prostate: Medium gland (<80 gm) Complete obstruction by lateral lobe of prostate. Bladder: No tumors or CIS noted. No bladder diverticulum. Moderate trabeculation noted. Ureters: Clear efflux from both ureters. Orifices with normal configuration and location. The cystoscope was removed. The patient tolerated the procedure well. Plan:   Good candidate for Urolift        52-year-old white male returns today for follow-up regarding his chronic voiding symptoms. He is currently on Flomax and Sanctura 20 mg p.o. twice daily. This combination has improved his voiding pattern significantly.   Urinary flow is much stronger, no hesitancy, and no urgency, frequency or nocturia. Has minimal minimal dryness of the mouth. Insurance will not cover sanctura any longer. Mo Huynh). PSA in May,2020 was 0.31 ng/ml. No FH of PCa. Imp: LUTS, especially severe nf. PVR is 60 mL today. PLAN: restart tamsulosin and try oxybutynin 5 mg #90 1 po tid. common side effects especially xerostomia and constipation reviewed. Return in 6 weeks. Last several PSA's:  Lab Results   Component Value Date    PSA 0.22 10/25/2022    PSA 0.37 06/15/2021    PSA 0.31 05/19/2020       Last total testosterone:  No results found for: TESTOSTERONE    Urinalysis today:  Results for POC orders placed in visit on 03/13/23   poct post void residual   Result Value Ref Range    post void residual 269 ml         Last BUN and creatinine:  Lab Results   Component Value Date    BUN 10 10/25/2022     Lab Results   Component Value Date    CREATININE 1.1 10/25/2022       Imaging Reviewed during this Office Visit:   (results were independently reviewed by physician and radiology report verified)    PAST MEDICAL, FAMILY AND SOCIAL HISTORY:  Past Medical History:   Diagnosis Date    ADHD (attention deficit hyperactivity disorder)     Allergic rhinitis     Benign esophageal stricture     s/p dilation may 2014    Benign prostatic hyperplasia     Bipolar 1 disorder ([facility]     CAD (coronary artery disease)     Unclear history, cath 2013 with patent arteries. Records pending. Chronic back pain     Depression     DM2 (diabetes mellitus, type 2) ([facility]     Dysfunctional voiding of urine 06/20/2013    Medications he changes to follow,  Adding ditropan  to the present schedule     ED (erectile dysfunction) of organic origin 05/01/2013    Fibromyalgia     Former smoker     GERD (gastroesophageal reflux disease)     History of colon polyps     Pt unsure when last colo.      History of TIA (transient ischemic attack)     x2     Hyperlipidemia     Hypertension     Hypertriglyceridemia 10/27/2015    Migraine headache Morbid obesity (HCC)     WESLEY (nonalcoholic steatohepatitis)     Obstructive sleep apnea on CPAP 11/03/2015    Peripheral neuropathy     Pulmonary nodule     stable >2 years, no further w/u required.     Rotator cuff injury     Left side    S/P cardiac catheterization: 9/30/2013: Normal coronaries 09/30/2013 9/30/2013: Normal coronaries. Radial approach. Dr. Kelly    Short-segment Vásquez's esophagus 06/17/2015    Tarsal tunnel syndrome     Bilateral     Past Surgical History:   Procedure Laterality Date    CARDIAC CATHETERIZATION  10/2013    9/30/2013: Normal coronaries. Radial approach. Dr. Kelly    COLONOSCOPY  2008, 2012    CYSTOSCOPY N/A 10/31/2022    Cystoscopy Urolift performed by Quinton Marquez Jr., MD at [facility] OR    KNEE SURGERY Left 2012    ROTATOR CUFF REPAIR Left 2013    SHOULDER SURGERY Bilateral 2011    SINUS SURGERY  2004    [place] occ.     TONSILLECTOMY  2002    TYMPANOSTOMY TUBE PLACEMENT      UPPER GASTROINTESTINAL ENDOSCOPY  2008, 2008 with dilation, 2012    UPPER GASTROINTESTINAL ENDOSCOPY  2013    UPPER GASTROINTESTINAL ENDOSCOPY  MAY 2014, 2017    Dilation     Family History   Problem Relation Age of Onset    Cancer Father 66        lung     Rheum Arthritis Father     Colon Cancer Maternal Aunt 60    Cancer Maternal Aunt 68        \"throat\"    Colon Cancer Maternal Uncle 50    Cancer Maternal Uncle 60        pancreatic    Cancer Paternal Aunt 49        brain    Colon Cancer Paternal Uncle 65    Cancer Paternal Uncle 60        hepatic     Prostate Cancer Paternal Uncle 60    Cancer Maternal Uncle 70        pancreatic     Ovarian Cancer Paternal Aunt 50    Rheum Arthritis Mother     Pancreatic Cancer Mother     Brain Cancer Brother      Outpatient Medications Marked as Taking for the 3/13/23 encounter (Office Visit) with Quinton Marquez Jr., MD   Medication Sig Dispense Refill    amphetamine-dextroamphetamine (ADDERALL XR) 30 MG extended release capsule Take 1 capsule by mouth every  morning for 30 days. Max Daily Amount: 30 mg 30 capsule 0    Misc. Devices [facility] 1 each 0    hydrOXYzine pamoate (VISTARIL) 50 MG capsule Take 50 mg by mouth in the morning and at bedtime At bedtime      Dulaglutide (TRULICITY) 0.85 JERZY/8.0BN SOPN Inject 0.75 mg into the skin once a week for 28 days 4 Adjustable Dose Pre-filled Pen Syringe 0    [START ON 3/29/2023] dulaglutide (TRULICITY) 1.5 IB/0.0DW SC injection Inject 0.5 mLs into the skin once a week for 28 days 4 Adjustable Dose Pre-filled Pen Syringe 0    [START ON 4/26/2023] Dulaglutide (TRULICITY) 3 RX/2.5YS SOPN Inject 3 mg into the skin once a week 4 Adjustable Dose Pre-filled Pen Syringe 2    VORTIoxetine HBr (TRINTELLIX) 20 MG TABS tablet TAKE 1 TABLET BY MOUTH EVERY DAY 30 tablet 1    metFORMIN (GLUCOPHAGE) 1000 MG tablet TAKE 1 TABLET BY MOUTH TWO TIMES A DAY WITH MEALS 180 tablet 3    lisinopril (PRINIVIL;ZESTRIL) 40 MG tablet TAKE 1 TABLET BY MOUTH EVERY DAY 90 tablet 3    buPROPion HCl ER, XL, 450 MG TB24 TAKE 1 TABLET BY MOUTH EVERY DAY 90 tablet 0    doxepin (SINEQUAN) 25 MG capsule TAKE 1 CAPSULE BY MOUTH EVERY DAY AT NIGHT 30 capsule 5    aspirin (ASPIRIN LOW DOSE) 81 MG EC tablet TAKE 1 TABLET BY MOUTH EVERY DAY 90 tablet 3    lamoTRIgine (LAMICTAL) 150 MG tablet Take 1 tablet by mouth in the morning and 1 tablet in the evening.  60 tablet 2    amLODIPine (NORVASC) 5 MG tablet TAKE 1 TABLET BY MOUTH EVERY DAY 90 tablet 3    Blood Glucose Monitoring Suppl KIT Use to check sugars once daily 1 kit 0    blood glucose monitor strips Use to check sugars once daily 100 strip 3    empagliflozin (JARDIANCE) 25 MG tablet Take 1 tablet by mouth daily 90 tablet 3    atorvastatin (LIPITOR) 80 MG tablet TAKE 1 TABLET BY MOUTH EVERY DAY 90 tablet 3    fenofibrate (TRIGLIDE) 160 MG tablet TAKE 1 TABLET BY MOUTH EVERY DAY 90 tablet 3    piroxicam (FELDENE) 20 MG capsule TAKE 1 CAPSULE BY MOUTH EVERY DAY AS NEEDED FOR PAIN 90 capsule 3    lurasidone (LATUDA) 120 MG tablet TAKE 1 TABLET BY MOUTH ONE TIME A DAY (take with at least 350 calories) 30 tablet 5    trospium (SANCTURA) 60 MG CP24 extended release capsule TAKE 1 CAPSULE BY MOUTH EVERY DAY (Patient taking differently: 60 mg nightly) 30 capsule 0    tamsulosin (FLOMAX) 0.4 MG capsule TAKE 1 CAPSULE BY MOUTH EVERY DAY TO FACILITATE PASSAGE OF UTERAL STONE (Patient taking differently: Take 0.4 mg by mouth nightly) 90 capsule 0    omeprazole (PRILOSEC) 20 MG delayed release capsule TAKE 2 CAPSULES BY MOUTH EVERY DAY (Patient taking differently: Take 40 mg by mouth Daily) 180 capsule 3    [employer] Lancets Universal 33G MISC use to test blood sugar once a day 100 each 3    Insulin Pen Needle (UNIFINE PENTIPS) 31G X 8 MM MISC Use DAILY as directed WITH INSULIN PENS-- DO NOT RE-USE needles 100 each 3    insulin glargine (LANTUS SOLOSTAR) 100 UNIT/ML injection pen Inject 65 Units into the skin 2 times daily 117 mL 3    sildenafil (VIAGRA) 50 MG tablet Take 1 tablet by mouth daily as needed for Erectile Dysfunction Take one tablet one hour before sexual activity. 6 tablet 5    azelastine (ASTELIN) 0.1 % nasal spray Inhale 2 sprays in each nostril twice a day as needed      ARTIFICIAL TEARS 1.4 % ophthalmic solution Place 1 drop into both eyes 4 times daily  0    rOPINIRole (REQUIP) 3 MG tablet Take 3 mg by mouth nightly      propranolol (INDERAL LA) 160 MG ER capsule Take 160 mg by mouth daily       fluticasone (FLONASE) 50 MCG/ACT nasal spray 1 spray by Nasal route daily as needed.       gabapentin (NEURONTIN) 800 MG tablet   Take 800 mg by mouth 4 times daily          Morphine  Social History     Tobacco Use   Smoking Status Some Days    Packs/day: 0.25    Years: 30.00    Pack years: 7.50    Types: Cigarettes    Start date: 1984    Last attempt to quit: 2018    Years since quittin.8   Smokeless Tobacco Never       Social History     Substance and Sexual Activity   Alcohol Use No    Alcohol/week: 0.0 standard drinks       REVIEW OF SYSTEMS:  Constitutional: negative  Eyes: negative  Respiratory: negative  Cardiovascular: negative  Gastrointestinal: negative  Musculoskeletal: negative  Genitourinary: negative  Skin: negative   Neurological: negative  Hematological/Lymphatic: negative  Psychological: negative    Physical Exam:    This a 45 y.o. male   Vitals:    03/13/23 1218   Resp: 22     Constitutional: Patient in no acute distress   Neuro: alert and oriented to person place and time. Psych: Mood and affect normal.  Head: atraumatic normocephalic  Eyes: EOMi  HEENT: neck supple, trachea midline  Lungs: Respiratory effort normal  Cardiovascular:  Normal peripheral pulses  Abdomen: Soft, non-tender, non-distended, No CVA  Bladder: non-tender and not distended. FROMx4, no cyanosis clubbing edema  Skin: warm and dry      Assessment and Plan      1. Benign prostatic hyperplasia with lower urinary tract symptoms, symptom details unspecified             Plan:      No follow-ups on file.   BPH with LUTs - improved after Urolift  - continue sanctura for overactivity  - Stop flomax  PSA 6 months

## 2023-03-14 LAB
IRON SERPL-MCNC: 66 UG/DL (ref 65–195)
TIBC SERPL-MCNC: 516 UG/DL (ref 171–450)

## 2023-03-15 LAB
FERRITIN SERPL IA-MCNC: 22 NG/ML (ref 22–322)
FOLATE SERPL-MCNC: 11.4 NG/ML (ref 4.8–24.2)
PSA SERPL-MCNC: 0.18 NG/ML (ref 0–1)
VIT B12 SERPL-MCNC: 498 PG/ML (ref 211–911)

## 2023-03-16 ENCOUNTER — HOSPITAL ENCOUNTER (OUTPATIENT)
Dept: MRI IMAGING | Age: 53
Discharge: HOME | End: 2023-03-16

## 2023-03-16 ENCOUNTER — TELEPHONE (OUTPATIENT)
Dept: FAMILY MEDICINE CLINIC | Age: 53
End: 2023-03-16

## 2023-03-16 DIAGNOSIS — K75.81 NASH (NONALCOHOLIC STEATOHEPATITIS): Primary | ICD-10-CM

## 2023-03-16 DIAGNOSIS — M25.561 ACUTE PAIN OF RIGHT KNEE: ICD-10-CM

## 2023-03-16 DIAGNOSIS — S83.206A POSITIVE MCMURRAY TEST OF RIGHT KNEE, INITIAL ENCOUNTER: ICD-10-CM

## 2023-03-16 NOTE — TELEPHONE ENCOUNTER
Pt informed and verbalized understanding.    Pt transferred to Marika Suarez [facility] CARLIN Acosta

## 2023-03-16 NOTE — TELEPHONE ENCOUNTER
Left message on answering machine requesting pt to call back at earliest convenience. Please when patient calls he needs to be transferred to central scheduling to get his test scheduled.

## 2023-03-16 NOTE — TELEPHONE ENCOUNTER
----- Message from Venus Apgar, DO sent at 3/16/2023  6:32 AM EDT -----  Please let pt know that labs overall stable and appropriate  LFT elevated yet, but better than prior  Would recommend routine f/u liver US and fibroscan to monitor this  That has been ordered  Let me know if questions, thanks!

## 2023-03-27 ENCOUNTER — TELEPHONE (OUTPATIENT)
Dept: PSYCHIATRY | Age: 53
End: 2023-03-27

## 2023-03-27 DIAGNOSIS — F90.8 ADHD, ADULT RESIDUAL TYPE: Primary | ICD-10-CM

## 2023-03-27 RX ORDER — DEXTROAMPHETAMINE SACCHARATE, AMPHETAMINE ASPARTATE MONOHYDRATE, DEXTROAMPHETAMINE SULFATE AND AMPHETAMINE SULFATE 7.5; 7.5; 7.5; 7.5 MG/1; MG/1; MG/1; MG/1
30 CAPSULE, EXTENDED RELEASE ORAL EVERY MORNING
Qty: 30 CAPSULE | Refills: 0 | Status: SHIPPED | OUTPATIENT
Start: 2023-03-27 | End: 2023-03-28

## 2023-03-27 NOTE — TELEPHONE ENCOUNTER
Reginald Bingham called into the office and states he is unable to find Adderall XR 30 mg anywhere. Requests to have Rx changed to Concerta. Please advise.

## 2023-03-27 NOTE — TELEPHONE ENCOUNTER
Willie Su called requesting a refill on the following medications:  Requested Prescriptions     Pending Prescriptions Disp Refills    amphetamine-dextroamphetamine (ADDERALL XR) 30 MG extended release capsule 30 capsule 0     Sig: Take 1 capsule by mouth every morning for 30 days.  Max Daily Amount: 30 mg    VORTIoxetine HBr (TRINTELLIX) 20 MG TABS tablet 30 tablet 0     Sig: TAKE 1 TABLET BY MOUTH EVERY DAY       Date of last visit: 3/9/2023  Date of next visit (if applicable):  Pharmacy Name: [facility], [place]

## 2023-03-28 PROBLEM — F25.0 SCHIZOAFFECTIVE DISORDER, BIPOLAR TYPE (HCC): Status: ACTIVE | Noted: 2023-03-28

## 2023-03-28 RX ORDER — DEXTROAMPHETAMINE SACCHARATE, AMPHETAMINE ASPARTATE MONOHYDRATE, DEXTROAMPHETAMINE SULFATE AND AMPHETAMINE SULFATE 6.25; 6.25; 6.25; 6.25 MG/1; MG/1; MG/1; MG/1
25 CAPSULE, EXTENDED RELEASE ORAL EVERY MORNING
Qty: 30 CAPSULE | Refills: 0 | Status: SHIPPED | OUTPATIENT
Start: 2023-03-28 | End: 2023-04-27

## 2023-03-28 NOTE — PROGRESS NOTES
bleeding, melena or hematochezia  No fevers, chills or night sweats      UPDATE PRIOR VISIT:   Found to have low iron on last lab check for anemia  Denies bleeding, melena or hematochezia  Placed on iron  F/u labs ordered for 6 wks  Pt is to call Donte SALAZAR's office for an apt, he is EST there. He has not done so yet, but plans to soon    UPDATE PRIOR VISIT:   On iron  Saw Skip Gasca, GI  EGD scheduled  Denies bleeding, melena or hematochezia. UPDATE PRIOR VISIT:   Had EGD, neg  UTD on colo  Labs done yesterday and normal  Taking iron yet  Denies bleeding, melena or hematochezia     UPDATE PRIOR VISIT:   Overeue for f/u labs to monitor, ordered 6 months ago, never done  Off iron tabs, and b12 tabs  Denies bleeding, melena or hematochezia    UPDATE PRIOR VISIT:   Due for f/u labs  Off iron and b12, no bleeding, melena or hematochezia     UPDATE TODAY:   Labs stable  Off iron/b12  Iron stores ok  B12/folate stores ok  Cbc WNL on last check  Denies bleeding, melena or hematochezia      -HTN:    HPI:    Taking meds as prescribed ?: yes  Tolerating well ?: yes  Side Effects ?: denies  BP at home ?: <140/90  Working on TLCS ?: yes  Chest Pain/SOB/Palpitations? denies    BP Readings from Last 3 Encounters:   03/29/23 130/72   03/01/23 130/72   11/23/22 130/78       -HLD:    HPI:    Taking meds as prescribed ?: yes  Tolerating well ?: yes  Side Effects ?: denies  Muscle Pain?: denies  Working on TLCS ?: yes      -GERD/Barrets:    HPI:  utd with EGD with GI    Taking meds as prescribed ?: yes  Tolerating well ?: yes  Side Effects ?: denies  Dysphagia ?: denies  Odynophagia ?: denies  Melena ?: denies  Working on TLCS ?: yes      -R Knee Pain LAST VISIT:    HPIMinda Pour Friday  Twisted knee  Pain since then  Some swelling  Feels like it will lock at times    Inciting injury or history of trauma? Yes  Pain is relieved by - rest  Pain is aggravated by - walking, bending and twisting  Locking or catching of the knee?

## 2023-03-28 NOTE — TELEPHONE ENCOUNTER
Previous patient of Dr. Marco Brown, who is no longer with the practice. Adderall XR 30mg script sent in by Dr Marco Brown but pharmacy does not have this dose due to national shortage. I confirmed history of Adderall XR use with [facility]. Spoke with Diane Kee. They do have the Adderall XR in 25mg. Please call patient and let them know I will send that prescription over. Though it is a slightly lower dose we should not see significant differences in effectiveness. Also, let patient know that I will need to see him in person prior to providing another refill.     Dr. Yari Jones

## 2023-03-28 NOTE — TELEPHONE ENCOUNTER
Previous patient of Dr. Sade Odonnell, who is no longer with the practice. Dr. Sade Odonnell had sent in Adderall XR script but due to national shortage patient has been unable to fill. I reviewed the chart as well as [facility] and verified patient is taking Adderall XR 30mg daily with last fill on 2/20/23. Given Adderall shortage I will provide a script for Vyvanse 30mg daily. If patient is unable to fill this or it is not covered he is to call into the office and we will need to work further with his pharmacy to find an available replacement. Please reach out to patient letting him know prescription has been sent to [facility] [place]. If he has any side effects or questions he should reach out to the office. Also, please inform patient I will be unable to provide another prescription until I see patient for an in person visit.     Dr. Taqueria oJhnston

## 2023-03-29 ENCOUNTER — OFFICE VISIT (OUTPATIENT)
Dept: FAMILY MEDICINE CLINIC | Age: 53
End: 2023-03-29
Payer: COMMERCIAL

## 2023-03-29 ENCOUNTER — TELEPHONE (OUTPATIENT)
Dept: FAMILY MEDICINE CLINIC | Age: 53
End: 2023-03-29

## 2023-03-29 VITALS
HEIGHT: 67 IN | RESPIRATION RATE: 16 BRPM | HEART RATE: 105 BPM | WEIGHT: 250 LBS | OXYGEN SATURATION: 99 % | SYSTOLIC BLOOD PRESSURE: 130 MMHG | BODY MASS INDEX: 39.24 KG/M2 | TEMPERATURE: 97.6 F | DIASTOLIC BLOOD PRESSURE: 72 MMHG

## 2023-03-29 DIAGNOSIS — M54.50 ACUTE RIGHT-SIDED LOW BACK PAIN WITHOUT SCIATICA: ICD-10-CM

## 2023-03-29 DIAGNOSIS — S83.206A POSITIVE MCMURRAY TEST OF RIGHT KNEE, INITIAL ENCOUNTER: ICD-10-CM

## 2023-03-29 DIAGNOSIS — M25.561 ACUTE PAIN OF RIGHT KNEE: ICD-10-CM

## 2023-03-29 DIAGNOSIS — K21.9 GASTROESOPHAGEAL REFLUX DISEASE, UNSPECIFIED WHETHER ESOPHAGITIS PRESENT: ICD-10-CM

## 2023-03-29 DIAGNOSIS — E11.69 TYPE 2 DIABETES MELLITUS WITH OTHER SPECIFIED COMPLICATION, WITH LONG-TERM CURRENT USE OF INSULIN (HCC): Primary | ICD-10-CM

## 2023-03-29 DIAGNOSIS — K75.81 NASH (NONALCOHOLIC STEATOHEPATITIS): ICD-10-CM

## 2023-03-29 DIAGNOSIS — R74.8 ABNORMAL TRANSAMINASES: ICD-10-CM

## 2023-03-29 DIAGNOSIS — E78.2 MIXED HYPERLIPIDEMIA: ICD-10-CM

## 2023-03-29 DIAGNOSIS — D50.9 IRON DEFICIENCY ANEMIA, UNSPECIFIED IRON DEFICIENCY ANEMIA TYPE: ICD-10-CM

## 2023-03-29 DIAGNOSIS — E66.01 MORBID OBESITY WITH BMI OF 40.0-44.9, ADULT (HCC): ICD-10-CM

## 2023-03-29 DIAGNOSIS — K22.70 SHORT-SEGMENT BARRETT'S ESOPHAGUS: ICD-10-CM

## 2023-03-29 DIAGNOSIS — E53.8 B12 DEFICIENCY: ICD-10-CM

## 2023-03-29 DIAGNOSIS — I10 ESSENTIAL HYPERTENSION: ICD-10-CM

## 2023-03-29 DIAGNOSIS — Z79.4 TYPE 2 DIABETES MELLITUS WITH OTHER SPECIFIED COMPLICATION, WITH LONG-TERM CURRENT USE OF INSULIN (HCC): Primary | ICD-10-CM

## 2023-03-29 PROCEDURE — G8482 FLU IMMUNIZE ORDER/ADMIN: HCPCS | Performed by: FAMILY MEDICINE

## 2023-03-29 PROCEDURE — 2022F DILAT RTA XM EVC RTNOPTHY: CPT | Performed by: FAMILY MEDICINE

## 2023-03-29 PROCEDURE — 3017F COLORECTAL CA SCREEN DOC REV: CPT | Performed by: FAMILY MEDICINE

## 2023-03-29 PROCEDURE — G8417 CALC BMI ABV UP PARAM F/U: HCPCS | Performed by: FAMILY MEDICINE

## 2023-03-29 PROCEDURE — G8427 DOCREV CUR MEDS BY ELIG CLIN: HCPCS | Performed by: FAMILY MEDICINE

## 2023-03-29 PROCEDURE — 3078F DIAST BP <80 MM HG: CPT | Performed by: FAMILY MEDICINE

## 2023-03-29 PROCEDURE — 1036F TOBACCO NON-USER: CPT | Performed by: FAMILY MEDICINE

## 2023-03-29 PROCEDURE — 99214 OFFICE O/P EST MOD 30 MIN: CPT | Performed by: FAMILY MEDICINE

## 2023-03-29 PROCEDURE — 3052F HG A1C>EQUAL 8.0%<EQUAL 9.0%: CPT | Performed by: FAMILY MEDICINE

## 2023-03-29 PROCEDURE — 3075F SYST BP GE 130 - 139MM HG: CPT | Performed by: FAMILY MEDICINE

## 2023-03-29 RX ORDER — TRAMADOL HYDROCHLORIDE 50 MG/1
50 TABLET ORAL EVERY 6 HOURS PRN
Qty: 28 TABLET | Refills: 0 | Status: SHIPPED | OUTPATIENT
Start: 2023-03-29 | End: 2023-04-05

## 2023-03-29 RX ORDER — TIZANIDINE 4 MG/1
4 TABLET ORAL EVERY 8 HOURS PRN
Qty: 30 TABLET | Refills: 0 | Status: SHIPPED | OUTPATIENT
Start: 2023-03-29

## 2023-03-29 NOTE — TELEPHONE ENCOUNTER
Left message on answering machine requesting pt to call back at earliest convenience.      Pt needs transferred to Central scheduling to schedule US lesion and US liver

## 2023-04-02 DIAGNOSIS — R10.13 DYSPEPSIA: ICD-10-CM

## 2023-04-02 DIAGNOSIS — K21.9 GASTROESOPHAGEAL REFLUX DISEASE: ICD-10-CM

## 2023-04-02 DIAGNOSIS — R10.13 EPIGASTRIC PAIN: ICD-10-CM

## 2023-04-02 DIAGNOSIS — R13.14 PHARYNGOESOPHAGEAL DYSPHAGIA: ICD-10-CM

## 2023-04-02 DIAGNOSIS — F25.0 SCHIZOAFFECTIVE DISORDER, BIPOLAR TYPE (HCC): ICD-10-CM

## 2023-04-03 ENCOUNTER — HOSPITAL ENCOUNTER (OUTPATIENT)
Dept: MRI IMAGING | Age: 53
Discharge: HOME | End: 2023-04-03
Payer: COMMERCIAL

## 2023-04-03 DIAGNOSIS — M25.561 ACUTE PAIN OF RIGHT KNEE: ICD-10-CM

## 2023-04-03 DIAGNOSIS — S83.206A POSITIVE MCMURRAY TEST OF RIGHT KNEE, INITIAL ENCOUNTER: ICD-10-CM

## 2023-04-03 PROCEDURE — 73721 MRI JNT OF LWR EXTRE W/O DYE: CPT

## 2023-04-03 RX ORDER — HYDROXYZINE PAMOATE 50 MG/1
CAPSULE ORAL
Qty: 60 CAPSULE | Refills: 0 | Status: SHIPPED | OUTPATIENT
Start: 2023-04-03 | End: 2023-04-27 | Stop reason: SDUPTHER

## 2023-04-03 RX ORDER — LAMOTRIGINE 150 MG/1
150 TABLET ORAL 2 TIMES DAILY
Qty: 60 TABLET | Refills: 0 | Status: SHIPPED | OUTPATIENT
Start: 2023-04-03 | End: 2023-04-27 | Stop reason: SDUPTHER

## 2023-04-03 RX ORDER — OMEPRAZOLE 20 MG/1
CAPSULE, DELAYED RELEASE ORAL
Qty: 180 CAPSULE | Refills: 3 | Status: SHIPPED | OUTPATIENT
Start: 2023-04-03

## 2023-04-03 NOTE — TELEPHONE ENCOUNTER
Recent Visits  Date Type Provider Dept   03/29/23 Office Visit Chela Cohn, DO [facility]   03/01/23 Office Visit Chela Suki, DO [facility]   11/23/22 Office Visit Chela Cohn, DO [facility]   08/23/22 Office Visit Chela Cohn, DO [facility]   05/24/22 Office Visit Chela Cohn, DO [facility]   11/29/21 Office Visit Chela Cohn, DO [facility]   Showing recent visits within past 540 days with a meds authorizing provider and meeting all other requirements  Future Appointments  Date Type Provider Dept   06/29/23 Appointment Chela Cohn, DO [facility]   Showing future appointments within next 150 days with a meds authorizing provider and meeting all other requirements      Future Appointments   Date Time Provider Mikey Painting   4/10/2023  7:45 AM [facility] Mc[facility]e [facility] [place]   4/11/2023  7:00 AM STR ULTRASOUND RM 2 STRZ US STR Radiolog   4/11/2023  7:30 AM STR ULTRASOUND RM 2 [facility] US STR Radiolog   4/27/2023 12:30 PM Moni Chapa MD [facility] [place]   6/29/2023  7:40 AM Chela Cohn DO [facility] [place]   9/11/2023  8:45 AM Onesimo Abreu MD [address] [place] [address]

## 2023-04-04 ENCOUNTER — TELEPHONE (OUTPATIENT)
Dept: FAMILY MEDICINE CLINIC | Age: 53
End: 2023-04-04

## 2023-04-04 DIAGNOSIS — S83.206A POSITIVE MCMURRAY TEST OF RIGHT KNEE, INITIAL ENCOUNTER: ICD-10-CM

## 2023-04-04 DIAGNOSIS — S83.241A ACUTE MEDIAL MENISCAL TEAR, RIGHT, INITIAL ENCOUNTER: Primary | ICD-10-CM

## 2023-04-04 NOTE — TELEPHONE ENCOUNTER
Diagnosis Orders   1. Acute medial meniscal tear, right, initial encounter  External Referral To Orthopedic Surgery      2.  Positive Tate test of right knee, initial encounter  External Referral To Orthopedic Surgery

## 2023-04-04 NOTE — TELEPHONE ENCOUNTER
----- Message from Shayla Devi, DO sent at 4/4/2023  6:32 AM EDT -----  Please let pt know that MRI is back:  -Shows tear of meniscus and partial tear of patella, those are the major findings    Recommend we get set back up with ortho. Does he want to go back to Dr. Garcia [place]? Let me know, thanks!

## 2023-04-04 NOTE — TELEPHONE ENCOUNTER
Patient has been informed and voiced understanding and would like to get back in with Dr Cornelio Marie

## 2023-04-27 ENCOUNTER — OFFICE VISIT (OUTPATIENT)
Dept: PSYCHIATRY | Age: 53
End: 2023-04-27
Payer: COMMERCIAL

## 2023-04-27 DIAGNOSIS — F17.210 TOBACCO DEPENDENCE DUE TO CIGARETTES: ICD-10-CM

## 2023-04-27 DIAGNOSIS — F25.0 SCHIZOAFFECTIVE DISORDER, BIPOLAR TYPE (HCC): Primary | ICD-10-CM

## 2023-04-27 DIAGNOSIS — F90.8 ADHD, ADULT RESIDUAL TYPE: ICD-10-CM

## 2023-04-27 PROCEDURE — G8428 CUR MEDS NOT DOCUMENT: HCPCS | Performed by: PSYCHIATRY & NEUROLOGY

## 2023-04-27 PROCEDURE — 1036F TOBACCO NON-USER: CPT | Performed by: PSYCHIATRY & NEUROLOGY

## 2023-04-27 PROCEDURE — 99214 OFFICE O/P EST MOD 30 MIN: CPT | Performed by: PSYCHIATRY & NEUROLOGY

## 2023-04-27 PROCEDURE — 3017F COLORECTAL CA SCREEN DOC REV: CPT | Performed by: PSYCHIATRY & NEUROLOGY

## 2023-04-27 PROCEDURE — G8417 CALC BMI ABV UP PARAM F/U: HCPCS | Performed by: PSYCHIATRY & NEUROLOGY

## 2023-04-27 RX ORDER — HYDROXYZINE PAMOATE 50 MG/1
50 CAPSULE ORAL 3 TIMES DAILY PRN
Qty: 30 CAPSULE | Refills: 1 | Status: SHIPPED | OUTPATIENT
Start: 2023-04-27 | End: 2023-05-17

## 2023-04-27 RX ORDER — LURASIDONE HYDROCHLORIDE 120 MG/1
TABLET, FILM COATED ORAL
Qty: 30 TABLET | Refills: 3 | Status: SHIPPED | OUTPATIENT
Start: 2023-04-27

## 2023-04-27 RX ORDER — DEXTROAMPHETAMINE SACCHARATE, AMPHETAMINE ASPARTATE MONOHYDRATE, DEXTROAMPHETAMINE SULFATE AND AMPHETAMINE SULFATE 6.25; 6.25; 6.25; 6.25 MG/1; MG/1; MG/1; MG/1
25 CAPSULE, EXTENDED RELEASE ORAL EVERY MORNING
Qty: 30 CAPSULE | Refills: 0 | Status: SHIPPED | OUTPATIENT
Start: 2023-04-27 | End: 2023-05-27

## 2023-04-27 RX ORDER — LAMOTRIGINE 150 MG/1
150 TABLET ORAL 2 TIMES DAILY
Qty: 60 TABLET | Refills: 3 | Status: SHIPPED | OUTPATIENT
Start: 2023-04-27

## 2023-04-27 ASSESSMENT — PATIENT HEALTH QUESTIONNAIRE - PHQ9
9. THOUGHTS THAT YOU WOULD BE BETTER OFF DEAD, OR OF HURTING YOURSELF: 0
10. IF YOU CHECKED OFF ANY PROBLEMS, HOW DIFFICULT HAVE THESE PROBLEMS MADE IT FOR YOU TO DO YOUR WORK, TAKE CARE OF THINGS AT HOME, OR GET ALONG WITH OTHER PEOPLE: 2
SUM OF ALL RESPONSES TO PHQ9 QUESTIONS 1 & 2: 4
7. TROUBLE CONCENTRATING ON THINGS, SUCH AS READING THE NEWSPAPER OR WATCHING TELEVISION: 2
5. POOR APPETITE OR OVEREATING: 1
SUM OF ALL RESPONSES TO PHQ QUESTIONS 1-9: 14
8. MOVING OR SPEAKING SO SLOWLY THAT OTHER PEOPLE COULD HAVE NOTICED. OR THE OPPOSITE, BEING SO FIGETY OR RESTLESS THAT YOU HAVE BEEN MOVING AROUND A LOT MORE THAN USUAL: 1
SUM OF ALL RESPONSES TO PHQ QUESTIONS 1-9: 14
6. FEELING BAD ABOUT YOURSELF - OR THAT YOU ARE A FAILURE OR HAVE LET YOURSELF OR YOUR FAMILY DOWN: 3
4. FEELING TIRED OR HAVING LITTLE ENERGY: 2
2. FEELING DOWN, DEPRESSED OR HOPELESS: 1
SUM OF ALL RESPONSES TO PHQ QUESTIONS 1-9: 14
SUM OF ALL RESPONSES TO PHQ QUESTIONS 1-9: 14
1. LITTLE INTEREST OR PLEASURE IN DOING THINGS: 3
3. TROUBLE FALLING OR STAYING ASLEEP: 1

## 2023-04-27 NOTE — PROGRESS NOTES
[address] [facility] PSYCHIATRY  [facility] [address] [phone]    Progress Note    Patient:  Delisa Bowman  YOB: 1970  PCP:  Joyce Swift DO  Visit Date:  4/27/2023      CC: \"Been a rough month\"    SUBJECTIVE:      Delisa Bowman, a 45 y.o. male, presents for a follow up visit. Previous patient of Dr. Lissy Bernstein. Chart reviewed. Patient reports it has been a difficult last 6 weeks as he lost his brother and the cat he had for about 16 years. However, he does feel the medications have been helpful and stabilized his depression. Denies suicidal ideation, intent or plan. Reports intermittent auditory and visual hallucinations which is consistent with his baseline and denies any worsening. Denies that these are command in nature. Sleep has remained stable. Appetite stable. We had to decrease Adderall XR to 25 mg last month due to shortage of Adderall and patient reports it is working fine. Patient would like to continue with current medications given stability. Denied any physical symptoms including chest pain, shortness of breath, palpitations or headaches. Does have a noticeable tremor, which patient states is chronic and likely an essential tremor. MENTAL STATUS EXAM:  Mental Status Evaluation:   Orientation: Alert, oriented, thought content appropriate   Mood:. \"Okay\"      Affect:  Constricted      Appearance:  Age Appropriate, Clothing Appropriate for Age, and Clothing Appropriate for Weather   Thought Process: Within Normal Limits   Thought Content:   Within Normal Limits   Insight:  Fair   Judgment: Good   Suicidal Ideations: Denies Suicidal Ideation           PHQ-9  4/27/2023   Little interest or pleasure in doing things 3   Feeling down, depressed, or hopeless 1   Trouble falling or staying asleep, or sleeping too much 1   Feeling tired or having little energy 2   Poor appetite or overeating 1

## 2023-05-01 ENCOUNTER — HOSPITAL ENCOUNTER (OUTPATIENT)
Dept: PHYSICAL THERAPY | Age: 53
Setting detail: THERAPIES SERIES
Discharge: HOME | End: 2023-05-01
Payer: COMMERCIAL

## 2023-05-01 PROCEDURE — 97110 THERAPEUTIC EXERCISES: CPT

## 2023-05-01 PROCEDURE — 97162 PT EVAL MOD COMPLEX 30 MIN: CPT

## 2023-05-01 ASSESSMENT — KOOS JR
RISING FROM SITTING: 4
HOW SEVERE IS YOUR KNEE STIFFNESS AFTER FIRST WAKING IN MORNING: 4
GOING UP OR DOWN STAIRS: 4
STANDING UPRIGHT: 3
STRAIGHTENING KNEE FULLY: 3
TWISING OR PIVOTING ON KNEE: 4
BENDING TO THE FLOOR TO PICK UP OBJECT: 3
KOOS JR TOTAL INTERVAL SCORE: 20.941

## 2023-05-01 NOTE — PROGRESS NOTES
** PLEASE SIGN, DATE AND TIME CERTIFICATION BELOW AND RETURN TO [facility] OUTPATIENT REHABILITATION (FAX #: [phone]). ATTEST/CO-SIGN IF ACCESSING VIA [employer] THANK YOU.**    I certify that I have examined the patient below and determined that Physical Medicine and Rehabilitation service is necessary and that I approve the established plan of care for up to 90 days or as specifically noted. Attestation, signature or co-signature of physician indicates approval of certification requirements.    ________________________ ____________ __________  Physician Signature   Date   Time   [address] [facility]  PHYSICAL THERAPY  [x] EVALUATION  [] DAILY NOTE (LAND) [] DAILY NOTE (AQUATIC ) [] PROGRESS NOTE [] DISCHARGE NOTE    [] [address] [facility]   [x] [place] [address]    [] [facility]   [] [facility]    Date: 2023  Patient Name:  Yaneth Emmanuel  : 1970  MRN: [id number]  CSN: [id number]    Referring Practitioner Chip Hernandez MD   Diagnosis Strain of unspecified muscle(s) and tendon(s) at lower leg level, right leg, initial encounter [S86.911A]  Unilateral primary osteoarthritis, right knee [M17.11]  Patellofemoral disorders, unspecified knee [M22.2X9]    Treatment Diagnosis R knee pain, difficulty walking, decreased balance   Date of Evaluation 23   Additional Pertinent History Bi-polar, fibromyalgia, DM, 2 CVA with L UE/LE weakness, HTN, MI , vertigo, obesity, memory issues, SOB with exertion      Functional Outcome Measure Used KOOS Jr   Functional Outcome Score Eval 25 (23)       Insurance: Primary: Payor: [address] [place] [address]  [address] /  /  / ,   Secondary:    Authorization Information: INSURANCE PAYS AT: 100%               PATIENT RESPONSIBILITY AND/OR CO-PAY:  N/a  PRECERTIFICATION REQUIRED: No  INSURANCE THERAPY BENEFIT: No pre-certification is needed.   PT, OT and ST are allowed 30 visits each per calendar

## 2023-05-05 ENCOUNTER — HOSPITAL ENCOUNTER (OUTPATIENT)
Dept: PHYSICAL THERAPY | Age: 53
Setting detail: THERAPIES SERIES
Discharge: HOME | End: 2023-05-05
Payer: COMMERCIAL

## 2023-05-05 PROCEDURE — 97035 APP MDLTY 1+ULTRASOUND EA 15: CPT

## 2023-05-05 PROCEDURE — 97110 THERAPEUTIC EXERCISES: CPT

## 2023-05-05 NOTE — PROGRESS NOTES
[address] [facility]  PHYSICAL THERAPY  [] EVALUATION  [x] DAILY NOTE (LAND) [] DAILY NOTE (AQUATIC ) [] PROGRESS NOTE [] DISCHARGE NOTE    [] [address] [facility]   [x] [address]    [] [facility]   [] Jody Yang    Date: 2023  Patient Name:  John Silva  : 1970  MRN: [id number]  CSN: [id number]    Referring Practitioner Rosalva Monsivais MD   Diagnosis Strain of unspecified muscle(s) and tendon(s) at lower leg level, right leg, initial encounter [S86.914M]  Unilateral primary osteoarthritis, right knee [M17.11]  Patellofemoral disorders, unspecified knee [M22.2X9]    Treatment Diagnosis R knee pain, difficulty walking, decreased balance   Date of Evaluation 23   Additional Pertinent History Bi-polar, fibromyalgia, DM, 2 CVA with L UE/LE weakness, HTN, MI , vertigo, obesity, memory issues, SOB with exertion      Functional Outcome Measure Used KOOS    Functional Outcome Score Eval 25 (23)       Insurance: Primary: Payor: [address] [place] [address]  [address] /  /  / ,   Secondary:    Authorization Information: INSURANCE PAYS AT: 100%               PATIENT RESPONSIBILITY AND/OR CO-PAY:  N/a  PRECERTIFICATION REQUIRED: No  INSURANCE THERAPY BENEFIT: No pre-certification is needed. PT, OT and ST are allowed 30 visits each per calendar year. AQUATIC THERAPY COVERED:  Yes   MODALITIES COVERED:  Yes --Iontophoresis is not covered. Hot/Cold Packs are not covered. Visit # 2, 2/10 for progress note   Visits Allowed: 30   Recertification Date: 44   Physician Follow-Up:  f/u with Dr. Mg Lopez- to discuss if R knee surgery is needed. Physician Orders:    History of Present Illness: 1.5 months ago fell down steps, immediate pain in R knee. Patient to family MD, MRI on 4/3, referred to Dr. Mg Lopez , PT ordered and is looking to precert insurance as surgery will likely needed due to torn meniscus. Patient f/u with MD on .

## 2023-05-08 ENCOUNTER — HOSPITAL ENCOUNTER (OUTPATIENT)
Dept: PHYSICAL THERAPY | Age: 53
Setting detail: THERAPIES SERIES
Discharge: HOME | End: 2023-05-08
Payer: COMMERCIAL

## 2023-05-08 PROCEDURE — 97110 THERAPEUTIC EXERCISES: CPT

## 2023-05-08 PROCEDURE — 97035 APP MDLTY 1+ULTRASOUND EA 15: CPT

## 2023-05-08 NOTE — PROGRESS NOTES
[address] [facility]  PHYSICAL THERAPY  [] EVALUATION  [x] DAILY NOTE (LAND) [] DAILY NOTE (AQUATIC ) [] PROGRESS NOTE [] DISCHARGE NOTE    [] [address] [facility]   [x] [address]    [] [facility]   [] Carrol Gee    Date: 2023  Patient Name:  Dinorah Carrion  : 1970  MRN: [id number]  CSN: [id number]    Referring Practitioner Murray Echeverria MD   Diagnosis Strain of unspecified muscle(s) and tendon(s) at lower leg level, right leg, initial encounter [S86.919F]  Unilateral primary osteoarthritis, right knee [M17.11]  Patellofemoral disorders, unspecified knee [M22.2X9]    Treatment Diagnosis R knee pain, difficulty walking, decreased balance   Date of Evaluation 23   Additional Pertinent History Bi-polar, fibromyalgia, DM, 2 CVA with L UE/LE weakness, HTN, MI , vertigo, obesity, memory issues, SOB with exertion      Functional Outcome Measure Used KOOS    Functional Outcome Score Eval 25 (23)       Insurance: Primary: Payor: [address] [place] [address]  [address] /  /  / ,   Secondary:    Authorization Information: INSURANCE PAYS AT: 100%               PATIENT RESPONSIBILITY AND/OR CO-PAY:  N/a  PRECERTIFICATION REQUIRED: No  INSURANCE THERAPY BENEFIT: No pre-certification is needed. PT, OT and ST are allowed 30 visits each per calendar year. AQUATIC THERAPY COVERED:  Yes   MODALITIES COVERED:  Yes --Iontophoresis is not covered. Hot/Cold Packs are not covered. Visit # 3, 3/10 for progress note   Visits Allowed: 30   Recertification Date: 27   Physician Follow-Up:  f/u with Dr. Ashleigh Ridley- to discuss if R knee surgery is needed. Physician Orders:    History of Present Illness: 1.5 months ago fell down steps, immediate pain in R knee. Patient to family MD, MRI on 4/3, referred to Dr. Ahsleigh Ridley , PT ordered and is looking to precert insurance as surgery will likely needed due to torn meniscus. Patient f/u with MD on .

## 2023-05-08 NOTE — PROGRESS NOTES
Fibromyalgia     Depression     History of colon polyps     Chronic back pain     ADHD (attention deficit hyperactivity disorder)     Former smoker     Peripheral neuropathy     Tarsal tunnel syndrome     Benign esophageal stricture     Hyperlipidemia     Allergic rhinitis     Migraine headache     Dysphagia s/p dilation. 04/01/2014    GERD (gastroesophageal reflux disease) 10/15/2013    S/P cardiac catheterization: 9/30/2013: Normal coronaries 09/30/2013 9/30/2013: Normal coronaries. Radial approach. Dr. Cynthia Cruz      ED (erectile dysfunction) of organic origin 05/01/2013       Past Medical History:   Diagnosis Date    ADHD (attention deficit hyperactivity disorder)     Allergic rhinitis     Benign esophageal stricture     s/p dilation may 2014    Benign prostatic hyperplasia     Bipolar 1 disorder ([facility] [address]     CAD (coronary artery disease)     Unclear history, cath 2013 with patent arteries. Records pending. Chronic back pain     Depression     DM2 (diabetes mellitus, type 2) ([facility] [address]     Dysfunctional voiding of urine 06/20/2013    Medications he changes to follow,  Adding ditropan  to the present schedule     ED (erectile dysfunction) of organic origin 05/01/2013    Fibromyalgia     Former smoker     GERD (gastroesophageal reflux disease)     History of colon polyps     Pt unsure when last colo. History of TIA (transient ischemic attack)     x2     Hyperlipidemia     Hypertension     Hypertriglyceridemia 10/27/2015    Migraine headache     Morbid obesity (HCC)     WESLEY (nonalcoholic steatohepatitis)     Obstructive sleep apnea on CPAP 11/03/2015    Peripheral neuropathy     Pulmonary nodule     stable >2 years, no further w/u required. Rotator cuff injury     Left side    S/P cardiac catheterization: 9/30/2013: Normal coronaries 09/30/2013 9/30/2013: Normal coronaries. Radial approach.  Dr. Cynthia Cruz    Short-segment Vásquez's esophagus 06/17/2015    Tarsal tunnel syndrome     Bilateral       Past

## 2023-05-09 ENCOUNTER — OFFICE VISIT (OUTPATIENT)
Dept: FAMILY MEDICINE CLINIC | Age: 53
End: 2023-05-09
Payer: COMMERCIAL

## 2023-05-09 VITALS
SYSTOLIC BLOOD PRESSURE: 128 MMHG | OXYGEN SATURATION: 97 % | HEIGHT: 67 IN | HEART RATE: 74 BPM | DIASTOLIC BLOOD PRESSURE: 60 MMHG | WEIGHT: 247 LBS | BODY MASS INDEX: 38.77 KG/M2 | TEMPERATURE: 98.2 F | RESPIRATION RATE: 16 BRPM

## 2023-05-09 DIAGNOSIS — Z79.4 TYPE 2 DIABETES MELLITUS WITH OTHER SPECIFIED COMPLICATION, WITH LONG-TERM CURRENT USE OF INSULIN (HCC): ICD-10-CM

## 2023-05-09 DIAGNOSIS — E53.8 B12 DEFICIENCY: ICD-10-CM

## 2023-05-09 DIAGNOSIS — E11.69 TYPE 2 DIABETES MELLITUS WITH OTHER SPECIFIED COMPLICATION, WITH LONG-TERM CURRENT USE OF INSULIN (HCC): ICD-10-CM

## 2023-05-09 DIAGNOSIS — K21.9 GASTROESOPHAGEAL REFLUX DISEASE, UNSPECIFIED WHETHER ESOPHAGITIS PRESENT: ICD-10-CM

## 2023-05-09 DIAGNOSIS — K75.81 NASH (NONALCOHOLIC STEATOHEPATITIS): ICD-10-CM

## 2023-05-09 DIAGNOSIS — S83.241A ACUTE MEDIAL MENISCAL TEAR, RIGHT, INITIAL ENCOUNTER: ICD-10-CM

## 2023-05-09 DIAGNOSIS — D50.9 IRON DEFICIENCY ANEMIA, UNSPECIFIED IRON DEFICIENCY ANEMIA TYPE: ICD-10-CM

## 2023-05-09 DIAGNOSIS — E66.9 OBESITY (BMI 30-39.9): ICD-10-CM

## 2023-05-09 DIAGNOSIS — R74.8 ABNORMAL TRANSAMINASES: ICD-10-CM

## 2023-05-09 DIAGNOSIS — E78.2 MIXED HYPERLIPIDEMIA: ICD-10-CM

## 2023-05-09 DIAGNOSIS — I10 ESSENTIAL HYPERTENSION: ICD-10-CM

## 2023-05-09 DIAGNOSIS — K22.70 SHORT-SEGMENT BARRETT'S ESOPHAGUS: ICD-10-CM

## 2023-05-09 DIAGNOSIS — Z01.818 PREOP EXAMINATION: Primary | ICD-10-CM

## 2023-05-09 LAB — HBA1C MFR BLD: 6.7 % (ref 4.3–5.7)

## 2023-05-09 PROCEDURE — G8417 CALC BMI ABV UP PARAM F/U: HCPCS | Performed by: FAMILY MEDICINE

## 2023-05-09 PROCEDURE — 3017F COLORECTAL CA SCREEN DOC REV: CPT | Performed by: FAMILY MEDICINE

## 2023-05-09 PROCEDURE — G8427 DOCREV CUR MEDS BY ELIG CLIN: HCPCS | Performed by: FAMILY MEDICINE

## 2023-05-09 PROCEDURE — 93000 ELECTROCARDIOGRAM COMPLETE: CPT | Performed by: FAMILY MEDICINE

## 2023-05-09 PROCEDURE — 1036F TOBACCO NON-USER: CPT | Performed by: FAMILY MEDICINE

## 2023-05-09 PROCEDURE — 2022F DILAT RTA XM EVC RTNOPTHY: CPT | Performed by: FAMILY MEDICINE

## 2023-05-09 PROCEDURE — 3074F SYST BP LT 130 MM HG: CPT | Performed by: FAMILY MEDICINE

## 2023-05-09 PROCEDURE — 3078F DIAST BP <80 MM HG: CPT | Performed by: FAMILY MEDICINE

## 2023-05-09 PROCEDURE — 99214 OFFICE O/P EST MOD 30 MIN: CPT | Performed by: FAMILY MEDICINE

## 2023-05-09 PROCEDURE — 3044F HG A1C LEVEL LT 7.0%: CPT | Performed by: FAMILY MEDICINE

## 2023-05-09 NOTE — PATIENT INSTRUCTIONS
-Hold Metformin and Jardiance 3 days before surgery  -Take 1/2 dose of Lantus AM of surgery, resume normal dose once eating normally  -Resume metformin and Jardiance the next day  -Hold ASA and Piroxicam 5 days prior to surgery.

## 2023-05-10 ENCOUNTER — APPOINTMENT (OUTPATIENT)
Dept: PHYSICAL THERAPY | Age: 53
End: 2023-05-10
Payer: COMMERCIAL

## 2023-05-11 ENCOUNTER — HOSPITAL ENCOUNTER (OUTPATIENT)
Dept: PHYSICAL THERAPY | Age: 53
Setting detail: THERAPIES SERIES
Discharge: HOME | End: 2023-05-11
Payer: COMMERCIAL

## 2023-05-11 PROCEDURE — 97035 APP MDLTY 1+ULTRASOUND EA 15: CPT

## 2023-05-11 PROCEDURE — 97110 THERAPEUTIC EXERCISES: CPT

## 2023-05-11 NOTE — PROGRESS NOTES
[address] [facility]  PHYSICAL THERAPY  [] EVALUATION  [x] DAILY NOTE (LAND) [] DAILY NOTE (AQUATIC ) [] PROGRESS NOTE [] DISCHARGE NOTE    [] [address] [facility]   [x] [address]    [] [facility]   [] Shelton Infante    Date: 2023  Patient Name:  Yamini Givens  : 1970  MRN: [id number]  CSN: [id number]    Referring Practitioner Zack Banuelos MD   Diagnosis Strain of unspecified muscle(s) and tendon(s) at lower leg level, right leg, initial encounter [S86.916O]  Unilateral primary osteoarthritis, right knee [M17.11]  Patellofemoral disorders, unspecified knee [M22.2X9]    Treatment Diagnosis R knee pain, difficulty walking, decreased balance   Date of Evaluation 23   Additional Pertinent History Bi-polar, fibromyalgia, DM, 2 CVA with L UE/LE weakness, HTN, MI , vertigo, obesity, memory issues, SOB with exertion      Functional Outcome Measure Used KOOS    Functional Outcome Score Eval 25 (23)       Insurance: Primary: Payor: [address] [place] [address]  [address] /  /  / ,   Secondary:    Authorization Information: INSURANCE PAYS AT: 100%               PATIENT RESPONSIBILITY AND/OR CO-PAY:  N/a  PRECERTIFICATION REQUIRED: No  INSURANCE THERAPY BENEFIT: No pre-certification is needed. PT, OT and ST are allowed 30 visits each per calendar year. AQUATIC THERAPY COVERED:  Yes   MODALITIES COVERED:  Yes --Iontophoresis is not covered. Hot/Cold Packs are not covered. Visit # 4, 4/10 for progress note   Visits Allowed: 30   Recertification Date: 75   Physician Follow-Up:  f/u with Dr. Olman Isbell- is scheduled for surgery on  R knee. Physician Orders:    History of Present Illness: 1.5 months ago fell down steps, immediate pain in R knee. Patient to family MD, MRI on 4/3, referred to Dr. Olman Isbell , PT ordered and is looking to precert insurance as surgery will likely needed due to torn meniscus. Patient f/u with MD on .

## 2023-05-16 ENCOUNTER — HOSPITAL ENCOUNTER (OUTPATIENT)
Dept: PHYSICAL THERAPY | Age: 53
Setting detail: THERAPIES SERIES
End: 2023-05-16
Payer: COMMERCIAL

## 2023-05-19 ENCOUNTER — HOSPITAL ENCOUNTER (OUTPATIENT)
Dept: PHYSICAL THERAPY | Age: 53
Setting detail: THERAPIES SERIES
End: 2023-05-19
Payer: COMMERCIAL

## 2023-05-24 ENCOUNTER — HOSPITAL ENCOUNTER (OUTPATIENT)
Dept: PHYSICAL THERAPY | Age: 53
Setting detail: THERAPIES SERIES
Discharge: HOME | End: 2023-05-24
Payer: COMMERCIAL

## 2023-05-24 PROCEDURE — 97110 THERAPEUTIC EXERCISES: CPT

## 2023-05-24 NOTE — PROGRESS NOTES
[address] [facility]  PHYSICAL THERAPY  [] EVALUATION  [] DAILY NOTE (LAND) [] DAILY NOTE (AQUATIC ) [x] PROGRESS NOTE [] DISCHARGE NOTE    [] 615 [facility]   [x] [address]    [] [facility]   [] Diogo Quiroz    Date: 2023  Patient Name:  Kati Pritchard  : 1970  MRN: [id number]  CSN: [id number]    Referring Practitioner Vern Henriquez MD   Diagnosis Acute medial meniscal tear, tendon tear  Strain of unspecified muscle(s) and tendon(s) at lower leg level, right leg, initial encounter [S86.911A]  Unilateral primary osteoarthritis, right knee [M17.11]  Patellofemoral disorders, unspecified knee [M22.2X9]  Other tear of medial meniscus, current injury, unspecified knee, initial encounter [O26.718J]  Other injury of unspecified body region, initial encounter Valgilles Manzanares. 8XXA]    Treatment Diagnosis R knee pain, difficulty walking, decreased balance   Date of Evaluation 23   Additional Pertinent History Bi-polar, fibromyalgia, DM, 2 CVA with L UE/LE weakness, HTN, MI , vertigo, obesity, memory issues, SOB with exertion      Functional Outcome Measure Used KOOS Jr   Functional Outcome Score Eval 25 (23)       Insurance: Primary: Payor: [address] [place] [address]  [address] /  /  / ,   Secondary:    Authorization Information: INSURANCE PAYS AT: 100%               PATIENT RESPONSIBILITY AND/OR CO-PAY:  N/a  PRECERTIFICATION REQUIRED: No  INSURANCE THERAPY BENEFIT: No pre-certification is needed. PT, OT and ST are allowed 30 visits each per calendar year. AQUATIC THERAPY COVERED:  Yes   MODALITIES COVERED:  Yes --Iontophoresis is not covered. Hot/Cold Packs are not covered. Visit # 5, 5/10 for progress note   Visits Allowed:    Recertification Date: 27   Physician Follow-Up: 23 f/u with Dr. Gabino Lynch   Physician Orders:    History of Present Illness: 1.5 months ago fell down steps, immediate pain in R knee.   Patient to family

## 2023-05-26 ENCOUNTER — HOSPITAL ENCOUNTER (OUTPATIENT)
Dept: PHYSICAL THERAPY | Age: 53
Setting detail: THERAPIES SERIES
Discharge: HOME | End: 2023-05-26
Payer: COMMERCIAL

## 2023-05-26 PROCEDURE — 97110 THERAPEUTIC EXERCISES: CPT

## 2023-05-26 NOTE — PROGRESS NOTES
GOALS S/P SURGERY ON 5/17/23  Long Term Goals:  Time Frame: 5 weeks  Increase AROM R hip flexion 50, knee 0- 115 degrees to allow patient to report able to in/out of car and bed with decreased R knee pain to 2/10  Increase strength R LE to 4/5 to allow patient to go up and down steps with decreased R knee pain to 2/10  I with HEP as prescribed to allow patient to report able to sleep through night without awakening due to pain    Patient Education:   [x]  HEP/Education Completed: continue with HEP, monitor response to progressions. [email or web address] Access Code:  []  No new Education completed  [x]  Reviewed Prior HEP      [x]  Patient verbalized and/or demonstrated understanding of education provided. []  Patient unable to verbalize and/or demonstrate understanding of education provided. Will continue education. [x]  Barriers to learning: memory issues, needs handouts    PLAN:  Treatment Recommendations: Strengthening, Range of Motion, Balance Training, Gait Training, Stair Training, Pain Management, Home Exercise Program, Patient Education, and Modalities    []  Plan of care UPDATED. Plan to see patient 2 times per week for 5 weeks to address the treatment planned outlined above.   [x]  Continue with current plan of care  []  Modify plan of care as follows:    []  Hold pending physician visit  []  Discharge    Time In 1433   Time Out 1457   Timed Code Minutes: 24 min   Total Treatment Time: 24 min       Electronically Signed by: Steph Uribe PTA

## 2023-05-31 ENCOUNTER — HOSPITAL ENCOUNTER (OUTPATIENT)
Dept: PHYSICAL THERAPY | Age: 53
Setting detail: THERAPIES SERIES
Discharge: HOME | End: 2023-05-31
Payer: COMMERCIAL

## 2023-05-31 PROCEDURE — 97110 THERAPEUTIC EXERCISES: CPT

## 2023-05-31 NOTE — PROGRESS NOTES
[address] [facility]  PHYSICAL THERAPY  [] EVALUATION  [x] DAILY NOTE (LAND) [] DAILY NOTE (AQUATIC ) [] PROGRESS NOTE [] DISCHARGE NOTE    [] [address] [facility]   [x] [address]    [] [facility]   [] Shashi Won    Date: 2023  Patient Name:  Darell Pacheco  : 1970  MRN: [id number]  CSN: [id number]    Referring Practitioner Shabana Calvo MD   Diagnosis Acute medial meniscal tear, tendon tear  Strain of unspecified muscle(s) and tendon(s) at lower leg level, right leg, initial encounter [S86.911A]  Unilateral primary osteoarthritis, right knee [M17.11]  Patellofemoral disorders, unspecified knee [M22.2X9]  Other tear of medial meniscus, current injury, unspecified knee, initial encounter [B47.531O]  Other injury of unspecified body region, initial encounter Rylie Dayne. 8XXA]    Treatment Diagnosis R knee pain, difficulty walking, decreased balance   Date of Evaluation 23   Additional Pertinent History Bi-polar, fibromyalgia, DM, 2 CVA with L UE/LE weakness, HTN, MI , vertigo, obesity, memory issues, SOB with exertion      Functional Outcome Measure Used KOOS    Functional Outcome Score Eval 25 (23)       Insurance: Primary: Payor: [address] [place] [address]  [address] /  /  / ,   Secondary:    Authorization Information: INSURANCE PAYS AT: 100%               PATIENT RESPONSIBILITY AND/OR CO-PAY:  N/a  PRECERTIFICATION REQUIRED: No  INSURANCE THERAPY BENEFIT: No pre-certification is needed. PT, OT and ST are allowed 30 visits each per calendar year. AQUATIC THERAPY COVERED:  Yes   MODALITIES COVERED:  Yes --Iontophoresis is not covered. Hot/Cold Packs are not covered. Visit # 7, 7/10 for progress note   Visits Allowed: 30   Recertification Date:    Physician Follow-Up: 23 f/u with Dr. Erik Morrell   Physician Orders:    History of Present Illness: 1.5 months ago fell down steps, immediate pain in R knee.   Patient to family

## 2023-06-02 ENCOUNTER — HOSPITAL ENCOUNTER (OUTPATIENT)
Dept: PHYSICAL THERAPY | Age: 53
Setting detail: THERAPIES SERIES
Discharge: HOME | End: 2023-06-02
Payer: COMMERCIAL

## 2023-06-02 PROCEDURE — 97110 THERAPEUTIC EXERCISES: CPT

## 2023-06-02 RX ORDER — HYDROXYZINE PAMOATE 50 MG/1
50 CAPSULE ORAL 3 TIMES DAILY PRN
Qty: 30 CAPSULE | Refills: 0 | OUTPATIENT
Start: 2023-06-02 | End: 2023-06-22

## 2023-06-02 NOTE — PROGRESS NOTES
7[address] [facility]  PHYSICAL THERAPY  [] EVALUATION  [x] DAILY NOTE (LAND) [] DAILY NOTE (AQUATIC ) [] PROGRESS NOTE [] DISCHARGE NOTE    [] 615 [facility]   [x] [address]    [] [facility]   [] Hostetter Pontiff    Date: 2023  Patient Name:  Ann-Marie Soto  : 1970  MRN: [id number]  CSN: [id number]    Referring Practitioner Corby Alexander MD   Diagnosis Acute medial meniscal tear, tendon tear  Strain of unspecified muscle(s) and tendon(s) at lower leg level, right leg, initial encounter [S86.911A]  Unilateral primary osteoarthritis, right knee [M17.11]  Patellofemoral disorders, unspecified knee [M22.2X9]  Other tear of medial meniscus, current injury, unspecified knee, initial encounter [S48.876K]  Other injury of unspecified body region, initial encounter Sebas Fisherluiza. 8XXA]    Treatment Diagnosis R knee pain, difficulty walking, decreased balance   Date of Evaluation 23   Additional Pertinent History Bi-polar, fibromyalgia, DM, 2 CVA with L UE/LE weakness, HTN, MI , vertigo, obesity, memory issues, SOB with exertion      Functional Outcome Measure Used KOOS Jr   Functional Outcome Score Eval 25 (23)       Insurance: Primary: Payor: [address] [place] [address]  [address] /  /  / ,   Secondary:    Authorization Information: INSURANCE PAYS AT: 100%               PATIENT RESPONSIBILITY AND/OR CO-PAY:  N/a  PRECERTIFICATION REQUIRED: No  INSURANCE THERAPY BENEFIT: No pre-certification is needed. PT, OT and ST are allowed 30 visits each per calendar year. AQUATIC THERAPY COVERED:  Yes   MODALITIES COVERED:  Yes --Iontophoresis is not covered. Hot/Cold Packs are not covered. Visit # 8, 8/10 for progress note   Visits Allowed: 30   Recertification Date: 60   Physician Follow-Up: 23 f/u with Dr. Danae Liu   Physician Orders:    History of Present Illness: 1.5 months ago fell down steps, immediate pain in R knee.   Patient to family MD,

## 2023-06-04 ASSESSMENT — PATIENT HEALTH QUESTIONNAIRE - PHQ9
3. TROUBLE FALLING OR STAYING ASLEEP: 3
1. LITTLE INTEREST OR PLEASURE IN DOING THINGS: NEARLY EVERY DAY
8. MOVING OR SPEAKING SO SLOWLY THAT OTHER PEOPLE COULD HAVE NOTICED. OR THE OPPOSITE - BEING SO FIDGETY OR RESTLESS THAT YOU HAVE BEEN MOVING AROUND A LOT MORE THAN USUAL: NOT AT ALL
10. IF YOU CHECKED OFF ANY PROBLEMS, HOW DIFFICULT HAVE THESE PROBLEMS MADE IT FOR YOU TO DO YOUR WORK, TAKE CARE OF THINGS AT HOME, OR GET ALONG WITH OTHER PEOPLE: 2
4. FEELING TIRED OR HAVING LITTLE ENERGY: NEARLY EVERY DAY
SUM OF ALL RESPONSES TO PHQ QUESTIONS 1-9: 19
6. FEELING BAD ABOUT YOURSELF - OR THAT YOU ARE A FAILURE OR HAVE LET YOURSELF OR YOUR FAMILY DOWN: NEARLY EVERY DAY
7. TROUBLE CONCENTRATING ON THINGS, SUCH AS READING THE NEWSPAPER OR WATCHING TELEVISION: 2
SUM OF ALL RESPONSES TO PHQ QUESTIONS 1-9: 20
SUM OF ALL RESPONSES TO PHQ9 QUESTIONS 1 & 2: 5
9. THOUGHTS THAT YOU WOULD BE BETTER OFF DEAD, OR OF HURTING YOURSELF: SEVERAL DAYS
5. POOR APPETITE OR OVEREATING: NEARLY EVERY DAY
SUM OF ALL RESPONSES TO PHQ QUESTIONS 1-9: 20
7. TROUBLE CONCENTRATING ON THINGS, SUCH AS READING THE NEWSPAPER OR WATCHING TELEVISION: MORE THAN HALF THE DAYS
2. FEELING DOWN, DEPRESSED OR HOPELESS: 2
1. LITTLE INTEREST OR PLEASURE IN DOING THINGS: 3
3. TROUBLE FALLING OR STAYING ASLEEP: NEARLY EVERY DAY
6. FEELING BAD ABOUT YOURSELF - OR THAT YOU ARE A FAILURE OR HAVE LET YOURSELF OR YOUR FAMILY DOWN: 3
4. FEELING TIRED OR HAVING LITTLE ENERGY: 3
8. MOVING OR SPEAKING SO SLOWLY THAT OTHER PEOPLE COULD HAVE NOTICED. OR THE OPPOSITE, BEING SO FIGETY OR RESTLESS THAT YOU HAVE BEEN MOVING AROUND A LOT MORE THAN USUAL: 0
SUM OF ALL RESPONSES TO PHQ QUESTIONS 1-9: 20
SUM OF ALL RESPONSES TO PHQ9 QUESTIONS 1 & 2: 5
9. THOUGHTS THAT YOU WOULD BE BETTER OFF DEAD, OR OF HURTING YOURSELF: 1
10. IF YOU CHECKED OFF ANY PROBLEMS, HOW DIFFICULT HAVE THESE PROBLEMS MADE IT FOR YOU TO DO YOUR WORK, TAKE CARE OF THINGS AT HOME, OR GET ALONG WITH OTHER PEOPLE: VERY DIFFICULT
5. POOR APPETITE OR OVEREATING: 3
2. FEELING DOWN, DEPRESSED OR HOPELESS: MORE THAN HALF THE DAYS
SUM OF ALL RESPONSES TO PHQ QUESTIONS 1-9: 20

## 2023-06-04 ASSESSMENT — ANXIETY QUESTIONNAIRES
7. FEELING AFRAID AS IF SOMETHING AWFUL MIGHT HAPPEN: MORE THAN HALF THE DAYS
2. NOT BEING ABLE TO STOP OR CONTROL WORRYING: 2
2. NOT BEING ABLE TO STOP OR CONTROL WORRYING: MORE THAN HALF THE DAYS
5. BEING SO RESTLESS THAT IT IS HARD TO SIT STILL: SEVERAL DAYS
1. FEELING NERVOUS, ANXIOUS, OR ON EDGE: 3
3. WORRYING TOO MUCH ABOUT DIFFERENT THINGS: MORE THAN HALF THE DAYS
IF YOU CHECKED OFF ANY PROBLEMS ON THIS QUESTIONNAIRE, HOW DIFFICULT HAVE THESE PROBLEMS MADE IT FOR YOU TO DO YOUR WORK, TAKE CARE OF THINGS AT HOME, OR GET ALONG WITH OTHER PEOPLE: VERY DIFFICULT
1. FEELING NERVOUS, ANXIOUS, OR ON EDGE: NEARLY EVERY DAY
GAD7 TOTAL SCORE: 14
4. TROUBLE RELAXING: NEARLY EVERY DAY
7. FEELING AFRAID AS IF SOMETHING AWFUL MIGHT HAPPEN: 2
IF YOU CHECKED OFF ANY PROBLEMS ON THIS QUESTIONNAIRE, HOW DIFFICULT HAVE THESE PROBLEMS MADE IT FOR YOU TO DO YOUR WORK, TAKE CARE OF THINGS AT HOME, OR GET ALONG WITH OTHER PEOPLE: VERY DIFFICULT
6. BECOMING EASILY ANNOYED OR IRRITABLE: SEVERAL DAYS
3. WORRYING TOO MUCH ABOUT DIFFERENT THINGS: 2
5. BEING SO RESTLESS THAT IT IS HARD TO SIT STILL: 1
6. BECOMING EASILY ANNOYED OR IRRITABLE: 1
4. TROUBLE RELAXING: 3

## 2023-06-04 ASSESSMENT — COLUMBIA-SUICIDE SEVERITY RATING SCALE - C-SSRS
1. IN THE PAST MONTH, HAVE YOU WISHED YOU WERE DEAD OR WISHED YOU COULD GO TO SLEEP AND NOT WAKE UP?: YES
7. DID THIS OCCUR IN THE LAST THREE MONTHS: NO
6. IN YOUR LIFETIME, HAVE YOU EVER DONE ANYTHING, STARTED TO DO ANYTHING, OR PREPARED TO DO ANYTHING TO END YOUR LIFE?: YES
2. IN THE PAST MONTH, HAVE YOU ACTUALLY HAD ANY THOUGHTS OF KILLING YOURSELF?: NO

## 2023-06-05 ENCOUNTER — TELEMEDICINE (OUTPATIENT)
Dept: PSYCHIATRY | Age: 53
End: 2023-06-05
Payer: COMMERCIAL

## 2023-06-05 ENCOUNTER — TELEPHONE (OUTPATIENT)
Dept: UROLOGY | Age: 53
End: 2023-06-05

## 2023-06-05 DIAGNOSIS — F90.8 ADHD, ADULT RESIDUAL TYPE: ICD-10-CM

## 2023-06-05 DIAGNOSIS — F25.0 SCHIZOAFFECTIVE DISORDER, BIPOLAR TYPE (HCC): Primary | ICD-10-CM

## 2023-06-05 PROCEDURE — G8417 CALC BMI ABV UP PARAM F/U: HCPCS | Performed by: PSYCHIATRY & NEUROLOGY

## 2023-06-05 PROCEDURE — 99214 OFFICE O/P EST MOD 30 MIN: CPT | Performed by: PSYCHIATRY & NEUROLOGY

## 2023-06-05 PROCEDURE — 1036F TOBACCO NON-USER: CPT | Performed by: PSYCHIATRY & NEUROLOGY

## 2023-06-05 PROCEDURE — G8428 CUR MEDS NOT DOCUMENT: HCPCS | Performed by: PSYCHIATRY & NEUROLOGY

## 2023-06-05 PROCEDURE — 3017F COLORECTAL CA SCREEN DOC REV: CPT | Performed by: PSYCHIATRY & NEUROLOGY

## 2023-06-05 RX ORDER — DEXTROAMPHETAMINE SACCHARATE, AMPHETAMINE ASPARTATE MONOHYDRATE, DEXTROAMPHETAMINE SULFATE AND AMPHETAMINE SULFATE 6.25; 6.25; 6.25; 6.25 MG/1; MG/1; MG/1; MG/1
25 CAPSULE, EXTENDED RELEASE ORAL EVERY MORNING
Qty: 30 CAPSULE | Refills: 0 | Status: SHIPPED | OUTPATIENT
Start: 2023-06-05 | End: 2023-07-05

## 2023-06-05 NOTE — PROGRESS NOTES
usual 0   Thoughts that you would be better off dead, or of hurting yourself in some way 1   PHQ-2 Score 5   PHQ-9 Total Score 20   If you checked off any problems, how difficult have these problems made it for you to do your work, take care of things at home, or get along with other people? 2     ELENI-7 SCREENING 6/4/2023   Feeling nervous, anxious, or on edge Nearly every day   Not being able to stop or control worrying More than half the days   Worrying too much about different things More than half the days   Trouble relaxing Nearly every day   Being so restless that it is hard to sit still Several days   Becoming easily annoyed or irritable Several days   Feeling afraid as if something awful might happen More than half the days   ELENI-7 Total Score 14   How difficult have these problems made it for you to do your work, take care of things at home, or get along with other people? Very difficult         ASSESSMENT:   Will continue with historical diagnosis and medication plan. Will continue to monitor for worsening symptoms. Diagnosis Orders   1. Schizoaffective disorder, bipolar type ([facility] [address]        2. ADHD, adult residual type  amphetamine-dextroamphetamine (ADDERALL XR) 25 MG extended release capsule              PLAN:     Medications:   Latuda 120mg daily with food. Discussed at least 350kcals needed. Lamictal 150mg BID. Risks of SJS reviewed  Trintellix 20mg daily  Adderall XR 25mg daily  Risks, benefits and alternatives discussed. Therapy: encouraged engagement  Labs/Tests/Imaging: none   Records Reviewed: [facility]  Patient advised to call if patient has any difficulties with treatment. Patient informed to call [phone] or go to ER if experiencing crisis, SI, HI, or psychosis. Return in about 6 weeks (around 7/17/2023). Guillermo Vides, was evaluated through a synchronous (real-time) audio-video encounter.  The patient (or guardian if applicable) is aware that this is a billable service, which includes

## 2023-06-05 NOTE — TELEPHONE ENCOUNTER
Per ashly trospium denied per prior auth. Spoke with ashly. Noted that patient received medication on a 30 day supply on 6/3/2023    Per note pt has not trialed for 30 day 2 different formulary. Myrbetriq, solifenacin and toviaz. Has trialed oxybutynin. Lvm with pt discussing this. See scan.

## 2023-06-07 ENCOUNTER — HOSPITAL ENCOUNTER (OUTPATIENT)
Dept: PHYSICAL THERAPY | Age: 53
Setting detail: THERAPIES SERIES
End: 2023-06-07
Payer: COMMERCIAL

## 2023-06-09 ENCOUNTER — HOSPITAL ENCOUNTER (OUTPATIENT)
Dept: PHYSICAL THERAPY | Age: 53
Setting detail: THERAPIES SERIES
Discharge: HOME | End: 2023-06-09
Payer: COMMERCIAL

## 2023-06-09 PROCEDURE — 97110 THERAPEUTIC EXERCISES: CPT

## 2023-06-09 ASSESSMENT — KOOS JR
KOOS JR TOTAL INTERVAL SCORE: 47.487
GOING UP OR DOWN STAIRS: 4
HOW SEVERE IS YOUR KNEE STIFFNESS AFTER FIRST WAKING IN MORNING: 2
STRAIGHTENING KNEE FULLY: 1
RISING FROM SITTING: 2
BENDING TO THE FLOOR TO PICK UP OBJECT: 2
STANDING UPRIGHT: 3
TWISING OR PIVOTING ON KNEE: 2

## 2023-06-09 NOTE — PROGRESS NOTES
treatment planned outlined above.   [x]  Continue with current plan of care  []  Modify plan of care as follows:    []  Hold pending physician visit  []  Discharge    Time In 1330   Time Out 1400   Timed Code Minutes: 30 min   Total Treatment Time: 30 min       Electronically Signed by: Sunita Moss, PT

## 2023-06-21 ENCOUNTER — HOSPITAL ENCOUNTER (OUTPATIENT)
Dept: PHYSICAL THERAPY | Age: 53
Setting detail: THERAPIES SERIES
Discharge: HOME | End: 2023-06-21
Payer: COMMERCIAL

## 2023-06-21 PROCEDURE — 97110 THERAPEUTIC EXERCISES: CPT

## 2023-06-21 NOTE — PROGRESS NOTES
Discharge    Time In 1500   Time Out 1525   Timed Code Minutes: 25 min   Total Treatment Time: 25 min       Electronically Signed by: Deepthi Bailey PT

## 2023-07-02 DIAGNOSIS — Z79.4 TYPE 2 DIABETES MELLITUS WITH OTHER SPECIFIED COMPLICATION, WITH LONG-TERM CURRENT USE OF INSULIN (HCC): ICD-10-CM

## 2023-07-02 DIAGNOSIS — F90.8 ADHD, ADULT RESIDUAL TYPE: ICD-10-CM

## 2023-07-02 DIAGNOSIS — E11.69 TYPE 2 DIABETES MELLITUS WITH OTHER SPECIFIED COMPLICATION, WITH LONG-TERM CURRENT USE OF INSULIN (HCC): ICD-10-CM

## 2023-07-03 RX ORDER — DEXTROAMPHETAMINE SACCHARATE, AMPHETAMINE ASPARTATE MONOHYDRATE, DEXTROAMPHETAMINE SULFATE AND AMPHETAMINE SULFATE 6.25; 6.25; 6.25; 6.25 MG/1; MG/1; MG/1; MG/1
25 CAPSULE, EXTENDED RELEASE ORAL EVERY MORNING
Qty: 30 CAPSULE | Refills: 0 | Status: SHIPPED | OUTPATIENT
Start: 2023-07-03 | End: 2023-08-03

## 2023-07-03 RX ORDER — GLUCOSAMINE HCL/CHONDROITIN SU 500-400 MG
CAPSULE ORAL
Qty: 100 STRIP | Refills: 3 | Status: SHIPPED | OUTPATIENT
Start: 2023-07-03

## 2023-07-03 NOTE — TELEPHONE ENCOUNTER
Patient requesting the following medication refill: Adderall XR 25mg #30 with no refills to the [facility] on [address]. Previous Rx 06/05/23. Last visit 06/05/23  Next visit 07/17/23    Pending your approval for a 30 day supply with no refills.

## 2023-07-03 NOTE — TELEPHONE ENCOUNTER
Recent Visits  Date Type Provider Dept   05/09/23 Office Visit Eulalia Cornell, DO [facility]   03/29/23 Office Visit Eulalia Cornell, DO [facility]   03/01/23 Office Visit Eulalia Cornell, DO [facility]   11/23/22 Office Visit Eulalia Cornell, DO [facility]   08/23/22 Office Visit Eulalia Cornell, DO [facility]   05/24/22 Office Visit Eulalia Cornell, DO [facility]   Showing recent visits within past 540 days with a meds authorizing provider and meeting all other requirements  Future Appointments  Date Type Provider Dept   08/09/23 Appointment Eulalia Cornell,  [facility]   Showing future appointments within next 150 days with a meds authorizing provider and meeting all other requirements      Future Appointments   Date Time Provider [address]   7/17/2023 11:00 AM Shubham Luna MD [facility]   8/8/2023  8:15 AM RAQUEL Roeverna Poisson [facility]   8/9/2023  8:20 AM Eulalia Cornell DO [facility]   9/11/2023  8:45 AM Teresita Stark MD [address]

## 2023-07-17 ENCOUNTER — TELEMEDICINE (OUTPATIENT)
Dept: PSYCHIATRY | Age: 53
End: 2023-07-17
Payer: COMMERCIAL

## 2023-07-17 DIAGNOSIS — F90.8 ADHD, ADULT RESIDUAL TYPE: ICD-10-CM

## 2023-07-17 DIAGNOSIS — F17.210 TOBACCO DEPENDENCE DUE TO CIGARETTES: ICD-10-CM

## 2023-07-17 DIAGNOSIS — F25.0 SCHIZOAFFECTIVE DISORDER, BIPOLAR TYPE (HCC): Primary | ICD-10-CM

## 2023-07-17 PROCEDURE — 99214 OFFICE O/P EST MOD 30 MIN: CPT | Performed by: PSYCHIATRY & NEUROLOGY

## 2023-07-17 PROCEDURE — 1036F TOBACCO NON-USER: CPT | Performed by: PSYCHIATRY & NEUROLOGY

## 2023-07-17 PROCEDURE — G8417 CALC BMI ABV UP PARAM F/U: HCPCS | Performed by: PSYCHIATRY & NEUROLOGY

## 2023-07-17 PROCEDURE — 90833 PSYTX W PT W E/M 30 MIN: CPT | Performed by: PSYCHIATRY & NEUROLOGY

## 2023-07-17 PROCEDURE — 3017F COLORECTAL CA SCREEN DOC REV: CPT | Performed by: PSYCHIATRY & NEUROLOGY

## 2023-07-17 PROCEDURE — G8428 CUR MEDS NOT DOCUMENT: HCPCS | Performed by: PSYCHIATRY & NEUROLOGY

## 2023-07-17 RX ORDER — HYDROXYZINE 50 MG/1
50-100 TABLET, FILM COATED ORAL NIGHTLY PRN
Qty: 30 TABLET | Refills: 2 | Status: SHIPPED | OUTPATIENT
Start: 2023-07-17 | End: 2023-08-31

## 2023-07-17 RX ORDER — LURASIDONE HYDROCHLORIDE 120 MG/1
TABLET, FILM COATED ORAL
Qty: 30 TABLET | Refills: 3 | Status: SHIPPED | OUTPATIENT
Start: 2023-07-17

## 2023-07-17 RX ORDER — LAMOTRIGINE 150 MG/1
150 TABLET ORAL 2 TIMES DAILY
Qty: 60 TABLET | Refills: 3 | Status: SHIPPED | OUTPATIENT
Start: 2023-07-17

## 2023-07-17 ASSESSMENT — PATIENT HEALTH QUESTIONNAIRE - PHQ9
SUM OF ALL RESPONSES TO PHQ9 QUESTIONS 1 & 2: 5
3. TROUBLE FALLING OR STAYING ASLEEP: 3
SUM OF ALL RESPONSES TO PHQ9 QUESTIONS 1 & 2: 5
4. FEELING TIRED OR HAVING LITTLE ENERGY: NEARLY EVERY DAY
8. MOVING OR SPEAKING SO SLOWLY THAT OTHER PEOPLE COULD HAVE NOTICED. OR THE OPPOSITE - BEING SO FIDGETY OR RESTLESS THAT YOU HAVE BEEN MOVING AROUND A LOT MORE THAN USUAL: NOT AT ALL
SUM OF ALL RESPONSES TO PHQ QUESTIONS 1-9: 21
4. FEELING TIRED OR HAVING LITTLE ENERGY: 3
8. MOVING OR SPEAKING SO SLOWLY THAT OTHER PEOPLE COULD HAVE NOTICED. OR THE OPPOSITE, BEING SO FIGETY OR RESTLESS THAT YOU HAVE BEEN MOVING AROUND A LOT MORE THAN USUAL: 0
SUM OF ALL RESPONSES TO PHQ QUESTIONS 1-9: 21
9. THOUGHTS THAT YOU WOULD BE BETTER OFF DEAD, OR OF HURTING YOURSELF: SEVERAL DAYS
2. FEELING DOWN, DEPRESSED OR HOPELESS: 2
9. THOUGHTS THAT YOU WOULD BE BETTER OFF DEAD, OR OF HURTING YOURSELF: 1
7. TROUBLE CONCENTRATING ON THINGS, SUCH AS READING THE NEWSPAPER OR WATCHING TELEVISION: NEARLY EVERY DAY
7. TROUBLE CONCENTRATING ON THINGS, SUCH AS READING THE NEWSPAPER OR WATCHING TELEVISION: 3
1. LITTLE INTEREST OR PLEASURE IN DOING THINGS: NEARLY EVERY DAY
2. FEELING DOWN, DEPRESSED OR HOPELESS: MORE THAN HALF THE DAYS
SUM OF ALL RESPONSES TO PHQ QUESTIONS 1-9: 21
1. LITTLE INTEREST OR PLEASURE IN DOING THINGS: 3
5. POOR APPETITE OR OVEREATING: 3
3. TROUBLE FALLING OR STAYING ASLEEP: NEARLY EVERY DAY
6. FEELING BAD ABOUT YOURSELF - OR THAT YOU ARE A FAILURE OR HAVE LET YOURSELF OR YOUR FAMILY DOWN: 3
SUM OF ALL RESPONSES TO PHQ QUESTIONS 1-9: 21
SUM OF ALL RESPONSES TO PHQ QUESTIONS 1-9: 20
5. POOR APPETITE OR OVEREATING: NEARLY EVERY DAY
6. FEELING BAD ABOUT YOURSELF - OR THAT YOU ARE A FAILURE OR HAVE LET YOURSELF OR YOUR FAMILY DOWN: NEARLY EVERY DAY
10. IF YOU CHECKED OFF ANY PROBLEMS, HOW DIFFICULT HAVE THESE PROBLEMS MADE IT FOR YOU TO DO YOUR WORK, TAKE CARE OF THINGS AT HOME, OR GET ALONG WITH OTHER PEOPLE: 3
10. IF YOU CHECKED OFF ANY PROBLEMS, HOW DIFFICULT HAVE THESE PROBLEMS MADE IT FOR YOU TO DO YOUR WORK, TAKE CARE OF THINGS AT HOME, OR GET ALONG WITH OTHER PEOPLE: EXTREMELY DIFFICULT

## 2023-07-17 ASSESSMENT — ANXIETY QUESTIONNAIRES
4. TROUBLE RELAXING: 3
3. WORRYING TOO MUCH ABOUT DIFFERENT THINGS: NEARLY EVERY DAY
2. NOT BEING ABLE TO STOP OR CONTROL WORRYING: NEARLY EVERY DAY
7. FEELING AFRAID AS IF SOMETHING AWFUL MIGHT HAPPEN: 3
6. BECOMING EASILY ANNOYED OR IRRITABLE: 3
GAD7 TOTAL SCORE: 20
1. FEELING NERVOUS, ANXIOUS, OR ON EDGE: 3
IF YOU CHECKED OFF ANY PROBLEMS ON THIS QUESTIONNAIRE, HOW DIFFICULT HAVE THESE PROBLEMS MADE IT FOR YOU TO DO YOUR WORK, TAKE CARE OF THINGS AT HOME, OR GET ALONG WITH OTHER PEOPLE: VERY DIFFICULT
3. WORRYING TOO MUCH ABOUT DIFFERENT THINGS: 3
4. TROUBLE RELAXING: NEARLY EVERY DAY
1. FEELING NERVOUS, ANXIOUS, OR ON EDGE: NEARLY EVERY DAY
5. BEING SO RESTLESS THAT IT IS HARD TO SIT STILL: 2
2. NOT BEING ABLE TO STOP OR CONTROL WORRYING: 3
6. BECOMING EASILY ANNOYED OR IRRITABLE: NEARLY EVERY DAY
IF YOU CHECKED OFF ANY PROBLEMS ON THIS QUESTIONNAIRE, HOW DIFFICULT HAVE THESE PROBLEMS MADE IT FOR YOU TO DO YOUR WORK, TAKE CARE OF THINGS AT HOME, OR GET ALONG WITH OTHER PEOPLE: VERY DIFFICULT
5. BEING SO RESTLESS THAT IT IS HARD TO SIT STILL: MORE THAN HALF THE DAYS
7. FEELING AFRAID AS IF SOMETHING AWFUL MIGHT HAPPEN: NEARLY EVERY DAY

## 2023-07-17 ASSESSMENT — COLUMBIA-SUICIDE SEVERITY RATING SCALE - C-SSRS
5. IN THE PAST MONTH, HAVE YOU STARTED TO WORK OUT OR WORKED OUT THE DETAILS OF HOW TO KILL YOURSELF? DO YOU INTEND TO CARRY OUT THIS PLAN?: NO
3. IN THE PAST MONTH, HAVE YOU BEEN THINKING ABOUT HOW YOU MIGHT KILL YOURSELF?: NO
1. IN THE PAST MONTH, HAVE YOU WISHED YOU WERE DEAD OR WISHED YOU COULD GO TO SLEEP AND NOT WAKE UP?: YES
2. IN THE PAST MONTH, HAVE YOU ACTUALLY HAD ANY THOUGHTS OF KILLING YOURSELF?: YES
4. IN THE PAST MONTH, HAVE YOU HAD THESE THOUGHTS AND HAD SOME INTENTION OF ACTING ON THEM?: NO
7. DID THIS OCCUR IN THE LAST THREE MONTHS: NO
6. IN YOUR LIFETIME, HAVE YOU EVER DONE ANYTHING, STARTED TO DO ANYTHING, OR PREPARED TO DO ANYTHING TO END YOUR LIFE?: YES

## 2023-07-24 DIAGNOSIS — Z79.4 TYPE 2 DIABETES MELLITUS WITH OTHER SPECIFIED COMPLICATION, WITH LONG-TERM CURRENT USE OF INSULIN (HCC): ICD-10-CM

## 2023-07-24 DIAGNOSIS — E11.69 TYPE 2 DIABETES MELLITUS WITH OTHER SPECIFIED COMPLICATION, WITH LONG-TERM CURRENT USE OF INSULIN (HCC): ICD-10-CM

## 2023-07-24 RX ORDER — DULAGLUTIDE 3 MG/.5ML
3 INJECTION, SOLUTION SUBCUTANEOUS WEEKLY
Qty: 4 ADJUSTABLE DOSE PRE-FILLED PEN SYRINGE | Refills: 2 | Status: SHIPPED | OUTPATIENT
Start: 2023-07-24

## 2023-08-03 ENCOUNTER — OFFICE VISIT (OUTPATIENT)
Dept: PSYCHIATRY | Age: 53
End: 2023-08-03
Payer: COMMERCIAL

## 2023-08-03 DIAGNOSIS — F25.0 SCHIZOAFFECTIVE DISORDER, BIPOLAR TYPE (HCC): Primary | ICD-10-CM

## 2023-08-03 DIAGNOSIS — F90.8 ADHD, ADULT RESIDUAL TYPE: ICD-10-CM

## 2023-08-03 DIAGNOSIS — G24.01 TARDIVE DYSKINESIA: Primary | ICD-10-CM

## 2023-08-03 DIAGNOSIS — F17.210 TOBACCO DEPENDENCE DUE TO CIGARETTES: ICD-10-CM

## 2023-08-03 PROCEDURE — 1036F TOBACCO NON-USER: CPT | Performed by: PSYCHIATRY & NEUROLOGY

## 2023-08-03 PROCEDURE — G8417 CALC BMI ABV UP PARAM F/U: HCPCS | Performed by: PSYCHIATRY & NEUROLOGY

## 2023-08-03 PROCEDURE — G8428 CUR MEDS NOT DOCUMENT: HCPCS | Performed by: PSYCHIATRY & NEUROLOGY

## 2023-08-03 PROCEDURE — 99214 OFFICE O/P EST MOD 30 MIN: CPT | Performed by: PSYCHIATRY & NEUROLOGY

## 2023-08-03 PROCEDURE — 90833 PSYTX W PT W E/M 30 MIN: CPT | Performed by: PSYCHIATRY & NEUROLOGY

## 2023-08-03 PROCEDURE — 3017F COLORECTAL CA SCREEN DOC REV: CPT | Performed by: PSYCHIATRY & NEUROLOGY

## 2023-08-03 RX ORDER — HYDROXYZINE 50 MG/1
50-100 TABLET, FILM COATED ORAL NIGHTLY PRN
Qty: 30 TABLET | Refills: 2 | Status: SHIPPED | OUTPATIENT
Start: 2023-08-03 | End: 2023-09-17

## 2023-08-03 RX ORDER — METHYLPHENIDATE HYDROCHLORIDE 27 MG/1
27 TABLET ORAL DAILY
Qty: 30 TABLET | Refills: 0 | Status: SHIPPED | OUTPATIENT
Start: 2023-08-03 | End: 2023-09-02

## 2023-08-03 RX ORDER — LURASIDONE HYDROCHLORIDE 120 MG/1
TABLET, FILM COATED ORAL
Qty: 30 TABLET | Refills: 3 | Status: SHIPPED | OUTPATIENT
Start: 2023-08-03

## 2023-08-03 RX ORDER — LURASIDONE HYDROCHLORIDE 20 MG/1
20 TABLET, FILM COATED ORAL
Qty: 30 TABLET | Refills: 2 | Status: SHIPPED | OUTPATIENT
Start: 2023-08-03

## 2023-08-03 ASSESSMENT — PATIENT HEALTH QUESTIONNAIRE - PHQ9
9. THOUGHTS THAT YOU WOULD BE BETTER OFF DEAD, OR OF HURTING YOURSELF: 2
8. MOVING OR SPEAKING SO SLOWLY THAT OTHER PEOPLE COULD HAVE NOTICED. OR THE OPPOSITE, BEING SO FIGETY OR RESTLESS THAT YOU HAVE BEEN MOVING AROUND A LOT MORE THAN USUAL: 1
SUM OF ALL RESPONSES TO PHQ QUESTIONS 1-9: 20
5. POOR APPETITE OR OVEREATING: 2
1. LITTLE INTEREST OR PLEASURE IN DOING THINGS: 3
SUM OF ALL RESPONSES TO PHQ QUESTIONS 1-9: 22
6. FEELING BAD ABOUT YOURSELF - OR THAT YOU ARE A FAILURE OR HAVE LET YOURSELF OR YOUR FAMILY DOWN: 3
SUM OF ALL RESPONSES TO PHQ QUESTIONS 1-9: 22
SUM OF ALL RESPONSES TO PHQ QUESTIONS 1-9: 22
3. TROUBLE FALLING OR STAYING ASLEEP: 3
4. FEELING TIRED OR HAVING LITTLE ENERGY: 3
SUM OF ALL RESPONSES TO PHQ9 QUESTIONS 1 & 2: 5
2. FEELING DOWN, DEPRESSED OR HOPELESS: 2
7. TROUBLE CONCENTRATING ON THINGS, SUCH AS READING THE NEWSPAPER OR WATCHING TELEVISION: 3

## 2023-08-03 ASSESSMENT — ANXIETY QUESTIONNAIRES
4. TROUBLE RELAXING: 3
1. FEELING NERVOUS, ANXIOUS, OR ON EDGE: 3
2. NOT BEING ABLE TO STOP OR CONTROL WORRYING: 3
7. FEELING AFRAID AS IF SOMETHING AWFUL MIGHT HAPPEN: 1
3. WORRYING TOO MUCH ABOUT DIFFERENT THINGS: 3
IF YOU CHECKED OFF ANY PROBLEMS ON THIS QUESTIONNAIRE, HOW DIFFICULT HAVE THESE PROBLEMS MADE IT FOR YOU TO DO YOUR WORK, TAKE CARE OF THINGS AT HOME, OR GET ALONG WITH OTHER PEOPLE: VERY DIFFICULT
GAD7 TOTAL SCORE: 17
5. BEING SO RESTLESS THAT IT IS HARD TO SIT STILL: 2
6. BECOMING EASILY ANNOYED OR IRRITABLE: 2

## 2023-08-03 ASSESSMENT — COLUMBIA-SUICIDE SEVERITY RATING SCALE - C-SSRS
1. WITHIN THE PAST MONTH, HAVE YOU WISHED YOU WERE DEAD OR WISHED YOU COULD GO TO SLEEP AND NOT WAKE UP?: YES
6. HAVE YOU EVER DONE ANYTHING, STARTED TO DO ANYTHING, OR PREPARED TO DO ANYTHING TO END YOUR LIFE?: YES
7. DID THIS OCCUR IN THE LAST THREE MONTHS: NO
2. HAVE YOU ACTUALLY HAD ANY THOUGHTS OF KILLING YOURSELF?: NO

## 2023-08-03 NOTE — PROGRESS NOTES
[address] PSYCHIATRY  1420 [facility] [id number] [phone]    Progress Note    Patient:  Leah Floyd  YOB: 1970  PCP:  Jarrod Austin DO  Visit Date:  8/3/2023      CC: Follow up for medication management and psychotherapy      SUBJECTIVE:      Leah Floyd, a 52 y.o. male, presents for a follow up visit. Patient reports he is not doing well. Patient is compliant with medication regimen. Has not been able to  Leartis Sensing yet but has not asked pharmacy if it is ready. Has multiple ongoing stressors but most prominent is housing. Has been living with daughters friend since last fall and there is starting to be some conflict. Patient reports intermittent visual hallucinations but cannot pinpoint when they happen and reports them as fleeting. Denies they are distressing. Reports chronic auditory hallucinations. When he is upset they can tell him to harm people but he reports this is infrequent and he never has intent or plan to do so. Denies that voices tell him to harm a specific person. Continues to endorse chronic passive suicidal thoughts but again denies any intent or plan. Daughter present on evaluation today and denies acute safety concerns. Psychotherapy Note  Time spent in psychotherapy: 16 minutes  Modality: supportive and CBT  Goals: process stressors and improve coping  Focus: discussed ongoing stressors and attempted to implement some problem solving. Worked to improve coping skills in the context of increased stress. OBJECTIVE:  Patient-Reported Vitals 7/17/2023   Patient-Reported Weight 250   Patient-Reported Height 5'7\"   Patient-Reported Systolic 499   Patient-Reported Diastolic 90   Patient-Reported Pulse 88   Patient-Reported Temperature -          MENTAL STATUS EXAM:  Orientation: Alert, oriented, thought content appropriate   Mood:.  \"Stressed\"      Affect:  Mood Congruent

## 2023-08-03 NOTE — TELEPHONE ENCOUNTER
Jorge Jones called into the office stating that [facility] said that they did not have his Ingrezza 40mg medication; per chart it was confirmed by Otilia Bolaños on 07/17/2023. I informed him that more than likely due to the type of medication it is; it would need to go to a speciality pharmacy but I would let him know once I had an answer. He said thank you. Spoke with the pharmacist; notes that [facility] does not handle that medication. They list [facility] as the speciality pharmacy. Medication is loaded for the same Rx; please advise otherwise. Patient will be notified as well.

## 2023-08-07 NOTE — PROGRESS NOTES
[facility]      Leah Floyd         [id number]  8/8/2023   Chief Complaint   Patient presents with    Follow-up     4mo IRISH f/u w/[facility] download. Doing well. Pt of Dr. Fallon Lerma     PAP Download:   Original or initial AHI: 100.8     Date of initial study: 2003        Compliant  100%     Noncompliant 0 %     PAP Type BiPAP     Level  19/14 cmH2O   Avg Hrs/Day 12hrs 43mins  AHI: 0.4   Recorded compliance dates , 7/3/23  to 8/1/23   Machine/Mfg:   [x] ResMed    [] Respironics/Dreamstation   Interface:   [x] Nasal    [] Nasal pillows   [] FFM      Provider:      [x] [facility]     []Charity     [] [employer]    [] [place]    [] [place]               [] [facility]      [] Adaptive    [] [address] [facility] [address]:      [] Other    Neck Size: 17  Mallampati 3  ESS:  11  SAQLI: 53    Here is a scan of the most recent download:                Presentation:   [facility] presents for sleep medicine follow up for obstructive sleep apnea, insomnia  Since the last visit, [facility] is doing well with PAP. He is taking Requip 3 mg at night for RLS with good benefit from neurology. Psych stopped Doxepin for insomnia and started him on hydroxyzine instead. He states he is still waking up 3-4 times at night. He is on Concerta for ADHD. He still gets tired during the day     Equipment issues: The pressure is  acceptable, the mask is acceptable     Sleep issues:  Do you feel better? Yes and No  More rested? Sometimes   Better concentration? yes    Progress History:   Since last visit any new medical issues? No  New ER or hospital visits? No  Any new or changes in medicines? No  Any new sleep medicines? No    Review of Systems -   Review of Systems   Constitutional:  Positive for fatigue. Negative for activity change, appetite change, chills and fever. HENT:  Negative for congestion and postnasal drip. Eyes: Negative. Respiratory:  Negative for cough, chest tightness, shortness of breath, wheezing and stridor.

## 2023-08-08 ENCOUNTER — OFFICE VISIT (OUTPATIENT)
Dept: PULMONOLOGY | Age: 53
End: 2023-08-08
Payer: COMMERCIAL

## 2023-08-08 VITALS
TEMPERATURE: 98.1 F | SYSTOLIC BLOOD PRESSURE: 122 MMHG | WEIGHT: 242.2 LBS | DIASTOLIC BLOOD PRESSURE: 68 MMHG | OXYGEN SATURATION: 95 % | HEIGHT: 67 IN | HEART RATE: 97 BPM | BODY MASS INDEX: 38.01 KG/M2

## 2023-08-08 DIAGNOSIS — G47.33 OSA TREATED WITH BIPAP: Primary | ICD-10-CM

## 2023-08-08 DIAGNOSIS — E66.9 OBESITY (BMI 30-39.9): ICD-10-CM

## 2023-08-08 DIAGNOSIS — G47.09 OTHER INSOMNIA: ICD-10-CM

## 2023-08-08 DIAGNOSIS — G47.61 PLMD (PERIODIC LIMB MOVEMENT DISORDER): ICD-10-CM

## 2023-08-08 DIAGNOSIS — G47.10 HYPERSOMNIA: ICD-10-CM

## 2023-08-08 PROCEDURE — 1036F TOBACCO NON-USER: CPT | Performed by: PHYSICIAN ASSISTANT

## 2023-08-08 PROCEDURE — 99214 OFFICE O/P EST MOD 30 MIN: CPT | Performed by: PHYSICIAN ASSISTANT

## 2023-08-08 PROCEDURE — 3017F COLORECTAL CA SCREEN DOC REV: CPT | Performed by: PHYSICIAN ASSISTANT

## 2023-08-08 PROCEDURE — 3074F SYST BP LT 130 MM HG: CPT | Performed by: PHYSICIAN ASSISTANT

## 2023-08-08 PROCEDURE — G8427 DOCREV CUR MEDS BY ELIG CLIN: HCPCS | Performed by: PHYSICIAN ASSISTANT

## 2023-08-08 PROCEDURE — G8417 CALC BMI ABV UP PARAM F/U: HCPCS | Performed by: PHYSICIAN ASSISTANT

## 2023-08-08 PROCEDURE — 3078F DIAST BP <80 MM HG: CPT | Performed by: PHYSICIAN ASSISTANT

## 2023-08-08 RX ORDER — BUTALBITAL, ACETAMINOPHEN AND CAFFEINE 50; 325; 40 MG/1; MG/1; MG/1
TABLET ORAL
COMMUNITY
Start: 2023-07-24

## 2023-08-08 ASSESSMENT — ENCOUNTER SYMPTOMS
SHORTNESS OF BREATH: 0
NAUSEA: 0
DIARRHEA: 0
CHEST TIGHTNESS: 0
ALLERGIC/IMMUNOLOGIC NEGATIVE: 1
STRIDOR: 0
BACK PAIN: 0
EYES NEGATIVE: 1
COUGH: 0
WHEEZING: 0

## 2023-08-08 NOTE — PROGRESS NOTES
Chief Complaint   Patient presents with    Follow-up     DM2 and Chronic issues noted below. History obtained from the patient. SUBJECTIVE:  Lizzette Liu is a 52 y.o. male that presents today for:     -DM2:   A1c 7.3, was 6.7  Sugars stable  FBS   On Lantus 65 units QAM, Trulicity 3mg, Jardiance 25mg and metformin  No lows. Lab Results   Component Value Date/Time    LABA1C 7.3 08/09/2023 07:15 AM    LABA1C 6.7 05/09/2023 02:25 PM    LABA1C 8.8 03/01/2023 06:55 AM    LABA1C 8.9 11/23/2022 06:58 AM    LABA1C 8.1 08/23/2022 08:02 AM    LABA1C 7.7 05/24/2022 07:20 AM    LABA1C 6.1 11/29/2021 08:27 AM    LABA1C 11.2 11/21/2019 08:00 AM    LABA1C 7.8 08/20/2019 07:54 AM    LABA1C 10.7 05/20/2019 10:54 AM    LABA1C 11.2 02/18/2019 07:56 AM    LABA1C 12.2 11/16/2018 07:50 AM     Wt Readings from Last 3 Encounters:   08/09/23 239 lb 3.2 oz (108.5 kg)   08/08/23 242 lb 3.2 oz (109.9 kg)   05/09/23 247 lb (112 kg)        -Elevated LFT PRIOR VISIT:  Noted on labs  Mild at this point, but still elevated  Denies ETOH use  Denies RUQ pain or jaundice. GI did US a few months ago, showed fatty liver    UPDATE PRIOR VISIT:   Labs table with persistent elevated, though mild  W/u for cause neg  Suspected WESLEY  Was to f/u with Jean Paul SALAZAR, has not yet   No RUQ pain or jaundice    UPDATE PRIOR VISIT:   Had f/u labs ordered, never done  Has not f/u with [facility] yet either   No RUQ pain or jaundice    UPDATE LAST VISIT:   LFT remain elevated   Seeing Donte SALAZAR, records pending  Had RUQ US and fibroscan ordered, but pt had to cancel  Plans to r/s soon  LFTs a bit better on labs.      UPDATE TODAY:   LFT remain mildly elevated  Needs to f/u with GI татьяна, Silvia  RUQ US w/o acute findings APR 2023  Fibroscan with F2       -Anemia PRIOR VISIT:  Anemia still present, mild  b12 and iron studies WNL in last SEPT and early OCT  Plan is to repeat labs in [address], [address]  Denies bleeding, melena or hematochezia  No fevers, chills

## 2023-08-09 ENCOUNTER — OFFICE VISIT (OUTPATIENT)
Dept: FAMILY MEDICINE CLINIC | Age: 53
End: 2023-08-09
Payer: COMMERCIAL

## 2023-08-09 VITALS
HEIGHT: 67 IN | RESPIRATION RATE: 16 BRPM | WEIGHT: 239.2 LBS | SYSTOLIC BLOOD PRESSURE: 130 MMHG | BODY MASS INDEX: 37.54 KG/M2 | DIASTOLIC BLOOD PRESSURE: 80 MMHG | HEART RATE: 95 BPM | OXYGEN SATURATION: 95 % | TEMPERATURE: 97.2 F

## 2023-08-09 DIAGNOSIS — K21.9 GASTROESOPHAGEAL REFLUX DISEASE, UNSPECIFIED WHETHER ESOPHAGITIS PRESENT: ICD-10-CM

## 2023-08-09 DIAGNOSIS — K22.70 SHORT-SEGMENT BARRETT'S ESOPHAGUS: ICD-10-CM

## 2023-08-09 DIAGNOSIS — E66.9 OBESITY (BMI 30-39.9): ICD-10-CM

## 2023-08-09 DIAGNOSIS — E11.69 TYPE 2 DIABETES MELLITUS WITH OTHER SPECIFIED COMPLICATION, WITH LONG-TERM CURRENT USE OF INSULIN (HCC): Primary | ICD-10-CM

## 2023-08-09 DIAGNOSIS — R74.8 ABNORMAL TRANSAMINASES: ICD-10-CM

## 2023-08-09 DIAGNOSIS — E78.2 MIXED HYPERLIPIDEMIA: ICD-10-CM

## 2023-08-09 DIAGNOSIS — Z79.4 TYPE 2 DIABETES MELLITUS WITH OTHER SPECIFIED COMPLICATION, WITH LONG-TERM CURRENT USE OF INSULIN (HCC): Primary | ICD-10-CM

## 2023-08-09 DIAGNOSIS — E53.8 B12 DEFICIENCY: ICD-10-CM

## 2023-08-09 DIAGNOSIS — K75.81 NASH (NONALCOHOLIC STEATOHEPATITIS): ICD-10-CM

## 2023-08-09 DIAGNOSIS — I10 ESSENTIAL HYPERTENSION: ICD-10-CM

## 2023-08-09 DIAGNOSIS — D50.9 IRON DEFICIENCY ANEMIA, UNSPECIFIED IRON DEFICIENCY ANEMIA TYPE: ICD-10-CM

## 2023-08-09 LAB — HBA1C MFR BLD: 7.3 % (ref 4.3–5.7)

## 2023-08-09 PROCEDURE — G8427 DOCREV CUR MEDS BY ELIG CLIN: HCPCS | Performed by: FAMILY MEDICINE

## 2023-08-09 PROCEDURE — 3017F COLORECTAL CA SCREEN DOC REV: CPT | Performed by: FAMILY MEDICINE

## 2023-08-09 PROCEDURE — 99214 OFFICE O/P EST MOD 30 MIN: CPT | Performed by: FAMILY MEDICINE

## 2023-08-09 PROCEDURE — G8417 CALC BMI ABV UP PARAM F/U: HCPCS | Performed by: FAMILY MEDICINE

## 2023-08-09 PROCEDURE — 3051F HG A1C>EQUAL 7.0%<8.0%: CPT | Performed by: FAMILY MEDICINE

## 2023-08-09 PROCEDURE — 3079F DIAST BP 80-89 MM HG: CPT | Performed by: FAMILY MEDICINE

## 2023-08-09 PROCEDURE — 1036F TOBACCO NON-USER: CPT | Performed by: FAMILY MEDICINE

## 2023-08-09 PROCEDURE — 3075F SYST BP GE 130 - 139MM HG: CPT | Performed by: FAMILY MEDICINE

## 2023-08-09 PROCEDURE — 2022F DILAT RTA XM EVC RTNOPTHY: CPT | Performed by: FAMILY MEDICINE

## 2023-08-14 NOTE — TELEPHONE ENCOUNTER
[facility] is requesting a medication refill on Az's behalf for Melina Bladimir 40mg;#53 with 0 refills;last with a start date of 08/03/2023. Medication is pending your approval for a 30 day supply with 0 refills (BID dosing from previous ending instructions). Per chart it was d/lynne by another office but no mention of d/c from the patient. He is scheduled to return on 08/30/23.

## 2023-08-14 NOTE — TELEPHONE ENCOUNTER
I have left a detailed message for Rickie Sullivan to return our office call to verify that he is still taking the medication; awaiting call back.

## 2023-08-16 RX ORDER — VALBENAZINE 40 MG/1
40 CAPSULE ORAL 2 TIMES DAILY
Qty: 60 CAPSULE | Refills: 0 | Status: SHIPPED | OUTPATIENT
Start: 2023-08-16

## 2023-08-16 NOTE — TELEPHONE ENCOUNTER
Patient left a voicemail on medication line stating he received the previous message. He states he is still taking the Ingrezza and isn't sure why anyone would have d/c the medication from his chart.

## 2023-08-30 ENCOUNTER — TELEMEDICINE (OUTPATIENT)
Dept: PSYCHIATRY | Age: 53
End: 2023-08-30
Payer: COMMERCIAL

## 2023-08-30 DIAGNOSIS — F90.8 ADHD, ADULT RESIDUAL TYPE: ICD-10-CM

## 2023-08-30 DIAGNOSIS — F25.0 SCHIZOAFFECTIVE DISORDER, BIPOLAR TYPE (HCC): Primary | ICD-10-CM

## 2023-08-30 DIAGNOSIS — F17.210 TOBACCO DEPENDENCE DUE TO CIGARETTES: ICD-10-CM

## 2023-08-30 DIAGNOSIS — G24.01 TARDIVE DYSKINESIA: ICD-10-CM

## 2023-08-30 PROCEDURE — G8417 CALC BMI ABV UP PARAM F/U: HCPCS | Performed by: PSYCHIATRY & NEUROLOGY

## 2023-08-30 PROCEDURE — 99214 OFFICE O/P EST MOD 30 MIN: CPT | Performed by: PSYCHIATRY & NEUROLOGY

## 2023-08-30 PROCEDURE — 3017F COLORECTAL CA SCREEN DOC REV: CPT | Performed by: PSYCHIATRY & NEUROLOGY

## 2023-08-30 PROCEDURE — 1036F TOBACCO NON-USER: CPT | Performed by: PSYCHIATRY & NEUROLOGY

## 2023-08-30 PROCEDURE — G8428 CUR MEDS NOT DOCUMENT: HCPCS | Performed by: PSYCHIATRY & NEUROLOGY

## 2023-08-30 RX ORDER — METHYLPHENIDATE HYDROCHLORIDE 27 MG/1
27 TABLET ORAL DAILY
Qty: 30 TABLET | Refills: 0 | Status: SHIPPED | OUTPATIENT
Start: 2023-09-03 | End: 2023-10-03

## 2023-08-30 ASSESSMENT — ANXIETY QUESTIONNAIRES
3. WORRYING TOO MUCH ABOUT DIFFERENT THINGS: NEARLY EVERY DAY
5. BEING SO RESTLESS THAT IT IS HARD TO SIT STILL: SEVERAL DAYS
2. NOT BEING ABLE TO STOP OR CONTROL WORRYING: 3
5. BEING SO RESTLESS THAT IT IS HARD TO SIT STILL: 1
3. WORRYING TOO MUCH ABOUT DIFFERENT THINGS: 3
2. NOT BEING ABLE TO STOP OR CONTROL WORRYING: NEARLY EVERY DAY
IF YOU CHECKED OFF ANY PROBLEMS ON THIS QUESTIONNAIRE, HOW DIFFICULT HAVE THESE PROBLEMS MADE IT FOR YOU TO DO YOUR WORK, TAKE CARE OF THINGS AT HOME, OR GET ALONG WITH OTHER PEOPLE: VERY DIFFICULT
1. FEELING NERVOUS, ANXIOUS, OR ON EDGE: 3
4. TROUBLE RELAXING: NEARLY EVERY DAY
6. BECOMING EASILY ANNOYED OR IRRITABLE: 1
7. FEELING AFRAID AS IF SOMETHING AWFUL MIGHT HAPPEN: 1
4. TROUBLE RELAXING: 3
7. FEELING AFRAID AS IF SOMETHING AWFUL MIGHT HAPPEN: SEVERAL DAYS
6. BECOMING EASILY ANNOYED OR IRRITABLE: SEVERAL DAYS
1. FEELING NERVOUS, ANXIOUS, OR ON EDGE: NEARLY EVERY DAY
GAD7 TOTAL SCORE: 15
IF YOU CHECKED OFF ANY PROBLEMS ON THIS QUESTIONNAIRE, HOW DIFFICULT HAVE THESE PROBLEMS MADE IT FOR YOU TO DO YOUR WORK, TAKE CARE OF THINGS AT HOME, OR GET ALONG WITH OTHER PEOPLE: VERY DIFFICULT

## 2023-08-30 ASSESSMENT — PATIENT HEALTH QUESTIONNAIRE - PHQ9
3. TROUBLE FALLING OR STAYING ASLEEP: 1
2. FEELING DOWN, DEPRESSED OR HOPELESS: MORE THAN HALF THE DAYS
8. MOVING OR SPEAKING SO SLOWLY THAT OTHER PEOPLE COULD HAVE NOTICED. OR THE OPPOSITE - BEING SO FIDGETY OR RESTLESS THAT YOU HAVE BEEN MOVING AROUND A LOT MORE THAN USUAL: MORE THAN HALF THE DAYS
SUM OF ALL RESPONSES TO PHQ QUESTIONS 1-9: 17
7. TROUBLE CONCENTRATING ON THINGS, SUCH AS READING THE NEWSPAPER OR WATCHING TELEVISION: MORE THAN HALF THE DAYS
SUM OF ALL RESPONSES TO PHQ9 QUESTIONS 1 & 2: 5
1. LITTLE INTEREST OR PLEASURE IN DOING THINGS: 3
5. POOR APPETITE OR OVEREATING: SEVERAL DAYS
SUM OF ALL RESPONSES TO PHQ QUESTIONS 1-9: 18
2. FEELING DOWN, DEPRESSED OR HOPELESS: 2
6. FEELING BAD ABOUT YOURSELF - OR THAT YOU ARE A FAILURE OR HAVE LET YOURSELF OR YOUR FAMILY DOWN: NEARLY EVERY DAY
9. THOUGHTS THAT YOU WOULD BE BETTER OFF DEAD, OR OF HURTING YOURSELF: 1
6. FEELING BAD ABOUT YOURSELF - OR THAT YOU ARE A FAILURE OR HAVE LET YOURSELF OR YOUR FAMILY DOWN: 3
5. POOR APPETITE OR OVEREATING: 1
SUM OF ALL RESPONSES TO PHQ QUESTIONS 1-9: 18
1. LITTLE INTEREST OR PLEASURE IN DOING THINGS: NEARLY EVERY DAY
8. MOVING OR SPEAKING SO SLOWLY THAT OTHER PEOPLE COULD HAVE NOTICED. OR THE OPPOSITE, BEING SO FIGETY OR RESTLESS THAT YOU HAVE BEEN MOVING AROUND A LOT MORE THAN USUAL: 2
SUM OF ALL RESPONSES TO PHQ9 QUESTIONS 1 & 2: 5
7. TROUBLE CONCENTRATING ON THINGS, SUCH AS READING THE NEWSPAPER OR WATCHING TELEVISION: 2
SUM OF ALL RESPONSES TO PHQ QUESTIONS 1-9: 18
SUM OF ALL RESPONSES TO PHQ QUESTIONS 1-9: 18
10. IF YOU CHECKED OFF ANY PROBLEMS, HOW DIFFICULT HAVE THESE PROBLEMS MADE IT FOR YOU TO DO YOUR WORK, TAKE CARE OF THINGS AT HOME, OR GET ALONG WITH OTHER PEOPLE: 2
3. TROUBLE FALLING OR STAYING ASLEEP: SEVERAL DAYS
4. FEELING TIRED OR HAVING LITTLE ENERGY: NEARLY EVERY DAY
9. THOUGHTS THAT YOU WOULD BE BETTER OFF DEAD, OR OF HURTING YOURSELF: SEVERAL DAYS
10. IF YOU CHECKED OFF ANY PROBLEMS, HOW DIFFICULT HAVE THESE PROBLEMS MADE IT FOR YOU TO DO YOUR WORK, TAKE CARE OF THINGS AT HOME, OR GET ALONG WITH OTHER PEOPLE: VERY DIFFICULT
4. FEELING TIRED OR HAVING LITTLE ENERGY: 3

## 2023-08-30 ASSESSMENT — COLUMBIA-SUICIDE SEVERITY RATING SCALE - C-SSRS
1. IN THE PAST MONTH, HAVE YOU WISHED YOU WERE DEAD OR WISHED YOU COULD GO TO SLEEP AND NOT WAKE UP?: YES
2. IN THE PAST MONTH, HAVE YOU ACTUALLY HAD ANY THOUGHTS OF KILLING YOURSELF?: NO
6. IN YOUR LIFETIME, HAVE YOU EVER DONE ANYTHING, STARTED TO DO ANYTHING, OR PREPARED TO DO ANYTHING TO END YOUR LIFE?: YES
7. DID THIS OCCUR IN THE LAST THREE MONTHS: NO

## 2023-08-30 NOTE — PROGRESS NOTES
Little interest or pleasure in doing things 3   Feeling down, depressed, or hopeless 2   Trouble falling or staying asleep, or sleeping too much 1   Feeling tired or having little energy 3   Poor appetite or overeating 1   Feeling bad about yourself - or that you are a failure or have let yourself or your family down 3   Trouble concentrating on things, such as reading the newspaper or watching television 2   Moving or speaking so slowly that other people could have noticed. Or the opposite - being so fidgety or restless that you have been moving around a lot more than usual 2   Thoughts that you would be better off dead, or of hurting yourself in some way 1   PHQ-2 Score 5   PHQ-9 Total Score 18   If you checked off any problems, how difficult have these problems made it for you to do your work, take care of things at home, or get along with other people? 2     ELENI-7 SCREENING 8/30/2023 8/3/2023 7/17/2023   Feeling nervous, anxious, or on edge Nearly every day Nearly every day Nearly every day   Not being able to stop or control worrying Nearly every day Nearly every day Nearly every day   Worrying too much about different things Nearly every day Nearly every day Nearly every day   Trouble relaxing Nearly every day Nearly every day Nearly every day   Being so restless that it is hard to sit still Several days More than half the days More than half the days   Becoming easily annoyed or irritable Several days More than half the days Nearly every day   Feeling afraid as if something awful might happen Several days Several days Nearly every day   ELNEI-7 Total Score 15 17 20   How difficult have these problems made it for you to do your work, take care of things at home, or get along with other people? Very difficult Very difficult Very difficult           ICD-10-CM    1. Schizoaffective disorder, bipolar type ([address])  F25.0       2. Tardive dyskinesia  G24.01       3.  ADHD, adult residual type  F90.8 methylphenidate

## 2023-09-05 RX ORDER — VALBENAZINE 40 MG/1
CAPSULE ORAL
Qty: 60 CAPSULE | Refills: 0 | OUTPATIENT
Start: 2023-09-05

## 2023-09-11 ENCOUNTER — OFFICE VISIT (OUTPATIENT)
Dept: UROLOGY | Age: 53
End: 2023-09-11
Payer: COMMERCIAL

## 2023-09-11 VITALS — BODY MASS INDEX: 37.51 KG/M2 | HEIGHT: 67 IN | RESPIRATION RATE: 16 BRPM | WEIGHT: 239 LBS

## 2023-09-11 DIAGNOSIS — N40.1 BENIGN PROSTATIC HYPERPLASIA WITH LOWER URINARY TRACT SYMPTOMS, SYMPTOM DETAILS UNSPECIFIED: Primary | ICD-10-CM

## 2023-09-11 DIAGNOSIS — N32.81 OAB (OVERACTIVE BLADDER): ICD-10-CM

## 2023-09-11 DIAGNOSIS — N52.01 ERECTILE DYSFUNCTION DUE TO ARTERIAL INSUFFICIENCY: ICD-10-CM

## 2023-09-11 DIAGNOSIS — Z01.818 PRE-OP TESTING: ICD-10-CM

## 2023-09-11 PROCEDURE — G8427 DOCREV CUR MEDS BY ELIG CLIN: HCPCS | Performed by: UROLOGY

## 2023-09-11 PROCEDURE — 99214 OFFICE O/P EST MOD 30 MIN: CPT | Performed by: UROLOGY

## 2023-09-11 PROCEDURE — 3017F COLORECTAL CA SCREEN DOC REV: CPT | Performed by: UROLOGY

## 2023-09-11 PROCEDURE — 1036F TOBACCO NON-USER: CPT | Performed by: UROLOGY

## 2023-09-11 PROCEDURE — G8417 CALC BMI ABV UP PARAM F/U: HCPCS | Performed by: UROLOGY

## 2023-09-11 RX ORDER — VALBENAZINE 40 MG/1
1 CAPSULE ORAL 2 TIMES DAILY
Qty: 60 CAPSULE | Refills: 3 | Status: SHIPPED | OUTPATIENT
Start: 2023-09-11

## 2023-09-11 NOTE — TELEPHONE ENCOUNTER
Pharmacy is requesting the following medication refill;    Valbenazine Tosylate 40mg  #60 with no refills to the [facility] in [facility]. Previous Rx sent 08/16/23 with a last fill date of 08/29/23. Last visit 08/30/23  Next visit 11/01/23    Pending your approval for a 30 day supply with no refills.

## 2023-09-11 NOTE — PROGRESS NOTES
Dr. Javier Allison MD MD  [facility] Consultation / New Patient Visit    Patient:  Florencia Flores  YOB: 1970  Date: 9/11/2023  Consult requested from Gilma Barnett DO     HISTORY OF PRESENT ILLNESS:   The patient is a 52 y.o. male who presents today for follow-up for the following problem(s): BPH with LUTs  Overall the problem(s) : are worsening. Associated Symptoms: No dysuria, gross hematuria. Pain Severity:      Today visit:   9/11/23   Presents as a previous patient of Dr. Shanda Silvestre. He has BPH with LUTs, s/p Urolift, on Sanctura 20mg. Bladder irritants: 1 cup coffee, 3 snapple tea  PH LUTs - s/p urolift states symptoms improved, AUASS: 11/2, 19/3 (urolift), 17/3. ED - since his wife passed away, he will request meds when ready. Offered meds      Summary of old records:   (Patient's old records, notes and chart reviewed and summarized above.)    Cystoscopy Operative Note  Surgeon: Javier Allison MD   Anesthesia: Urethral 2%  Indications: BPH   Position: supine  Findings:   The patient was prepped and draped in the usual sterile fashion. The flexible cystoscope was advanced through the urethra and into the bladder. The bladder was thoroughly inspected and the following was noted:  Residual Urine: Minimal   Urethra: No abnormalities of the urethra are noted. Prostate: Medium gland (<80 gm) Complete obstruction by lateral lobe of prostate. Bladder: No tumors or CIS noted. No bladder diverticulum. Moderate trabeculation noted. Ureters: Clear efflux from both ureters. Orifices with normal configuration and location. The cystoscope was removed. The patient tolerated the procedure well. Plan:   Good candidate for Urolift        57-year-old white male returns today for follow-up regarding his chronic voiding symptoms. He is currently on Flomax and Sanctura 20 mg p.o. twice daily. This combination has improved his voiding pattern significantly.

## 2023-10-05 ENCOUNTER — TELEPHONE (OUTPATIENT)
Dept: FAMILY MEDICINE CLINIC | Age: 53
End: 2023-10-05

## 2023-10-05 DIAGNOSIS — Z87.891 PERSONAL HISTORY OF TOBACCO USE: Primary | ICD-10-CM

## 2023-10-05 NOTE — TELEPHONE ENCOUNTER
Please let pt know he is due for 1 hear CT scan to screen for lung cancer. Any time this month  Help schedule  Let me know if questions, thanks! Diagnosis Orders   1. Personal history of tobacco use  CT Lung Screen (Initial/Annual/Baseline)      . Future Appointments   Date Time Provider [address]   11/1/2023 10:00 AM Ashkan Grimaldo MD [facility]   2/12/2024  8:20 AM DO LANNY Mason [facility]   8/6/2024  8:15 AM Calli Cortés Med [facility]   9/11/2024  7:30 AM Dylan Cool Uro [facility]       Discussed with the patient the current USPSTF guidelines released March 9, 2021 for screening for lung cancer. For adults aged 48 to 80 years who have a 20 pack-year smoking history and currently smoke or have quit within the past 15 years the grade B recommendation is to:  Screen for lung cancer with low-dose computed tomography (LDCT) every year. Stop screening once a person has not smoked for 15 years or has a health problem that limits life expectancy or the ability to have lung surgery. The patient  reports that he quit smoking about 5 years ago. His smoking use included cigarettes. He started smoking about 39 years ago. He has a 7.50 pack-year smoking history. He has never used smokeless tobacco.. Discussed with patient the risks and benefits of screening, including over-diagnosis, false positive rate, and total radiation exposure. The patient currently exhibits no signs or symptoms suggestive of lung cancer. Discussed with patient the importance of compliance with yearly annual lung cancer screenings and willingness to undergo diagnosis and treatment if screening scan is positive. In addition, the patient was counseled regarding the importance of remaining smoke free and/or total smoking cessation.     Also reviewed the following if the patient has Medicare that as of February 10, 2022, Medicare only covers LDCT screening in

## 2023-10-06 NOTE — TELEPHONE ENCOUNTER
Left message on answering machine requesting pt to call back at earliest convenience.  Please transfer patient to CS to schedule testing

## 2023-10-09 DIAGNOSIS — E11.69 TYPE 2 DIABETES MELLITUS WITH OTHER SPECIFIED COMPLICATION, WITH LONG-TERM CURRENT USE OF INSULIN (HCC): ICD-10-CM

## 2023-10-09 DIAGNOSIS — Z79.4 TYPE 2 DIABETES MELLITUS WITH OTHER SPECIFIED COMPLICATION, WITH LONG-TERM CURRENT USE OF INSULIN (HCC): ICD-10-CM

## 2023-10-09 RX ORDER — DULAGLUTIDE 3 MG/.5ML
INJECTION, SOLUTION SUBCUTANEOUS
Qty: 6 ML | Refills: 3 | Status: SHIPPED | OUTPATIENT
Start: 2023-10-09

## 2023-10-09 NOTE — TELEPHONE ENCOUNTER
Recent Visits  Date Type Provider Dept   08/09/23 Office Visit Onita Dill, DO [facility]   05/09/23 Office Visit Onita Dill, DO [facility]   03/29/23 Office Visit Onita Dill, DO [facility]   03/01/23 Office Visit Onita Dill, DO [facility]   11/23/22 Office Visit Onita Dill, DO [facility]   08/23/22 Office Visit Onita Dill, DO [facility]   05/24/22 Office Visit Onita Dill, DO [facility]   Showing recent visits within past 540 days with a meds authorizing provider and meeting all other requirements  Future Appointments  Date Type Provider Dept   02/12/24 Appointment Onita Dill, DO [facility]   Showing future appointments within next 150 days with a meds authorizing provider and meeting all other requirements

## 2023-10-16 DIAGNOSIS — F90.8 ADHD, ADULT RESIDUAL TYPE: ICD-10-CM

## 2023-10-16 RX ORDER — METHYLPHENIDATE HYDROCHLORIDE 27 MG/1
27 TABLET ORAL DAILY
Qty: 30 TABLET | Refills: 0 | Status: SHIPPED | OUTPATIENT
Start: 2023-10-16 | End: 2023-11-15

## 2023-10-16 NOTE — TELEPHONE ENCOUNTER
Adebayo Jb called into the office stating that he needs a refill on his Concerta 32EY;#37 with 0 refills;last with a start date of 09/03/23. Medication is pending your approval for a 30 day supply with 0 refills; he is scheduled to return on 11/01/23. Last seen on 08/30/23.

## 2023-10-23 ENCOUNTER — HOSPITAL ENCOUNTER (OUTPATIENT)
Dept: CT IMAGING | Age: 53
Discharge: HOME | End: 2023-10-23
Attending: FAMILY MEDICINE
Payer: COMMERCIAL

## 2023-10-23 ENCOUNTER — TELEPHONE (OUTPATIENT)
Dept: FAMILY MEDICINE CLINIC | Age: 53
End: 2023-10-23

## 2023-10-23 DIAGNOSIS — F90.8 ADHD, ADULT RESIDUAL TYPE: ICD-10-CM

## 2023-10-23 DIAGNOSIS — Z87.891 PERSONAL HISTORY OF TOBACCO USE: ICD-10-CM

## 2023-10-23 PROCEDURE — 71271 CT THORAX LUNG CANCER SCR C-: CPT

## 2023-10-23 RX ORDER — METHYLPHENIDATE HYDROCHLORIDE 27 MG/1
27 TABLET ORAL DAILY
Qty: 30 TABLET | Refills: 0 | Status: SHIPPED | OUTPATIENT
Start: 2023-10-23 | End: 2023-11-22

## 2023-10-23 NOTE — TELEPHONE ENCOUNTER
B[facility] Paulino called requesting a refill on the following medications:  Requested Prescriptions     Pending Prescriptions Disp Refills    methylphenidate (CONCERTA) 27 MG extended release tablet 30 tablet 0     Sig: Take 1 tablet by mouth daily for 30 days. Max Daily Amount: 27 mg     Mariluzmarion Astrid reports his pharmacy is out of stock, however, [facility] on [address] has Rx available.  Please advise if ok to proceed with cancelling Rx at [facility]    Date of last visit: 8/30/2023  Date of next visit (if applicable):11/1/2023  Pharmacy Name: [facility]      Thanks,  [facility]

## 2023-10-23 NOTE — TELEPHONE ENCOUNTER
----- Message from Alysa Cuello DO sent at 10/23/2023 11:52 AM EDT -----  Please let pt know that LDCT of chest is negative for cancer. Repeat 1 year per current guidelines. Let me know if questions, thanks!

## 2023-11-01 ENCOUNTER — TELEMEDICINE (OUTPATIENT)
Dept: PSYCHIATRY | Age: 53
End: 2023-11-01
Payer: COMMERCIAL

## 2023-11-01 DIAGNOSIS — E78.1 HYPERTRIGLYCERIDEMIA: Chronic | ICD-10-CM

## 2023-11-01 DIAGNOSIS — F90.8 ADHD, ADULT RESIDUAL TYPE: ICD-10-CM

## 2023-11-01 DIAGNOSIS — R80.9 TYPE 2 DIABETES MELLITUS WITH MICROALBUMINURIA, WITHOUT LONG-TERM CURRENT USE OF INSULIN (HCC): Chronic | ICD-10-CM

## 2023-11-01 DIAGNOSIS — F17.210 TOBACCO DEPENDENCE DUE TO CIGARETTES: ICD-10-CM

## 2023-11-01 DIAGNOSIS — G24.01 TARDIVE DYSKINESIA: ICD-10-CM

## 2023-11-01 DIAGNOSIS — F25.0 SCHIZOAFFECTIVE DISORDER, BIPOLAR TYPE (HCC): Primary | ICD-10-CM

## 2023-11-01 DIAGNOSIS — G89.29 CHRONIC LEFT SHOULDER PAIN: ICD-10-CM

## 2023-11-01 DIAGNOSIS — M25.512 CHRONIC LEFT SHOULDER PAIN: ICD-10-CM

## 2023-11-01 DIAGNOSIS — E11.29 TYPE 2 DIABETES MELLITUS WITH MICROALBUMINURIA, WITHOUT LONG-TERM CURRENT USE OF INSULIN (HCC): Chronic | ICD-10-CM

## 2023-11-01 PROCEDURE — 99214 OFFICE O/P EST MOD 30 MIN: CPT | Performed by: PSYCHIATRY & NEUROLOGY

## 2023-11-01 PROCEDURE — G8427 DOCREV CUR MEDS BY ELIG CLIN: HCPCS | Performed by: PSYCHIATRY & NEUROLOGY

## 2023-11-01 PROCEDURE — 3017F COLORECTAL CA SCREEN DOC REV: CPT | Performed by: PSYCHIATRY & NEUROLOGY

## 2023-11-01 PROCEDURE — 90833 PSYTX W PT W E/M 30 MIN: CPT | Performed by: PSYCHIATRY & NEUROLOGY

## 2023-11-01 RX ORDER — ATORVASTATIN CALCIUM 80 MG/1
TABLET, FILM COATED ORAL
Qty: 90 TABLET | Refills: 3 | Status: SHIPPED | OUTPATIENT
Start: 2023-11-01

## 2023-11-01 RX ORDER — FENOFIBRATE 160 MG/1
TABLET ORAL
Qty: 90 TABLET | Refills: 3 | Status: SHIPPED | OUTPATIENT
Start: 2023-11-01

## 2023-11-01 RX ORDER — LURASIDONE HYDROCHLORIDE 20 MG/1
20 TABLET, FILM COATED ORAL
Qty: 30 TABLET | Refills: 0 | OUTPATIENT
Start: 2023-11-01

## 2023-11-01 RX ORDER — HYDROXYZINE 50 MG/1
50-100 TABLET, FILM COATED ORAL NIGHTLY PRN
Qty: 60 TABLET | Refills: 2 | Status: SHIPPED | OUTPATIENT
Start: 2023-11-01 | End: 2024-01-30

## 2023-11-01 RX ORDER — LURASIDONE HYDROCHLORIDE 20 MG/1
20 TABLET, FILM COATED ORAL
Qty: 30 TABLET | Refills: 2 | Status: SHIPPED | OUTPATIENT
Start: 2023-11-01

## 2023-11-01 ASSESSMENT — PATIENT HEALTH QUESTIONNAIRE - PHQ9
9. THOUGHTS THAT YOU WOULD BE BETTER OFF DEAD, OR OF HURTING YOURSELF: 1
6. FEELING BAD ABOUT YOURSELF - OR THAT YOU ARE A FAILURE OR HAVE LET YOURSELF OR YOUR FAMILY DOWN: 1
7. TROUBLE CONCENTRATING ON THINGS, SUCH AS READING THE NEWSPAPER OR WATCHING TELEVISION: 2
SUM OF ALL RESPONSES TO PHQ QUESTIONS 1-9: 13
1. LITTLE INTEREST OR PLEASURE IN DOING THINGS: 2
2. FEELING DOWN, DEPRESSED OR HOPELESS: 1
SUM OF ALL RESPONSES TO PHQ QUESTIONS 1-9: 13
4. FEELING TIRED OR HAVING LITTLE ENERGY: MORE THAN HALF THE DAYS
6. FEELING BAD ABOUT YOURSELF - OR THAT YOU ARE A FAILURE OR HAVE LET YOURSELF OR YOUR FAMILY DOWN: SEVERAL DAYS
9. THOUGHTS THAT YOU WOULD BE BETTER OFF DEAD, OR OF HURTING YOURSELF: SEVERAL DAYS
4. FEELING TIRED OR HAVING LITTLE ENERGY: 2
10. IF YOU CHECKED OFF ANY PROBLEMS, HOW DIFFICULT HAVE THESE PROBLEMS MADE IT FOR YOU TO DO YOUR WORK, TAKE CARE OF THINGS AT HOME, OR GET ALONG WITH OTHER PEOPLE: 2
5. POOR APPETITE OR OVEREATING: SEVERAL DAYS
SUM OF ALL RESPONSES TO PHQ QUESTIONS 1-9: 13
8. MOVING OR SPEAKING SO SLOWLY THAT OTHER PEOPLE COULD HAVE NOTICED. OR THE OPPOSITE - BEING SO FIDGETY OR RESTLESS THAT YOU HAVE BEEN MOVING AROUND A LOT MORE THAN USUAL: SEVERAL DAYS
1. LITTLE INTEREST OR PLEASURE IN DOING THINGS: MORE THAN HALF THE DAYS
SUM OF ALL RESPONSES TO PHQ QUESTIONS 1-9: 13
10. IF YOU CHECKED OFF ANY PROBLEMS, HOW DIFFICULT HAVE THESE PROBLEMS MADE IT FOR YOU TO DO YOUR WORK, TAKE CARE OF THINGS AT HOME, OR GET ALONG WITH OTHER PEOPLE: VERY DIFFICULT
SUM OF ALL RESPONSES TO PHQ9 QUESTIONS 1 & 2: 3
7. TROUBLE CONCENTRATING ON THINGS, SUCH AS READING THE NEWSPAPER OR WATCHING TELEVISION: MORE THAN HALF THE DAYS
5. POOR APPETITE OR OVEREATING: 1
8. MOVING OR SPEAKING SO SLOWLY THAT OTHER PEOPLE COULD HAVE NOTICED. OR THE OPPOSITE, BEING SO FIGETY OR RESTLESS THAT YOU HAVE BEEN MOVING AROUND A LOT MORE THAN USUAL: 1
3. TROUBLE FALLING OR STAYING ASLEEP: MORE THAN HALF THE DAYS
3. TROUBLE FALLING OR STAYING ASLEEP: 2
SUM OF ALL RESPONSES TO PHQ QUESTIONS 1-9: 12
SUM OF ALL RESPONSES TO PHQ9 QUESTIONS 1 & 2: 3
2. FEELING DOWN, DEPRESSED OR HOPELESS: SEVERAL DAYS

## 2023-11-01 ASSESSMENT — ANXIETY QUESTIONNAIRES
1. FEELING NERVOUS, ANXIOUS, OR ON EDGE: 3
4. TROUBLE RELAXING: 2
1. FEELING NERVOUS, ANXIOUS, OR ON EDGE: NEARLY EVERY DAY
5. BEING SO RESTLESS THAT IT IS HARD TO SIT STILL: SEVERAL DAYS
GAD7 TOTAL SCORE: 12
5. BEING SO RESTLESS THAT IT IS HARD TO SIT STILL: 1
IF YOU CHECKED OFF ANY PROBLEMS ON THIS QUESTIONNAIRE, HOW DIFFICULT HAVE THESE PROBLEMS MADE IT FOR YOU TO DO YOUR WORK, TAKE CARE OF THINGS AT HOME, OR GET ALONG WITH OTHER PEOPLE: VERY DIFFICULT
2. NOT BEING ABLE TO STOP OR CONTROL WORRYING: SEVERAL DAYS
2. NOT BEING ABLE TO STOP OR CONTROL WORRYING: 1
3. WORRYING TOO MUCH ABOUT DIFFERENT THINGS: 1
IF YOU CHECKED OFF ANY PROBLEMS ON THIS QUESTIONNAIRE, HOW DIFFICULT HAVE THESE PROBLEMS MADE IT FOR YOU TO DO YOUR WORK, TAKE CARE OF THINGS AT HOME, OR GET ALONG WITH OTHER PEOPLE: VERY DIFFICULT
7. FEELING AFRAID AS IF SOMETHING AWFUL MIGHT HAPPEN: SEVERAL DAYS
4. TROUBLE RELAXING: MORE THAN HALF THE DAYS
3. WORRYING TOO MUCH ABOUT DIFFERENT THINGS: SEVERAL DAYS
6. BECOMING EASILY ANNOYED OR IRRITABLE: NEARLY EVERY DAY
7. FEELING AFRAID AS IF SOMETHING AWFUL MIGHT HAPPEN: 1
6. BECOMING EASILY ANNOYED OR IRRITABLE: 3

## 2023-11-01 ASSESSMENT — COLUMBIA-SUICIDE SEVERITY RATING SCALE - C-SSRS
2. IN THE PAST MONTH, HAVE YOU ACTUALLY HAD ANY THOUGHTS OF KILLING YOURSELF?: NO
7. DID THIS OCCUR IN THE LAST THREE MONTHS: NO
6. IN YOUR LIFETIME, HAVE YOU EVER DONE ANYTHING, STARTED TO DO ANYTHING, OR PREPARED TO DO ANYTHING TO END YOUR LIFE?: YES
1. IN THE PAST MONTH, HAVE YOU WISHED YOU WERE DEAD OR WISHED YOU COULD GO TO SLEEP AND NOT WAKE UP?: NO

## 2023-11-01 NOTE — TELEPHONE ENCOUNTER
Recent Visits  Date Type Provider Dept   08/09/23 Office Visit Elvin Whitfield, DO [facility]   05/09/23 Office Visit Elvin Whitfield, DO [facility]   03/29/23 Office Visit Elvin Whitfield, DO [facility]   03/01/23 Office Visit Elvin Whitfield, DO [facility]   11/23/22 Office Visit Elvin Whitfield, DO [facility]   08/23/22 Office Visit Elvin Whitfield, DO [facility]   05/24/22 Office Visit Elvin Whiftield, DO [facility]   Showing recent visits within past 540 days with a meds authorizing provider and meeting all other requirements  Future Appointments  Date Type Provider Dept   02/12/24 Appointment Elvin Whitfield, DO [facility]   Showing future appointments within next 150 days with a meds authorizing provider and meeting all other requirements
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
## 2023-11-01 NOTE — PATIENT INSTRUCTIONS
-Please take medications as prescribed  -Refrain from alcohol or drug use  -Seek emergency help via the emergency and/or calling 911 should symptoms become severe, worsen, or with other concerning symptoms. Go immediately to the emergency room and/or call 911 with any suicidal or homicidal ideations or if audio/visual hallucinations develop.   -Contact office with any questions or concerns. Crisis phone numbers:  Zafar Lorenzo, and [facility] [phone]. Charleen Johnson and [place] [address] [facility] [phone]. [facility] [phone]. [address] [place] [address]. Baljinder Coats [phone].

## 2023-11-28 DIAGNOSIS — F90.8 ADHD, ADULT RESIDUAL TYPE: ICD-10-CM

## 2023-11-28 RX ORDER — METHYLPHENIDATE HYDROCHLORIDE 27 MG/1
27 TABLET ORAL DAILY
Qty: 30 TABLET | Refills: 0 | Status: SHIPPED | OUTPATIENT
Start: 2023-11-28 | End: 2023-12-28

## 2023-11-28 NOTE — TELEPHONE ENCOUNTER
Patient called in requesting a refill of the following medication:    Methylphenidate 27mg Extended Release  #30 with no refills to the [address] [place] [address] on [address] [place] [address]. Previous Rx sent 10/23/23. Last visit 11/01/23  Next visit 01/15/24    Pending your approval for a 30 day supply with no refills.

## 2023-12-01 DIAGNOSIS — Z79.4 TYPE 2 DIABETES MELLITUS WITH OTHER SPECIFIED COMPLICATION, WITH LONG-TERM CURRENT USE OF INSULIN (HCC): ICD-10-CM

## 2023-12-01 DIAGNOSIS — F25.0 SCHIZOAFFECTIVE DISORDER, BIPOLAR TYPE (HCC): ICD-10-CM

## 2023-12-01 DIAGNOSIS — E11.69 TYPE 2 DIABETES MELLITUS WITH OTHER SPECIFIED COMPLICATION, WITH LONG-TERM CURRENT USE OF INSULIN (HCC): ICD-10-CM

## 2023-12-01 RX ORDER — LAMOTRIGINE 150 MG/1
150 TABLET ORAL 2 TIMES DAILY
Qty: 60 TABLET | Refills: 0 | Status: SHIPPED | OUTPATIENT
Start: 2023-12-01

## 2023-12-01 RX ORDER — EMPAGLIFLOZIN 25 MG/1
25 TABLET, FILM COATED ORAL DAILY
Qty: 90 TABLET | Refills: 3 | Status: SHIPPED | OUTPATIENT
Start: 2023-12-01

## 2023-12-01 NOTE — TELEPHONE ENCOUNTER
THIS IS A DR MCDONALD PATIENT    Stevenson Lopez is requesting a couple medication refills on Az's behalf for Lamictal 150mg;#60 with 3 refills and Trintellix 20mg;#30 with 2 refills; both last sent on 07/17/23 and both last filled on 11/01/23. Medications are pending your approval for a 30 day supply with 0 refills being that the original provider is out of the office. He is scheduled to return on 01/15/24. Last seen on 11/01/23.

## 2023-12-01 NOTE — TELEPHONE ENCOUNTER
Recent Visits  Date Type Provider Dept   08/09/23 Office Visit Sam Barrett, DO [facility]   05/09/23 Office Visit Sam Barrett, DO [facility]   03/29/23 Office Visit Sam Barrett, DO [facility]   03/01/23 Office Visit Sam Barrett, DO [facility]   11/23/22 Office Visit Sam Barrett, DO [facility]   08/23/22 Office Visit Sam Barrett, DO [facility]   Showing recent visits within past 540 days with a meds authorizing provider and meeting all other requirements  Future Appointments  Date Type Provider Dept   02/12/24 Appointment Sam Barrett, DO [facility]   Showing future appointments within next 150 days with a meds authorizing provider and meeting all other requirements

## 2023-12-29 DIAGNOSIS — F90.8 ADHD, ADULT RESIDUAL TYPE: ICD-10-CM

## 2023-12-29 RX ORDER — METHYLPHENIDATE HYDROCHLORIDE 27 MG/1
27 TABLET ORAL DAILY
Qty: 30 TABLET | Refills: 0 | Status: SHIPPED | OUTPATIENT
Start: 2023-12-29 | End: 2024-01-28

## 2023-12-29 NOTE — TELEPHONE ENCOUNTER
Patient called in requesting a refill of the following medications:      Methylphenidate 27mg Extended Release  #30 with no refills to the [facility] on [address]. Previous Rx sent 11/28/23.    Vortioxetine HBr 20mg  #30 with no refills to the [facility] in [place]. Previous Rx sent 12/01/23.    Last visit 11/01/23  Next visit 01/15/24    Pending your approval for a 30 day supply with no refills.

## 2023-12-30 DIAGNOSIS — R80.9 TYPE 2 DIABETES MELLITUS WITH MICROALBUMINURIA, WITHOUT LONG-TERM CURRENT USE OF INSULIN (HCC): Chronic | ICD-10-CM

## 2023-12-30 DIAGNOSIS — F25.0 SCHIZOAFFECTIVE DISORDER, BIPOLAR TYPE (HCC): ICD-10-CM

## 2023-12-30 DIAGNOSIS — E11.29 TYPE 2 DIABETES MELLITUS WITH MICROALBUMINURIA, WITHOUT LONG-TERM CURRENT USE OF INSULIN (HCC): Chronic | ICD-10-CM

## 2023-12-30 DIAGNOSIS — I25.10 ASCVD (ARTERIOSCLEROTIC CARDIOVASCULAR DISEASE): ICD-10-CM

## 2024-01-02 RX ORDER — ASPIRIN 81 MG/1
TABLET ORAL
Qty: 90 TABLET | Refills: 3 | Status: SHIPPED | OUTPATIENT
Start: 2024-01-02

## 2024-01-02 RX ORDER — LAMOTRIGINE 150 MG/1
TABLET ORAL
Qty: 60 TABLET | Refills: 2 | Status: SHIPPED | OUTPATIENT
Start: 2024-01-02

## 2024-01-02 RX ORDER — VALBENAZINE 40 MG/1
1 CAPSULE ORAL 2 TIMES DAILY
Qty: 60 CAPSULE | Refills: 5 | Status: SHIPPED | OUTPATIENT
Start: 2024-01-02

## 2024-01-02 NOTE — TELEPHONE ENCOUNTER
Pharmacy is requesting a refill of the following medication:    Lamotrigine 150mg  #60 with no refills to the [facility]. Previous Rx sent 12/01/23 for #60 with no refills.    Last visit 11/01/23  Next visit 01/15/24    Pending your approval for a 30 day supply with no refills.

## 2024-01-02 NOTE — TELEPHONE ENCOUNTER
[facility] is requesting a refill on the following medications:  Requested Prescriptions     Pending Prescriptions Disp Refills    INGREZZA 40 MG CAPS [Pharmacy Med Name: Ingrezza 40 mg capsule - Small Caps 40] 60 capsule 5     Sig: Take one capsule by mouth twice daily       Date of last visit: 11/1/2023  Date of next visit (if applicable):12/30/2023  Pharmacy Name: Barbara Hernandez MA

## 2024-01-05 ENCOUNTER — OFFICE VISIT (OUTPATIENT)
Dept: FAMILY MEDICINE CLINIC | Age: 54
End: 2024-01-05
Payer: COMMERCIAL

## 2024-01-05 VITALS
SYSTOLIC BLOOD PRESSURE: 134 MMHG | HEIGHT: 67 IN | RESPIRATION RATE: 18 BRPM | BODY MASS INDEX: 35.53 KG/M2 | HEART RATE: 99 BPM | WEIGHT: 226.4 LBS | TEMPERATURE: 97.7 F | DIASTOLIC BLOOD PRESSURE: 82 MMHG | OXYGEN SATURATION: 99 %

## 2024-01-05 DIAGNOSIS — E11.69 TYPE 2 DIABETES MELLITUS WITH OTHER SPECIFIED COMPLICATION, WITH LONG-TERM CURRENT USE OF INSULIN (HCC): ICD-10-CM

## 2024-01-05 DIAGNOSIS — Z79.4 TYPE 2 DIABETES MELLITUS WITH OTHER SPECIFIED COMPLICATION, WITH LONG-TERM CURRENT USE OF INSULIN (HCC): ICD-10-CM

## 2024-01-05 DIAGNOSIS — B35.1 ONYCHOMYCOSIS: ICD-10-CM

## 2024-01-05 DIAGNOSIS — M54.50 ACUTE RIGHT-SIDED LOW BACK PAIN WITHOUT SCIATICA: Primary | ICD-10-CM

## 2024-01-05 PROCEDURE — 99213 OFFICE O/P EST LOW 20 MIN: CPT | Performed by: FAMILY MEDICINE

## 2024-01-05 PROCEDURE — 3075F SYST BP GE 130 - 139MM HG: CPT | Performed by: FAMILY MEDICINE

## 2024-01-05 PROCEDURE — G8482 FLU IMMUNIZE ORDER/ADMIN: HCPCS | Performed by: FAMILY MEDICINE

## 2024-01-05 PROCEDURE — 3017F COLORECTAL CA SCREEN DOC REV: CPT | Performed by: FAMILY MEDICINE

## 2024-01-05 PROCEDURE — 3079F DIAST BP 80-89 MM HG: CPT | Performed by: FAMILY MEDICINE

## 2024-01-05 PROCEDURE — 1036F TOBACCO NON-USER: CPT | Performed by: FAMILY MEDICINE

## 2024-01-05 PROCEDURE — G8427 DOCREV CUR MEDS BY ELIG CLIN: HCPCS | Performed by: FAMILY MEDICINE

## 2024-01-05 PROCEDURE — G8417 CALC BMI ABV UP PARAM F/U: HCPCS | Performed by: FAMILY MEDICINE

## 2024-01-05 RX ORDER — TRAMADOL HYDROCHLORIDE 50 MG/1
50 TABLET ORAL EVERY 6 HOURS PRN
Qty: 21 TABLET | Refills: 0 | Status: SHIPPED | OUTPATIENT
Start: 2024-01-05 | End: 2024-01-10

## 2024-01-05 RX ORDER — DULAGLUTIDE 3 MG/.5ML
INJECTION, SOLUTION SUBCUTANEOUS
Qty: 6 ML | Refills: 3 | Status: SHIPPED | OUTPATIENT
Start: 2024-01-05

## 2024-01-05 RX ORDER — TIZANIDINE 4 MG/1
4 TABLET ORAL EVERY 8 HOURS PRN
Qty: 30 TABLET | Refills: 0 | Status: SHIPPED | OUTPATIENT
Start: 2024-01-05

## 2024-01-05 ASSESSMENT — PATIENT HEALTH QUESTIONNAIRE - PHQ9
SUM OF ALL RESPONSES TO PHQ QUESTIONS 1-9: 11
1. LITTLE INTEREST OR PLEASURE IN DOING THINGS: 3
SUM OF ALL RESPONSES TO PHQ9 QUESTIONS 1 & 2: 6
4. FEELING TIRED OR HAVING LITTLE ENERGY: 1
2. FEELING DOWN, DEPRESSED OR HOPELESS: 3
9. THOUGHTS THAT YOU WOULD BE BETTER OFF DEAD, OR OF HURTING YOURSELF: 0
6. FEELING BAD ABOUT YOURSELF - OR THAT YOU ARE A FAILURE OR HAVE LET YOURSELF OR YOUR FAMILY DOWN: 1
3. TROUBLE FALLING OR STAYING ASLEEP: 1
8. MOVING OR SPEAKING SO SLOWLY THAT OTHER PEOPLE COULD HAVE NOTICED. OR THE OPPOSITE, BEING SO FIGETY OR RESTLESS THAT YOU HAVE BEEN MOVING AROUND A LOT MORE THAN USUAL: 0
SUM OF ALL RESPONSES TO PHQ QUESTIONS 1-9: 11
7. TROUBLE CONCENTRATING ON THINGS, SUCH AS READING THE NEWSPAPER OR WATCHING TELEVISION: 1
SUM OF ALL RESPONSES TO PHQ QUESTIONS 1-9: 11
10. IF YOU CHECKED OFF ANY PROBLEMS, HOW DIFFICULT HAVE THESE PROBLEMS MADE IT FOR YOU TO DO YOUR WORK, TAKE CARE OF THINGS AT HOME, OR GET ALONG WITH OTHER PEOPLE: 0
5. POOR APPETITE OR OVEREATING: 1
SUM OF ALL RESPONSES TO PHQ QUESTIONS 1-9: 11

## 2024-01-05 NOTE — PROGRESS NOTES
Cancer Paternal Uncle 60    Cancer Maternal Uncle 70        pancreatic     Ovarian Cancer Paternal Aunt 50    Rheum Arthritis Mother     Pancreatic Cancer Mother     Brain Cancer Brother          I have reviewed the patient's past medical history, past surgical history, allergies, medications, social and family history and I have made updates where appropriate.      Review of Systems  Positive responses are highlighted in bold    Constitutional:  Fever, Chills, Night Sweats, Fatigue, Unexpected changes in weight  HENT:  Ear pain, Tinnitus, Nosebleeds, Trouble swallowing, Hearing loss, Sore throat  Cardiovascular:  Chest Pain, Palpitations, Orthopnea, Paroxysmal Nocturnal Dyspnea  Respiratory:  Cough, Wheezing, Shortness of breath, Chest tightness, Apnea  Gastrointestinal:  Nausea, Vomiting, Diarrhea, Constipation, Heartburn, Blood in stool  Genitourinary:  Difficulty or painful urination, Flank pain, Change in frequency, Urgency  Skin:  Color change, Rash, Itching, Wound  Musculoskeletal:  Joint pain, Back pain, Gait problems, Joint swelling, Myalgias  Neurological:  Dizziness, Headaches, Presyncope, Numbness, Seizures, Tremors  Endocrine:  Heat Intolerance, Cold Intolerance, Polydipsia, Polyphagia, Polyuria      PHYSICAL EXAM:  Vitals:    01/05/24 0833   BP: 134/82   Pulse: 99   Resp: 18   Temp: 97.7 °F (36.5 °C)   TempSrc: Temporal   SpO2: 99%   Weight: 102.7 kg (226 lb 6.4 oz)   Height: 1.702 m (5' 7.01\")     Body mass index is 35.45 kg/m².       Wt Readings from Last 3 Encounters:   01/05/24 102.7 kg (226 lb 6.4 oz)   09/11/23 108.4 kg (239 lb)   08/09/23 108.5 kg (239 lb 3.2 oz)     VS Reviewed  General Appearance: A&O x 3, No acute distress,well developed and well- nourished  Eyes: pupils equal, round, and reactive to light, extraocular eye movements intact, conjunctivae and eye lids without erythema  ENT: external ear and ear canal clear bilaterally, TMs intact and regular, nose without deformity, nasal

## 2024-01-11 ENCOUNTER — TELEPHONE (OUTPATIENT)
Dept: FAMILY MEDICINE CLINIC | Age: 54
End: 2024-01-11

## 2024-01-11 DIAGNOSIS — Z79.4 TYPE 2 DIABETES MELLITUS WITH OTHER SPECIFIED COMPLICATION, WITH LONG-TERM CURRENT USE OF INSULIN (HCC): Primary | ICD-10-CM

## 2024-01-11 DIAGNOSIS — E11.69 TYPE 2 DIABETES MELLITUS WITH OTHER SPECIFIED COMPLICATION, WITH LONG-TERM CURRENT USE OF INSULIN (HCC): Primary | ICD-10-CM

## 2024-01-11 NOTE — TELEPHONE ENCOUNTER
Pt due for fasting labs prior to next apt on 2/12/2024. Please call to have pt complete this. Thanks!    ASSESSMENT & PLAN   Diagnosis Orders   1. Type 2 diabetes mellitus with other specified complication, with long-term current use of insulin (HCC)  CBC with Auto Differential    Comprehensive Metabolic Panel    Lipid Panel    TSH with Reflex    Microalbumin / Creatinine Urine Ratio        Future Appointments   Date Time Provider Department Center   1/15/2024 10:00 AM Harman Soria MD N [facility] PSYCH [facility] [place]   2/12/2024  8:20 AM Javon Aaron, DO Fam Med [facility] [place]   8/6/2024  8:15 AM Natasha Leahy PA-C N Pulm Med [facility] [place]   9/11/2024  7:30 AM Reid Cool PA-C N [place] [facility]

## 2024-01-15 ENCOUNTER — TELEMEDICINE (OUTPATIENT)
Dept: PSYCHIATRY | Age: 54
End: 2024-01-15
Payer: COMMERCIAL

## 2024-01-15 DIAGNOSIS — F25.0 SCHIZOAFFECTIVE DISORDER, BIPOLAR TYPE (HCC): Primary | ICD-10-CM

## 2024-01-15 DIAGNOSIS — F90.8 ADHD, ADULT RESIDUAL TYPE: ICD-10-CM

## 2024-01-15 DIAGNOSIS — G24.01 TARDIVE DYSKINESIA: ICD-10-CM

## 2024-01-15 DIAGNOSIS — F17.210 TOBACCO DEPENDENCE DUE TO CIGARETTES: ICD-10-CM

## 2024-01-15 PROCEDURE — G8427 DOCREV CUR MEDS BY ELIG CLIN: HCPCS | Performed by: PSYCHIATRY & NEUROLOGY

## 2024-01-15 PROCEDURE — 99214 OFFICE O/P EST MOD 30 MIN: CPT | Performed by: PSYCHIATRY & NEUROLOGY

## 2024-01-15 PROCEDURE — 3017F COLORECTAL CA SCREEN DOC REV: CPT | Performed by: PSYCHIATRY & NEUROLOGY

## 2024-01-15 PROCEDURE — 90833 PSYTX W PT W E/M 30 MIN: CPT | Performed by: PSYCHIATRY & NEUROLOGY

## 2024-01-15 RX ORDER — METHYLPHENIDATE HYDROCHLORIDE 18 MG/1
18 TABLET ORAL DAILY
Qty: 30 TABLET | Refills: 0 | Status: SHIPPED | OUTPATIENT
Start: 2024-01-27 | End: 2024-02-26

## 2024-01-15 RX ORDER — LURASIDONE HYDROCHLORIDE 120 MG/1
TABLET, FILM COATED ORAL
Qty: 30 TABLET | Refills: 3 | Status: SHIPPED | OUTPATIENT
Start: 2024-01-15

## 2024-01-15 RX ORDER — VALBENAZINE 80 MG/1
80 CAPSULE ORAL DAILY
Qty: 30 CAPSULE | Refills: 2 | Status: SHIPPED | OUTPATIENT
Start: 2024-01-15

## 2024-01-15 ASSESSMENT — COLUMBIA-SUICIDE SEVERITY RATING SCALE - C-SSRS
2. IN THE PAST MONTH, HAVE YOU ACTUALLY HAD ANY THOUGHTS OF KILLING YOURSELF?: NO
7. DID THIS OCCUR IN THE LAST THREE MONTHS: NO
6. IN YOUR LIFETIME, HAVE YOU EVER DONE ANYTHING, STARTED TO DO ANYTHING, OR PREPARED TO DO ANYTHING TO END YOUR LIFE?: YES
1. IN THE PAST MONTH, HAVE YOU WISHED YOU WERE DEAD OR WISHED YOU COULD GO TO SLEEP AND NOT WAKE UP?: YES

## 2024-01-15 ASSESSMENT — ANXIETY QUESTIONNAIRES
1. FEELING NERVOUS, ANXIOUS, OR ON EDGE: 3
4. TROUBLE RELAXING: 3
5. BEING SO RESTLESS THAT IT IS HARD TO SIT STILL: 1
7. FEELING AFRAID AS IF SOMETHING AWFUL MIGHT HAPPEN: 0
3. WORRYING TOO MUCH ABOUT DIFFERENT THINGS: MORE THAN HALF THE DAYS
IF YOU CHECKED OFF ANY PROBLEMS ON THIS QUESTIONNAIRE, HOW DIFFICULT HAVE THESE PROBLEMS MADE IT FOR YOU TO DO YOUR WORK, TAKE CARE OF THINGS AT HOME, OR GET ALONG WITH OTHER PEOPLE: SOMEWHAT DIFFICULT
1. FEELING NERVOUS, ANXIOUS, OR ON EDGE: NEARLY EVERY DAY
4. TROUBLE RELAXING: NEARLY EVERY DAY
7. FEELING AFRAID AS IF SOMETHING AWFUL MIGHT HAPPEN: NOT AT ALL
6. BECOMING EASILY ANNOYED OR IRRITABLE: SEVERAL DAYS
5. BEING SO RESTLESS THAT IT IS HARD TO SIT STILL: SEVERAL DAYS
3. WORRYING TOO MUCH ABOUT DIFFERENT THINGS: 2
6. BECOMING EASILY ANNOYED OR IRRITABLE: 1
IF YOU CHECKED OFF ANY PROBLEMS ON THIS QUESTIONNAIRE, HOW DIFFICULT HAVE THESE PROBLEMS MADE IT FOR YOU TO DO YOUR WORK, TAKE CARE OF THINGS AT HOME, OR GET ALONG WITH OTHER PEOPLE: SOMEWHAT DIFFICULT
2. NOT BEING ABLE TO STOP OR CONTROL WORRYING: 2
2. NOT BEING ABLE TO STOP OR CONTROL WORRYING: MORE THAN HALF THE DAYS
GAD7 TOTAL SCORE: 12

## 2024-01-15 ASSESSMENT — PATIENT HEALTH QUESTIONNAIRE - PHQ9
SUM OF ALL RESPONSES TO PHQ QUESTIONS 1-9: 17
10. IF YOU CHECKED OFF ANY PROBLEMS, HOW DIFFICULT HAVE THESE PROBLEMS MADE IT FOR YOU TO DO YOUR WORK, TAKE CARE OF THINGS AT HOME, OR GET ALONG WITH OTHER PEOPLE: VERY DIFFICULT
SUM OF ALL RESPONSES TO PHQ9 QUESTIONS 1 & 2: 3
3. TROUBLE FALLING OR STAYING ASLEEP: NEARLY EVERY DAY
SUM OF ALL RESPONSES TO PHQ QUESTIONS 1-9: 17
2. FEELING DOWN, DEPRESSED OR HOPELESS: MORE THAN HALF THE DAYS
10. IF YOU CHECKED OFF ANY PROBLEMS, HOW DIFFICULT HAVE THESE PROBLEMS MADE IT FOR YOU TO DO YOUR WORK, TAKE CARE OF THINGS AT HOME, OR GET ALONG WITH OTHER PEOPLE: 2
4. FEELING TIRED OR HAVING LITTLE ENERGY: 2
5. POOR APPETITE OR OVEREATING: 2
SUM OF ALL RESPONSES TO PHQ9 QUESTIONS 1 & 2: 3
2. FEELING DOWN, DEPRESSED OR HOPELESS: 2
7. TROUBLE CONCENTRATING ON THINGS, SUCH AS READING THE NEWSPAPER OR WATCHING TELEVISION: NEARLY EVERY DAY
5. POOR APPETITE OR OVEREATING: MORE THAN HALF THE DAYS
7. TROUBLE CONCENTRATING ON THINGS, SUCH AS READING THE NEWSPAPER OR WATCHING TELEVISION: 3
SUM OF ALL RESPONSES TO PHQ QUESTIONS 1-9: 16
8. MOVING OR SPEAKING SO SLOWLY THAT OTHER PEOPLE COULD HAVE NOTICED. OR THE OPPOSITE - BEING SO FIDGETY OR RESTLESS THAT YOU HAVE BEEN MOVING AROUND A LOT MORE THAN USUAL: MORE THAN HALF THE DAYS
9. THOUGHTS THAT YOU WOULD BE BETTER OFF DEAD, OR OF HURTING YOURSELF: SEVERAL DAYS
8. MOVING OR SPEAKING SO SLOWLY THAT OTHER PEOPLE COULD HAVE NOTICED. OR THE OPPOSITE, BEING SO FIGETY OR RESTLESS THAT YOU HAVE BEEN MOVING AROUND A LOT MORE THAN USUAL: 2
6. FEELING BAD ABOUT YOURSELF - OR THAT YOU ARE A FAILURE OR HAVE LET YOURSELF OR YOUR FAMILY DOWN: SEVERAL DAYS
1. LITTLE INTEREST OR PLEASURE IN DOING THINGS: SEVERAL DAYS
1. LITTLE INTEREST OR PLEASURE IN DOING THINGS: 1
4. FEELING TIRED OR HAVING LITTLE ENERGY: MORE THAN HALF THE DAYS
SUM OF ALL RESPONSES TO PHQ QUESTIONS 1-9: 17
6. FEELING BAD ABOUT YOURSELF - OR THAT YOU ARE A FAILURE OR HAVE LET YOURSELF OR YOUR FAMILY DOWN: 1
SUM OF ALL RESPONSES TO PHQ QUESTIONS 1-9: 17
3. TROUBLE FALLING OR STAYING ASLEEP: 3
9. THOUGHTS THAT YOU WOULD BE BETTER OFF DEAD, OR OF HURTING YOURSELF: 1

## 2024-01-15 NOTE — PROGRESS NOTES
[facility]YSICIANS [place] [facility]  [facility] PSYCHIATRY  [address] [facility] [place] [address] [phone]    Progress Note    Patient:  Az Long  YOB: 1970  PCP:  Javon Aaron DO  Visit Date:  1/15/2024    TELEHEALTH EVALUATION     CC: Follow up for medication management and psychotherapy      SUBJECTIVE:      Az Long, a 53 y.o. male, presents for a follow up visit.  Patient reports things remain stressful with housing situation.  They have still not found a house.  Continues to endorse auditory hallucinations but denies that they have gotten worse or that they are command in nature.  Reports intermittent passive suicidal thoughts but adamantly denies any intent or plan.  Reports shortness of breath has improved some.  Has continued to lose weight on Trulicity but is frustrated because he cannot find it anywhere.  Concerned that his diabetes is going to get worse.  Continues to take what to do with food and feels it has been the most beneficial medication for him.  We discussed the need to continue to try to come down on methylphenidate to see if this improves hallucinations and patient is in agreement with this.  Sleep has remained consistent.    16 minutes was spent in supportive psychotherapy.  Focus was on patient's stressors, specifically housing.          OBJECTIVE:      1/12/2024    12:32 AM   Patient-Reported Vitals   Patient-Reported Weight 226   Patient-Reported Height 5'7\"   Patient-Reported Systolic 138 mmHg   Patient-Reported Diastolic 83 mmHg   Patient-Reported Pulse 96          MENTAL STATUS EXAM:  Orientation: Alert, oriented, thought content appropriate   Mood:. \"Okay\"      Affect:  constricted      Appearance:  Age Appropriate, Clothing Appropriate for Age, and Clothing Appropriate for Weather   Thought Process:  Within Normal Limits   Thought Content:  Within Normal Limits   Insight:  Age Appropriate   Judgment: Fair   Suicidal

## 2024-02-01 RX ORDER — HYDROXYZINE 50 MG/1
50-100 TABLET, FILM COATED ORAL NIGHTLY PRN
Qty: 60 TABLET | Refills: 0 | Status: SHIPPED | OUTPATIENT
Start: 2024-02-01 | End: 2024-05-01

## 2024-02-01 NOTE — TELEPHONE ENCOUNTER
Pharmacy is requesting a refill of the following medication:    Hydroxyzine HCL 50mg  #60 with 2 refills to the [facility] in [place]. Previous prescription sent 11/01/23 for #60 with 2 refills.    Patient stated he has 5 days left.    Last visit 11/01/23  Next visit 02/12/24    Pending your advice

## 2024-02-11 NOTE — PATIENT INSTRUCTIONS
LAB INSTRUCTIONS:    Please complete labs within 2 week(s).    Please fast for 8 hours prior to lab collection.    The clinic will call you within 1 week of collection. If you have not heard from us within that amount of time, please call us at [phone].

## 2024-02-11 NOTE — PROGRESS NOTES
27 05/21/2022    HDL 35 06/15/2021     Lab Results   Component Value Date    LDLCALC 103 03/13/2023    LDLCALC 90 05/21/2022    LDLCALC 116 06/15/2021     Lab Results   Component Value Date    LDLCALC 103 03/13/2023    LDLDIRECT 116.17 01/21/2020       TSH -   Lab Results   Component Value Date    TSH 1.470 03/13/2023       Colon Cancer Screening - NEG CANCER FEB 2015  LDCT: NEG OCT 2023    Tetanus - UTD June 2014  Influenza Vaccine - UTD FALL 2023  Pneumonia Vaccine - UTD June 2014 PPV 23 and PCV 13 in SEPT 2015  Zoster - UTD x 2    PSA testing discussion - follows with urology  Lab Results   Component Value Date    PSA 0.18 03/13/2023    PSA 0.22 10/25/2022    PSA 0.37 06/15/2021       AAA Screening - Age 65      Diabetes Health Maintenance    A1C -   Lab Results   Component Value Date/Time    LABA1C 5.7 02/12/2024 07:08 AM    LABA1C 7.3 08/09/2023 07:15 AM    LABA1C 6.7 05/09/2023 02:25 PM    LABA1C 8.8 03/01/2023 06:55 AM    LABA1C 8.9 11/23/2022 06:58 AM    LABA1C 8.1 08/23/2022 08:02 AM    LABA1C 7.7 05/24/2022 07:20 AM    LABA1C 11.2 11/21/2019 08:00 AM    LABA1C 7.8 08/20/2019 07:54 AM    LABA1C 10.7 05/20/2019 10:54 AM    LABA1C 11.2 02/18/2019 07:56 AM    LABA1C 12.2 11/16/2018 07:50 AM       ACE/ARB - yes, lisinopril 2.5mg daily  Eye - records pending  Foot - UTD AUG 2023  ASA - yes, 81mg  Microal/Cr -   Lab Results   Component Value Date    LABCREA 121.7 03/13/2023     Lab Results   Component Value Date    MALBCR 10 03/13/2023     Lab Results   Component Value Date    PROTUR Negative 07/16/2021       eGFR -   No results found for: \"GFRESTIMATE\"  Lab Results   Component Value Date/Time    LABGLOM >60 03/13/2023 02:04 PM     No results found for: \"CRCLEARANCE\"  No results found for: \"EGFRNONAA\"  No results found for: \"EGFRAA\"      Statin - yes, lipitor 80mg qhs      Specialist Lists:  Dr Mckay Neurosurgeon  Dr Torres Cardiology  Dr Pedraza Eye  SRMR:  Urology  Dr Howell Pulmonology/Sleep  Dr Elkins Orthopedic

## 2024-02-12 ENCOUNTER — TELEMEDICINE (OUTPATIENT)
Dept: PSYCHIATRY | Age: 54
End: 2024-02-12
Payer: COMMERCIAL

## 2024-02-12 ENCOUNTER — OFFICE VISIT (OUTPATIENT)
Dept: FAMILY MEDICINE CLINIC | Age: 54
End: 2024-02-12
Payer: COMMERCIAL

## 2024-02-12 VITALS
HEIGHT: 67 IN | WEIGHT: 229.6 LBS | HEART RATE: 82 BPM | SYSTOLIC BLOOD PRESSURE: 136 MMHG | RESPIRATION RATE: 18 BRPM | BODY MASS INDEX: 36.03 KG/M2 | OXYGEN SATURATION: 98 % | TEMPERATURE: 98.6 F | DIASTOLIC BLOOD PRESSURE: 78 MMHG

## 2024-02-12 DIAGNOSIS — I10 ESSENTIAL HYPERTENSION: ICD-10-CM

## 2024-02-12 DIAGNOSIS — R74.8 ABNORMAL TRANSAMINASES: ICD-10-CM

## 2024-02-12 DIAGNOSIS — Z79.4 TYPE 2 DIABETES MELLITUS WITH OTHER SPECIFIED COMPLICATION, WITH LONG-TERM CURRENT USE OF INSULIN (HCC): Primary | ICD-10-CM

## 2024-02-12 DIAGNOSIS — D50.9 IRON DEFICIENCY ANEMIA, UNSPECIFIED IRON DEFICIENCY ANEMIA TYPE: ICD-10-CM

## 2024-02-12 DIAGNOSIS — G24.01 TARDIVE DYSKINESIA: ICD-10-CM

## 2024-02-12 DIAGNOSIS — E66.9 OBESITY (BMI 30-39.9): ICD-10-CM

## 2024-02-12 DIAGNOSIS — F25.0 SCHIZOAFFECTIVE DISORDER, BIPOLAR TYPE (HCC): Primary | ICD-10-CM

## 2024-02-12 DIAGNOSIS — E11.69 TYPE 2 DIABETES MELLITUS WITH OTHER SPECIFIED COMPLICATION, WITH LONG-TERM CURRENT USE OF INSULIN (HCC): Primary | ICD-10-CM

## 2024-02-12 DIAGNOSIS — F90.8 ADHD, ADULT RESIDUAL TYPE: ICD-10-CM

## 2024-02-12 DIAGNOSIS — K75.81 NASH (NONALCOHOLIC STEATOHEPATITIS): ICD-10-CM

## 2024-02-12 DIAGNOSIS — E53.8 B12 DEFICIENCY: ICD-10-CM

## 2024-02-12 DIAGNOSIS — E78.2 MIXED HYPERLIPIDEMIA: ICD-10-CM

## 2024-02-12 DIAGNOSIS — K22.70 SHORT-SEGMENT BARRETT'S ESOPHAGUS: ICD-10-CM

## 2024-02-12 DIAGNOSIS — K21.9 GASTROESOPHAGEAL REFLUX DISEASE, UNSPECIFIED WHETHER ESOPHAGITIS PRESENT: ICD-10-CM

## 2024-02-12 LAB — HBA1C MFR BLD: 5.7 % (ref 4.3–5.7)

## 2024-02-12 PROCEDURE — 99214 OFFICE O/P EST MOD 30 MIN: CPT | Performed by: PSYCHIATRY & NEUROLOGY

## 2024-02-12 PROCEDURE — 3017F COLORECTAL CA SCREEN DOC REV: CPT | Performed by: PSYCHIATRY & NEUROLOGY

## 2024-02-12 PROCEDURE — 3078F DIAST BP <80 MM HG: CPT | Performed by: FAMILY MEDICINE

## 2024-02-12 PROCEDURE — G8482 FLU IMMUNIZE ORDER/ADMIN: HCPCS | Performed by: FAMILY MEDICINE

## 2024-02-12 PROCEDURE — 3075F SYST BP GE 130 - 139MM HG: CPT | Performed by: FAMILY MEDICINE

## 2024-02-12 PROCEDURE — G8417 CALC BMI ABV UP PARAM F/U: HCPCS | Performed by: FAMILY MEDICINE

## 2024-02-12 PROCEDURE — 1036F TOBACCO NON-USER: CPT | Performed by: FAMILY MEDICINE

## 2024-02-12 PROCEDURE — 3044F HG A1C LEVEL LT 7.0%: CPT | Performed by: FAMILY MEDICINE

## 2024-02-12 PROCEDURE — G8427 DOCREV CUR MEDS BY ELIG CLIN: HCPCS | Performed by: PSYCHIATRY & NEUROLOGY

## 2024-02-12 PROCEDURE — 99214 OFFICE O/P EST MOD 30 MIN: CPT | Performed by: FAMILY MEDICINE

## 2024-02-12 PROCEDURE — 2022F DILAT RTA XM EVC RTNOPTHY: CPT | Performed by: FAMILY MEDICINE

## 2024-02-12 PROCEDURE — G8427 DOCREV CUR MEDS BY ELIG CLIN: HCPCS | Performed by: FAMILY MEDICINE

## 2024-02-12 PROCEDURE — 3017F COLORECTAL CA SCREEN DOC REV: CPT | Performed by: FAMILY MEDICINE

## 2024-02-12 NOTE — PATIENT INSTRUCTIONS
-Please take medications as prescribed  -Refrain from alcohol or drug use  -Seek emergency help via the emergency and/or calling 911 should symptoms become severe, worsen, or with other concerning symptoms.Go immediately to the emergency room and/or call 911 with any suicidal or homicidal ideations or if audio/visual hallucinations develop.   -Contact office with any questions or concerns.      Crisis phone numbers:  989-national crisis hotline  [facility] [phone].  [facility] [phone]  [facility] [phone].  General acute hospital [phone].  [facility] [phone].  [facility] [phone].

## 2024-02-12 NOTE — PROGRESS NOTES
[facility] [place] [facility]  [facility] PSYCHIATRY  [address] [place] [address] [phone]    Progress Note    Patient:  Az Long  YOB: 1970  PCP:  Javon Aaron DO  Visit Date:  2/12/2024    TELEHEALTH EVALUATION     CC: Follow up for medication management      SUBJECTIVE:      Az Long, a 53 y.o. male, presents for a follow up visit.  Patient reports things are going okay.  He and his daughter moved out other housing situation are currently in temporary housing.  He reports this is fine.  Continue to look for a house.  He reports auditory hallucinations have stayed the same.  Denies command hallucinations.  Denies current suicidal or homicidal ideation, intent or plan.  Reports intermittent fleeting passive suicidal thoughts.  Feels like depression is better.  Still some anxiety.  Continues to have some TD symptoms but reports he thinks these are slightly improved.  Has not started once daily Ingrezza due to prior authorization.  Encouraged him to reach out to me if this does not get pushed through.  Denies side effects from other medications.          OBJECTIVE:      2/12/2024     7:35 AM   Patient-Reported Vitals   Patient-Reported Weight 226   Patient-Reported Height 5'7\"   Patient-Reported Systolic 108 mmHg   Patient-Reported Diastolic 83 mmHg   Patient-Reported Pulse 89          MENTAL STATUS EXAM:  Orientation: Alert, oriented, thought content appropriate   Mood:. \"Good\"      Affect:  euthymic      Appearance:  Age Appropriate, Clothing Appropriate for Age, and Clothing Appropriate for Weather   Thought Process:  Within Normal Limits   Thought Content:  Within Normal Limits   Insight:  Age Appropriate   Judgment: Fair   Suicidal Ideations: Passive, Without Plan, and Without Intent                2/12/2024    10:07 AM   PHQ-9    Little interest or pleasure in doing things 1   Feeling down, depressed, or hopeless 2   Trouble falling

## 2024-02-21 ENCOUNTER — NURSE ONLY (OUTPATIENT)
Dept: LAB | Age: 54
End: 2024-02-21

## 2024-02-21 DIAGNOSIS — R74.8 ABNORMAL TRANSAMINASES: ICD-10-CM

## 2024-02-21 DIAGNOSIS — Z79.4 TYPE 2 DIABETES MELLITUS WITH OTHER SPECIFIED COMPLICATION, WITH LONG-TERM CURRENT USE OF INSULIN (HCC): ICD-10-CM

## 2024-02-21 DIAGNOSIS — E11.69 TYPE 2 DIABETES MELLITUS WITH OTHER SPECIFIED COMPLICATION, WITH LONG-TERM CURRENT USE OF INSULIN (HCC): ICD-10-CM

## 2024-02-21 LAB
ALBUMIN SERPL BCG-MCNC: 4.8 G/DL (ref 3.5–5.1)
ALP SERPL-CCNC: 84 U/L (ref 38–126)
ALT SERPL W/O P-5'-P-CCNC: 24 U/L (ref 11–66)
ANION GAP SERPL CALC-SCNC: 14 MEQ/L (ref 8–16)
AST SERPL-CCNC: 21 U/L (ref 5–40)
BASOPHILS ABSOLUTE: 0.1 THOU/MM3 (ref 0–0.1)
BASOPHILS NFR BLD AUTO: 0.8 %
BILIRUB SERPL-MCNC: 0.2 MG/DL (ref 0.3–1.2)
BUN SERPL-MCNC: 8 MG/DL (ref 7–22)
CALCIUM SERPL-MCNC: 9.6 MG/DL (ref 8.5–10.5)
CHLORIDE SERPL-SCNC: 106 MEQ/L (ref 98–111)
CHOLEST SERPL-MCNC: 180 MG/DL (ref 100–199)
CO2 SERPL-SCNC: 24 MEQ/L (ref 23–33)
CREAT SERPL-MCNC: 1.1 MG/DL (ref 0.4–1.2)
CREAT UR-MCNC: 118.3 MG/DL
DEPRECATED RDW RBC AUTO: 46 FL (ref 35–45)
EOSINOPHIL NFR BLD AUTO: 3.6 %
EOSINOPHILS ABSOLUTE: 0.3 THOU/MM3 (ref 0–0.4)
ERYTHROCYTE [DISTWIDTH] IN BLOOD BY AUTOMATED COUNT: 14.2 % (ref 11.5–14.5)
GFR SERPL CREATININE-BSD FRML MDRD: > 60 ML/MIN/1.73M2
GLUCOSE SERPL-MCNC: 81 MG/DL (ref 70–108)
HCT VFR BLD AUTO: 45.2 % (ref 42–52)
HDLC SERPL-MCNC: 35 MG/DL
HGB BLD-MCNC: 14.3 GM/DL (ref 14–18)
IMM GRANULOCYTES # BLD AUTO: 0.04 THOU/MM3 (ref 0–0.07)
IMM GRANULOCYTES NFR BLD AUTO: 0.4 %
LDLC SERPL CALC-MCNC: 95 MG/DL
LYMPHOCYTES ABSOLUTE: 2.6 THOU/MM3 (ref 1–4.8)
LYMPHOCYTES NFR BLD AUTO: 29.1 %
MCH RBC QN AUTO: 28.3 PG (ref 26–33)
MCHC RBC AUTO-ENTMCNC: 31.6 GM/DL (ref 32.2–35.5)
MCV RBC AUTO: 89.5 FL (ref 80–94)
MICROALBUMIN UR-MCNC: < 1.2 MG/DL
MICROALBUMIN/CREAT RATIO PNL UR: 10 MG/G (ref 0–30)
MONOCYTES ABSOLUTE: 0.7 THOU/MM3 (ref 0.4–1.3)
MONOCYTES NFR BLD AUTO: 7.8 %
NEUTROPHILS NFR BLD AUTO: 58.3 %
NRBC BLD AUTO-RTO: 0 /100 WBC
PLATELET # BLD AUTO: 173 THOU/MM3 (ref 130–400)
PMV BLD AUTO: 11.4 FL (ref 9.4–12.4)
POTASSIUM SERPL-SCNC: 4.1 MEQ/L (ref 3.5–5.2)
PROT SERPL-MCNC: 7.6 G/DL (ref 6.1–8)
RBC # BLD AUTO: 5.05 MILL/MM3 (ref 4.7–6.1)
SEGMENTED NEUTROPHILS ABSOLUTE COUNT: 5.3 THOU/MM3 (ref 1.8–7.7)
SODIUM SERPL-SCNC: 144 MEQ/L (ref 135–145)
TRIGL SERPL-MCNC: 251 MG/DL (ref 0–199)
TSH SERPL DL<=0.005 MIU/L-ACNC: 1.15 UIU/ML (ref 0.4–4.2)
WBC # BLD AUTO: 9.1 THOU/MM3 (ref 4.8–10.8)

## 2024-02-22 ENCOUNTER — TELEPHONE (OUTPATIENT)
Dept: FAMILY MEDICINE CLINIC | Age: 54
End: 2024-02-22

## 2024-02-22 NOTE — TELEPHONE ENCOUNTER
----- Message from Javon Aaron, DO sent at 2/21/2024  6:07 PM EST -----  Please let pt know that labs are stable and appropriate. Let me know if questions, thanks!

## 2024-03-04 DIAGNOSIS — E11.29 TYPE 2 DIABETES MELLITUS WITH MICROALBUMINURIA, WITHOUT LONG-TERM CURRENT USE OF INSULIN (HCC): ICD-10-CM

## 2024-03-04 DIAGNOSIS — R80.9 TYPE 2 DIABETES MELLITUS WITH MICROALBUMINURIA, WITHOUT LONG-TERM CURRENT USE OF INSULIN (HCC): ICD-10-CM

## 2024-03-04 RX ORDER — TITANIUM DIOXIDE, OCTINOXATE 1.94; 1.8 MG/G; MG/G
POWDER TOPICAL
Qty: 100 EACH | Refills: 3 | Status: SHIPPED | OUTPATIENT
Start: 2024-03-04

## 2024-03-04 RX ORDER — INSULIN GLARGINE 100 [IU]/ML
65 INJECTION, SOLUTION SUBCUTANEOUS 2 TIMES DAILY
Qty: 117 ML | Refills: 3 | Status: SHIPPED | OUTPATIENT
Start: 2024-03-04

## 2024-03-04 RX ORDER — LURASIDONE HYDROCHLORIDE 20 MG/1
20 TABLET, FILM COATED ORAL
Qty: 30 TABLET | Refills: 2 | Status: SHIPPED | OUTPATIENT
Start: 2024-03-04

## 2024-03-04 RX ORDER — HYDROXYZINE 50 MG/1
50-100 TABLET, FILM COATED ORAL NIGHTLY PRN
Qty: 60 TABLET | Refills: 2 | Status: SHIPPED | OUTPATIENT
Start: 2024-03-04

## 2024-03-04 NOTE — TELEPHONE ENCOUNTER
Az called into the office stating that he needs a couple medication refills for Latuda 20mg;#30 with 2 refills;last with a start date of 11/01/23 and Hydroxyzine 50mg;#60 with 0 refill;last with a start date of 02/1/24.    Medications are pending your approval for a 30 day supply with 2 refills. Please advise otherwise. He is scheduled to return on 05/08/24. Last seen on 02/12/24.    *Spoke with Cris at [facility] due to the electronic transmission Rxs; notes that non controlled medications are being received but controlled medications are \"hit and miss\" when confirming Rx.

## 2024-03-13 ENCOUNTER — TELEPHONE (OUTPATIENT)
Dept: PSYCHIATRY | Age: 54
End: 2024-03-13

## 2024-03-13 DIAGNOSIS — E11.29 TYPE 2 DIABETES MELLITUS WITH MICROALBUMINURIA, WITHOUT LONG-TERM CURRENT USE OF INSULIN (HCC): Chronic | ICD-10-CM

## 2024-03-13 DIAGNOSIS — R80.9 TYPE 2 DIABETES MELLITUS WITH MICROALBUMINURIA, WITHOUT LONG-TERM CURRENT USE OF INSULIN (HCC): Chronic | ICD-10-CM

## 2024-03-13 NOTE — TELEPHONE ENCOUNTER
Recent Visits  Date Type Provider Dept   02/12/24 Office Visit aJvon Aaron, DO [facility]   01/05/24 Office Visit Javon Aaron, DO [facility]   08/09/23 Office Visit Javon Aaron, DO [facility]   05/09/23 Office Visit Javon Aaron, DO [facility]   03/29/23 Office Visit Javon Aaron, DO [facility]   03/01/23 Office Visit Javon Aaron, DO [facility]   11/23/22 Office Visit Javon Aaron, DO [facility]   Showing recent visits within past 540 days with a meds authorizing provider and meeting all other requirements  Future Appointments  No visits were found meeting these conditions.  Showing future appointments within next 150 days with a meds authorizing provider and meeting all other requirements

## 2024-03-13 NOTE — TELEPHONE ENCOUNTER
Noted. Agree with recommendation to proceed to nearest ED or call 911 if suicidal thoughts worsen or intent or plan develop. Will plan to discuss Concerta at next visit.    Dr. Soria

## 2024-03-13 NOTE — TELEPHONE ENCOUNTER
Patient called to provide an update. Reports his Concerta was d/c approximately one month ago. Patient reports he was advised to follow up with provider after being off of medication for a month. Patient reports he is experiencing difficulty thinking clearly, \"in a fog\", \"space out\". Patient reports these symptoms started back up shortly after stopping the Concerta.     Patient reports \"all of the reasons he took me off of it in the first place hasn't changed\". Reports he was advised to d/c Concerta due to \"the voices\". Patient reports the voices are the same, have not worsened. Patient reports he occasionally has thoughts of self-harm. Denies any plan or intent. Reports having a lot of depressive thoughts and anger. Patient states provider is aware of his thoughts and this has been ongoing. Patient reports having passive SI thoughts a couple of days a week. Reports thoughts have not worsened, just stayed the same. Patient states \"when I space out is when I have the most thoughts\".     Patient states his two daughters reside in the home with him. Reports he is able to keep himself safe, denies plan or intent on harming self. Patient was advised to present IMMEDIATELY to the ER if he develops any plan or intent. Patient agreed and voiced understanding. He has been scheduled for a sooner appointment 03/18/2024 at 8:30 AM on [facility].     Patient would like scripts sent to [facility] pharmacy on [address] if provider recommends medication changes prior to upcoming appointment. Routing as high priority. Please advise.

## 2024-03-18 ENCOUNTER — TELEMEDICINE (OUTPATIENT)
Dept: PSYCHIATRY | Age: 54
End: 2024-03-18
Payer: COMMERCIAL

## 2024-03-18 DIAGNOSIS — F90.8 ADHD, ADULT RESIDUAL TYPE: ICD-10-CM

## 2024-03-18 DIAGNOSIS — F25.0 SCHIZOAFFECTIVE DISORDER, BIPOLAR TYPE (HCC): Primary | ICD-10-CM

## 2024-03-18 DIAGNOSIS — G24.01 TARDIVE DYSKINESIA: ICD-10-CM

## 2024-03-18 PROCEDURE — G8427 DOCREV CUR MEDS BY ELIG CLIN: HCPCS | Performed by: PSYCHIATRY & NEUROLOGY

## 2024-03-18 PROCEDURE — 3017F COLORECTAL CA SCREEN DOC REV: CPT | Performed by: PSYCHIATRY & NEUROLOGY

## 2024-03-18 PROCEDURE — 99214 OFFICE O/P EST MOD 30 MIN: CPT | Performed by: PSYCHIATRY & NEUROLOGY

## 2024-03-18 RX ORDER — METHYLPHENIDATE HYDROCHLORIDE 18 MG/1
18 TABLET ORAL DAILY
Qty: 30 TABLET | Refills: 0 | Status: SHIPPED | OUTPATIENT
Start: 2024-03-18 | End: 2024-04-17

## 2024-03-18 ASSESSMENT — PATIENT HEALTH QUESTIONNAIRE - PHQ9
SUM OF ALL RESPONSES TO PHQ9 QUESTIONS 1 & 2: 4
8. MOVING OR SPEAKING SO SLOWLY THAT OTHER PEOPLE COULD HAVE NOTICED. OR THE OPPOSITE, BEING SO FIGETY OR RESTLESS THAT YOU HAVE BEEN MOVING AROUND A LOT MORE THAN USUAL: NEARLY EVERY DAY
2. FEELING DOWN, DEPRESSED OR HOPELESS: MORE THAN HALF THE DAYS
10. IF YOU CHECKED OFF ANY PROBLEMS, HOW DIFFICULT HAVE THESE PROBLEMS MADE IT FOR YOU TO DO YOUR WORK, TAKE CARE OF THINGS AT HOME, OR GET ALONG WITH OTHER PEOPLE: VERY DIFFICULT
6. FEELING BAD ABOUT YOURSELF - OR THAT YOU ARE A FAILURE OR HAVE LET YOURSELF OR YOUR FAMILY DOWN: MORE THAN HALF THE DAYS
8. MOVING OR SPEAKING SO SLOWLY THAT OTHER PEOPLE COULD HAVE NOTICED. OR THE OPPOSITE - BEING SO FIDGETY OR RESTLESS THAT YOU HAVE BEEN MOVING AROUND A LOT MORE THAN USUAL: NEARLY EVERY DAY
7. TROUBLE CONCENTRATING ON THINGS, SUCH AS READING THE NEWSPAPER OR WATCHING TELEVISION: NEARLY EVERY DAY
SUM OF ALL RESPONSES TO PHQ QUESTIONS 1-9: 21
SUM OF ALL RESPONSES TO PHQ QUESTIONS 1-9: 20
1. LITTLE INTEREST OR PLEASURE IN DOING THINGS: MORE THAN HALF THE DAYS
6. FEELING BAD ABOUT YOURSELF - OR THAT YOU ARE A FAILURE OR HAVE LET YOURSELF OR YOUR FAMILY DOWN: MORE THAN HALF THE DAYS
5. POOR APPETITE OR OVEREATING: MORE THAN HALF THE DAYS
1. LITTLE INTEREST OR PLEASURE IN DOING THINGS: MORE THAN HALF THE DAYS
SUM OF ALL RESPONSES TO PHQ QUESTIONS 1-9: 21
9. THOUGHTS THAT YOU WOULD BE BETTER OFF DEAD, OR OF HURTING YOURSELF: SEVERAL DAYS
4. FEELING TIRED OR HAVING LITTLE ENERGY: NEARLY EVERY DAY
10. IF YOU CHECKED OFF ANY PROBLEMS, HOW DIFFICULT HAVE THESE PROBLEMS MADE IT FOR YOU TO DO YOUR WORK, TAKE CARE OF THINGS AT HOME, OR GET ALONG WITH OTHER PEOPLE: VERY DIFFICULT
3. TROUBLE FALLING OR STAYING ASLEEP: NEARLY EVERY DAY
5. POOR APPETITE OR OVEREATING: MORE THAN HALF THE DAYS
3. TROUBLE FALLING OR STAYING ASLEEP: NEARLY EVERY DAY
9. THOUGHTS THAT YOU WOULD BE BETTER OFF DEAD, OR OF HURTING YOURSELF: SEVERAL DAYS
7. TROUBLE CONCENTRATING ON THINGS, SUCH AS READING THE NEWSPAPER OR WATCHING TELEVISION: NEARLY EVERY DAY
4. FEELING TIRED OR HAVING LITTLE ENERGY: NEARLY EVERY DAY
2. FEELING DOWN, DEPRESSED OR HOPELESS: MORE THAN HALF THE DAYS

## 2024-03-18 ASSESSMENT — ANXIETY QUESTIONNAIRES
IF YOU CHECKED OFF ANY PROBLEMS ON THIS QUESTIONNAIRE, HOW DIFFICULT HAVE THESE PROBLEMS MADE IT FOR YOU TO DO YOUR WORK, TAKE CARE OF THINGS AT HOME, OR GET ALONG WITH OTHER PEOPLE: VERY DIFFICULT
3. WORRYING TOO MUCH ABOUT DIFFERENT THINGS: MORE THAN HALF THE DAYS
GAD7 TOTAL SCORE: 15
2. NOT BEING ABLE TO STOP OR CONTROL WORRYING: MORE THAN HALF THE DAYS
7. FEELING AFRAID AS IF SOMETHING AWFUL MIGHT HAPPEN: NEARLY EVERY DAY
4. TROUBLE RELAXING: MORE THAN HALF THE DAYS
5. BEING SO RESTLESS THAT IT IS HARD TO SIT STILL: MORE THAN HALF THE DAYS
5. BEING SO RESTLESS THAT IT IS HARD TO SIT STILL: MORE THAN HALF THE DAYS
4. TROUBLE RELAXING: MORE THAN HALF THE DAYS
6. BECOMING EASILY ANNOYED OR IRRITABLE: MORE THAN HALF THE DAYS
2. NOT BEING ABLE TO STOP OR CONTROL WORRYING: MORE THAN HALF THE DAYS
3. WORRYING TOO MUCH ABOUT DIFFERENT THINGS: MORE THAN HALF THE DAYS
1. FEELING NERVOUS, ANXIOUS, OR ON EDGE: MORE THAN HALF THE DAYS
1. FEELING NERVOUS, ANXIOUS, OR ON EDGE: MORE THAN HALF THE DAYS
6. BECOMING EASILY ANNOYED OR IRRITABLE: MORE THAN HALF THE DAYS
IF YOU CHECKED OFF ANY PROBLEMS ON THIS QUESTIONNAIRE, HOW DIFFICULT HAVE THESE PROBLEMS MADE IT FOR YOU TO DO YOUR WORK, TAKE CARE OF THINGS AT HOME, OR GET ALONG WITH OTHER PEOPLE: VERY DIFFICULT
7. FEELING AFRAID AS IF SOMETHING AWFUL MIGHT HAPPEN: NEARLY EVERY DAY

## 2024-03-18 NOTE — PROGRESS NOTES
risk present today.  Patient was future oriented.  Endorses intermittent passive suicidal thoughts but no intent or plan.  Family was present and did not express any acute safety concerns.  No access to guns.  Safety planning was completed.      Medical Diagnoses:  Patient Active Problem List   Diagnosis    ED (erectile dysfunction) of organic origin    S/P cardiac catheterization: 9/30/2013: Normal coronaries    GERD (gastroesophageal reflux disease)    Dysphagia s/p dilation.    CAD (coronary artery disease)    History of TIA (transient ischemic attack)    Hypertension    Fibromyalgia    Depression    History of colon polyps    Chronic back pain    ADHD (attention deficit hyperactivity disorder)    Former smoker    Peripheral neuropathy    Tarsal tunnel syndrome    Benign esophageal stricture    Hyperlipidemia    Allergic rhinitis    Migraine headache    Bipolar 1 disorder (HCC)    Pulmonary nodule    Short-segment Vásquez's esophagus    Hypertriglyceridemia    Obstructive sleep apnea on CPAP    Morbid obesity (HCC)    Benign prostatic hyperplasia    DM2 (diabetes mellitus, type 2) ([facility])    Normocytic anemia    Closed fracture of right tibial plateau    ADHD, adult residual type    WESLEY (nonalcoholic steatohepatitis)    Schizoaffective disorder, bipolar type ([facility])     Psychiatric Diagnoses:    ICD-10-CM    1. Schizoaffective disorder, bipolar type (HCC)  F25.0       2. Tardive dyskinesia  G24.01       3. ADHD, adult residual type  F90.8                 Plan:    Medications:  Ingrezza 80mg daily  Latuda 140mg daily with food. Discussed at least 350kcals needed.  Lamictal 150mg BID. Risks of SJS reviewed  Trintellix 20mg daily  Stop Concerta  Hydroxyzine 50-100mg nightly PRN for sleep  Labs: None  Crisis plan reviewed and patient verbally contracts for safety.  Go to ED with emergent symptoms or safety concerns.  Risks, benefits, side effects of medications, including any / all black box warnings, discussed with

## 2024-03-18 NOTE — PATIENT INSTRUCTIONS
Plan:  Az Long, it was nice seeing you today  We will resume Concerta but monitor closely for side effects  Continue all other medications  Reach out with questions  Follow up in 4 weeks      -Please take medications as prescribed  -Refrain from alcohol or drug use  -Seek emergency help via the emergency and/or calling 911 should symptoms become severe, worsen, or with other concerning symptoms.Go immediately to the emergency room and/or call 911 with any suicidal or homicidal ideations or if audio/visual hallucinations develop.   -Contact office with any questions or concerns.      Crisis phone numbers:  988-national suicide hotline, call or text  [facility], [facility] [phone].  [facility] [phone]  [facility] [phone].  [facility] [phone].  [facility] [phone].  [facility] [phone].

## 2024-03-29 DIAGNOSIS — N40.1 BENIGN PROSTATIC HYPERPLASIA WITH LOWER URINARY TRACT SYMPTOMS, SYMPTOM DETAILS UNSPECIFIED: ICD-10-CM

## 2024-03-29 DIAGNOSIS — N32.81 OAB (OVERACTIVE BLADDER): ICD-10-CM

## 2024-04-01 RX ORDER — TROSPIUM CHLORIDE ER 60 MG/1
CAPSULE ORAL
Qty: 90 CAPSULE | Refills: 1 | Status: SHIPPED | OUTPATIENT
Start: 2024-04-01

## 2024-04-01 NOTE — TELEPHONE ENCOUNTER
Az Long called requesting a refill on the following medications:  Requested Prescriptions     Pending Prescriptions Disp Refills    trospium (SANCTURA) 60 MG CP24 extended release capsule [Pharmacy Med Name: Trospium Chloride ER Oral Capsule Extended Release 24 Hour 60 MG] 90 capsule 0     Sig: TAKE 1 CAPSULE BY MOUTH AT NIGHT     Pharmacy verified:  [facility]      Date of last visit: 09/11/2023  Date of next visit (if applicable): 9/11/2024

## 2024-04-16 NOTE — TELEPHONE ENCOUNTER
Patient called in requesting a refill of the following medication:    Vortloxetine HBr 20mg  #30 with 2 refills to the [facility]. Previous prescription was sent on 12/29/23 for #30 with 2 refills.    Last visit 03/18/24  Next visit 04/24/24    Pending your approval

## 2024-04-21 DIAGNOSIS — R13.14 PHARYNGOESOPHAGEAL DYSPHAGIA: ICD-10-CM

## 2024-04-21 DIAGNOSIS — R10.13 DYSPEPSIA: ICD-10-CM

## 2024-04-21 DIAGNOSIS — R10.13 EPIGASTRIC PAIN: ICD-10-CM

## 2024-04-21 DIAGNOSIS — K21.9 GASTROESOPHAGEAL REFLUX DISEASE: ICD-10-CM

## 2024-04-22 RX ORDER — OMEPRAZOLE 20 MG/1
CAPSULE, DELAYED RELEASE ORAL
Qty: 180 CAPSULE | Refills: 3 | Status: SHIPPED | OUTPATIENT
Start: 2024-04-22

## 2024-04-24 ENCOUNTER — OFFICE VISIT (OUTPATIENT)
Dept: PSYCHIATRY | Age: 54
End: 2024-04-24

## 2024-04-24 VITALS
WEIGHT: 230 LBS | HEART RATE: 76 BPM | DIASTOLIC BLOOD PRESSURE: 96 MMHG | BODY MASS INDEX: 36.01 KG/M2 | SYSTOLIC BLOOD PRESSURE: 160 MMHG

## 2024-04-24 DIAGNOSIS — F17.210 TOBACCO DEPENDENCE DUE TO CIGARETTES: ICD-10-CM

## 2024-04-24 DIAGNOSIS — F25.0 SCHIZOAFFECTIVE DISORDER, BIPOLAR TYPE (HCC): Primary | ICD-10-CM

## 2024-04-24 DIAGNOSIS — G47.00 INSOMNIA, UNSPECIFIED TYPE: ICD-10-CM

## 2024-04-24 DIAGNOSIS — G24.01 TARDIVE DYSKINESIA: ICD-10-CM

## 2024-04-24 DIAGNOSIS — F90.8 ADHD, ADULT RESIDUAL TYPE: ICD-10-CM

## 2024-04-24 RX ORDER — LAMOTRIGINE 150 MG/1
TABLET ORAL
Qty: 60 TABLET | Refills: 2 | Status: SHIPPED | OUTPATIENT
Start: 2024-04-24

## 2024-04-24 RX ORDER — LURASIDONE HYDROCHLORIDE 120 MG/1
TABLET, FILM COATED ORAL
Qty: 30 TABLET | Refills: 3 | Status: SHIPPED | OUTPATIENT
Start: 2024-04-24

## 2024-04-24 RX ORDER — RAMELTEON 8 MG/1
8 TABLET ORAL NIGHTLY PRN
Qty: 30 TABLET | Refills: 2 | Status: SHIPPED | OUTPATIENT
Start: 2024-04-24 | End: 2024-07-23

## 2024-04-24 RX ORDER — METHYLPHENIDATE HYDROCHLORIDE 27 MG/1
27 TABLET ORAL DAILY
Qty: 30 TABLET | Refills: 0 | Status: SHIPPED | OUTPATIENT
Start: 2024-04-24 | End: 2024-05-24

## 2024-04-24 ASSESSMENT — PATIENT HEALTH QUESTIONNAIRE - PHQ9
8. MOVING OR SPEAKING SO SLOWLY THAT OTHER PEOPLE COULD HAVE NOTICED. OR THE OPPOSITE, BEING SO FIGETY OR RESTLESS THAT YOU HAVE BEEN MOVING AROUND A LOT MORE THAN USUAL: SEVERAL DAYS
3. TROUBLE FALLING OR STAYING ASLEEP: NEARLY EVERY DAY
1. LITTLE INTEREST OR PLEASURE IN DOING THINGS: MORE THAN HALF THE DAYS
SUM OF ALL RESPONSES TO PHQ QUESTIONS 1-9: 19
5. POOR APPETITE OR OVEREATING: MORE THAN HALF THE DAYS
10. IF YOU CHECKED OFF ANY PROBLEMS, HOW DIFFICULT HAVE THESE PROBLEMS MADE IT FOR YOU TO DO YOUR WORK, TAKE CARE OF THINGS AT HOME, OR GET ALONG WITH OTHER PEOPLE: EXTREMELY DIFFICULT
SUM OF ALL RESPONSES TO PHQ QUESTIONS 1-9: 19
9. THOUGHTS THAT YOU WOULD BE BETTER OFF DEAD, OR OF HURTING YOURSELF: SEVERAL DAYS
SUM OF ALL RESPONSES TO PHQ QUESTIONS 1-9: 18
7. TROUBLE CONCENTRATING ON THINGS, SUCH AS READING THE NEWSPAPER OR WATCHING TELEVISION: NEARLY EVERY DAY
2. FEELING DOWN, DEPRESSED OR HOPELESS: MORE THAN HALF THE DAYS
SUM OF ALL RESPONSES TO PHQ9 QUESTIONS 1 & 2: 4
SUM OF ALL RESPONSES TO PHQ QUESTIONS 1-9: 19
6. FEELING BAD ABOUT YOURSELF - OR THAT YOU ARE A FAILURE OR HAVE LET YOURSELF OR YOUR FAMILY DOWN: MORE THAN HALF THE DAYS
4. FEELING TIRED OR HAVING LITTLE ENERGY: NEARLY EVERY DAY

## 2024-04-24 ASSESSMENT — ANXIETY QUESTIONNAIRES
1. FEELING NERVOUS, ANXIOUS, OR ON EDGE: NEARLY EVERY DAY
6. BECOMING EASILY ANNOYED OR IRRITABLE: NEARLY EVERY DAY
2. NOT BEING ABLE TO STOP OR CONTROL WORRYING: MORE THAN HALF THE DAYS
3. WORRYING TOO MUCH ABOUT DIFFERENT THINGS: MORE THAN HALF THE DAYS
5. BEING SO RESTLESS THAT IT IS HARD TO SIT STILL: SEVERAL DAYS
4. TROUBLE RELAXING: NEARLY EVERY DAY
7. FEELING AFRAID AS IF SOMETHING AWFUL MIGHT HAPPEN: NEARLY EVERY DAY
GAD7 TOTAL SCORE: 17

## 2024-04-24 NOTE — PROGRESS NOTES
[facility] [place] [facility]  [facility] PSYCHIATRY  [address] [address] [phone]    Progress Note    Patient:  Az Long  YOB: 1970  PCP:  Javon Aaron DO  Visit Date:  4/24/2024      CC: Follow up for medication management and psychotherapy      SUBJECTIVE:      Az Long, a 53 y.o. male, presents for a follow up visit.  Patient reports things have not been going that well.  Still financial and housing struggles.  However, is hopeful they are going to close on a house soon.  We processed through the stressors today.  Patient reports restarting Concerta was mildly helpful but still having some fogginess.  Would like to try to increase back up to 27 mg dosing, which she was on in the past.  Since restarting Concerta he denies any worsening hallucinations, chest pain, palpitations or other side effects.  Has had some issues with sleep secondary to racing thoughts.  Continues to tolerate all other medications without issues.  Continues to report Latuda has been very beneficial.  Has chronic auditory hallucinations but reports they have not worsened.  Denies that they are command in nature.  Continues to have symptoms of TD but reports these have improved since starting Ingrezza.  Aims today was 9.  Reports some intermittent passive suicidal thoughts that are chronic but denies any intent or plan.  Future oriented.    16 minutes was spent in supportive psychotherapy.  Focus was on stressors.          OBJECTIVE:      3/18/2024     7:05 AM   Patient-Reported Vitals   Patient-Reported Weight 229   Patient-Reported Height 5'7\"   Patient-Reported Systolic 123 mmHg   Patient-Reported Diastolic 80 mmHg   Patient-Reported Pulse 88   Patient-Reported Temperature 98.4          MENTAL STATUS EXAM:  Orientation: Alert, oriented, thought content appropriate   Mood:. \"Not great\"      Affect:  Constricted      Appearance:  Age Appropriate,

## 2024-05-14 DIAGNOSIS — G24.01 TARDIVE DYSKINESIA: ICD-10-CM

## 2024-05-14 RX ORDER — VALBENAZINE 80 MG/1
1 CAPSULE ORAL DAILY
Qty: 30 CAPSULE | Refills: 2 | Status: SHIPPED | OUTPATIENT
Start: 2024-05-14

## 2024-05-14 NOTE — TELEPHONE ENCOUNTER
[facility] is requesting a medication refill on Az's behalf for Ingrezza 80mg;#30 with 2 refills;last with a start date of 01/15/24 and last refill date of 04/30/24. He is scheduled to return on 06/04/24 but not enough time to delivery.     He was last seen on 04/24/24. Medication is pending your approval for a 30 day supply with 2 refills. Please advise otherwise.

## 2024-06-04 ENCOUNTER — TELEMEDICINE (OUTPATIENT)
Dept: PSYCHIATRY | Age: 54
End: 2024-06-04
Payer: COMMERCIAL

## 2024-06-04 DIAGNOSIS — F17.210 TOBACCO DEPENDENCE DUE TO CIGARETTES: ICD-10-CM

## 2024-06-04 DIAGNOSIS — G47.00 INSOMNIA, UNSPECIFIED TYPE: ICD-10-CM

## 2024-06-04 DIAGNOSIS — F90.8 ADHD, ADULT RESIDUAL TYPE: ICD-10-CM

## 2024-06-04 DIAGNOSIS — F25.0 SCHIZOAFFECTIVE DISORDER, BIPOLAR TYPE (HCC): Primary | ICD-10-CM

## 2024-06-04 DIAGNOSIS — G24.01 TARDIVE DYSKINESIA: ICD-10-CM

## 2024-06-04 PROCEDURE — 99406 BEHAV CHNG SMOKING 3-10 MIN: CPT | Performed by: PSYCHIATRY & NEUROLOGY

## 2024-06-04 PROCEDURE — G8427 DOCREV CUR MEDS BY ELIG CLIN: HCPCS | Performed by: PSYCHIATRY & NEUROLOGY

## 2024-06-04 PROCEDURE — 3017F COLORECTAL CA SCREEN DOC REV: CPT | Performed by: PSYCHIATRY & NEUROLOGY

## 2024-06-04 PROCEDURE — 99214 OFFICE O/P EST MOD 30 MIN: CPT | Performed by: PSYCHIATRY & NEUROLOGY

## 2024-06-04 RX ORDER — METHYLPHENIDATE HYDROCHLORIDE 27 MG/1
27 TABLET ORAL DAILY
Qty: 30 TABLET | Refills: 0 | Status: SHIPPED | OUTPATIENT
Start: 2024-06-04 | End: 2024-07-04

## 2024-06-04 RX ORDER — LURASIDONE HYDROCHLORIDE 20 MG/1
20 TABLET, FILM COATED ORAL
Qty: 90 TABLET | Refills: 1 | Status: SHIPPED | OUTPATIENT
Start: 2024-06-04

## 2024-06-04 RX ORDER — LURASIDONE HYDROCHLORIDE 120 MG/1
TABLET, FILM COATED ORAL
Qty: 90 TABLET | Refills: 1 | Status: SHIPPED | OUTPATIENT
Start: 2024-06-04

## 2024-06-04 RX ORDER — LAMOTRIGINE 150 MG/1
TABLET ORAL
Qty: 180 TABLET | Refills: 1 | Status: SHIPPED | OUTPATIENT
Start: 2024-06-04

## 2024-06-04 ASSESSMENT — PATIENT HEALTH QUESTIONNAIRE - PHQ9
9. THOUGHTS THAT YOU WOULD BE BETTER OFF DEAD, OR OF HURTING YOURSELF: SEVERAL DAYS
6. FEELING BAD ABOUT YOURSELF - OR THAT YOU ARE A FAILURE OR HAVE LET YOURSELF OR YOUR FAMILY DOWN: MORE THAN HALF THE DAYS
8. MOVING OR SPEAKING SO SLOWLY THAT OTHER PEOPLE COULD HAVE NOTICED. OR THE OPPOSITE - BEING SO FIDGETY OR RESTLESS THAT YOU HAVE BEEN MOVING AROUND A LOT MORE THAN USUAL: SEVERAL DAYS
8. MOVING OR SPEAKING SO SLOWLY THAT OTHER PEOPLE COULD HAVE NOTICED. OR THE OPPOSITE, BEING SO FIGETY OR RESTLESS THAT YOU HAVE BEEN MOVING AROUND A LOT MORE THAN USUAL: SEVERAL DAYS
4. FEELING TIRED OR HAVING LITTLE ENERGY: MORE THAN HALF THE DAYS
1. LITTLE INTEREST OR PLEASURE IN DOING THINGS: MORE THAN HALF THE DAYS
3. TROUBLE FALLING OR STAYING ASLEEP: SEVERAL DAYS
SUM OF ALL RESPONSES TO PHQ QUESTIONS 1-9: 14
10. IF YOU CHECKED OFF ANY PROBLEMS, HOW DIFFICULT HAVE THESE PROBLEMS MADE IT FOR YOU TO DO YOUR WORK, TAKE CARE OF THINGS AT HOME, OR GET ALONG WITH OTHER PEOPLE: VERY DIFFICULT
2. FEELING DOWN, DEPRESSED OR HOPELESS: SEVERAL DAYS
5. POOR APPETITE OR OVEREATING: SEVERAL DAYS
3. TROUBLE FALLING OR STAYING ASLEEP: SEVERAL DAYS
5. POOR APPETITE OR OVEREATING: SEVERAL DAYS
7. TROUBLE CONCENTRATING ON THINGS, SUCH AS READING THE NEWSPAPER OR WATCHING TELEVISION: NEARLY EVERY DAY
7. TROUBLE CONCENTRATING ON THINGS, SUCH AS READING THE NEWSPAPER OR WATCHING TELEVISION: NEARLY EVERY DAY
6. FEELING BAD ABOUT YOURSELF - OR THAT YOU ARE A FAILURE OR HAVE LET YOURSELF OR YOUR FAMILY DOWN: MORE THAN HALF THE DAYS
SUM OF ALL RESPONSES TO PHQ QUESTIONS 1-9: 13
9. THOUGHTS THAT YOU WOULD BE BETTER OFF DEAD, OR OF HURTING YOURSELF: SEVERAL DAYS
10. IF YOU CHECKED OFF ANY PROBLEMS, HOW DIFFICULT HAVE THESE PROBLEMS MADE IT FOR YOU TO DO YOUR WORK, TAKE CARE OF THINGS AT HOME, OR GET ALONG WITH OTHER PEOPLE: VERY DIFFICULT
SUM OF ALL RESPONSES TO PHQ9 QUESTIONS 1 & 2: 3
2. FEELING DOWN, DEPRESSED OR HOPELESS: SEVERAL DAYS
SUM OF ALL RESPONSES TO PHQ QUESTIONS 1-9: 14
SUM OF ALL RESPONSES TO PHQ QUESTIONS 1-9: 14
1. LITTLE INTEREST OR PLEASURE IN DOING THINGS: MORE THAN HALF THE DAYS
SUM OF ALL RESPONSES TO PHQ QUESTIONS 1-9: 14
4. FEELING TIRED OR HAVING LITTLE ENERGY: MORE THAN HALF THE DAYS

## 2024-06-04 ASSESSMENT — ANXIETY QUESTIONNAIRES
4. TROUBLE RELAXING: MORE THAN HALF THE DAYS
7. FEELING AFRAID AS IF SOMETHING AWFUL MIGHT HAPPEN: MORE THAN HALF THE DAYS
GAD7 TOTAL SCORE: 11
2. NOT BEING ABLE TO STOP OR CONTROL WORRYING: SEVERAL DAYS
IF YOU CHECKED OFF ANY PROBLEMS ON THIS QUESTIONNAIRE, HOW DIFFICULT HAVE THESE PROBLEMS MADE IT FOR YOU TO DO YOUR WORK, TAKE CARE OF THINGS AT HOME, OR GET ALONG WITH OTHER PEOPLE: VERY DIFFICULT
4. TROUBLE RELAXING: MORE THAN HALF THE DAYS
IF YOU CHECKED OFF ANY PROBLEMS ON THIS QUESTIONNAIRE, HOW DIFFICULT HAVE THESE PROBLEMS MADE IT FOR YOU TO DO YOUR WORK, TAKE CARE OF THINGS AT HOME, OR GET ALONG WITH OTHER PEOPLE: VERY DIFFICULT
6. BECOMING EASILY ANNOYED OR IRRITABLE: MORE THAN HALF THE DAYS
2. NOT BEING ABLE TO STOP OR CONTROL WORRYING: SEVERAL DAYS
7. FEELING AFRAID AS IF SOMETHING AWFUL MIGHT HAPPEN: MORE THAN HALF THE DAYS
1. FEELING NERVOUS, ANXIOUS, OR ON EDGE: MORE THAN HALF THE DAYS
3. WORRYING TOO MUCH ABOUT DIFFERENT THINGS: SEVERAL DAYS
5. BEING SO RESTLESS THAT IT IS HARD TO SIT STILL: SEVERAL DAYS
1. FEELING NERVOUS, ANXIOUS, OR ON EDGE: MORE THAN HALF THE DAYS
5. BEING SO RESTLESS THAT IT IS HARD TO SIT STILL: SEVERAL DAYS
6. BECOMING EASILY ANNOYED OR IRRITABLE: MORE THAN HALF THE DAYS
3. WORRYING TOO MUCH ABOUT DIFFERENT THINGS: SEVERAL DAYS

## 2024-06-04 ASSESSMENT — COLUMBIA-SUICIDE SEVERITY RATING SCALE - C-SSRS
2. IN THE PAST MONTH, HAVE YOU ACTUALLY HAD ANY THOUGHTS OF KILLING YOURSELF?: NO
6. IN YOUR LIFETIME, HAVE YOU EVER DONE ANYTHING, STARTED TO DO ANYTHING, OR PREPARED TO DO ANYTHING TO END YOUR LIFE?: YES
7. DID THIS OCCUR IN THE LAST THREE MONTHS: NO
1. IN THE PAST MONTH, HAVE YOU WISHED YOU WERE DEAD OR WISHED YOU COULD GO TO SLEEP AND NOT WAKE UP?: YES

## 2024-06-04 NOTE — PATIENT INSTRUCTIONS
Plan:  Az Long, it was nice seeing you today  Continue current medications  Follow-up in 3 months but reach out sooner if needed      -Please take medications as prescribed  -Refrain from alcohol or drug use  -Seek emergency help via the emergency and/or calling 911 should symptoms become severe, worsen, or with other concerning symptoms.Go immediately to the emergency room and/or call 911 with any suicidal or homicidal ideations or if audio/visual hallucinations develop.   -Contact office with any questions or concerns.      Crisis phone numbers:  988-national suicide hotline, call or text  [facility] [phone].  [facility] [phone]  [facility] [phone].  [facility] [phone].  [facility] [phone].  [facility] [phone].

## 2024-06-04 NOTE — PROGRESS NOTES
[facility] [place] [facility]  [facility] PSYCHIATRY  [address] [address] [phone]    Progress Note    Patient:  Az Long  YOB: 1970  PCP:  Javon Aaron DO  Visit Date:  6/4/2024      CC: Follow up for medication management       SUBJECTIVE:      Az Long, a 53 y.o. male, presents for a follow up visit.  Reports continued housing stress as most recent home purchase fell through.  Has to wait till August for new opportunity.  Frustrated by this but feels he is managing the stress.  Feels like symptoms have improved some.  Tolerated increase and Concerta without issue.  Denies worsening in auditory hallucinations.  Continues to endorse chronic auditory hallucinations but denies that they are command in nature.  Reports suicidal thoughts have remained consistent, describing intermittent passive thoughts.  Adamantly denies any intent or plan.  Identifies his children as protective factors.  Reports tardive dyskinesia movements remained stable on Ingrezza.  Aims today was a 2.  Appetite is stable.  Sleep improved with ramelteon.    He reports smoking 5 to 6 cigarettes daily.  Continues to try to cut back.  Utilizing nicotine lozenge 8-10 times a day.  Denies side effects.  3 minutes was spent in tobacco cessation counseling.      OBJECTIVE:      6/4/2024     3:32 AM   Patient-Reported Vitals   Patient-Reported Weight 226   Patient-Reported Height 5'7\"   Patient-Reported Systolic 144 mmHg   Patient-Reported Diastolic 77 mmHg   Patient-Reported Pulse 87   Patient-Reported Temperature 98.4          MENTAL STATUS EXAM:  Orientation: Alert, oriented, thought content appropriate   Mood:. \"Good\"      Affect:  Blunted      Appearance:  Age Appropriate, Overweight, Clothing Appropriate for Age, and Clothing Appropriate for Weather   Thought Process:  Within Normal Limits   Thought Content:  Without evidence of internal stimulation.  Does

## 2024-06-14 ENCOUNTER — HOSPITAL ENCOUNTER (EMERGENCY)
Age: 54
Discharge: HOME | End: 2024-06-14
Payer: COMMERCIAL

## 2024-06-14 VITALS
DIASTOLIC BLOOD PRESSURE: 100 MMHG | RESPIRATION RATE: 18 BRPM | WEIGHT: 226 LBS | SYSTOLIC BLOOD PRESSURE: 182 MMHG | HEIGHT: 67 IN | OXYGEN SATURATION: 98 % | TEMPERATURE: 97.5 F | BODY MASS INDEX: 35.47 KG/M2 | HEART RATE: 78 BPM

## 2024-06-14 DIAGNOSIS — M27.2 HARD PALATE ABSCESS: Primary | ICD-10-CM

## 2024-06-14 PROCEDURE — 99213 OFFICE O/P EST LOW 20 MIN: CPT

## 2024-06-14 PROCEDURE — 99213 OFFICE O/P EST LOW 20 MIN: CPT | Performed by: NURSE PRACTITIONER

## 2024-06-14 RX ORDER — AMOXICILLIN AND CLAVULANATE POTASSIUM 875; 125 MG/1; MG/1
1 TABLET, FILM COATED ORAL 2 TIMES DAILY
Qty: 14 TABLET | Refills: 0 | Status: SHIPPED | OUTPATIENT
Start: 2024-06-14 | End: 2024-06-21

## 2024-06-14 RX ORDER — NAPROXEN 500 MG/1
500 TABLET ORAL 2 TIMES DAILY PRN
Qty: 20 TABLET | Refills: 0 | Status: SHIPPED | OUTPATIENT
Start: 2024-06-14 | End: 2024-06-24

## 2024-06-14 ASSESSMENT — PAIN SCALES - GENERAL: PAINLEVEL_OUTOF10: 10

## 2024-06-14 ASSESSMENT — PAIN - FUNCTIONAL ASSESSMENT
PAIN_FUNCTIONAL_ASSESSMENT: PREVENTS OR INTERFERES SOME ACTIVE ACTIVITIES AND ADLS
PAIN_FUNCTIONAL_ASSESSMENT: 0-10

## 2024-06-14 ASSESSMENT — PAIN DESCRIPTION - FREQUENCY: FREQUENCY: CONTINUOUS

## 2024-06-14 ASSESSMENT — PAIN DESCRIPTION - LOCATION: LOCATION: TEETH

## 2024-06-14 ASSESSMENT — PAIN DESCRIPTION - ORIENTATION: ORIENTATION: RIGHT;UPPER

## 2024-06-14 ASSESSMENT — PAIN DESCRIPTION - ONSET: ONSET: PROGRESSIVE

## 2024-06-14 ASSESSMENT — PAIN DESCRIPTION - PAIN TYPE: TYPE: CHRONIC PAIN

## 2024-06-14 ASSESSMENT — PAIN DESCRIPTION - DESCRIPTORS: DESCRIPTORS: ACHING

## 2024-06-14 NOTE — ED PROVIDER NOTES
[facility] URGENT CARE  UrgentCare Encounter      CHIEFCOMPLAINT       Chief Complaint   Patient presents with    Dental Problem     Upper right        Nurses Notes reviewed and I agree except as noted in the HPI.  HISTORY OF PRESENT ILLNESS   Az Long is a 53 y.o. male who presents to urgent care with complaints of dental pain to his right upper teeth.  Symptom onset was approximately 2 weeks ago.    REVIEW OF SYSTEMS     Review of Systems   HENT:  Positive for dental problem.        PAST MEDICAL HISTORY         Diagnosis Date    ADHD (attention deficit hyperactivity disorder)     Allergic rhinitis     Benign esophageal stricture     s/p dilation may 2014    Benign prostatic hyperplasia     Bipolar 1 disorder (HCC)     CAD (coronary artery disease)     Unclear history, cath 2013 with patent arteries. Records pending.     Chronic back pain     Depression     DM2 (diabetes mellitus, type 2) ([facility])     Dysfunctional voiding of urine 06/20/2013    Medications he changes to follow,  Adding ditropan  to the present schedule     ED (erectile dysfunction) of organic origin 05/01/2013    Fibromyalgia     Former smoker     GERD (gastroesophageal reflux disease)     History of colon polyps     Pt unsure when last colo.     History of TIA (transient ischemic attack)     x2     Hyperlipidemia     Hypertension     Hypertriglyceridemia 10/27/2015    Migraine headache     Morbid obesity (HCC)     WESLEY (nonalcoholic steatohepatitis)     Obstructive sleep apnea on CPAP 11/03/2015    Peripheral neuropathy     Pulmonary nodule     stable >2 years, no further w/u required.     Rotator cuff injury     Left side    S/P cardiac catheterization: 9/30/2013: Normal coronaries 09/30/2013 9/30/2013: Normal coronaries. Radial approach. Dr. Kelly    Short-segment Vásquez's esophagus 06/17/2015    Tarsal tunnel syndrome     Bilateral       SURGICAL HISTORY     Patient  has a past surgical history that includes knee surgery  Ear: Tympanic membrane normal.      Nose: No congestion or rhinorrhea.      Mouth/Throat:      Mouth: Mucous membranes are moist.      Pharynx: Oropharynx is clear. No pharyngeal swelling, posterior oropharyngeal erythema or uvula swelling.      Tonsils: 0 on the right. 0 on the left.        Comments: Patient has abscess to the right hard palate in the area illustrated above.  There are multiple broken teeth.  Multiple dental caries throughout.  Eyes:      Conjunctiva/sclera: Conjunctivae normal.      Pupils: Pupils are equal, round, and reactive to light.   Cardiovascular:      Rate and Rhythm: Normal rate and regular rhythm.      Heart sounds: No murmur heard.  Pulmonary:      Effort: Pulmonary effort is normal.      Breath sounds: Normal breath sounds. No wheezing.   Abdominal:      General: Bowel sounds are normal.      Palpations: Abdomen is soft.      Tenderness: There is no abdominal tenderness.   Musculoskeletal:      Cervical back: Normal range of motion and neck supple.   Lymphadenopathy:      Cervical: No cervical adenopathy.   Skin:     General: Skin is warm and dry.      Capillary Refill: Capillary refill takes less than 2 seconds.   Neurological:      General: No focal deficit present.      Mental Status: He is alert and oriented to person, place, and time.   Psychiatric:         Mood and Affect: Mood normal.         Behavior: Behavior normal.         DIAGNOSTIC RESULTS   Labs:No results found for this visit on 06/14/24.    IMAGING:  No orders to display     URGENT CARE COURSE:         Medications - No data to display  PROCEDURES:  FINALIMPRESSION      1. Hard palate abscess        DISPOSITION/PLAN   DISPOSITION Decision To Discharge 06/14/2024 07:15:44 PM    Findings consistent with dental abscess.  Will start patient on Augmentin.  Patient was advised with any increased swelling, pain, fever, headaches, facial swelling he needs to report directly to the emergency room.  He denies questions.

## 2024-06-14 NOTE — ED NOTES
Pt presents to [facility] by self with complaints of right upper dental problems x1 year. Patient reports his broken teeth have abscessed.      Bella Zacarias, RN  06/14/24 1908

## 2024-06-14 NOTE — DISCHARGE INSTRUCTIONS
Medications as prescribed.    Do not take naproxen with your  piroxicam (FELDENE)   Report to the emergency room with any worsening symptoms such as increased swelling, increased pain, facial swelling, redness.    Take your blood pressure medications as prescribed

## 2024-07-15 DIAGNOSIS — F90.8 ADHD, ADULT RESIDUAL TYPE: ICD-10-CM

## 2024-07-15 RX ORDER — METHYLPHENIDATE HYDROCHLORIDE 27 MG/1
27 TABLET ORAL DAILY
Qty: 30 TABLET | Refills: 0 | Status: SHIPPED | OUTPATIENT
Start: 2024-07-15 | End: 2024-08-14

## 2024-07-15 NOTE — TELEPHONE ENCOUNTER
Az Long called requesting a refill on the following medications:  Requested Prescriptions     Pending Prescriptions Disp Refills    methylphenidate (CONCERTA) 27 MG extended release tablet 30 tablet 0     Sig: Take 1 tablet by mouth daily for 30 days. Max Daily Amount: 27 mg       Date of last visit: 6/4/2024  Date of next visit (if applicable):8/27/2024  Pharmacy Name: Barbara Brandt MA

## 2024-07-23 DIAGNOSIS — G47.00 INSOMNIA, UNSPECIFIED TYPE: ICD-10-CM

## 2024-07-23 RX ORDER — RAMELTEON 8 MG/1
8 TABLET ORAL NIGHTLY PRN
Qty: 30 TABLET | Refills: 2 | Status: SHIPPED | OUTPATIENT
Start: 2024-07-23 | End: 2024-10-21

## 2024-07-23 NOTE — TELEPHONE ENCOUNTER
[facility] pharmacy is requesting a medication refill on Az's behalf for Rozerem 8mg;#30 with 2 refills;last with a start date of 04/24/24 and last refill date of 06/26/24.    Medication is pending your approval for a 30 day supply with 2 refills; he is scheduled to return on 08/27/24. Last seen on 06/04/24.

## 2024-08-05 DIAGNOSIS — G24.01 TARDIVE DYSKINESIA: ICD-10-CM

## 2024-08-05 RX ORDER — VALBENAZINE 80 MG/1
1 CAPSULE ORAL DAILY
Qty: 30 CAPSULE | Refills: 5 | Status: SHIPPED | OUTPATIENT
Start: 2024-08-05

## 2024-08-05 NOTE — TELEPHONE ENCOUNTER
[facility] mail order pharmacy is requesting a refill on the following medications:  Requested Prescriptions     Pending Prescriptions Disp Refills    INGREZZA 80 MG CAPS [Pharmacy Med Name: Ingrezza 80 mg capsule 80] 30 capsule 5     Sig: Take one capsule by mouth daily       Date of last visit: 6/4/2024  Date of next visit (if applicable):8/27/2024  Pharmacy Name: Barbara Roy MA

## 2024-08-07 ENCOUNTER — HOSPITAL ENCOUNTER (OUTPATIENT)
Dept: ULTRASOUND IMAGING | Age: 54
Discharge: HOME | End: 2024-08-07
Attending: FAMILY MEDICINE
Payer: COMMERCIAL

## 2024-08-07 DIAGNOSIS — R74.8 ABNORMAL TRANSAMINASES: ICD-10-CM

## 2024-08-07 DIAGNOSIS — K75.81 NASH (NONALCOHOLIC STEATOHEPATITIS): ICD-10-CM

## 2024-08-07 PROCEDURE — 76705 ECHO EXAM OF ABDOMEN: CPT

## 2024-08-07 PROCEDURE — 76981 USE PARENCHYMA: CPT

## 2024-08-08 ENCOUNTER — TELEPHONE (OUTPATIENT)
Dept: FAMILY MEDICINE CLINIC | Age: 54
End: 2024-08-08

## 2024-08-08 NOTE — TELEPHONE ENCOUNTER
----- Message from Javon Aaron, DO sent at 8/7/2024  6:20 PM EDT -----  Please let pt know that liver US shows fatty infiltration but otherwise ok  Fibroscan shows worsening fibrotic changes compared to last year  Will discuss at his f/u visit next wk  Let me know if questions, thanks!

## 2024-08-12 ENCOUNTER — OFFICE VISIT (OUTPATIENT)
Dept: FAMILY MEDICINE CLINIC | Age: 54
End: 2024-08-12
Payer: COMMERCIAL

## 2024-08-12 VITALS
RESPIRATION RATE: 18 BRPM | HEIGHT: 67 IN | DIASTOLIC BLOOD PRESSURE: 86 MMHG | SYSTOLIC BLOOD PRESSURE: 136 MMHG | OXYGEN SATURATION: 98 % | HEART RATE: 93 BPM | TEMPERATURE: 98.3 F | WEIGHT: 224.69 LBS | BODY MASS INDEX: 35.27 KG/M2

## 2024-08-12 DIAGNOSIS — E53.8 B12 DEFICIENCY: ICD-10-CM

## 2024-08-12 DIAGNOSIS — E66.9 OBESITY (BMI 30-39.9): ICD-10-CM

## 2024-08-12 DIAGNOSIS — Z79.4 TYPE 2 DIABETES MELLITUS WITH OTHER SPECIFIED COMPLICATION, WITH LONG-TERM CURRENT USE OF INSULIN (HCC): Primary | ICD-10-CM

## 2024-08-12 DIAGNOSIS — Z23 NEED FOR TDAP VACCINATION: ICD-10-CM

## 2024-08-12 DIAGNOSIS — K22.70 SHORT-SEGMENT BARRETT'S ESOPHAGUS: Chronic | ICD-10-CM

## 2024-08-12 DIAGNOSIS — K21.9 GASTROESOPHAGEAL REFLUX DISEASE, UNSPECIFIED WHETHER ESOPHAGITIS PRESENT: ICD-10-CM

## 2024-08-12 DIAGNOSIS — D50.9 IRON DEFICIENCY ANEMIA, UNSPECIFIED IRON DEFICIENCY ANEMIA TYPE: ICD-10-CM

## 2024-08-12 DIAGNOSIS — R74.8 ABNORMAL TRANSAMINASES: ICD-10-CM

## 2024-08-12 DIAGNOSIS — E78.2 MIXED HYPERLIPIDEMIA: ICD-10-CM

## 2024-08-12 DIAGNOSIS — F17.210 CIGARETTE NICOTINE DEPENDENCE WITHOUT COMPLICATION: ICD-10-CM

## 2024-08-12 DIAGNOSIS — K75.81 NASH (NONALCOHOLIC STEATOHEPATITIS): Chronic | ICD-10-CM

## 2024-08-12 DIAGNOSIS — E11.69 TYPE 2 DIABETES MELLITUS WITH OTHER SPECIFIED COMPLICATION, WITH LONG-TERM CURRENT USE OF INSULIN (HCC): Primary | ICD-10-CM

## 2024-08-12 DIAGNOSIS — I10 ESSENTIAL HYPERTENSION: ICD-10-CM

## 2024-08-12 LAB — HBA1C MFR BLD: 5.4 % (ref 4.3–5.7)

## 2024-08-12 PROCEDURE — G8417 CALC BMI ABV UP PARAM F/U: HCPCS | Performed by: FAMILY MEDICINE

## 2024-08-12 PROCEDURE — 3017F COLORECTAL CA SCREEN DOC REV: CPT | Performed by: FAMILY MEDICINE

## 2024-08-12 PROCEDURE — G8427 DOCREV CUR MEDS BY ELIG CLIN: HCPCS | Performed by: FAMILY MEDICINE

## 2024-08-12 PROCEDURE — 4004F PT TOBACCO SCREEN RCVD TLK: CPT | Performed by: FAMILY MEDICINE

## 2024-08-12 PROCEDURE — 3079F DIAST BP 80-89 MM HG: CPT | Performed by: FAMILY MEDICINE

## 2024-08-12 PROCEDURE — 99214 OFFICE O/P EST MOD 30 MIN: CPT | Performed by: FAMILY MEDICINE

## 2024-08-12 PROCEDURE — 3075F SYST BP GE 130 - 139MM HG: CPT | Performed by: FAMILY MEDICINE

## 2024-08-12 PROCEDURE — 90715 TDAP VACCINE 7 YRS/> IM: CPT | Performed by: FAMILY MEDICINE

## 2024-08-12 PROCEDURE — 3044F HG A1C LEVEL LT 7.0%: CPT | Performed by: FAMILY MEDICINE

## 2024-08-12 PROCEDURE — 2022F DILAT RTA XM EVC RTNOPTHY: CPT | Performed by: FAMILY MEDICINE

## 2024-08-12 RX ORDER — PRIMIDONE 50 MG/1
50 TABLET ORAL NIGHTLY
COMMUNITY
Start: 2024-06-11

## 2024-08-12 RX ORDER — ROPINIROLE 5 MG/1
5 TABLET, FILM COATED ORAL NIGHTLY
COMMUNITY
Start: 2024-07-23

## 2024-08-12 RX ORDER — NICOTINE 21 MG/24HR
1 PATCH, TRANSDERMAL 24 HOURS TRANSDERMAL DAILY
Qty: 42 PATCH | Refills: 0 | Status: SHIPPED | OUTPATIENT
Start: 2024-08-12 | End: 2024-09-23

## 2024-08-12 SDOH — ECONOMIC STABILITY: FOOD INSECURITY: WITHIN THE PAST 12 MONTHS, YOU WORRIED THAT YOUR FOOD WOULD RUN OUT BEFORE YOU GOT MONEY TO BUY MORE.: NEVER TRUE

## 2024-08-12 SDOH — ECONOMIC STABILITY: FOOD INSECURITY: WITHIN THE PAST 12 MONTHS, THE FOOD YOU BOUGHT JUST DIDN'T LAST AND YOU DIDN'T HAVE MONEY TO GET MORE.: NEVER TRUE

## 2024-08-12 SDOH — ECONOMIC STABILITY: INCOME INSECURITY: HOW HARD IS IT FOR YOU TO PAY FOR THE VERY BASICS LIKE FOOD, HOUSING, MEDICAL CARE, AND HEATING?: NOT HARD AT ALL

## 2024-08-12 NOTE — PROGRESS NOTES
Immunization(s) given during visit:    Immunizations Administered       Name Date Dose Route    TDaP, ADACEL (age 10y-64y), BOOSTRIX (age 10y+), IM, 0.5mL 8/12/2024 0.5 mL Intramuscular    Site: Deltoid- Right    Lot: [id number]    NDC: 93118-202-84            Most recent Vaccine Information Sheet dated 8.6.2021 given to pt    
times daily.       No current facility-administered medications for this visit.     Orders Placed This Encounter   Medications    nicotine (NICODERM CQ) 14 MG/24HR     Sig: Place 1 patch onto the skin daily     Dispense:  42 patch     Refill:  0    nicotine (NICODERM CQ) 7 MG/24HR     Sig: Place 1 patch onto the skin daily for 14 days     Dispense:  14 patch     Refill:  0    nicotine polacrilex (NICORETTE) 2 MG gum     Sig: Take 1 each by mouth as needed for Smoking cessation     Dispense:  110 each     Refill:  3       All medications reviewed and reconciled, including OTC and herbal medications. Updated list given to patient.       Patient Active Problem List    Diagnosis Date Noted    Obstructive sleep apnea on CPAP 11/03/2015     Priority: High    Schizoaffective disorder, bipolar type (HCC) 03/28/2023    WESLEY (nonalcoholic steatohepatitis)     ADHD, adult residual type 03/24/2020    Closed fracture of right tibial plateau 03/12/2019    Normocytic anemia 02/19/2019    DM2 (diabetes mellitus, type 2) (HCC)     Benign prostatic hyperplasia     Morbid obesity (HCC)     Hypertriglyceridemia 10/27/2015    Short-segment Vásquez's esophagus 06/17/2015     Following with [facility] for this.       Pulmonary nodule      stable >2 years, no further w/u required.       Bipolar 1 disorder (HCC)     CAD (coronary artery disease)      Unclear history, cath 2013 with patent arteries. Records pending.       History of TIA (transient ischemic attack)      x2      Hypertension     Fibromyalgia     Depression     History of colon polyps     Chronic back pain     ADHD (attention deficit hyperactivity disorder)     Nicotine dependence     Peripheral neuropathy     Tarsal tunnel syndrome     Benign esophageal stricture     Hyperlipidemia     Allergic rhinitis     Migraine headache     Dysphagia s/p dilation. 04/01/2014    GERD (gastroesophageal reflux disease) 10/15/2013    S/P cardiac catheterization: 9/30/2013: Normal coronaries

## 2024-08-14 DIAGNOSIS — F90.8 ADHD, ADULT RESIDUAL TYPE: ICD-10-CM

## 2024-08-14 RX ORDER — METHYLPHENIDATE HYDROCHLORIDE 27 MG/1
27 TABLET ORAL DAILY
Qty: 30 TABLET | Refills: 0 | Status: SHIPPED | OUTPATIENT
Start: 2024-08-14 | End: 2024-09-13

## 2024-08-14 NOTE — TELEPHONE ENCOUNTER
Az called into the office requesting a medication refill for Concerta 27mg;#30 with 0 refills;last with a start date of 07/15/24.    Medication is pending your approval for a 30 day supply with 0 refills; he is scheduled to return on 08/27/24. Last seen on 06/04/24.

## 2024-08-26 ENCOUNTER — OFFICE VISIT (OUTPATIENT)
Dept: PULMONOLOGY | Age: 54
End: 2024-08-26
Payer: COMMERCIAL

## 2024-08-26 VITALS
SYSTOLIC BLOOD PRESSURE: 134 MMHG | OXYGEN SATURATION: 94 % | BODY MASS INDEX: 35.16 KG/M2 | TEMPERATURE: 97.8 F | HEIGHT: 67 IN | WEIGHT: 224 LBS | HEART RATE: 94 BPM | DIASTOLIC BLOOD PRESSURE: 86 MMHG

## 2024-08-26 DIAGNOSIS — G47.61 PLMD (PERIODIC LIMB MOVEMENT DISORDER): ICD-10-CM

## 2024-08-26 DIAGNOSIS — E66.9 OBESITY (BMI 30-39.9): ICD-10-CM

## 2024-08-26 DIAGNOSIS — G47.33 OSA TREATED WITH BIPAP: Primary | ICD-10-CM

## 2024-08-26 DIAGNOSIS — G47.09 OTHER INSOMNIA: ICD-10-CM

## 2024-08-26 DIAGNOSIS — G47.10 HYPERSOMNIA: ICD-10-CM

## 2024-08-26 PROCEDURE — 3079F DIAST BP 80-89 MM HG: CPT | Performed by: PHYSICIAN ASSISTANT

## 2024-08-26 PROCEDURE — G8427 DOCREV CUR MEDS BY ELIG CLIN: HCPCS | Performed by: PHYSICIAN ASSISTANT

## 2024-08-26 PROCEDURE — 99214 OFFICE O/P EST MOD 30 MIN: CPT | Performed by: PHYSICIAN ASSISTANT

## 2024-08-26 PROCEDURE — 3017F COLORECTAL CA SCREEN DOC REV: CPT | Performed by: PHYSICIAN ASSISTANT

## 2024-08-26 PROCEDURE — 4004F PT TOBACCO SCREEN RCVD TLK: CPT | Performed by: PHYSICIAN ASSISTANT

## 2024-08-26 PROCEDURE — 3075F SYST BP GE 130 - 139MM HG: CPT | Performed by: PHYSICIAN ASSISTANT

## 2024-08-26 PROCEDURE — G8417 CALC BMI ABV UP PARAM F/U: HCPCS | Performed by: PHYSICIAN ASSISTANT

## 2024-08-26 ASSESSMENT — ENCOUNTER SYMPTOMS
ALLERGIC/IMMUNOLOGIC NEGATIVE: 1
BACK PAIN: 0
CHEST TIGHTNESS: 0
DIARRHEA: 0
WHEEZING: 0
SHORTNESS OF BREATH: 0
STRIDOR: 0
EYES NEGATIVE: 1
COUGH: 0
NAUSEA: 0

## 2024-08-26 NOTE — PROGRESS NOTES
[facility]      Az Long         [id number]  8/26/2024   Chief Complaint   Patient presents with    Follow-up     1 yr doreen         Pt of Dr. Lynda MONTGOMERY Download:   Original or initial AHI: 100.8     Date of initial study: 2003      Compliant  97%     Noncompliant 3 %     PAP Type spont Level  19/14   Avg Hrs/Day 11hr 34min  AHI: 0.1   Recorded compliance dates , 7.24.24  to 8.22.24   Machine/Mfg:   [x] ResMed    [] Respironics/Dreamstation   Interface:   [] Nasal    [] Nasal pillows   [x] FFM      Provider:      [x] [facility]     [][facility]     [] [employer]    [] [facility]    [] [facility]               [] [facility]      [] [employer]    [] [place]:      [] Other    Neck Size: 17  Mallampati 3  ESS:  9  SAQLI: 42    Here is a scan of the most recent download:                  Presentation:   Az presents for sleep medicine follow up for obstructive sleep apnea  Since the last visit, Az is doing ok with PAP. He is having insomnia and psych started him on Rozerem. He states it is not working. He takes Requip 5 mg at night for RLS per neurology.  He watches TV prior to bedtime.  He gets tired during the day.     Equipment issues:  The pressure is  acceptable, the mask is acceptable     Sleep issues:  Do you feel better? Yes and No  More rested?Sometimes   Better concentration? no    Progress History:   Since last visit any new medical issues? No  New ER or hospital visits? No      Review of Systems -   Review of Systems   Constitutional:  Negative for activity change, appetite change, chills and fever.   HENT:  Negative for congestion and postnasal drip.    Eyes: Negative.    Respiratory:  Negative for cough, chest tightness, shortness of breath, wheezing and stridor.    Cardiovascular:  Negative for chest pain and leg swelling.   Gastrointestinal:  Negative for diarrhea and nausea.   Endocrine: Negative.    Genitourinary: Negative.    Musculoskeletal: Negative.  Negative

## 2024-08-28 DIAGNOSIS — G47.00 INSOMNIA, UNSPECIFIED TYPE: ICD-10-CM

## 2024-08-28 RX ORDER — RAMELTEON 8 MG/1
8 TABLET ORAL NIGHTLY PRN
Qty: 30 TABLET | Refills: 5 | Status: SHIPPED | OUTPATIENT
Start: 2024-08-28

## 2024-08-28 NOTE — TELEPHONE ENCOUNTER
Az called into the office leaving a VM stating that [facility] is out of stock on the Ramelteon 8mg medication but [facility] has it in stock; he is asking that the Rx is sent there instead.    Per chart the last Rx waas sent to [facility] on 07/23/24 for a 30 day supply with 2 refills. Medication is pending your approval for a 30 day supply with 1 refill. Please advise otherwise. He is scheduled to return on 10/02/24. Last seen on 6/04/24.

## 2024-09-12 ENCOUNTER — LAB (OUTPATIENT)
Dept: LAB | Age: 54
End: 2024-09-12

## 2024-09-12 ENCOUNTER — TELEPHONE (OUTPATIENT)
Dept: FAMILY MEDICINE CLINIC | Age: 54
End: 2024-09-12

## 2024-09-12 DIAGNOSIS — K75.81 NASH (NONALCOHOLIC STEATOHEPATITIS): Chronic | ICD-10-CM

## 2024-09-12 DIAGNOSIS — R74.8 ABNORMAL TRANSAMINASES: ICD-10-CM

## 2024-09-12 LAB
ALBUMIN SERPL BCG-MCNC: 4.8 G/DL (ref 3.5–5.1)
ALP SERPL-CCNC: 76 U/L (ref 38–126)
ALT SERPL W/O P-5'-P-CCNC: 17 U/L (ref 11–66)
AST SERPL-CCNC: 19 U/L (ref 5–40)
BILIRUB CONJ SERPL-MCNC: 0.2 MG/DL (ref 0.1–13.8)
BILIRUB SERPL-MCNC: 0.4 MG/DL (ref 0.3–1.2)
PROT SERPL-MCNC: 7.4 G/DL (ref 6.1–8)

## 2024-09-17 ENCOUNTER — OFFICE VISIT (OUTPATIENT)
Dept: UROLOGY | Age: 54
End: 2024-09-17
Payer: COMMERCIAL

## 2024-09-17 VITALS — BODY MASS INDEX: 34.53 KG/M2 | WEIGHT: 220 LBS | RESPIRATION RATE: 16 BRPM | HEIGHT: 67 IN

## 2024-09-17 DIAGNOSIS — N32.81 OAB (OVERACTIVE BLADDER): ICD-10-CM

## 2024-09-17 DIAGNOSIS — Z12.5 PROSTATE CANCER SCREENING: ICD-10-CM

## 2024-09-17 DIAGNOSIS — N40.1 BENIGN PROSTATIC HYPERPLASIA WITH LOWER URINARY TRACT SYMPTOMS, SYMPTOM DETAILS UNSPECIFIED: Primary | ICD-10-CM

## 2024-09-17 LAB
BILIRUBIN, URINE: NEGATIVE
BLOOD URINE, POC: NEGATIVE
CHARACTER, URINE: CLEAR
COLOR, UA: YELLOW
GLUCOSE URINE: NEGATIVE MG/DL
KETONES, URINE: NEGATIVE
LEUKOCYTE CLUMPS, URINE: ABNORMAL
NITRITE, URINE: NEGATIVE
PH, URINE: 5.5 (ref 5–9)
POST VOID RESIDUAL (PVR): 7 ML
PROTEIN, URINE: NEGATIVE MG/DL
SPECIFIC GRAVITY UA: 1.02 (ref 1–1.03)
UROBILINOGEN, URINE: 0.2 EU/DL (ref 0–1)

## 2024-09-17 PROCEDURE — 4004F PT TOBACCO SCREEN RCVD TLK: CPT

## 2024-09-17 PROCEDURE — 99213 OFFICE O/P EST LOW 20 MIN: CPT

## 2024-09-17 PROCEDURE — G8427 DOCREV CUR MEDS BY ELIG CLIN: HCPCS

## 2024-09-17 PROCEDURE — G8417 CALC BMI ABV UP PARAM F/U: HCPCS

## 2024-09-17 PROCEDURE — 51798 US URINE CAPACITY MEASURE: CPT

## 2024-09-17 PROCEDURE — 81003 URINALYSIS AUTO W/O SCOPE: CPT

## 2024-09-17 PROCEDURE — 3017F COLORECTAL CA SCREEN DOC REV: CPT

## 2024-09-17 RX ORDER — FINASTERIDE 5 MG/1
5 TABLET, FILM COATED ORAL DAILY
Qty: 90 TABLET | Refills: 3 | Status: SHIPPED | OUTPATIENT
Start: 2024-09-17

## 2024-09-17 RX ORDER — TROSPIUM CHLORIDE ER 60 MG/1
60 CAPSULE ORAL DAILY
Qty: 90 CAPSULE | Refills: 3 | Status: SHIPPED | OUTPATIENT
Start: 2024-09-17

## 2024-10-02 DIAGNOSIS — F90.8 ADHD, ADULT RESIDUAL TYPE: ICD-10-CM

## 2024-10-02 NOTE — TELEPHONE ENCOUNTER
Az called into the office leaving a VM stating that he overslept for his appointment today because he has not been sleeping well; he has been r/sed for 11/04/24.    While on the phone, he said that he needs refills on his Concerta 27mg;#30 with 0 refills;last sent on 08/14/24 and Latuda 120mg dose; per chart he should have 1 remaining refill.    Medication is pending your approval for a 30 day supply with 0 refill; he was last seen on 06/04/24.

## 2024-10-03 ENCOUNTER — TELEPHONE (OUTPATIENT)
Dept: FAMILY MEDICINE CLINIC | Age: 54
End: 2024-10-03

## 2024-10-03 DIAGNOSIS — F17.210 CIGARETTE NICOTINE DEPENDENCE WITHOUT COMPLICATION: Primary | ICD-10-CM

## 2024-10-03 NOTE — TELEPHONE ENCOUNTER
Please let pt know he is due for LDCT to screen for lung cancer    Due after 23 OCT 2024    Help schedule.    Thanks!     Diagnosis Orders   1. Cigarette nicotine dependence without complication  CT Lung Screen (Initial/Annual/Baseline)        Future Appointments   Date Time Provider Department Center   11/4/2024 11:00 AM Harman Soria MD N [facility] [place]   2/12/2025  8:00 AM Javon Aaron, DO [facility]   8/25/2025  8:30 AM Natasha Leahy PA-C N [facility] [place]   9/16/2025  8:30 AM Reid Cool PA-C N [place] [facility] [place]   Discussed with the patient the current USPSTF guidelines released March 9, 2021 for screening for lung cancer.    For adults aged 50 to 80 years who have a 20 pack-year smoking history and currently smoke or have quit within the past 15 years the grade B recommendation is to:  Screen for lung cancer with low-dose computed tomography (LDCT) every year.  Stop screening once a person has not smoked for 15 years or has a health problem that limits life expectancy or the ability to have lung surgery.    The patient  reports that he has been smoking cigarettes. He started smoking about 40 years ago. He has a 40.7 pack-year smoking history. He has never used smokeless tobacco.. Discussed with patient the risks and benefits of screening, including over-diagnosis, false positive rate, and total radiation exposure.  The patient currently exhibits no signs or symptoms suggestive of lung cancer.  Discussed with patient the importance of compliance with yearly annual lung cancer screenings and willingness to undergo diagnosis and treatment if screening scan is positive.  In addition, the patient was counseled regarding the importance of remaining smoke free and/or total smoking cessation.    Also reviewed the following if the patient has Medicare that as of February 10, 2022, Medicare only covers LDCT screening in patients aged 50-77 with at least a 20 pack-year

## 2024-10-04 RX ORDER — METHYLPHENIDATE HYDROCHLORIDE 27 MG/1
27 TABLET ORAL DAILY
Qty: 30 TABLET | Refills: 0 | Status: SHIPPED | OUTPATIENT
Start: 2024-10-04 | End: 2024-11-03

## 2024-10-15 ENCOUNTER — LAB (OUTPATIENT)
Dept: LAB | Age: 54
End: 2024-10-15

## 2024-10-15 DIAGNOSIS — N40.1 BENIGN PROSTATIC HYPERPLASIA WITH LOWER URINARY TRACT SYMPTOMS, SYMPTOM DETAILS UNSPECIFIED: ICD-10-CM

## 2024-10-15 LAB — PSA SERPL-MCNC: 0.86 NG/ML (ref 0–1)

## 2024-10-21 ENCOUNTER — TELEPHONE (OUTPATIENT)
Dept: UROLOGY | Age: 54
End: 2024-10-21

## 2024-10-21 DIAGNOSIS — R30.0 DYSURIA: Primary | ICD-10-CM

## 2024-10-21 NOTE — TELEPHONE ENCOUNTER
Patient c/o burning, frequency, and urgency and it takes a while to empty the bladder. He can empty the bladder and declined an office visit. He voiced understanding if unable to void to be evaluated in the ER.    Orders placed for urine.

## 2024-10-22 DIAGNOSIS — E11.29 TYPE 2 DIABETES MELLITUS WITH MICROALBUMINURIA, WITHOUT LONG-TERM CURRENT USE OF INSULIN (HCC): Chronic | ICD-10-CM

## 2024-10-22 DIAGNOSIS — E78.1 HYPERTRIGLYCERIDEMIA: Chronic | ICD-10-CM

## 2024-10-22 DIAGNOSIS — R80.9 TYPE 2 DIABETES MELLITUS WITH MICROALBUMINURIA, WITHOUT LONG-TERM CURRENT USE OF INSULIN (HCC): Chronic | ICD-10-CM

## 2024-10-22 RX ORDER — FENOFIBRATE 160 MG/1
TABLET ORAL
Qty: 90 TABLET | Refills: 3 | Status: SHIPPED | OUTPATIENT
Start: 2024-10-22

## 2024-10-22 RX ORDER — ATORVASTATIN CALCIUM 80 MG/1
TABLET, FILM COATED ORAL
Qty: 90 TABLET | Refills: 3 | Status: SHIPPED | OUTPATIENT
Start: 2024-10-22

## 2024-10-22 NOTE — TELEPHONE ENCOUNTER
Recent Visits  Date Type Provider Dept   08/12/24 Office Visit Javon Aaron, DO [facility]   02/12/24 Office Visit Javon Aaron, DO [facility]   01/05/24 Office Visit Javon Aaron, DO [facility]   08/09/23 Office Visit Javon Aaron, DO [facility]   05/09/23 Office Visit Javon Aaron, DO [facility]   Showing recent visits within past 540 days with a meds authorizing provider and meeting all other requirements  Future Appointments  Date Type Provider Dept   02/12/25 Appointment Javon Aaron, DO [facility]   Showing future appointments within next 150 days with a meds authorizing provider and meeting all other requirements

## 2024-10-28 NOTE — TELEPHONE ENCOUNTER
Az Long pharmacy is requesting a refill on the following medications:  Requested Prescriptions     Pending Prescriptions Disp Refills    nicotine polacrilex (COMMIT) 4 MG lozenge [Pharmacy Med Name: Nicotine Polacrilex Mouth/Throat Lozenge 4 MG] 108 lozenge 0     Sig: Dissolve 1 lozenge in mouth as needed every 1 to 2 hours. Max 5 lozenges in 6 hours or 20 lozenges per day       Date of last visit: 6/4/2024  Date of next visit (if applicable):11/14/2024  Pharmacy Name: Barbara Brandt MA

## 2024-10-30 DIAGNOSIS — M25.512 CHRONIC LEFT SHOULDER PAIN: ICD-10-CM

## 2024-10-30 DIAGNOSIS — G89.29 CHRONIC LEFT SHOULDER PAIN: ICD-10-CM

## 2024-11-01 ENCOUNTER — TELEPHONE (OUTPATIENT)
Dept: UROLOGY | Age: 54
End: 2024-11-01

## 2024-11-01 NOTE — TELEPHONE ENCOUNTER
[employer] prior auth    Your request has been approved  Your PA request for [id number] was approved for 365 days. The PA# assigned is [id number].

## 2024-11-05 DIAGNOSIS — F90.8 ADHD, ADULT RESIDUAL TYPE: ICD-10-CM

## 2024-11-05 RX ORDER — METHYLPHENIDATE HYDROCHLORIDE 27 MG/1
27 TABLET ORAL DAILY
Qty: 30 TABLET | Refills: 0 | Status: CANCELLED | OUTPATIENT
Start: 2024-11-05 | End: 2024-12-05

## 2024-11-05 NOTE — TELEPHONE ENCOUNTER
Az Long left a voicemail requesting a refill on the following medications:  Requested Prescriptions     Pending Prescriptions Disp Refills    methylphenidate (CONCERTA) 27 MG extended release tablet 30 tablet 0     Sig: Take 1 tablet by mouth daily for 30 days. Max Daily Amount: 27 mg       Date of last visit: 6/4/2024  Date of next visit (if applicable):11/14/2024  Pharmacy Name: Barbara Brandt MA

## 2024-11-06 RX ORDER — METHYLPHENIDATE HYDROCHLORIDE 27 MG/1
27 TABLET ORAL DAILY
Qty: 30 TABLET | Refills: 0 | Status: SHIPPED | OUTPATIENT
Start: 2024-11-06 | End: 2024-12-06

## 2024-11-06 RX ORDER — METHYLPHENIDATE HYDROCHLORIDE 27 MG/1
27 TABLET ORAL DAILY
Qty: 30 TABLET | Refills: 0 | Status: SHIPPED | OUTPATIENT
Start: 2024-12-06 | End: 2025-01-05

## 2024-11-06 RX ORDER — METHYLPHENIDATE HYDROCHLORIDE 27 MG/1
27 TABLET ORAL DAILY
Qty: 30 TABLET | Refills: 0 | Status: SHIPPED | OUTPATIENT
Start: 2025-01-05 | End: 2025-02-04

## 2024-11-14 ENCOUNTER — TELEMEDICINE (OUTPATIENT)
Dept: PSYCHIATRY | Age: 54
End: 2024-11-14
Payer: COMMERCIAL

## 2024-11-14 DIAGNOSIS — F90.8 ADHD, ADULT RESIDUAL TYPE: ICD-10-CM

## 2024-11-14 DIAGNOSIS — G47.00 INSOMNIA, UNSPECIFIED TYPE: ICD-10-CM

## 2024-11-14 DIAGNOSIS — F25.0 SCHIZOAFFECTIVE DISORDER, BIPOLAR TYPE (HCC): Primary | ICD-10-CM

## 2024-11-14 DIAGNOSIS — G21.19 DRUG-INDUCED PARKINSONISM (HCC): ICD-10-CM

## 2024-11-14 PROCEDURE — G8427 DOCREV CUR MEDS BY ELIG CLIN: HCPCS | Performed by: PSYCHIATRY & NEUROLOGY

## 2024-11-14 PROCEDURE — 3017F COLORECTAL CA SCREEN DOC REV: CPT | Performed by: PSYCHIATRY & NEUROLOGY

## 2024-11-14 PROCEDURE — 99214 OFFICE O/P EST MOD 30 MIN: CPT | Performed by: PSYCHIATRY & NEUROLOGY

## 2024-11-14 RX ORDER — LAMOTRIGINE 150 MG/1
TABLET ORAL
Qty: 180 TABLET | Refills: 1 | Status: SHIPPED | OUTPATIENT
Start: 2024-11-14

## 2024-11-14 RX ORDER — LURASIDONE HYDROCHLORIDE 120 MG/1
TABLET, FILM COATED ORAL
Qty: 90 TABLET | Refills: 1 | Status: SHIPPED | OUTPATIENT
Start: 2024-11-14

## 2024-11-14 RX ORDER — TRAZODONE HYDROCHLORIDE 50 MG/1
50 TABLET, FILM COATED ORAL NIGHTLY PRN
Qty: 30 TABLET | Refills: 1 | Status: SHIPPED | OUTPATIENT
Start: 2024-11-14

## 2024-11-14 ASSESSMENT — PATIENT HEALTH QUESTIONNAIRE - PHQ9
1. LITTLE INTEREST OR PLEASURE IN DOING THINGS: MORE THAN HALF THE DAYS
3. TROUBLE FALLING OR STAYING ASLEEP: NEARLY EVERY DAY
10. IF YOU CHECKED OFF ANY PROBLEMS, HOW DIFFICULT HAVE THESE PROBLEMS MADE IT FOR YOU TO DO YOUR WORK, TAKE CARE OF THINGS AT HOME, OR GET ALONG WITH OTHER PEOPLE: VERY DIFFICULT
SUM OF ALL RESPONSES TO PHQ QUESTIONS 1-9: 16
SUM OF ALL RESPONSES TO PHQ QUESTIONS 1-9: 15
2. FEELING DOWN, DEPRESSED OR HOPELESS: MORE THAN HALF THE DAYS
4. FEELING TIRED OR HAVING LITTLE ENERGY: MORE THAN HALF THE DAYS
6. FEELING BAD ABOUT YOURSELF - OR THAT YOU ARE A FAILURE OR HAVE LET YOURSELF OR YOUR FAMILY DOWN: SEVERAL DAYS
2. FEELING DOWN, DEPRESSED OR HOPELESS: MORE THAN HALF THE DAYS
7. TROUBLE CONCENTRATING ON THINGS, SUCH AS READING THE NEWSPAPER OR WATCHING TELEVISION: NEARLY EVERY DAY
9. THOUGHTS THAT YOU WOULD BE BETTER OFF DEAD, OR OF HURTING YOURSELF: SEVERAL DAYS
6. FEELING BAD ABOUT YOURSELF - OR THAT YOU ARE A FAILURE OR HAVE LET YOURSELF OR YOUR FAMILY DOWN: SEVERAL DAYS
5. POOR APPETITE OR OVEREATING: SEVERAL DAYS
SUM OF ALL RESPONSES TO PHQ QUESTIONS 1-9: 16
4. FEELING TIRED OR HAVING LITTLE ENERGY: MORE THAN HALF THE DAYS
10. IF YOU CHECKED OFF ANY PROBLEMS, HOW DIFFICULT HAVE THESE PROBLEMS MADE IT FOR YOU TO DO YOUR WORK, TAKE CARE OF THINGS AT HOME, OR GET ALONG WITH OTHER PEOPLE: VERY DIFFICULT
8. MOVING OR SPEAKING SO SLOWLY THAT OTHER PEOPLE COULD HAVE NOTICED. OR THE OPPOSITE, BEING SO FIGETY OR RESTLESS THAT YOU HAVE BEEN MOVING AROUND A LOT MORE THAN USUAL: SEVERAL DAYS
SUM OF ALL RESPONSES TO PHQ QUESTIONS 1-9: 16
7. TROUBLE CONCENTRATING ON THINGS, SUCH AS READING THE NEWSPAPER OR WATCHING TELEVISION: NEARLY EVERY DAY
9. THOUGHTS THAT YOU WOULD BE BETTER OFF DEAD, OR OF HURTING YOURSELF: SEVERAL DAYS
SUM OF ALL RESPONSES TO PHQ QUESTIONS 1-9: 16
5. POOR APPETITE OR OVEREATING: SEVERAL DAYS
SUM OF ALL RESPONSES TO PHQ9 QUESTIONS 1 & 2: 4
3. TROUBLE FALLING OR STAYING ASLEEP: NEARLY EVERY DAY
8. MOVING OR SPEAKING SO SLOWLY THAT OTHER PEOPLE COULD HAVE NOTICED. OR THE OPPOSITE - BEING SO FIDGETY OR RESTLESS THAT YOU HAVE BEEN MOVING AROUND A LOT MORE THAN USUAL: SEVERAL DAYS

## 2024-11-14 ASSESSMENT — COLUMBIA-SUICIDE SEVERITY RATING SCALE - C-SSRS
6. IN YOUR LIFETIME, HAVE YOU EVER DONE ANYTHING, STARTED TO DO ANYTHING, OR PREPARED TO DO ANYTHING TO END YOUR LIFE?: YES
7. DID THIS OCCUR IN THE LAST THREE MONTHS: NO
2. IN THE PAST MONTH, HAVE YOU ACTUALLY HAD ANY THOUGHTS OF KILLING YOURSELF?: NO
1. IN THE PAST MONTH, HAVE YOU WISHED YOU WERE DEAD OR WISHED YOU COULD GO TO SLEEP AND NOT WAKE UP?: YES

## 2024-11-14 ASSESSMENT — ANXIETY QUESTIONNAIRES
5. BEING SO RESTLESS THAT IT IS HARD TO SIT STILL: MORE THAN HALF THE DAYS
4. TROUBLE RELAXING: MORE THAN HALF THE DAYS
7. FEELING AFRAID AS IF SOMETHING AWFUL MIGHT HAPPEN: SEVERAL DAYS
7. FEELING AFRAID AS IF SOMETHING AWFUL MIGHT HAPPEN: SEVERAL DAYS
3. WORRYING TOO MUCH ABOUT DIFFERENT THINGS: MORE THAN HALF THE DAYS
1. FEELING NERVOUS, ANXIOUS, OR ON EDGE: MORE THAN HALF THE DAYS
IF YOU CHECKED OFF ANY PROBLEMS ON THIS QUESTIONNAIRE, HOW DIFFICULT HAVE THESE PROBLEMS MADE IT FOR YOU TO DO YOUR WORK, TAKE CARE OF THINGS AT HOME, OR GET ALONG WITH OTHER PEOPLE: VERY DIFFICULT
2. NOT BEING ABLE TO STOP OR CONTROL WORRYING: MORE THAN HALF THE DAYS
GAD7 TOTAL SCORE: 12
6. BECOMING EASILY ANNOYED OR IRRITABLE: SEVERAL DAYS
4. TROUBLE RELAXING: MORE THAN HALF THE DAYS
1. FEELING NERVOUS, ANXIOUS, OR ON EDGE: MORE THAN HALF THE DAYS
5. BEING SO RESTLESS THAT IT IS HARD TO SIT STILL: MORE THAN HALF THE DAYS
2. NOT BEING ABLE TO STOP OR CONTROL WORRYING: MORE THAN HALF THE DAYS
IF YOU CHECKED OFF ANY PROBLEMS ON THIS QUESTIONNAIRE, HOW DIFFICULT HAVE THESE PROBLEMS MADE IT FOR YOU TO DO YOUR WORK, TAKE CARE OF THINGS AT HOME, OR GET ALONG WITH OTHER PEOPLE: VERY DIFFICULT
3. WORRYING TOO MUCH ABOUT DIFFERENT THINGS: MORE THAN HALF THE DAYS
6. BECOMING EASILY ANNOYED OR IRRITABLE: SEVERAL DAYS

## 2024-11-14 NOTE — PATIENT INSTRUCTIONS
Plan:  Az Long, it was nice seeing you today  Start trazodone 50 mg nightly as needed for sleep until you can fill your ramelteon  Decrease lurasidone 220 mg daily to see if this helps with tremors.  Let me know if it does not  Continue all other medications  Follow up in 3 months but reach out sooner if needed      -Please take medications as prescribed  -Refrain from alcohol or drug use  -Seek emergency help via the emergency and/or calling 911 should symptoms become severe, worsen, or with other concerning symptoms.Go immediately to the emergency room and/or call 911 with any suicidal or homicidal ideations or if audio/visual hallucinations develop.   -Contact office with any questions or concerns.      Crisis phone numbers:  988-national suicide hotline, call or text  [facility] [phone].  [facility] [phone]  [facility] [phone].  General acute hospital [phone].  [facility] [phone].  [facility] [phone].

## 2024-11-14 NOTE — PROGRESS NOTES
[facility] [place] [facility]  [facility]  [address] [address] [phone]    Progress Note    Patient:  Az Long  YOB: 1970  PCP:  Javon Aaron DO  Visit Date:  11/14/2024      CC: Follow up for medication management for    Diagnosis Orders   1. Schizoaffective disorder, bipolar type (HCC)  VORTIoxetine HBr (TRINTELLIX) 20 MG TABS tablet    lurasidone (LATUDA) 120 MG tablet    lamoTRIgine (LAMICTAL) 150 MG tablet      2. Drug-induced parkinsonism (HCC)        3. ADHD, adult residual type        4. Insomnia, unspecified type  traZODone (DESYREL) 50 MG tablet                SUBJECTIVE:      Az Long, a 54 y.o. male, presents for a follow up visit.  Reports he is doing okay.  Unfortunately pharmacy has been unable to fill ramelteon so he has been without for about a month.  Not sleeping well.  We reviewed his PHQ-9 which indicated passive SI but when asked about them he denied suicidal thoughts.  Reports chronic auditory hallucinations which have not worsened.  Denies that they are command in nature.  Continues to have some tardive dyskinesia movements of the mouth but these are improved with Ingrezza.  He is having upper extremity tremors which sound like EPS, likely related to lurasidone.  Appetite stable.      OBJECTIVE:      11/12/2024     8:51 AM   Patient-Reported Vitals   Patient-Reported Weight 220   Patient-Reported Height 5'7\"   Patient-Reported Systolic 152 mmHg   Patient-Reported Diastolic 92 mmHg   Patient-Reported Pulse 90   Patient-Reported Temperature 98.8          MENTAL STATUS EXAM:  Orientation: Alert, oriented, thought content appropriate   Mood:. \"Okay\"      Affect:  Blunted      Appearance:  Age Appropriate, Overweight, Clothing Appropriate for Age, and Clothing Appropriate for Weather   Thought Process:  Within Normal Limits   Thought Content:  No evidence of AVH or paranoia.   Insight:  Fair

## 2024-12-03 ENCOUNTER — TELEPHONE (OUTPATIENT)
Dept: FAMILY MEDICINE CLINIC | Age: 54
End: 2024-12-03

## 2024-12-03 ENCOUNTER — HOSPITAL ENCOUNTER (OUTPATIENT)
Dept: CT IMAGING | Age: 54
Discharge: HOME | End: 2024-12-03
Attending: FAMILY MEDICINE
Payer: COMMERCIAL

## 2024-12-03 DIAGNOSIS — F17.210 CIGARETTE NICOTINE DEPENDENCE WITHOUT COMPLICATION: ICD-10-CM

## 2024-12-03 PROCEDURE — 71271 CT THORAX LUNG CANCER SCR C-: CPT

## 2024-12-03 NOTE — TELEPHONE ENCOUNTER
----- Message from Dr. Javon Aaron, DO sent at 12/3/2024  4:49 PM EST -----  Please let pt know that LDCT of chest is negative for cancer. Repeat 1 year per current guidelines. Let me know if questions, thanks!

## 2024-12-06 DIAGNOSIS — B35.1 ONYCHOMYCOSIS: ICD-10-CM

## 2024-12-09 RX ORDER — CICLOPIROX 80 MG/ML
SOLUTION TOPICAL
Qty: 6.6 ML | Refills: 0 | Status: SHIPPED | OUTPATIENT
Start: 2024-12-09

## 2024-12-09 NOTE — TELEPHONE ENCOUNTER
Recent Visits  Date Type Provider Dept   08/12/24 Office Visit Javon Aaron, DO [facility]   02/12/24 Office Visit Javon Aaron, DO [facility]   01/05/24 Office Visit Javon Aaron, DO [facility]   08/09/23 Office Visit Javon Aaron, DO [facility]   Showing recent visits within past 540 days with a meds authorizing provider and meeting all other requirements  Future Appointments  Date Type Provider Dept   02/12/25 Appointment Javon Aaron, DO [facility]   Showing future appointments within next 150 days with a meds authorizing provider and meeting all other requirements

## 2025-01-03 DIAGNOSIS — R80.9 TYPE 2 DIABETES MELLITUS WITH MICROALBUMINURIA, WITHOUT LONG-TERM CURRENT USE OF INSULIN (HCC): Chronic | ICD-10-CM

## 2025-01-03 DIAGNOSIS — I25.10 ASCVD (ARTERIOSCLEROTIC CARDIOVASCULAR DISEASE): ICD-10-CM

## 2025-01-03 DIAGNOSIS — E11.29 TYPE 2 DIABETES MELLITUS WITH MICROALBUMINURIA, WITHOUT LONG-TERM CURRENT USE OF INSULIN (HCC): Chronic | ICD-10-CM

## 2025-01-03 RX ORDER — ASPIRIN 81 MG/1
TABLET ORAL
Qty: 90 TABLET | Refills: 0 | Status: SHIPPED | OUTPATIENT
Start: 2025-01-03

## 2025-01-07 DIAGNOSIS — G47.00 INSOMNIA, UNSPECIFIED TYPE: ICD-10-CM

## 2025-01-07 RX ORDER — TRAZODONE HYDROCHLORIDE 50 MG/1
TABLET ORAL
Qty: 30 TABLET | Refills: 0 | OUTPATIENT
Start: 2025-01-07

## 2025-01-07 NOTE — TELEPHONE ENCOUNTER
Az Long pharmacy is requesting a refill on the following medications:  Requested Prescriptions     Pending Prescriptions Disp Refills    traZODone (DESYREL) 50 MG tablet [Pharmacy Med Name: traZODone HCl Oral Tablet 50 MG] 30 tablet 0     Sig: TAKE 1 TABLET BY MOUTH AT NIGHT AS NEEDED for sleep       Date of last visit: 11/14/2024  Date of next visit (if applicable):2/11/2025  Pharmacy Name: Barbara Brandt MA

## 2025-01-16 ENCOUNTER — TELEPHONE (OUTPATIENT)
Dept: FAMILY MEDICINE CLINIC | Age: 55
End: 2025-01-16

## 2025-01-16 DIAGNOSIS — E11.29 TYPE 2 DIABETES MELLITUS WITH MICROALBUMINURIA, WITHOUT LONG-TERM CURRENT USE OF INSULIN (HCC): Primary | ICD-10-CM

## 2025-01-16 DIAGNOSIS — R80.9 TYPE 2 DIABETES MELLITUS WITH MICROALBUMINURIA, WITHOUT LONG-TERM CURRENT USE OF INSULIN (HCC): Primary | ICD-10-CM

## 2025-01-16 NOTE — TELEPHONE ENCOUNTER
Pt due for fasting labs prior to next apt on 2/12/2025. Please call to have pt complete this. Thanks!    ASSESSMENT & PLAN   Diagnosis Orders   1. Type 2 diabetes mellitus with microalbuminuria, without long-term current use of insulin (HCC)  CBC with Auto Differential    Comprehensive Metabolic Panel    Lipid Panel    TSH reflex to FT4    Albumin/Creatinine Ratio, Urine        Future Appointments   Date Time Provider Department Center   2/11/2025  9:00 AM Harman Soria MD N [facility] [place]   2/12/2025  8:00 AM Javon Aaron, DO [facility]   8/25/2025  8:30 AM Natasha Leahy PA-C N [facility] [place]   9/16/2025  8:30 AM Reid Cool PA-C N [place] [facility] [place]

## 2025-01-16 NOTE — TELEPHONE ENCOUNTER
Left message on answering machine. Requested pt to call back at [phone], at their earliest convenience.         Labs have been mailed.

## 2025-01-17 DIAGNOSIS — G47.00 INSOMNIA, UNSPECIFIED TYPE: ICD-10-CM

## 2025-01-17 RX ORDER — RAMELTEON 8 MG/1
8 TABLET ORAL NIGHTLY PRN
Qty: 90 TABLET | Refills: 1 | Status: SHIPPED | OUTPATIENT
Start: 2025-01-17

## 2025-01-17 NOTE — TELEPHONE ENCOUNTER
[facility] is requesting a medication refill on Az's behalf for Rozerem 8mg;#30 with 5 refills;last with a start date of 08/28/24 and last refill date of 12/20/24.    Medication is pending your approval for a 30 day supply with 5 refills; he is scheduled to return on 2/11/25.Last seen on 11/14/24.

## 2025-01-20 DIAGNOSIS — F90.8 ADHD, ADULT RESIDUAL TYPE: ICD-10-CM

## 2025-01-20 NOTE — TELEPHONE ENCOUNTER
Patient called in requesting a refill of the following medication:    Methylphenidate 27mg  Extended Release #30 with no refills to the [facility]. Previous prescription was sent on 01/05/25 for #30 with no refills.    Last visit 11/14/24  Next visit 02/11/25    Pending your approval

## 2025-01-21 RX ORDER — METHYLPHENIDATE HYDROCHLORIDE 27 MG/1
27 TABLET ORAL DAILY
Qty: 30 TABLET | Refills: 0 | OUTPATIENT
Start: 2025-01-21 | End: 2025-02-20

## 2025-01-22 DIAGNOSIS — G47.00 INSOMNIA, UNSPECIFIED TYPE: ICD-10-CM

## 2025-01-22 RX ORDER — TRAZODONE HYDROCHLORIDE 50 MG/1
TABLET, FILM COATED ORAL
Qty: 30 TABLET | Refills: 0 | OUTPATIENT
Start: 2025-01-22

## 2025-01-22 NOTE — TELEPHONE ENCOUNTER
Pharmacy is requesting a refill of the following medication:    Trazodone 50mg  #30 with 1 refill to the [facility]. Previous prescription was sent on 11/14/24 for #30 with 1 refill. Last fill date 12/10/24.    Last visit 11/14/24   Next visit 02/11/25    Pending your approval

## 2025-02-03 DIAGNOSIS — G24.01 TARDIVE DYSKINESIA: ICD-10-CM

## 2025-02-03 RX ORDER — VALBENAZINE 80 MG/1
1 CAPSULE ORAL DAILY
Qty: 90 CAPSULE | Refills: 1 | Status: SHIPPED | OUTPATIENT
Start: 2025-02-03

## 2025-02-03 NOTE — TELEPHONE ENCOUNTER
Az Long pharmacy is requesting a refill on the following medications:  Requested Prescriptions     Pending Prescriptions Disp Refills    INGREZZA 80 MG CAPS [Pharmacy Med Name: Ingrezza 80 mg capsule 80] 30 capsule 10     Sig: Take one capsule by mouth daily       Date of last visit: 11/14/2024  Date of next visit (if applicable):2/11/2025  Pharmacy Name: Barbara Hernandez MA

## 2025-02-05 DIAGNOSIS — Z79.4 TYPE 2 DIABETES MELLITUS WITH OTHER SPECIFIED COMPLICATION, WITH LONG-TERM CURRENT USE OF INSULIN (HCC): ICD-10-CM

## 2025-02-05 DIAGNOSIS — E11.69 TYPE 2 DIABETES MELLITUS WITH OTHER SPECIFIED COMPLICATION, WITH LONG-TERM CURRENT USE OF INSULIN (HCC): ICD-10-CM

## 2025-02-06 ENCOUNTER — LAB (OUTPATIENT)
Dept: LAB | Age: 55
End: 2025-02-06

## 2025-02-06 DIAGNOSIS — R80.9 TYPE 2 DIABETES MELLITUS WITH MICROALBUMINURIA, WITHOUT LONG-TERM CURRENT USE OF INSULIN (HCC): ICD-10-CM

## 2025-02-06 DIAGNOSIS — E11.29 TYPE 2 DIABETES MELLITUS WITH MICROALBUMINURIA, WITHOUT LONG-TERM CURRENT USE OF INSULIN (HCC): ICD-10-CM

## 2025-02-06 LAB
ALBUMIN SERPL BCG-MCNC: 4.8 G/DL (ref 3.5–5.1)
ALP SERPL-CCNC: 74 U/L (ref 38–126)
ALT SERPL W/O P-5'-P-CCNC: 22 U/L (ref 11–66)
ANION GAP SERPL CALC-SCNC: 12 MEQ/L (ref 8–16)
AST SERPL-CCNC: 24 U/L (ref 5–40)
BASOPHILS ABSOLUTE: 0 THOU/MM3 (ref 0–0.1)
BASOPHILS NFR BLD AUTO: 0.7 %
BILIRUB SERPL-MCNC: 0.2 MG/DL (ref 0.3–1.2)
BUN SERPL-MCNC: 10 MG/DL (ref 7–22)
CALCIUM SERPL-MCNC: 9.3 MG/DL (ref 8.5–10.5)
CHLORIDE SERPL-SCNC: 104 MEQ/L (ref 98–111)
CHOLEST SERPL-MCNC: 159 MG/DL (ref 100–199)
CO2 SERPL-SCNC: 25 MEQ/L (ref 23–33)
CREAT SERPL-MCNC: 0.9 MG/DL (ref 0.4–1.2)
CREAT UR-MCNC: 71.5 MG/DL
DEPRECATED RDW RBC AUTO: 45.3 FL (ref 35–45)
EOSINOPHIL NFR BLD AUTO: 2.8 %
EOSINOPHILS ABSOLUTE: 0.2 THOU/MM3 (ref 0–0.4)
ERYTHROCYTE [DISTWIDTH] IN BLOOD BY AUTOMATED COUNT: 14.2 % (ref 11.5–14.5)
GFR SERPL CREATININE-BSD FRML MDRD: > 90 ML/MIN/1.73M2
GLUCOSE SERPL-MCNC: 84 MG/DL (ref 70–108)
HCT VFR BLD AUTO: 41.4 % (ref 42–52)
HDLC SERPL-MCNC: 40 MG/DL
HGB BLD-MCNC: 13.4 GM/DL (ref 14–18)
IMM GRANULOCYTES # BLD AUTO: 0.05 THOU/MM3 (ref 0–0.07)
IMM GRANULOCYTES NFR BLD AUTO: 0.7 %
LDLC SERPL CALC-MCNC: 93 MG/DL
LYMPHOCYTES ABSOLUTE: 1.7 THOU/MM3 (ref 1–4.8)
LYMPHOCYTES NFR BLD AUTO: 25.1 %
MCH RBC QN AUTO: 28.8 PG (ref 26–33)
MCHC RBC AUTO-ENTMCNC: 32.4 GM/DL (ref 32.2–35.5)
MCV RBC AUTO: 89 FL (ref 80–94)
MICROALBUMIN UR-MCNC: 1.72 MG/DL
MICROALBUMIN/CREAT RATIO PNL UR: 24 MG/G (ref 0–30)
MONOCYTES ABSOLUTE: 0.5 THOU/MM3 (ref 0.4–1.3)
MONOCYTES NFR BLD AUTO: 7.9 %
NEUTROPHILS ABSOLUTE: 4.3 THOU/MM3 (ref 1.8–7.7)
NEUTROPHILS NFR BLD AUTO: 62.8 %
NRBC BLD AUTO-RTO: 0 /100 WBC
PLATELET # BLD AUTO: 155 THOU/MM3 (ref 130–400)
PMV BLD AUTO: 11.3 FL (ref 9.4–12.4)
POTASSIUM SERPL-SCNC: 4.3 MEQ/L (ref 3.5–5.2)
PROT SERPL-MCNC: 7 G/DL (ref 6.1–8)
RBC # BLD AUTO: 4.65 MILL/MM3 (ref 4.7–6.1)
SODIUM SERPL-SCNC: 141 MEQ/L (ref 135–145)
TRIGL SERPL-MCNC: 132 MG/DL (ref 0–199)
TSH SERPL DL<=0.005 MIU/L-ACNC: 1.51 UIU/ML (ref 0.4–4.2)
WBC # BLD AUTO: 6.8 THOU/MM3 (ref 4.8–10.8)

## 2025-02-06 RX ORDER — DULAGLUTIDE 3 MG/.5ML
INJECTION, SOLUTION SUBCUTANEOUS
Qty: 4 ADJUSTABLE DOSE PRE-FILLED PEN SYRINGE | Refills: 11 | Status: SHIPPED | OUTPATIENT
Start: 2025-02-06

## 2025-02-06 NOTE — TELEPHONE ENCOUNTER
Recent Visits  Date Type Provider Dept   08/12/24 Office Visit Javon Aaron, DO [facility]   02/12/24 Office Visit Javon Aaron, DO [facility]   01/05/24 Office Visit Javon Aaron, DO [facility]   Showing recent visits within past 540 days with a meds authorizing provider and meeting all other requirements  Future Appointments  Date Type Provider Dept   02/12/25 Appointment Javon Aaron, DO [facility]   Showing future appointments within next 150 days with a meds authorizing provider and meeting all other requirements

## 2025-02-07 ENCOUNTER — TELEPHONE (OUTPATIENT)
Dept: FAMILY MEDICINE CLINIC | Age: 55
End: 2025-02-07

## 2025-02-07 NOTE — TELEPHONE ENCOUNTER
----- Message from Dr. Javon Aaron, DO sent at 2/7/2025  6:56 AM EST -----  Please let pt know that labs look good. No concerning findings. Let me know if questions, thanks!

## 2025-02-10 SDOH — ECONOMIC STABILITY: FOOD INSECURITY: WITHIN THE PAST 12 MONTHS, THE FOOD YOU BOUGHT JUST DIDN'T LAST AND YOU DIDN'T HAVE MONEY TO GET MORE.: OFTEN TRUE

## 2025-02-10 SDOH — ECONOMIC STABILITY: TRANSPORTATION INSECURITY
IN THE PAST 12 MONTHS, HAS THE LACK OF TRANSPORTATION KEPT YOU FROM MEDICAL APPOINTMENTS OR FROM GETTING MEDICATIONS?: NO

## 2025-02-10 SDOH — ECONOMIC STABILITY: FOOD INSECURITY: WITHIN THE PAST 12 MONTHS, YOU WORRIED THAT YOUR FOOD WOULD RUN OUT BEFORE YOU GOT MONEY TO BUY MORE.: OFTEN TRUE

## 2025-02-10 SDOH — ECONOMIC STABILITY: INCOME INSECURITY: IN THE LAST 12 MONTHS, WAS THERE A TIME WHEN YOU WERE NOT ABLE TO PAY THE MORTGAGE OR RENT ON TIME?: PATIENT DECLINED

## 2025-02-10 SDOH — ECONOMIC STABILITY: TRANSPORTATION INSECURITY
IN THE PAST 12 MONTHS, HAS LACK OF TRANSPORTATION KEPT YOU FROM MEETINGS, WORK, OR FROM GETTING THINGS NEEDED FOR DAILY LIVING?: NO

## 2025-02-10 ASSESSMENT — PATIENT HEALTH QUESTIONNAIRE - PHQ9
1. LITTLE INTEREST OR PLEASURE IN DOING THINGS: NEARLY EVERY DAY
10. IF YOU CHECKED OFF ANY PROBLEMS, HOW DIFFICULT HAVE THESE PROBLEMS MADE IT FOR YOU TO DO YOUR WORK, TAKE CARE OF THINGS AT HOME, OR GET ALONG WITH OTHER PEOPLE: VERY DIFFICULT
4. FEELING TIRED OR HAVING LITTLE ENERGY: NEARLY EVERY DAY
2. FEELING DOWN, DEPRESSED OR HOPELESS: SEVERAL DAYS
5. POOR APPETITE OR OVEREATING: NEARLY EVERY DAY
6. FEELING BAD ABOUT YOURSELF - OR THAT YOU ARE A FAILURE OR HAVE LET YOURSELF OR YOUR FAMILY DOWN: SEVERAL DAYS
8. MOVING OR SPEAKING SO SLOWLY THAT OTHER PEOPLE COULD HAVE NOTICED. OR THE OPPOSITE, BEING SO FIGETY OR RESTLESS THAT YOU HAVE BEEN MOVING AROUND A LOT MORE THAN USUAL: SEVERAL DAYS
10. IF YOU CHECKED OFF ANY PROBLEMS, HOW DIFFICULT HAVE THESE PROBLEMS MADE IT FOR YOU TO DO YOUR WORK, TAKE CARE OF THINGS AT HOME, OR GET ALONG WITH OTHER PEOPLE: VERY DIFFICULT
5. POOR APPETITE OR OVEREATING: NEARLY EVERY DAY
3. TROUBLE FALLING OR STAYING ASLEEP: NEARLY EVERY DAY
SUM OF ALL RESPONSES TO PHQ QUESTIONS 1-9: 19
7. TROUBLE CONCENTRATING ON THINGS, SUCH AS READING THE NEWSPAPER OR WATCHING TELEVISION: NEARLY EVERY DAY
SUM OF ALL RESPONSES TO PHQ QUESTIONS 1-9: 19
1. LITTLE INTEREST OR PLEASURE IN DOING THINGS: NEARLY EVERY DAY
SUM OF ALL RESPONSES TO PHQ QUESTIONS 1-9: 19
2. FEELING DOWN, DEPRESSED OR HOPELESS: SEVERAL DAYS
4. FEELING TIRED OR HAVING LITTLE ENERGY: NEARLY EVERY DAY
7. TROUBLE CONCENTRATING ON THINGS, SUCH AS READING THE NEWSPAPER OR WATCHING TELEVISION: NEARLY EVERY DAY
6. FEELING BAD ABOUT YOURSELF - OR THAT YOU ARE A FAILURE OR HAVE LET YOURSELF OR YOUR FAMILY DOWN: SEVERAL DAYS
3. TROUBLE FALLING OR STAYING ASLEEP: NEARLY EVERY DAY
SUM OF ALL RESPONSES TO PHQ9 QUESTIONS 1 & 2: 4
SUM OF ALL RESPONSES TO PHQ QUESTIONS 1-9: 18
9. THOUGHTS THAT YOU WOULD BE BETTER OFF DEAD, OR OF HURTING YOURSELF: SEVERAL DAYS
8. MOVING OR SPEAKING SO SLOWLY THAT OTHER PEOPLE COULD HAVE NOTICED. OR THE OPPOSITE - BEING SO FIDGETY OR RESTLESS THAT YOU HAVE BEEN MOVING AROUND A LOT MORE THAN USUAL: SEVERAL DAYS
9. THOUGHTS THAT YOU WOULD BE BETTER OFF DEAD, OR OF HURTING YOURSELF: SEVERAL DAYS
SUM OF ALL RESPONSES TO PHQ QUESTIONS 1-9: 19

## 2025-02-11 ENCOUNTER — OFFICE VISIT (OUTPATIENT)
Dept: PSYCHIATRY | Age: 55
End: 2025-02-11
Payer: COMMERCIAL

## 2025-02-11 VITALS — DIASTOLIC BLOOD PRESSURE: 96 MMHG | SYSTOLIC BLOOD PRESSURE: 159 MMHG | HEART RATE: 89 BPM

## 2025-02-11 DIAGNOSIS — F25.0 SCHIZOAFFECTIVE DISORDER, BIPOLAR TYPE (HCC): Primary | ICD-10-CM

## 2025-02-11 DIAGNOSIS — G24.01 TARDIVE DYSKINESIA: ICD-10-CM

## 2025-02-11 DIAGNOSIS — G21.19 DRUG-INDUCED PARKINSONISM (HCC): ICD-10-CM

## 2025-02-11 DIAGNOSIS — F90.8 ADHD, ADULT RESIDUAL TYPE: ICD-10-CM

## 2025-02-11 PROCEDURE — 3017F COLORECTAL CA SCREEN DOC REV: CPT | Performed by: PSYCHIATRY & NEUROLOGY

## 2025-02-11 PROCEDURE — 4004F PT TOBACCO SCREEN RCVD TLK: CPT | Performed by: PSYCHIATRY & NEUROLOGY

## 2025-02-11 PROCEDURE — 99214 OFFICE O/P EST MOD 30 MIN: CPT | Performed by: PSYCHIATRY & NEUROLOGY

## 2025-02-11 PROCEDURE — G8417 CALC BMI ABV UP PARAM F/U: HCPCS | Performed by: PSYCHIATRY & NEUROLOGY

## 2025-02-11 PROCEDURE — 90833 PSYTX W PT W E/M 30 MIN: CPT | Performed by: PSYCHIATRY & NEUROLOGY

## 2025-02-11 PROCEDURE — 3077F SYST BP >= 140 MM HG: CPT | Performed by: PSYCHIATRY & NEUROLOGY

## 2025-02-11 PROCEDURE — 3080F DIAST BP >= 90 MM HG: CPT | Performed by: PSYCHIATRY & NEUROLOGY

## 2025-02-11 PROCEDURE — G8427 DOCREV CUR MEDS BY ELIG CLIN: HCPCS | Performed by: PSYCHIATRY & NEUROLOGY

## 2025-02-11 RX ORDER — CARBOXYMETHYLCELLULOSE SODIUM, GLYCERIN AND POLYSORBATE 80 5; 10; 5 MG/ML; MG/ML; MG/ML
SOLUTION/ DROPS OPHTHALMIC
COMMUNITY
Start: 2025-01-13

## 2025-02-11 RX ORDER — CYCLOSPORINE 0.5 MG/ML
1 EMULSION OPHTHALMIC EVERY 12 HOURS
COMMUNITY
Start: 2025-01-02

## 2025-02-11 NOTE — PROGRESS NOTES
Chief Complaint   Patient presents with    Follow-up     DM2/chronic issues noted below     History obtained from the patient.    SUBJECTIVE:  Az Long is a 54 y.o. male that presents today for:     -DM2:   A1c 5.3  Made diet changes  On Trulicity 3mg, Jardiance 25mg and Metformin  Pt weaned off Lantus 65 units 6+ months ago  FBS remains   No lows.     Lab Results   Component Value Date/Time    LABA1C 5.3 02/12/2025 06:22 AM    LABA1C 5.4 08/12/2024 06:39 AM    LABA1C 5.7 02/12/2024 07:08 AM    LABA1C 7.3 08/09/2023 07:15 AM    LABA1C 6.7 05/09/2023 02:25 PM    LABA1C 8.8 03/01/2023 06:55 AM    LABA1C 8.1 08/23/2022 08:02 AM    LABA1C 11.2 11/21/2019 08:00 AM    LABA1C 7.8 08/20/2019 07:54 AM    LABA1C 10.7 05/20/2019 10:54 AM    LABA1C 11.2 02/18/2019 07:56 AM    LABA1C 12.2 11/16/2018 07:50 AM     Wt Readings from Last 3 Encounters:   02/12/25 100.2 kg (221 lb)   09/17/24 99.8 kg (220 lb)   08/26/24 101.6 kg (224 lb)        -Elevated LFT/WESLEY  F3 on Fibroscan FEB 2024  No RUQ pain or jaundice  Last LFT were WNL, FEB 2025  Hasn't f/u with GI, [facility], in 2 years  Wants to see new GI. We referred to [facility] last visit, but did not go. Needs new referral      -Anemia:  Improved on most recent labs  Labs stable  Off iron/b12  Denies bleeding, melena or hematochezia      -HTN:    HPI:    Taking meds as prescribed ?: yes  Tolerating well ?: yes  Side Effects ?: denies  BP at home ?: <140/90  Working on TLCS ?: yes  Chest Pain/SOB/Palpitations? denies      -HLD:    HPI:    Taking meds as prescribed ?: yes  Tolerating well ?: yes  Side Effects ?: denies  Muscle Pain?: denies  Working on TLCS ?: yes      -GERD/Barrets:    HPI:  Due for EGD  Wants to see new GI. We referred to [facility] last visit, but did not go. Needs new referral    Taking meds as prescribed ?: yes  Tolerating well ?: yes  Side Effects ?: denies  Dysphagia ?: denies  Odynophagia ?: denies  Melena ?: denies  Working on TLCS ?:

## 2025-02-11 NOTE — PATIENT INSTRUCTIONS
Plan:  Az Long, it was nice seeing you today  Continue current medications  We will keep an eye on tremors in hands and legs. If these worsen let me know.  Follow up in 3 months          -Please take medications as prescribed  -Refrain from alcohol or drug use  -Seek emergency help via the emergency and/or calling 911 should symptoms become severe, worsen, or with other concerning symptoms.Go immediately to the emergency room and/or call 911 with any suicidal or homicidal ideations or if audio/visual hallucinations develop.   -Contact office with any questions or concerns.      Crisis phone numbers:  988-national suicide hotline, call or text  [facility], [facility] [phone].  [facility] [phone]  [facility] [phone].  [facility] [phone].  [facility] [phone].  [facility] [phone].

## 2025-02-11 NOTE — TELEPHONE ENCOUNTER
Late entry from this morning.  Spoke with pts psychiatrist, Dr. Soria via [facility].  Hs is aware of the screening results and will f/u with pt tomorrow as scheduled.     Future Appointments   Date Time Provider Department Center   2/11/2025  9:00 AM Harman Soria MD N [facility] PSYCH [facility] [place]   2/12/2025  8:00 AM Javon Aaron, DO [facility]   8/25/2025  8:30 AM Natasha Leahy PA-C N Pulm Med [facility] [place]   9/16/2025  8:30 AM Reid Cool PA-C [place] [facility] [place]

## 2025-02-11 NOTE — PROGRESS NOTES
[facility] [place] [facility]  [facility] PSYCHIATRY  [address] [place] [id number] [phone]    Progress Note    Patient:  Az Long  YOB: 1970  PCP:  Javon Aaron DO  Visit Date:  2/11/2025      CC: Follow up for medication management for    Diagnosis Orders   1. Schizoaffective disorder, bipolar type (HCC)        2. Tardive dyskinesia        3. ADHD, adult residual type        4. Drug-induced parkinsonism (HCC)              SUBJECTIVE:      Az Long, a 54 y.o. male, presents for a follow up visit.  Reports he is doing okay.  Continues to have upper and lower extremity tremors.  More noticeable leg tremor today but he reports this is restless legs.  Discussed the role that TD could be playing but patient was a little dismissive of this.  Reports he is taking Ingrezza daily.  Reports tolerating decrease in Latuda without worsening symptoms.  Still reporting intermittent auditory hallucinations but denies command hallucinations.  Did not appear internally stimulated today.  Reports chronic intermittent passive suicidal thoughts but denies any intent or plan.  Denies homicidal thoughts.  Reports appetite has stayed the same.  Sleep is up and down.  Reports continued financial stress.    16 minutes spent in supportive psychotherapy.  Focus was on stressors.      OBJECTIVE:  Vitals:    02/11/25 1345   BP: (!) 159/96   Pulse: 89         MENTAL STATUS EXAM:  Orientation: Alert, oriented, thought content appropriate   Mood:. \"Okay\"      Affect:  Blunted      Appearance:  Age Appropriate, Overweight, Clothing Appropriate for Age, and Clothing Appropriate for Weather   Thought Process:  Within Normal Limits   Thought Content:  Reports auditory visual hallucinations.  No evidence of paranoia or internal stimulation on my exam.   Insight:  Fair   Judgment: Fair   Suicidal Ideations: Passive without intent or plan               2/10/2025     9:20 AM

## 2025-02-12 ENCOUNTER — OFFICE VISIT (OUTPATIENT)
Dept: FAMILY MEDICINE CLINIC | Age: 55
End: 2025-02-12

## 2025-02-12 VITALS
DIASTOLIC BLOOD PRESSURE: 80 MMHG | RESPIRATION RATE: 18 BRPM | TEMPERATURE: 97.3 F | SYSTOLIC BLOOD PRESSURE: 130 MMHG | WEIGHT: 221 LBS | BODY MASS INDEX: 34.69 KG/M2 | HEART RATE: 77 BPM | OXYGEN SATURATION: 97 % | HEIGHT: 67 IN

## 2025-02-12 DIAGNOSIS — Z12.11 SCREENING FOR COLON CANCER: ICD-10-CM

## 2025-02-12 DIAGNOSIS — E53.8 B12 DEFICIENCY: ICD-10-CM

## 2025-02-12 DIAGNOSIS — Z79.4 TYPE 2 DIABETES MELLITUS WITH OTHER SPECIFIED COMPLICATION, WITH LONG-TERM CURRENT USE OF INSULIN (HCC): Primary | ICD-10-CM

## 2025-02-12 DIAGNOSIS — K21.9 GASTROESOPHAGEAL REFLUX DISEASE, UNSPECIFIED WHETHER ESOPHAGITIS PRESENT: ICD-10-CM

## 2025-02-12 DIAGNOSIS — E11.69 TYPE 2 DIABETES MELLITUS WITH OTHER SPECIFIED COMPLICATION, WITH LONG-TERM CURRENT USE OF INSULIN (HCC): Primary | ICD-10-CM

## 2025-02-12 DIAGNOSIS — D64.9 NORMOCYTIC ANEMIA: ICD-10-CM

## 2025-02-12 DIAGNOSIS — R74.8 ABNORMAL TRANSAMINASES: ICD-10-CM

## 2025-02-12 DIAGNOSIS — I10 ESSENTIAL HYPERTENSION: ICD-10-CM

## 2025-02-12 DIAGNOSIS — F17.210 CIGARETTE NICOTINE DEPENDENCE WITHOUT COMPLICATION: ICD-10-CM

## 2025-02-12 DIAGNOSIS — Z86.2 HISTORY OF IRON DEFICIENCY ANEMIA: ICD-10-CM

## 2025-02-12 DIAGNOSIS — K22.70 SHORT-SEGMENT BARRETT'S ESOPHAGUS: Chronic | ICD-10-CM

## 2025-02-12 DIAGNOSIS — E66.9 OBESITY (BMI 30-39.9): ICD-10-CM

## 2025-02-12 DIAGNOSIS — K75.81 NASH (NONALCOHOLIC STEATOHEPATITIS): Chronic | ICD-10-CM

## 2025-02-12 DIAGNOSIS — E78.2 MIXED HYPERLIPIDEMIA: ICD-10-CM

## 2025-02-12 LAB — HBA1C MFR BLD: 5.3 % (ref 4.3–5.7)

## 2025-02-19 ENCOUNTER — TELEPHONE (OUTPATIENT)
Dept: FAMILY MEDICINE CLINIC | Age: 55
End: 2025-02-19

## 2025-02-19 DIAGNOSIS — E11.69 TYPE 2 DIABETES MELLITUS WITH OTHER SPECIFIED COMPLICATION, WITH LONG-TERM CURRENT USE OF INSULIN (HCC): ICD-10-CM

## 2025-02-19 DIAGNOSIS — M54.50 ACUTE RIGHT-SIDED LOW BACK PAIN WITHOUT SCIATICA: Primary | ICD-10-CM

## 2025-02-19 DIAGNOSIS — Z79.4 TYPE 2 DIABETES MELLITUS WITH OTHER SPECIFIED COMPLICATION, WITH LONG-TERM CURRENT USE OF INSULIN (HCC): ICD-10-CM

## 2025-02-19 RX ORDER — PREDNISONE 20 MG/1
40 TABLET ORAL DAILY
Qty: 10 TABLET | Refills: 0 | Status: SHIPPED | OUTPATIENT
Start: 2025-02-19 | End: 2025-02-24

## 2025-02-19 RX ORDER — EMPAGLIFLOZIN 25 MG/1
25 TABLET, FILM COATED ORAL DAILY
Qty: 90 TABLET | Refills: 3 | Status: SHIPPED | OUTPATIENT
Start: 2025-02-19

## 2025-02-19 NOTE — TELEPHONE ENCOUNTER
F/u if no better     Diagnosis Orders   1. Acute right-sided low back pain without sciatica  tiZANidine (ZANAFLEX) 4 MG tablet    predniSONE (DELTASONE) 20 MG tablet

## 2025-02-19 NOTE — TELEPHONE ENCOUNTER
Pt is asking for a script for his back pain. He stated it has been going on for a while now and you have given him something in the past.

## 2025-02-19 NOTE — TELEPHONE ENCOUNTER
Recent Visits  Date Type Provider Dept   02/12/25 Office Visit Javon Aaron, DO [facility]   08/12/24 Office Visit Javon Aaron, DO [facility]   02/12/24 Office Visit Javon Aaron, DO [facility]   01/05/24 Office Visit Javon Aaron, DO [facility]   Showing recent visits within past 540 days with a meds authorizing provider and meeting all other requirements  Future Appointments  Date Type Provider Dept   05/13/25 Appointment Javon Aaron, DO [facility]   Showing future appointments within next 150 days with a meds authorizing provider and meeting all other requirements

## 2025-02-24 ENCOUNTER — APPOINTMENT (OUTPATIENT)
Dept: CT IMAGING | Age: 55
End: 2025-02-24
Payer: COMMERCIAL

## 2025-02-24 ENCOUNTER — TELEPHONE (OUTPATIENT)
Dept: FAMILY MEDICINE CLINIC | Age: 55
End: 2025-02-24

## 2025-02-24 ENCOUNTER — HOSPITAL ENCOUNTER (EMERGENCY)
Age: 55
Discharge: HOME | End: 2025-02-24
Payer: COMMERCIAL

## 2025-02-24 ENCOUNTER — APPOINTMENT (OUTPATIENT)
Dept: MRI IMAGING | Age: 55
End: 2025-02-24
Payer: COMMERCIAL

## 2025-02-24 VITALS
DIASTOLIC BLOOD PRESSURE: 72 MMHG | TEMPERATURE: 97.8 F | WEIGHT: 221 LBS | SYSTOLIC BLOOD PRESSURE: 142 MMHG | OXYGEN SATURATION: 96 % | RESPIRATION RATE: 15 BRPM | BODY MASS INDEX: 34.61 KG/M2 | HEART RATE: 92 BPM

## 2025-02-24 DIAGNOSIS — R51.9 NONINTRACTABLE HEADACHE, UNSPECIFIED CHRONICITY PATTERN, UNSPECIFIED HEADACHE TYPE: Primary | ICD-10-CM

## 2025-02-24 DIAGNOSIS — H53.2 DIPLOPIA: ICD-10-CM

## 2025-02-24 LAB
ANION GAP SERPL CALC-SCNC: 20 MEQ/L (ref 8–16)
APTT PPP: 30.7 SECONDS (ref 22–38)
BASOPHILS ABSOLUTE: 0 THOU/MM3 (ref 0–0.1)
BASOPHILS NFR BLD AUTO: 0.6 %
BUN SERPL-MCNC: 10 MG/DL (ref 7–22)
CALCIUM SERPL-MCNC: 9 MG/DL (ref 8.2–9.6)
CHLORIDE SERPL-SCNC: 99 MEQ/L (ref 98–111)
CO2 SERPL-SCNC: 22 MEQ/L (ref 23–33)
CREAT SERPL-MCNC: 0.9 MG/DL (ref 0.4–1.2)
DEPRECATED RDW RBC AUTO: 44.4 FL (ref 35–45)
EKG ATRIAL RATE: 88 BPM
EKG P AXIS: 61 DEGREES
EKG P-R INTERVAL: 160 MS
EKG Q-T INTERVAL: 360 MS
EKG QRS DURATION: 92 MS
EKG QTC CALCULATION (BAZETT): 435 MS
EKG R AXIS: -6 DEGREES
EKG T AXIS: 66 DEGREES
EKG VENTRICULAR RATE: 88 BPM
EOSINOPHIL NFR BLD AUTO: 2.7 %
EOSINOPHILS ABSOLUTE: 0.2 THOU/MM3 (ref 0–0.4)
ERYTHROCYTE [DISTWIDTH] IN BLOOD BY AUTOMATED COUNT: 14.1 % (ref 11.5–14.5)
GFR SERPL CREATININE-BSD FRML MDRD: > 90 ML/MIN/1.73M2
GLUCOSE SERPL-MCNC: 184 MG/DL (ref 70–108)
HCT VFR BLD AUTO: 41 % (ref 42–52)
HGB BLD-MCNC: 13.4 GM/DL (ref 14–18)
IMM GRANULOCYTES # BLD AUTO: 0.04 THOU/MM3 (ref 0–0.07)
IMM GRANULOCYTES NFR BLD AUTO: 0.6 %
INR PPP: 1.09 (ref 0.85–1.13)
LYMPHOCYTES ABSOLUTE: 1.9 THOU/MM3 (ref 1–4.8)
LYMPHOCYTES NFR BLD AUTO: 29.4 %
MAGNESIUM SERPL-MCNC: 1.7 MG/DL (ref 1.6–2.4)
MCH RBC QN AUTO: 28.3 PG (ref 26–33)
MCHC RBC AUTO-ENTMCNC: 32.7 GM/DL (ref 32.2–35.5)
MCV RBC AUTO: 86.5 FL (ref 80–94)
MONOCYTES ABSOLUTE: 0.3 THOU/MM3 (ref 0.4–1.3)
MONOCYTES NFR BLD AUTO: 5.4 %
NEUTROPHILS ABSOLUTE: 3.9 THOU/MM3 (ref 1.8–7.7)
NEUTROPHILS NFR BLD AUTO: 61.3 %
NRBC BLD AUTO-RTO: 0 /100 WBC
OSMOLALITY SERPL CALC.SUM OF ELEC: 285.1 MOSMOL/KG (ref 275–300)
PLATELET # BLD AUTO: 146 THOU/MM3 (ref 130–400)
PMV BLD AUTO: 10.3 FL (ref 9.4–12.4)
POTASSIUM SERPL-SCNC: 3.8 MEQ/L (ref 3.5–5.2)
RBC # BLD AUTO: 4.74 MILL/MM3 (ref 4.7–6.1)
SODIUM SERPL-SCNC: 141 MEQ/L (ref 135–145)
TROPONIN, HIGH SENSITIVITY: 11 NG/L (ref 0–12)
WBC # BLD AUTO: 6.3 THOU/MM3 (ref 4.8–10.8)

## 2025-02-24 PROCEDURE — 70498 CT ANGIOGRAPHY NECK: CPT

## 2025-02-24 PROCEDURE — 84484 ASSAY OF TROPONIN QUANT: CPT

## 2025-02-24 PROCEDURE — 85025 COMPLETE CBC W/AUTO DIFF WBC: CPT

## 2025-02-24 PROCEDURE — 85610 PROTHROMBIN TIME: CPT

## 2025-02-24 PROCEDURE — 70551 MRI BRAIN STEM W/O DYE: CPT

## 2025-02-24 PROCEDURE — 83735 ASSAY OF MAGNESIUM: CPT

## 2025-02-24 PROCEDURE — 80048 BASIC METABOLIC PNL TOTAL CA: CPT

## 2025-02-24 PROCEDURE — 96374 THER/PROPH/DIAG INJ IV PUSH: CPT

## 2025-02-24 PROCEDURE — 36415 COLL VENOUS BLD VENIPUNCTURE: CPT

## 2025-02-24 PROCEDURE — 6360000002 HC RX W HCPCS: Performed by: PHYSICIAN ASSISTANT

## 2025-02-24 PROCEDURE — 93010 ELECTROCARDIOGRAM REPORT: CPT | Performed by: NUCLEAR MEDICINE

## 2025-02-24 PROCEDURE — 6360000004 HC RX CONTRAST MEDICATION: Performed by: PHYSICIAN ASSISTANT

## 2025-02-24 PROCEDURE — 70496 CT ANGIOGRAPHY HEAD: CPT

## 2025-02-24 PROCEDURE — 96375 TX/PRO/DX INJ NEW DRUG ADDON: CPT

## 2025-02-24 PROCEDURE — 99285 EMERGENCY DEPT VISIT HI MDM: CPT

## 2025-02-24 PROCEDURE — 93005 ELECTROCARDIOGRAM TRACING: CPT | Performed by: PHYSICIAN ASSISTANT

## 2025-02-24 PROCEDURE — 85730 THROMBOPLASTIN TIME PARTIAL: CPT

## 2025-02-24 PROCEDURE — 70450 CT HEAD/BRAIN W/O DYE: CPT

## 2025-02-24 RX ORDER — IOPAMIDOL 755 MG/ML
80 INJECTION, SOLUTION INTRAVASCULAR
Status: COMPLETED | OUTPATIENT
Start: 2025-02-24 | End: 2025-02-24

## 2025-02-24 RX ORDER — LORAZEPAM 2 MG/ML
1 INJECTION INTRAMUSCULAR ONCE
Status: COMPLETED | OUTPATIENT
Start: 2025-02-24 | End: 2025-02-24

## 2025-02-24 RX ORDER — METOCLOPRAMIDE HYDROCHLORIDE 5 MG/ML
10 INJECTION INTRAMUSCULAR; INTRAVENOUS ONCE
Status: COMPLETED | OUTPATIENT
Start: 2025-02-24 | End: 2025-02-24

## 2025-02-24 RX ADMIN — LORAZEPAM 1 MG: 2 INJECTION INTRAMUSCULAR; INTRAVENOUS at 12:11

## 2025-02-24 RX ADMIN — METOCLOPRAMIDE HYDROCHLORIDE 10 MG: 5 INJECTION, SOLUTION INTRAMUSCULAR; INTRAVENOUS at 12:11

## 2025-02-24 RX ADMIN — IOPAMIDOL 80 ML: 755 INJECTION, SOLUTION INTRAVENOUS at 14:04

## 2025-02-24 ASSESSMENT — PAIN - FUNCTIONAL ASSESSMENT: PAIN_FUNCTIONAL_ASSESSMENT: 0-10

## 2025-02-24 ASSESSMENT — PAIN DESCRIPTION - LOCATION: LOCATION: HEAD

## 2025-02-24 ASSESSMENT — PAIN SCALES - GENERAL: PAINLEVEL_OUTOF10: 5

## 2025-02-24 NOTE — TELEPHONE ENCOUNTER
Pt states that his blood pressure has been running high.   2.24.25 172/104    Pt does not have a log of blood pressures but said that it keeps running higher.     He does have a headache when his blood pressure is high and will get vision changes.     He currently has double vision and a headache as we are talking on the phone. 171/99.     Talked to Dr. Aaron and he stated that the pt needs to go to ER with those symptoms.    Patient informed and voiced understanding. Pt's daughter is there to take him to the ER.

## 2025-02-24 NOTE — ED NOTES
Pt presents to the ED with complaints of HA and HTN.Pt called PCP due to blood pressure being 170s/ and patient having HA and double/blurry vision. Pt states s/s began 2 days ago. Pt states he had had two previous strokes.

## 2025-02-24 NOTE — ED PROVIDER NOTES
[facility] EMERGENCY DEPARTMENT      EMERGENCY MEDICINE     Pt Name: Az Long  MRN: [id number]  Birthdate 1970  Date of evaluation: 2/24/2025  Provider: ROMMEL Retana    CHIEF COMPLAINT       Chief Complaint   Patient presents with    Hypertension    Headache     HISTORY OF PRESENT ILLNESS   Az Long is a pleasant 54 y.o. male who presents to the emergency department from from home, by private vehicle for evaluation of headache and double vision blurry vision has been going for 2 months.  He states that he had 2 strokes in the past.  He has had some residual left-sided weakness from that he states he contacted his PCP and was told to come to the emergency department.  He is otherwise without complaints.  The nurses note states is been gone for 2 days but this is a mistake he tells this been going on for 2 months.  He does not take any thing for his blood pressure..        PASTMEDICAL HISTORY     Past Medical History:   Diagnosis Date    ADHD (attention deficit hyperactivity disorder)     Allergic rhinitis     Benign esophageal stricture     s/p dilation may 2014    Benign prostatic hyperplasia     Bipolar 1 disorder ([facility])     CAD (coronary artery disease)     Unclear history, cath 2013 with patent arteries. Records pending.     Chronic back pain     Depression     DM2 (diabetes mellitus, type 2) ([facility])     Dysfunctional voiding of urine 06/20/2013    Medications he changes to follow,  Adding ditropan  to the present schedule     ED (erectile dysfunction) of organic origin 05/01/2013    Essential tremor     See's neurology at [facility]      Fibromyalgia     GERD (gastroesophageal reflux disease)     History of colon polyps     Pt unsure when last colo.     History of TIA (transient ischemic attack)     x2     Hyperlipidemia     Hypertension     Hypertriglyceridemia 10/27/2015    Migraine headache     Morbid obesity     WESLEY (nonalcoholic steatohepatitis)     Nicotine dependence     Obstructive

## 2025-02-25 ENCOUNTER — HOSPITAL ENCOUNTER (OUTPATIENT)
Dept: ULTRASOUND IMAGING | Age: 55
Discharge: HOME | End: 2025-02-25
Attending: FAMILY MEDICINE
Payer: COMMERCIAL

## 2025-02-25 DIAGNOSIS — R74.8 ABNORMAL TRANSAMINASES: ICD-10-CM

## 2025-02-25 DIAGNOSIS — F90.8 ADHD, ADULT RESIDUAL TYPE: ICD-10-CM

## 2025-02-25 DIAGNOSIS — K75.81 NASH (NONALCOHOLIC STEATOHEPATITIS): Chronic | ICD-10-CM

## 2025-02-25 PROCEDURE — 76705 ECHO EXAM OF ABDOMEN: CPT

## 2025-02-25 PROCEDURE — 76981 USE PARENCHYMA: CPT

## 2025-02-25 NOTE — TELEPHONE ENCOUNTER
Az called into the office stating that he needs a refill on his Concerta 27mg;#30 with 0 refills;last with a start date of 01/05/25. Medication was last sent for the Nov,Dec and Jan time frames.    Medication is pending your approval for the next 3 months; he was last seen on 02/11/25. Scheduled to return on 05/14/25.

## 2025-02-26 ENCOUNTER — TELEPHONE (OUTPATIENT)
Dept: FAMILY MEDICINE CLINIC | Age: 55
End: 2025-02-26

## 2025-02-26 RX ORDER — METHYLPHENIDATE HYDROCHLORIDE 27 MG/1
27 TABLET ORAL DAILY
Qty: 30 TABLET | Refills: 0 | Status: SHIPPED | OUTPATIENT
Start: 2025-03-27 | End: 2025-04-26

## 2025-02-26 RX ORDER — METHYLPHENIDATE HYDROCHLORIDE 27 MG/1
27 TABLET ORAL DAILY
Qty: 30 TABLET | Refills: 0 | Status: SHIPPED | OUTPATIENT
Start: 2025-02-26 | End: 2025-03-28

## 2025-02-26 RX ORDER — METHYLPHENIDATE HYDROCHLORIDE 27 MG/1
27 TABLET ORAL DAILY
Qty: 30 TABLET | Refills: 0 | Status: SHIPPED | OUTPATIENT
Start: 2025-04-26 | End: 2025-05-26

## 2025-02-26 NOTE — TELEPHONE ENCOUNTER
Left detailed message regarding US results. Okay per HIPAA. Requested call back at [phone]  if they have any further questions.

## 2025-02-26 NOTE — TELEPHONE ENCOUNTER
----- Message from Dr. Javon aAron, DO sent at 2/26/2025  6:42 AM EST -----  Please let pt know that liver US is stable  Fibrotic changes have improved from prior  Overall reassuring testing  Let me know if questions, thanks!

## 2025-02-28 ENCOUNTER — OFFICE VISIT (OUTPATIENT)
Dept: FAMILY MEDICINE CLINIC | Age: 55
End: 2025-02-28
Payer: COMMERCIAL

## 2025-02-28 VITALS
SYSTOLIC BLOOD PRESSURE: 148 MMHG | HEIGHT: 67 IN | DIASTOLIC BLOOD PRESSURE: 88 MMHG | BODY MASS INDEX: 34.65 KG/M2 | TEMPERATURE: 97.7 F | RESPIRATION RATE: 18 BRPM | OXYGEN SATURATION: 98 % | WEIGHT: 220.8 LBS | HEART RATE: 79 BPM

## 2025-02-28 DIAGNOSIS — I10 ESSENTIAL HYPERTENSION: ICD-10-CM

## 2025-02-28 DIAGNOSIS — H53.2 DIPLOPIA: ICD-10-CM

## 2025-02-28 DIAGNOSIS — R51.9 NONINTRACTABLE HEADACHE, UNSPECIFIED CHRONICITY PATTERN, UNSPECIFIED HEADACHE TYPE: Primary | ICD-10-CM

## 2025-02-28 DIAGNOSIS — F17.210 CIGARETTE NICOTINE DEPENDENCE WITHOUT COMPLICATION: ICD-10-CM

## 2025-02-28 PROCEDURE — 3079F DIAST BP 80-89 MM HG: CPT | Performed by: FAMILY MEDICINE

## 2025-02-28 PROCEDURE — 3017F COLORECTAL CA SCREEN DOC REV: CPT | Performed by: FAMILY MEDICINE

## 2025-02-28 PROCEDURE — 4004F PT TOBACCO SCREEN RCVD TLK: CPT | Performed by: FAMILY MEDICINE

## 2025-02-28 PROCEDURE — 99214 OFFICE O/P EST MOD 30 MIN: CPT | Performed by: FAMILY MEDICINE

## 2025-02-28 PROCEDURE — G8417 CALC BMI ABV UP PARAM F/U: HCPCS | Performed by: FAMILY MEDICINE

## 2025-02-28 PROCEDURE — G8427 DOCREV CUR MEDS BY ELIG CLIN: HCPCS | Performed by: FAMILY MEDICINE

## 2025-02-28 PROCEDURE — 3077F SYST BP >= 140 MM HG: CPT | Performed by: FAMILY MEDICINE

## 2025-02-28 RX ORDER — AMLODIPINE BESYLATE 10 MG/1
10 TABLET ORAL DAILY
Qty: 90 TABLET | Refills: 3 | Status: SHIPPED | OUTPATIENT
Start: 2025-02-28

## 2025-02-28 NOTE — PROGRESS NOTES
Chief Complaint   Patient presents with    ED Follow-up     History obtained from the patient.    SUBJECTIVE:  Az Long is a 54 y.o. male that presents today for:     -ER f/u:  In ER on 2/24/2025  Was having a HA and some double vision  BP's were also elevated  Neg w/u in ER, including MRI brain, CTA head and neck and labs  Here for f/u. HA and double vision resolved. Feeling well there.  BP's remain elevated, have been elevated the last several wks, 140-150/70-90  No CP/SOB  On Inderal, Norvasc 5mg and Lisinopril 40mg daily.       -Smoking:  Not quite ready to quit      Age/Gender Health Maintenance    Lipid -   Lab Results   Component Value Date    CHOL 159 02/06/2025    CHOL 180 02/21/2024    CHOL 203 (H) 03/13/2023     Lab Results   Component Value Date    TRIG 132 02/06/2025    TRIG 251 (H) 02/21/2024    TRIG 353 (H) 03/13/2023     Lab Results   Component Value Date    HDL 40 02/06/2025    HDL 35 02/21/2024    HDL 29 03/13/2023     Lab Results   Component Value Date    LDL 93 02/06/2025    LDL 95 02/21/2024     03/13/2023     Lab Results   Component Value Date    LDLDIRECT 116.17 01/21/2020       TSH -   Lab Results   Component Value Date    TSH 1.510 02/06/2025       Colon Cancer Screening - NEG CANCER FEB 2015  LDCT: NEG DEC 2024    Tetanus - UTD AUG 2024  Influenza Vaccine - UTD FALL 2024  Pneumonia Vaccine - UTD June 2014 PPV 23 and PCV 13 in SEPT 2015  Zoster - UTD x 2    PSA testing discussion - follows with urology  Lab Results   Component Value Date    PSA 0.86 10/15/2024    PSA 0.18 03/13/2023    PSA 0.22 10/25/2022       AAA Screening - Age 65      Diabetes Health Maintenance    A1C -   Lab Results   Component Value Date/Time    LABA1C 5.3 02/12/2025 06:22 AM    LABA1C 5.4 08/12/2024 06:39 AM    LABA1C 5.7 02/12/2024 07:08 AM    LABA1C 7.3 08/09/2023 07:15 AM    LABA1C 6.7 05/09/2023 02:25 PM    LABA1C 8.8 03/01/2023 06:55 AM    LABA1C 8.1 08/23/2022 08:02 AM    LABA1C 11.2 11/21/2019

## 2025-03-06 DIAGNOSIS — Z79.4 TYPE 2 DIABETES MELLITUS WITH OTHER SPECIFIED COMPLICATION, WITH LONG-TERM CURRENT USE OF INSULIN (HCC): ICD-10-CM

## 2025-03-06 DIAGNOSIS — E11.69 TYPE 2 DIABETES MELLITUS WITH OTHER SPECIFIED COMPLICATION, WITH LONG-TERM CURRENT USE OF INSULIN (HCC): ICD-10-CM

## 2025-03-06 RX ORDER — EMPAGLIFLOZIN 25 MG/1
25 TABLET, FILM COATED ORAL DAILY
Qty: 90 TABLET | Refills: 3 | Status: SHIPPED | OUTPATIENT
Start: 2025-03-06

## 2025-03-06 NOTE — TELEPHONE ENCOUNTER
Recent Visits  Date Type Provider Dept   02/28/25 Office Visit Javon Aaron, DO [facility]   02/12/25 Office Visit Javon Aaron, DO [facility]   08/12/24 Office Visit Javon Aaron, DO [facility]   02/12/24 Office Visit Javon Aaron, DO [facility]   01/05/24 Office Visit Javon Aaron, DO [facility]   Showing recent visits within past 540 days with a meds authorizing provider and meeting all other requirements  Future Appointments  Date Type Provider Dept   05/13/25 Appointment Javon Aaron, DO [facility]   Showing future appointments within next 150 days with a meds authorizing provider and meeting all other requirements

## 2025-03-13 ENCOUNTER — TELEPHONE (OUTPATIENT)
Dept: FAMILY MEDICINE CLINIC | Age: 55
End: 2025-03-13

## 2025-03-13 DIAGNOSIS — I10 ESSENTIAL HYPERTENSION: Primary | ICD-10-CM

## 2025-03-13 NOTE — TELEPHONE ENCOUNTER
----- Message from Dr. Javon Aaron, DO sent at 3/13/2025  5:29 AM EDT -----  Please call pt and see how his BP's have been doing with inc dose of Norvasc.  Let me know, thanks!   Anesthesia Volume In Cc: 6

## 2025-03-14 RX ORDER — HYDROCHLOROTHIAZIDE 12.5 MG/1
12.5 CAPSULE ORAL EVERY MORNING
Qty: 90 CAPSULE | Refills: 3 | Status: SHIPPED | OUTPATIENT
Start: 2025-03-14

## 2025-03-14 NOTE — TELEPHONE ENCOUNTER
Pt informed of medication being sent in and to get labs done a week after starting the medication . Pt voiced understanding with no further questions at this time.

## 2025-03-14 NOTE — TELEPHONE ENCOUNTER
Ok  Still a little high  Recommend we add HCTZ 12.5mg daily  Get a BMP in a wk after starting  Con't lisinopril and norvasc  Let me know if questions, thanks!     Diagnosis Orders   1. Essential hypertension  hydroCHLOROthiazide 12.5 MG capsule    Basic Metabolic Panel        Future Appointments   Date Time Provider Department Center   5/13/2025  8:20 AM [facility]   5/14/2025 11:30 AM Harman Soria MD N [facility] [place]   8/25/2025  8:30 AM Natasha Leahy PA-C N Pulm Med [facility] [place]   9/16/2025  8:30 AM Reid Cool PA-C N [place] [facility]

## 2025-03-24 NOTE — TELEPHONE ENCOUNTER
Emmanuel Kumar has requested a refill of Lamictal 200mg/d on [employer]. He attended an appointment in the office 6/23 and is to return in 6 wks but does not have a future appt at this time.
How Severe Is Your Rash?: mild
Is This A New Presentation, Or A Follow-Up?: Rash

## 2025-03-26 ENCOUNTER — LAB (OUTPATIENT)
Dept: LAB | Age: 55
End: 2025-03-26

## 2025-03-26 DIAGNOSIS — R30.0 DYSURIA: ICD-10-CM

## 2025-03-26 DIAGNOSIS — I10 ESSENTIAL HYPERTENSION: ICD-10-CM

## 2025-03-26 LAB
ALBUMIN SERPL BCG-MCNC: 4.3 G/DL (ref 3.4–4.9)
ALP SERPL-CCNC: 73 U/L (ref 40–129)
ALT SERPL W/O P-5'-P-CCNC: 20 U/L (ref 10–50)
ANION GAP SERPL CALC-SCNC: 11 MEQ/L (ref 8–16)
AST SERPL-CCNC: 24 U/L (ref 10–50)
BACTERIA: ABNORMAL
BILIRUB CONJ SERPL-MCNC: < 0.1 MG/DL (ref 0–0.2)
BILIRUB SERPL-MCNC: 0.2 MG/DL (ref 0.3–1.2)
BILIRUB UR QL STRIP: NEGATIVE
BUN SERPL-MCNC: 10 MG/DL (ref 8–23)
CALCIUM SERPL-MCNC: 9.4 MG/DL (ref 8.6–10)
CASTS #/AREA URNS LPF: ABNORMAL /LPF
CASTS #/AREA URNS LPF: ABNORMAL /LPF
CHARACTER UR: CLEAR
CHARCOAL URNS QL MICRO: ABNORMAL
CHLORIDE SERPL-SCNC: 103 MEQ/L (ref 98–111)
CO2 SERPL-SCNC: 24 MEQ/L (ref 22–29)
COLOR UR: YELLOW
CREAT SERPL-MCNC: 0.9 MG/DL (ref 0.7–1.2)
CRYSTALS URNS QL MICRO: ABNORMAL
EPITHELIAL CELLS, UA: ABNORMAL /HPF
GFR SERPL CREATININE-BSD FRML MDRD: > 90 ML/MIN/1.73M2
GLUCOSE SERPL-MCNC: 91 MG/DL (ref 74–109)
GLUCOSE UR QL STRIP.AUTO: >= 1000 MG/DL
HGB UR QL STRIP.AUTO: NEGATIVE
KETONES UR QL STRIP.AUTO: NEGATIVE
LEUKOCYTE ESTERASE UR QL STRIP.AUTO: NEGATIVE
NITRITE UR QL STRIP.AUTO: NEGATIVE
PH UR STRIP.AUTO: 7.5 [PH] (ref 5–9)
POTASSIUM SERPL-SCNC: 3.7 MEQ/L (ref 3.5–5.2)
PROT SERPL-MCNC: 6.8 G/DL (ref 6.4–8.3)
PROT UR STRIP.AUTO-MCNC: NEGATIVE MG/DL
RBC #/AREA URNS HPF: ABNORMAL /HPF
RENAL EPI CELLS #/AREA URNS HPF: ABNORMAL /[HPF]
SODIUM SERPL-SCNC: 138 MEQ/L (ref 135–145)
SPECIFIC GRAVITY UA: 1.02 (ref 1–1.03)
UROBILINOGEN, URINE: 0.2 EU/DL (ref 0–1)
WBC #/AREA URNS HPF: ABNORMAL /HPF
YEAST LIKE FUNGI URNS QL MICRO: ABNORMAL

## 2025-03-27 ENCOUNTER — RESULTS FOLLOW-UP (OUTPATIENT)
Dept: FAMILY MEDICINE CLINIC | Age: 55
End: 2025-03-27

## 2025-03-27 ENCOUNTER — TELEPHONE (OUTPATIENT)
Dept: FAMILY MEDICINE CLINIC | Age: 55
End: 2025-03-27

## 2025-03-27 NOTE — TELEPHONE ENCOUNTER
Javon Aaron, DO       Also, see how his BP's have been doing with the addition of HCTZ  Let me know, thanks!      Javon Aaron, DO       Result Note  Please let pt know that f/u BMP is WNL Let me know if questions, thanks!

## 2025-03-27 NOTE — TELEPHONE ENCOUNTER
Great  Con't current meds  Call any changes in BP  F/u as planned    Future Appointments   Date Time Provider Department Center   5/13/2025  8:20 AM Javon Aaron, DO [facility]   5/14/2025 11:30 AM Harman Soria MD N [facility] PS[facility] [place]   8/25/2025  8:30 AM Natasha Leahy PA-C N [facility] [place]   9/16/2025  8:30 AM Reid Cool PA-C N [place] [facility] [place]

## 2025-03-28 ENCOUNTER — RESULTS FOLLOW-UP (OUTPATIENT)
Dept: UROLOGY | Age: 55
End: 2025-03-28

## 2025-03-28 DIAGNOSIS — B96.20 E. COLI UTI: Primary | ICD-10-CM

## 2025-03-28 DIAGNOSIS — N39.0 E. COLI UTI: Primary | ICD-10-CM

## 2025-03-28 LAB
BACTERIA UR CULT: ABNORMAL
ORGANISM: ABNORMAL

## 2025-03-28 RX ORDER — SULFAMETHOXAZOLE AND TRIMETHOPRIM 800; 160 MG/1; MG/1
1 TABLET ORAL 2 TIMES DAILY
Qty: 14 TABLET | Refills: 0 | Status: SHIPPED | OUTPATIENT
Start: 2025-03-28 | End: 2025-04-04

## 2025-03-28 NOTE — TELEPHONE ENCOUNTER
Pt informed of continue current medications. Pt voiced understanding with no further questions at this time.

## 2025-03-30 LAB
A2 MACROGLOB SERPL-MCNC: 239 MG/DL (ref 131–293)
ALT SERPL-CCNC: 22 U/L (ref 5–50)
ANNOTATION COMMENT IMP: NORMAL
AST SERPL-CCNC: 25 U/L (ref 9–50)
BUN SERPL-MCNC: 10 MG/DL (ref 7–20)
FIBROSIS STAGE SERPL QL: NORMAL
GGT SERPL-CCNC: 36 U/L (ref 7–51)
LIVER FIBR SCORE SERPL CALC.FIBROMETER: 0.64
PATHOLOGY STUDY: NORMAL
PLATELET # BLD: 169 K/UL
PT INDEX PPP: 91 % (ref 90–120)
REF LAB TEST RESULTS: 0.13
REF LAB TEST RESULTS: 0.38
REF LAB TEST RESULTS: NORMAL

## 2025-04-01 DIAGNOSIS — R80.9 TYPE 2 DIABETES MELLITUS WITH MICROALBUMINURIA, WITHOUT LONG-TERM CURRENT USE OF INSULIN (HCC): Chronic | ICD-10-CM

## 2025-04-01 DIAGNOSIS — E11.29 TYPE 2 DIABETES MELLITUS WITH MICROALBUMINURIA, WITHOUT LONG-TERM CURRENT USE OF INSULIN (HCC): Chronic | ICD-10-CM

## 2025-04-01 DIAGNOSIS — I25.10 ASCVD (ARTERIOSCLEROTIC CARDIOVASCULAR DISEASE): ICD-10-CM

## 2025-04-01 RX ORDER — ASPIRIN 81 MG/1
81 TABLET, COATED ORAL DAILY
Qty: 90 TABLET | Refills: 3 | Status: SHIPPED | OUTPATIENT
Start: 2025-04-01

## 2025-04-13 DIAGNOSIS — R13.14 PHARYNGOESOPHAGEAL DYSPHAGIA: ICD-10-CM

## 2025-04-13 DIAGNOSIS — R10.13 DYSPEPSIA: ICD-10-CM

## 2025-04-13 DIAGNOSIS — K21.9 GASTROESOPHAGEAL REFLUX DISEASE: ICD-10-CM

## 2025-04-13 DIAGNOSIS — R10.13 EPIGASTRIC PAIN: ICD-10-CM

## 2025-04-14 RX ORDER — OMEPRAZOLE 20 MG/1
40 CAPSULE, DELAYED RELEASE ORAL DAILY
Qty: 180 CAPSULE | Refills: 3 | Status: SHIPPED | OUTPATIENT
Start: 2025-04-14

## 2025-05-07 DIAGNOSIS — F25.0 SCHIZOAFFECTIVE DISORDER, BIPOLAR TYPE (HCC): ICD-10-CM

## 2025-05-07 RX ORDER — LAMOTRIGINE 150 MG/1
TABLET ORAL
Qty: 60 TABLET | Refills: 0 | Status: SHIPPED | OUTPATIENT
Start: 2025-05-07

## 2025-05-07 NOTE — TELEPHONE ENCOUNTER
DR TAMARA LEONARD  Qian is requesting a medication refill on Az's behalf for Lamictal 150mg;#180 with 1 refill;last with a start date of 11/14/24 and last filled on 02/09/25.    Medication is pending your approval for a 30 day supply with 0 refills due to covering provider. Please advise otherwise. He is scheduled to return on 05/14/25. Last seen on 2/11/25.

## 2025-05-12 RX ORDER — ROPINIROLE 8 MG/1
8 TABLET, EXTENDED RELEASE ORAL DAILY
COMMUNITY
Start: 2025-05-04

## 2025-05-12 NOTE — PROGRESS NOTES
Chief Complaint   Patient presents with    Follow-up     DM2 and chronic issues noted below     History obtained from the patient.    SUBJECTIVE:  Az Long is a 54 y.o. male that presents today for:     -DM2:   A1c 5.2  We stopped metformin 3 months ago  Remains on Trulicity 3mg, Jardiance 25mg  FBS remains   No lows.     Lab Results   Component Value Date/Time    LABA1C 5.2 05/13/2025 06:38 AM    LABA1C 5.3 02/12/2025 06:22 AM    LABA1C 5.4 08/12/2024 06:39 AM    LABA1C 5.7 02/12/2024 07:08 AM    LABA1C 7.3 08/09/2023 07:15 AM    LABA1C 6.7 05/09/2023 02:25 PM    LABA1C 8.1 08/23/2022 08:02 AM    LABA1C 11.2 11/21/2019 08:00 AM    LABA1C 7.8 08/20/2019 07:54 AM    LABA1C 10.7 05/20/2019 10:54 AM    LABA1C 11.2 02/18/2019 07:56 AM    LABA1C 12.2 11/16/2018 07:50 AM     Wt Readings from Last 3 Encounters:   05/13/25 101.4 kg (223 lb 9.6 oz)   02/28/25 100.2 kg (220 lb 12.8 oz)   02/24/25 100.2 kg (221 lb)        -Elevated LFT/WESLEY:  F3 on Fibroscan FEB 2024 but F1 on Fibroscan FEB 2025  No RUQ pain or jaundice  Last LFT were WNL, FEB 2025 and March 2025  Hadn't f/u with GI, [place], in 2 years  Has since est with GI in [facility].      -Anemia:  Improved on most recent labs  Labs stable  Off iron/b12  Denies bleeding, melena or hematochezia      -HTN:    HPI:    Taking meds as prescribed ?: yes  Tolerating well ?: yes  Side Effects ?: denies  BP at home ?: <140/90  Working on TLCS ?: yes  Chest Pain/SOB/Palpitations? denies      -HLD:    HPI:    Taking meds as prescribed ?: yes  Tolerating well ?: yes  Side Effects ?: denies  Muscle Pain?: denies  Working on TLCS ?: yes      -GERD/Barrets:    HPI:  [facility]    Taking meds as prescribed ?: yes  Tolerating well ?: yes  Side Effects ?: denies  Dysphagia ?: denies  Odynophagia ?: denies  Melena ?: denies  Working on TLCS ?: yes      -Smoking:  Wants to quit  Ready to quit  Failed rachel replacement prior.

## 2025-05-13 ENCOUNTER — OFFICE VISIT (OUTPATIENT)
Dept: FAMILY MEDICINE CLINIC | Age: 55
End: 2025-05-13
Payer: COMMERCIAL

## 2025-05-13 ENCOUNTER — TELEPHONE (OUTPATIENT)
Dept: PULMONOLOGY | Age: 55
End: 2025-05-13

## 2025-05-13 VITALS
DIASTOLIC BLOOD PRESSURE: 76 MMHG | TEMPERATURE: 97.8 F | HEART RATE: 72 BPM | OXYGEN SATURATION: 97 % | HEIGHT: 67 IN | RESPIRATION RATE: 16 BRPM | WEIGHT: 223.6 LBS | BODY MASS INDEX: 35.09 KG/M2 | SYSTOLIC BLOOD PRESSURE: 132 MMHG

## 2025-05-13 DIAGNOSIS — Z23 NEED FOR VACCINATION: ICD-10-CM

## 2025-05-13 DIAGNOSIS — R74.8 ABNORMAL TRANSAMINASES: ICD-10-CM

## 2025-05-13 DIAGNOSIS — E66.9 OBESITY (BMI 30-39.9): ICD-10-CM

## 2025-05-13 DIAGNOSIS — E78.2 MIXED HYPERLIPIDEMIA: ICD-10-CM

## 2025-05-13 DIAGNOSIS — E53.8 B12 DEFICIENCY: ICD-10-CM

## 2025-05-13 DIAGNOSIS — F17.210 CIGARETTE NICOTINE DEPENDENCE WITHOUT COMPLICATION: ICD-10-CM

## 2025-05-13 DIAGNOSIS — E11.69 TYPE 2 DIABETES MELLITUS WITH OTHER SPECIFIED COMPLICATION, WITH LONG-TERM CURRENT USE OF INSULIN (HCC): Primary | ICD-10-CM

## 2025-05-13 DIAGNOSIS — Z79.4 TYPE 2 DIABETES MELLITUS WITH OTHER SPECIFIED COMPLICATION, WITH LONG-TERM CURRENT USE OF INSULIN (HCC): Primary | ICD-10-CM

## 2025-05-13 DIAGNOSIS — I10 ESSENTIAL HYPERTENSION: ICD-10-CM

## 2025-05-13 DIAGNOSIS — D64.9 NORMOCYTIC ANEMIA: ICD-10-CM

## 2025-05-13 DIAGNOSIS — Z86.2 HISTORY OF IRON DEFICIENCY ANEMIA: ICD-10-CM

## 2025-05-13 DIAGNOSIS — K75.81 NASH (NONALCOHOLIC STEATOHEPATITIS): Chronic | ICD-10-CM

## 2025-05-13 DIAGNOSIS — K21.9 GASTROESOPHAGEAL REFLUX DISEASE, UNSPECIFIED WHETHER ESOPHAGITIS PRESENT: ICD-10-CM

## 2025-05-13 DIAGNOSIS — K22.70 SHORT-SEGMENT BARRETT'S ESOPHAGUS: Chronic | ICD-10-CM

## 2025-05-13 LAB — HBA1C MFR BLD: 5.2 % (ref 4.3–5.7)

## 2025-05-13 PROCEDURE — G8417 CALC BMI ABV UP PARAM F/U: HCPCS | Performed by: FAMILY MEDICINE

## 2025-05-13 PROCEDURE — 3017F COLORECTAL CA SCREEN DOC REV: CPT | Performed by: FAMILY MEDICINE

## 2025-05-13 PROCEDURE — 3044F HG A1C LEVEL LT 7.0%: CPT | Performed by: FAMILY MEDICINE

## 2025-05-13 PROCEDURE — 99214 OFFICE O/P EST MOD 30 MIN: CPT | Performed by: FAMILY MEDICINE

## 2025-05-13 PROCEDURE — 4004F PT TOBACCO SCREEN RCVD TLK: CPT | Performed by: FAMILY MEDICINE

## 2025-05-13 PROCEDURE — 3075F SYST BP GE 130 - 139MM HG: CPT | Performed by: FAMILY MEDICINE

## 2025-05-13 PROCEDURE — G8427 DOCREV CUR MEDS BY ELIG CLIN: HCPCS | Performed by: FAMILY MEDICINE

## 2025-05-13 PROCEDURE — 90677 PCV20 VACCINE IM: CPT | Performed by: FAMILY MEDICINE

## 2025-05-13 PROCEDURE — 2022F DILAT RTA XM EVC RTNOPTHY: CPT | Performed by: FAMILY MEDICINE

## 2025-05-13 PROCEDURE — 3078F DIAST BP <80 MM HG: CPT | Performed by: FAMILY MEDICINE

## 2025-05-13 RX ORDER — VARENICLINE TARTRATE 1 MG/1
1 TABLET, FILM COATED ORAL 2 TIMES DAILY
Qty: 60 TABLET | Refills: 1 | Status: SHIPPED | OUTPATIENT
Start: 2025-06-10 | End: 2025-08-09

## 2025-05-13 RX ORDER — VARENICLINE TARTRATE 0.5 MG/1
.5-1 TABLET, FILM COATED ORAL SEE ADMIN INSTRUCTIONS
Qty: 57 TABLET | Refills: 0 | Status: SHIPPED | OUTPATIENT
Start: 2025-05-13

## 2025-05-13 NOTE — TELEPHONE ENCOUNTER
Phoned patient and left message to call office to reschedule appointment due to provider no longer practicing at [facility].

## 2025-05-13 NOTE — PROGRESS NOTES
Immunization(s) given during visit:    Immunizations Administered       Name Date Dose Route    Pneumococcal, PCV20, PREVNAR 20, (age 6w+), IM, 0.5mL 5/13/2025 0.5 mL Intramuscular    Site: Deltoid- Right    Lot: [id number]    NDC: 4210-5102-47            Most recent Vaccine Information Sheet dated 5.12.2023 given to pt

## 2025-05-14 ENCOUNTER — TELEMEDICINE (OUTPATIENT)
Dept: PSYCHIATRY | Age: 55
End: 2025-05-14
Payer: COMMERCIAL

## 2025-05-14 DIAGNOSIS — F25.0 SCHIZOAFFECTIVE DISORDER, BIPOLAR TYPE (HCC): Primary | ICD-10-CM

## 2025-05-14 DIAGNOSIS — G47.00 INSOMNIA, UNSPECIFIED TYPE: ICD-10-CM

## 2025-05-14 DIAGNOSIS — F90.8 ADHD, ADULT RESIDUAL TYPE: ICD-10-CM

## 2025-05-14 DIAGNOSIS — G24.01 TARDIVE DYSKINESIA: ICD-10-CM

## 2025-05-14 PROCEDURE — G8427 DOCREV CUR MEDS BY ELIG CLIN: HCPCS | Performed by: PSYCHIATRY & NEUROLOGY

## 2025-05-14 PROCEDURE — 3017F COLORECTAL CA SCREEN DOC REV: CPT | Performed by: PSYCHIATRY & NEUROLOGY

## 2025-05-14 PROCEDURE — 99214 OFFICE O/P EST MOD 30 MIN: CPT | Performed by: PSYCHIATRY & NEUROLOGY

## 2025-05-14 RX ORDER — RAMELTEON 8 MG/1
8 TABLET ORAL NIGHTLY PRN
Qty: 90 TABLET | Refills: 1 | Status: SHIPPED | OUTPATIENT
Start: 2025-05-14

## 2025-05-14 RX ORDER — LURASIDONE HYDROCHLORIDE 120 MG/1
TABLET, FILM COATED ORAL
Qty: 90 TABLET | Refills: 1 | Status: SHIPPED | OUTPATIENT
Start: 2025-05-14

## 2025-05-14 RX ORDER — VALBENAZINE 80 MG/1
1 CAPSULE ORAL DAILY
Qty: 90 CAPSULE | Refills: 1 | Status: SHIPPED | OUTPATIENT
Start: 2025-05-14

## 2025-05-14 RX ORDER — LAMOTRIGINE 150 MG/1
TABLET ORAL
Qty: 60 TABLET | Refills: 0 | Status: SHIPPED | OUTPATIENT
Start: 2025-05-14

## 2025-05-14 ASSESSMENT — PATIENT HEALTH QUESTIONNAIRE - PHQ9
7. TROUBLE CONCENTRATING ON THINGS, SUCH AS READING THE NEWSPAPER OR WATCHING TELEVISION: NEARLY EVERY DAY
6. FEELING BAD ABOUT YOURSELF - OR THAT YOU ARE A FAILURE OR HAVE LET YOURSELF OR YOUR FAMILY DOWN: SEVERAL DAYS
6. FEELING BAD ABOUT YOURSELF - OR THAT YOU ARE A FAILURE OR HAVE LET YOURSELF OR YOUR FAMILY DOWN: SEVERAL DAYS
8. MOVING OR SPEAKING SO SLOWLY THAT OTHER PEOPLE COULD HAVE NOTICED. OR THE OPPOSITE - BEING SO FIDGETY OR RESTLESS THAT YOU HAVE BEEN MOVING AROUND A LOT MORE THAN USUAL: MORE THAN HALF THE DAYS
SUM OF ALL RESPONSES TO PHQ QUESTIONS 1-9: 19
9. THOUGHTS THAT YOU WOULD BE BETTER OFF DEAD, OR OF HURTING YOURSELF: NOT AT ALL
3. TROUBLE FALLING OR STAYING ASLEEP: NEARLY EVERY DAY
8. MOVING OR SPEAKING SO SLOWLY THAT OTHER PEOPLE COULD HAVE NOTICED. OR THE OPPOSITE, BEING SO FIGETY OR RESTLESS THAT YOU HAVE BEEN MOVING AROUND A LOT MORE THAN USUAL: MORE THAN HALF THE DAYS
9. THOUGHTS THAT YOU WOULD BE BETTER OFF DEAD, OR OF HURTING YOURSELF: NOT AT ALL
10. IF YOU CHECKED OFF ANY PROBLEMS, HOW DIFFICULT HAVE THESE PROBLEMS MADE IT FOR YOU TO DO YOUR WORK, TAKE CARE OF THINGS AT HOME, OR GET ALONG WITH OTHER PEOPLE: VERY DIFFICULT
5. POOR APPETITE OR OVEREATING: MORE THAN HALF THE DAYS
1. LITTLE INTEREST OR PLEASURE IN DOING THINGS: NEARLY EVERY DAY
2. FEELING DOWN, DEPRESSED OR HOPELESS: MORE THAN HALF THE DAYS
SUM OF ALL RESPONSES TO PHQ QUESTIONS 1-9: 19
4. FEELING TIRED OR HAVING LITTLE ENERGY: NEARLY EVERY DAY
2. FEELING DOWN, DEPRESSED OR HOPELESS: MORE THAN HALF THE DAYS
1. LITTLE INTEREST OR PLEASURE IN DOING THINGS: NEARLY EVERY DAY
SUM OF ALL RESPONSES TO PHQ QUESTIONS 1-9: 19
4. FEELING TIRED OR HAVING LITTLE ENERGY: NEARLY EVERY DAY
7. TROUBLE CONCENTRATING ON THINGS, SUCH AS READING THE NEWSPAPER OR WATCHING TELEVISION: NEARLY EVERY DAY
10. IF YOU CHECKED OFF ANY PROBLEMS, HOW DIFFICULT HAVE THESE PROBLEMS MADE IT FOR YOU TO DO YOUR WORK, TAKE CARE OF THINGS AT HOME, OR GET ALONG WITH OTHER PEOPLE: VERY DIFFICULT
5. POOR APPETITE OR OVEREATING: MORE THAN HALF THE DAYS
3. TROUBLE FALLING OR STAYING ASLEEP: NEARLY EVERY DAY
SUM OF ALL RESPONSES TO PHQ QUESTIONS 1-9: 19
SUM OF ALL RESPONSES TO PHQ QUESTIONS 1-9: 19

## 2025-05-14 ASSESSMENT — ANXIETY QUESTIONNAIRES
3. WORRYING TOO MUCH ABOUT DIFFERENT THINGS: SEVERAL DAYS
7. FEELING AFRAID AS IF SOMETHING AWFUL MIGHT HAPPEN: SEVERAL DAYS
4. TROUBLE RELAXING: MORE THAN HALF THE DAYS
5. BEING SO RESTLESS THAT IT IS HARD TO SIT STILL: MORE THAN HALF THE DAYS
4. TROUBLE RELAXING: MORE THAN HALF THE DAYS
6. BECOMING EASILY ANNOYED OR IRRITABLE: MORE THAN HALF THE DAYS
2. NOT BEING ABLE TO STOP OR CONTROL WORRYING: SEVERAL DAYS
3. WORRYING TOO MUCH ABOUT DIFFERENT THINGS: SEVERAL DAYS
IF YOU CHECKED OFF ANY PROBLEMS ON THIS QUESTIONNAIRE, HOW DIFFICULT HAVE THESE PROBLEMS MADE IT FOR YOU TO DO YOUR WORK, TAKE CARE OF THINGS AT HOME, OR GET ALONG WITH OTHER PEOPLE: VERY DIFFICULT
IF YOU CHECKED OFF ANY PROBLEMS ON THIS QUESTIONNAIRE, HOW DIFFICULT HAVE THESE PROBLEMS MADE IT FOR YOU TO DO YOUR WORK, TAKE CARE OF THINGS AT HOME, OR GET ALONG WITH OTHER PEOPLE: VERY DIFFICULT
GAD7 TOTAL SCORE: 10
1. FEELING NERVOUS, ANXIOUS, OR ON EDGE: SEVERAL DAYS
5. BEING SO RESTLESS THAT IT IS HARD TO SIT STILL: MORE THAN HALF THE DAYS
6. BECOMING EASILY ANNOYED OR IRRITABLE: MORE THAN HALF THE DAYS
7. FEELING AFRAID AS IF SOMETHING AWFUL MIGHT HAPPEN: SEVERAL DAYS
1. FEELING NERVOUS, ANXIOUS, OR ON EDGE: SEVERAL DAYS
2. NOT BEING ABLE TO STOP OR CONTROL WORRYING: SEVERAL DAYS

## 2025-05-14 NOTE — PROGRESS NOTES
[facility] [place] [facility]  [facility] PS[facility]  [address] [place] [id number] [phone]    Progress Note    Patient:  Az Long  YOB: 1970  PCP:  Javon Aaron DO  Visit Date:  5/14/2025      Reason for Visit/CC:  Follow up for medication management for    Diagnosis Orders   1. Schizoaffective disorder, bipolar type (HCC)  lurasidone (LATUDA) 120 MG tablet    lamoTRIgine (LAMICTAL) 150 MG tablet      2. Tardive dyskinesia  Valbenazine Tosylate (INGREZZA) 80 MG CAPS      3. Insomnia, unspecified type  ramelteon (ROZEREM) 8 MG tablet      4. ADHD, adult residual type              Assessment & Plan    Clinical Impression:  The patient's hallucinations remain stable without any worsening or command hallucinations. He denies suicidal thoughts but acknowledges persistent thoughts of violence when frustrated, without specific plans or intent to act on them. The current medication regimen, including Latuda and Ingrezza, appears to be effective in maintaining stability. The patient reports no improvement in restless leg syndrome symptoms despite an increased dose of ropinirole. Sleep continues to be problematic, with delayed onset of sleep medication and frequent awakenings, despite CPAP use.      Plan:  - Continue current medication regimen.  - Monitor for exacerbation of hallucinations or depression upon initiation of Chantix.  - Provide information on sleep hygiene and cognitive behavioral therapy for insomnia.  - Encourage consultation with a sleep medicine specialist.    Follow-up:  - Next appointment scheduled for 08/18/2025 at 11:30 AM.    Notes & Risk Factors:  - Persistent thoughts of violence when frustrated, no specific plans.  - Protective factors include stable medication regimen and use of CPAP.  There was no indication of imminent danger to self or others based on today's exam; outpatient care is appropriate. Modifiable risk factors

## 2025-05-14 NOTE — PATIENT INSTRUCTIONS
Plan:  Az Long, it was nice seeing you today  Continue current medications  Review information below  Follow up in 3 months    Key Points for Sleep Hygiene  Regular Sleep Schedule  Go to bed and wake up at the same time every day, including weekends and days off, to regulate your body's clock  Sleep Environment  Ensure the bedroom is quiet, dark, and at a comfortable temperature (between 17 to 19°C). Use curtains, blinds, or an eye mask to block out light and earplugs to reduce noise  Keep the bedroom only for sleeping and sex. Avoid using the bed for watching TV, eating, reading, or working  Avoid Stimulants  Avoid consuming caffeine and nicotine for at least 4-6 hours before bedtime. Caffeine can take up to six hours to wear off  Avoid alcohol for at least 4-6 hours before bedtime, as it can disrupt sleep quality  Diet and Exercise  Maintain a healthy, balanced diet. Avoid heavy meals close to bedtime, but a light snack can be helpful  Exercise regularly, but avoid strenuous exercise within 2-3 hours of bedtime. Morning exercise is recommended as it helps regulate the sleep-wake cycle  Relaxation Techniques  Engage in relaxing activities before bedtime, such as reading a book, listening to music, or taking a warm bath  Avoid mentally stimulating activities close to bedtime  Managing Sleep Difficulties  If you can't fall asleep within 20-30 minutes, get up and do something calming in another room until you feel sleepy. Avoid bright lights and stimulating activities during this time  Limit naps to less than one hour and avoid napping after 3 p.m.  Electronic Devices  Stop using electronic devices (computers, smartphones, TVs) before going to bed, as they can stimulate the brain and make falling asleep difficult  Consistency  Consistency is key. Find what works best for you and maintain these habits to improve sleep quality over time        -Please take medications as prescribed  -Refrain from alcohol or drug

## 2025-06-03 DIAGNOSIS — F17.210 CIGARETTE NICOTINE DEPENDENCE WITHOUT COMPLICATION: ICD-10-CM

## 2025-06-03 DIAGNOSIS — F90.8 ADHD, ADULT RESIDUAL TYPE: ICD-10-CM

## 2025-06-03 RX ORDER — METHYLPHENIDATE HYDROCHLORIDE 27 MG/1
27 TABLET ORAL DAILY
Qty: 30 TABLET | Refills: 0 | Status: SHIPPED | OUTPATIENT
Start: 2025-06-05 | End: 2025-07-05

## 2025-06-03 RX ORDER — VARENICLINE TARTRATE 0.5 (11)-1
KIT ORAL
Refills: 0 | OUTPATIENT
Start: 2025-06-03

## 2025-06-03 NOTE — TELEPHONE ENCOUNTER
Az Long left a voicemail requesting a refill on the following medications:  Requested Prescriptions     Pending Prescriptions Disp Refills    methylphenidate (CONCERTA) 27 MG extended release tablet 30 tablet 0     Sig: Take 1 tablet by mouth daily for 30 days. Max Daily Amount: 27 mg       Date of last visit: 5/14/2025  Date of next visit (if applicable):8/18/2025  Date Rx last sent by provider: 02/26/2025 with a start date of 04/26/2025  Pharmacy Name: [facility]

## 2025-06-03 NOTE — TELEPHONE ENCOUNTER
Recent Visits  Date Type Provider Dept   05/13/25 Office Visit Javon Aaron, DO [facility]   02/28/25 Office Visit Javon Aaron, DO [facility]   02/12/25 Office Visit Javon Aaron, DO [facility]   08/12/24 Office Visit Javon Aaron, DO [facility]   02/12/24 Office Visit Javon Aaron, DO [facility]   01/05/24 Office Visit Javon Aaron, DO [facility]   Showing recent visits within past 540 days with a meds authorizing provider and meeting all other requirements  Future Appointments  No visits were found meeting these conditions.  Showing future appointments within next 150 days with a meds authorizing provider and meeting all other requirements

## 2025-06-17 DIAGNOSIS — F25.0 SCHIZOAFFECTIVE DISORDER, BIPOLAR TYPE (HCC): ICD-10-CM

## 2025-06-17 RX ORDER — VORTIOXETINE 20 MG/1
20 TABLET, FILM COATED ORAL DAILY
Qty: 90 TABLET | Refills: 1 | Status: SHIPPED | OUTPATIENT
Start: 2025-06-17

## 2025-06-17 NOTE — TELEPHONE ENCOUNTER
Qian is requesting a medication refill on Az's behalf for Trintellix 20mg;#90 with 1 refill;last with a start date of 11/14/24.    Medication is pending your approval for a 90 day supply with 1 refill; please advise otherwise. He is scheduled to return on 08/18/25. Last seen on 05/14/25.

## 2025-06-18 DIAGNOSIS — F17.210 CIGARETTE NICOTINE DEPENDENCE WITHOUT COMPLICATION: ICD-10-CM

## 2025-06-18 RX ORDER — VARENICLINE TARTRATE 1 MG/1
1 TABLET, FILM COATED ORAL 2 TIMES DAILY
Qty: 60 TABLET | Refills: 1 | Status: SHIPPED | OUTPATIENT
Start: 2025-06-18 | End: 2025-08-17

## 2025-06-18 RX ORDER — VARENICLINE TARTRATE 0.5 (11)-1
KIT ORAL
Refills: 0 | OUTPATIENT
Start: 2025-06-18

## 2025-06-18 NOTE — TELEPHONE ENCOUNTER
Recent Visits  Date Type Provider Dept   05/13/25 Office Visit Javon Aaron, DO [facility]   02/28/25 Office Visit Javon Aaron, DO [facility]   02/12/25 Office Visit Javon Aaorn, DO [facility]   08/12/24 Office Visit Javon Aaron, DO [facility]   02/12/24 Office Visit Javon Aaron, DO [facility]   01/05/24 Office Visit Javon Aaron, DO [facility]   Showing recent visits within past 540 days with a meds authorizing provider and meeting all other requirements  Future Appointments  Date Type Provider Dept   11/12/25 Appointment Javon Aaron, DO [facility]   Showing future appointments within next 150 days with a meds authorizing provider and meeting all other requirements

## 2025-07-02 DIAGNOSIS — F90.8 ADHD, ADULT RESIDUAL TYPE: ICD-10-CM

## 2025-07-02 RX ORDER — METHYLPHENIDATE HYDROCHLORIDE 27 MG/1
27 TABLET ORAL DAILY
Qty: 30 TABLET | Refills: 0 | Status: SHIPPED | OUTPATIENT
Start: 2025-07-06 | End: 2025-08-05

## 2025-07-02 NOTE — TELEPHONE ENCOUNTER
Az called into the office leaving a VM stating that he needs a refill on Concerta 27mg;#30 with 0 refills;last with a start date of 06/04/25.    Medication is pending your approval for a 30 day supply with 0 refills; he is scheduled to return on 08/18/25. Last seen on 05/14/25.

## 2025-07-08 DIAGNOSIS — F25.0 SCHIZOAFFECTIVE DISORDER, BIPOLAR TYPE (HCC): ICD-10-CM

## 2025-07-08 RX ORDER — LAMOTRIGINE 150 MG/1
TABLET ORAL
Qty: 60 TABLET | Refills: 5 | Status: SHIPPED | OUTPATIENT
Start: 2025-07-08

## 2025-08-06 DIAGNOSIS — F90.8 ADHD, ADULT RESIDUAL TYPE: ICD-10-CM

## 2025-08-07 RX ORDER — METHYLPHENIDATE HYDROCHLORIDE 27 MG/1
27 TABLET ORAL DAILY
Qty: 30 TABLET | Refills: 0 | Status: SHIPPED | OUTPATIENT
Start: 2025-08-07 | End: 2025-09-06

## 2025-08-18 ASSESSMENT — ANXIETY QUESTIONNAIRES
IF YOU CHECKED OFF ANY PROBLEMS ON THIS QUESTIONNAIRE, HOW DIFFICULT HAVE THESE PROBLEMS MADE IT FOR YOU TO DO YOUR WORK, TAKE CARE OF THINGS AT HOME, OR GET ALONG WITH OTHER PEOPLE: VERY DIFFICULT
IF YOU CHECKED OFF ANY PROBLEMS ON THIS QUESTIONNAIRE, HOW DIFFICULT HAVE THESE PROBLEMS MADE IT FOR YOU TO DO YOUR WORK, TAKE CARE OF THINGS AT HOME, OR GET ALONG WITH OTHER PEOPLE: VERY DIFFICULT
1. FEELING NERVOUS, ANXIOUS, OR ON EDGE: MORE THAN HALF THE DAYS
1. FEELING NERVOUS, ANXIOUS, OR ON EDGE: MORE THAN HALF THE DAYS
7. FEELING AFRAID AS IF SOMETHING AWFUL MIGHT HAPPEN: SEVERAL DAYS
4. TROUBLE RELAXING: MORE THAN HALF THE DAYS
5. BEING SO RESTLESS THAT IT IS HARD TO SIT STILL: NEARLY EVERY DAY
3. WORRYING TOO MUCH ABOUT DIFFERENT THINGS: MORE THAN HALF THE DAYS
6. BECOMING EASILY ANNOYED OR IRRITABLE: NEARLY EVERY DAY
2. NOT BEING ABLE TO STOP OR CONTROL WORRYING: MORE THAN HALF THE DAYS
6. BECOMING EASILY ANNOYED OR IRRITABLE: NEARLY EVERY DAY
3. WORRYING TOO MUCH ABOUT DIFFERENT THINGS: MORE THAN HALF THE DAYS
GAD7 TOTAL SCORE: 15
5. BEING SO RESTLESS THAT IT IS HARD TO SIT STILL: NEARLY EVERY DAY
2. NOT BEING ABLE TO STOP OR CONTROL WORRYING: MORE THAN HALF THE DAYS
7. FEELING AFRAID AS IF SOMETHING AWFUL MIGHT HAPPEN: SEVERAL DAYS
4. TROUBLE RELAXING: MORE THAN HALF THE DAYS

## 2025-08-18 ASSESSMENT — PATIENT HEALTH QUESTIONNAIRE - PHQ9
10. IF YOU CHECKED OFF ANY PROBLEMS, HOW DIFFICULT HAVE THESE PROBLEMS MADE IT FOR YOU TO DO YOUR WORK, TAKE CARE OF THINGS AT HOME, OR GET ALONG WITH OTHER PEOPLE: VERY DIFFICULT
4. FEELING TIRED OR HAVING LITTLE ENERGY: NEARLY EVERY DAY
6. FEELING BAD ABOUT YOURSELF - OR THAT YOU ARE A FAILURE OR HAVE LET YOURSELF OR YOUR FAMILY DOWN: MORE THAN HALF THE DAYS
1. LITTLE INTEREST OR PLEASURE IN DOING THINGS: NEARLY EVERY DAY
SUM OF ALL RESPONSES TO PHQ QUESTIONS 1-9: 23
SUM OF ALL RESPONSES TO PHQ QUESTIONS 1-9: 23
7. TROUBLE CONCENTRATING ON THINGS, SUCH AS READING THE NEWSPAPER OR WATCHING TELEVISION: NEARLY EVERY DAY
3. TROUBLE FALLING OR STAYING ASLEEP: NEARLY EVERY DAY
8. MOVING OR SPEAKING SO SLOWLY THAT OTHER PEOPLE COULD HAVE NOTICED. OR THE OPPOSITE - BEING SO FIDGETY OR RESTLESS THAT YOU HAVE BEEN MOVING AROUND A LOT MORE THAN USUAL: MORE THAN HALF THE DAYS
SUM OF ALL RESPONSES TO PHQ QUESTIONS 1-9: 23
1. LITTLE INTEREST OR PLEASURE IN DOING THINGS: NEARLY EVERY DAY
6. FEELING BAD ABOUT YOURSELF - OR THAT YOU ARE A FAILURE OR HAVE LET YOURSELF OR YOUR FAMILY DOWN: MORE THAN HALF THE DAYS
9. THOUGHTS THAT YOU WOULD BE BETTER OFF DEAD, OR OF HURTING YOURSELF: SEVERAL DAYS
7. TROUBLE CONCENTRATING ON THINGS, SUCH AS READING THE NEWSPAPER OR WATCHING TELEVISION: NEARLY EVERY DAY
4. FEELING TIRED OR HAVING LITTLE ENERGY: NEARLY EVERY DAY
3. TROUBLE FALLING OR STAYING ASLEEP: NEARLY EVERY DAY
5. POOR APPETITE OR OVEREATING: NEARLY EVERY DAY
9. THOUGHTS THAT YOU WOULD BE BETTER OFF DEAD, OR OF HURTING YOURSELF: SEVERAL DAYS
SUM OF ALL RESPONSES TO PHQ QUESTIONS 1-9: 22
2. FEELING DOWN, DEPRESSED OR HOPELESS: NEARLY EVERY DAY
2. FEELING DOWN, DEPRESSED OR HOPELESS: NEARLY EVERY DAY
10. IF YOU CHECKED OFF ANY PROBLEMS, HOW DIFFICULT HAVE THESE PROBLEMS MADE IT FOR YOU TO DO YOUR WORK, TAKE CARE OF THINGS AT HOME, OR GET ALONG WITH OTHER PEOPLE: VERY DIFFICULT
8. MOVING OR SPEAKING SO SLOWLY THAT OTHER PEOPLE COULD HAVE NOTICED. OR THE OPPOSITE, BEING SO FIGETY OR RESTLESS THAT YOU HAVE BEEN MOVING AROUND A LOT MORE THAN USUAL: MORE THAN HALF THE DAYS
5. POOR APPETITE OR OVEREATING: NEARLY EVERY DAY
SUM OF ALL RESPONSES TO PHQ QUESTIONS 1-9: 23

## 2025-08-18 ASSESSMENT — COLUMBIA-SUICIDE SEVERITY RATING SCALE - C-SSRS
7. DID THIS OCCUR IN THE LAST THREE MONTHS: NO
2. IN THE PAST MONTH, HAVE YOU ACTUALLY HAD ANY THOUGHTS OF KILLING YOURSELF?: NO
1. IN THE PAST MONTH, HAVE YOU WISHED YOU WERE DEAD OR WISHED YOU COULD GO TO SLEEP AND NOT WAKE UP?: YES
6. IN YOUR LIFETIME, HAVE YOU EVER DONE ANYTHING, STARTED TO DO ANYTHING, OR PREPARED TO DO ANYTHING TO END YOUR LIFE?: YES

## 2025-08-20 ENCOUNTER — TELEMEDICINE (OUTPATIENT)
Dept: PSYCHIATRY | Age: 55
End: 2025-08-20
Payer: COMMERCIAL

## 2025-08-20 DIAGNOSIS — F90.8 ADHD, ADULT RESIDUAL TYPE: ICD-10-CM

## 2025-08-20 DIAGNOSIS — G24.01 TARDIVE DYSKINESIA: ICD-10-CM

## 2025-08-20 DIAGNOSIS — F25.0 SCHIZOAFFECTIVE DISORDER, BIPOLAR TYPE (HCC): Primary | ICD-10-CM

## 2025-08-20 DIAGNOSIS — F17.210 TOBACCO DEPENDENCE DUE TO CIGARETTES: ICD-10-CM

## 2025-08-20 DIAGNOSIS — G47.00 INSOMNIA, UNSPECIFIED TYPE: ICD-10-CM

## 2025-08-20 PROCEDURE — 99214 OFFICE O/P EST MOD 30 MIN: CPT | Performed by: PSYCHIATRY & NEUROLOGY

## 2025-08-20 PROCEDURE — 3017F COLORECTAL CA SCREEN DOC REV: CPT | Performed by: PSYCHIATRY & NEUROLOGY

## 2025-08-20 PROCEDURE — G8427 DOCREV CUR MEDS BY ELIG CLIN: HCPCS | Performed by: PSYCHIATRY & NEUROLOGY

## 2025-08-20 RX ORDER — AMANTADINE HYDROCHLORIDE 100 MG/1
100 TABLET ORAL 3 TIMES DAILY
COMMUNITY
Start: 2025-08-12

## 2025-09-03 ENCOUNTER — LAB (OUTPATIENT)
Dept: LAB | Age: 55
End: 2025-09-03

## 2025-09-03 DIAGNOSIS — N40.1 BENIGN PROSTATIC HYPERPLASIA WITH LOWER URINARY TRACT SYMPTOMS, SYMPTOM DETAILS UNSPECIFIED: ICD-10-CM

## 2025-09-03 LAB — PSA SERPL-MCNC: 0.08 NG/ML (ref 0–1)

## 2025-09-04 ENCOUNTER — OFFICE VISIT (OUTPATIENT)
Dept: FAMILY MEDICINE CLINIC | Age: 55
End: 2025-09-04
Payer: COMMERCIAL

## 2025-09-04 VITALS
WEIGHT: 230.4 LBS | BODY MASS INDEX: 36.16 KG/M2 | RESPIRATION RATE: 16 BRPM | SYSTOLIC BLOOD PRESSURE: 132 MMHG | DIASTOLIC BLOOD PRESSURE: 80 MMHG | HEIGHT: 67 IN | HEART RATE: 81 BPM | OXYGEN SATURATION: 96 % | TEMPERATURE: 97.9 F

## 2025-09-04 DIAGNOSIS — K01.1 DENTAL IMPACTION: ICD-10-CM

## 2025-09-04 DIAGNOSIS — K04.7 DENTAL INFECTION: Primary | ICD-10-CM

## 2025-09-04 PROCEDURE — 3079F DIAST BP 80-89 MM HG: CPT | Performed by: FAMILY MEDICINE

## 2025-09-04 PROCEDURE — 4004F PT TOBACCO SCREEN RCVD TLK: CPT | Performed by: FAMILY MEDICINE

## 2025-09-04 PROCEDURE — G8427 DOCREV CUR MEDS BY ELIG CLIN: HCPCS | Performed by: FAMILY MEDICINE

## 2025-09-04 PROCEDURE — G8417 CALC BMI ABV UP PARAM F/U: HCPCS | Performed by: FAMILY MEDICINE

## 2025-09-04 PROCEDURE — 99213 OFFICE O/P EST LOW 20 MIN: CPT | Performed by: FAMILY MEDICINE

## 2025-09-04 PROCEDURE — 3017F COLORECTAL CA SCREEN DOC REV: CPT | Performed by: FAMILY MEDICINE

## 2025-09-04 PROCEDURE — 3075F SYST BP GE 130 - 139MM HG: CPT | Performed by: FAMILY MEDICINE

## (undated) DEVICE — GLOVE ORANGE PI 7 1/2   MSG9075

## (undated) DEVICE — SOLUTION IRRIG 3000ML 0.9% SOD CHL USP UROMATIC PLAS CONT

## (undated) DEVICE — SYRINGE, LUER LOCK, 60ML: Brand: MEDLINE

## (undated) DEVICE — CYSTO PACK: Brand: MEDLINE INDUSTRIES, INC.

## (undated) DEVICE — SOLUTION IV IRRIG WATER 1000ML POUR BRL 2F7114